# Patient Record
Sex: FEMALE | Race: WHITE | NOT HISPANIC OR LATINO | Employment: OTHER | URBAN - METROPOLITAN AREA
[De-identification: names, ages, dates, MRNs, and addresses within clinical notes are randomized per-mention and may not be internally consistent; named-entity substitution may affect disease eponyms.]

---

## 2017-01-25 DIAGNOSIS — Z12.31 ENCOUNTER FOR SCREENING MAMMOGRAM FOR MALIGNANT NEOPLASM OF BREAST: ICD-10-CM

## 2017-01-27 ENCOUNTER — HOSPITAL ENCOUNTER (OUTPATIENT)
Dept: RADIOLOGY | Age: 62
Discharge: HOME | End: 2017-01-27
Payer: COMMERCIAL

## 2017-01-27 DIAGNOSIS — Z12.31 ENCOUNTER FOR SCREENING MAMMOGRAM FOR MALIGNANT NEOPLASM OF BREAST: ICD-10-CM

## 2017-01-27 PROCEDURE — G0202 SCR MAMMO BI INCL CAD: HCPCS

## 2017-02-28 ENCOUNTER — ALLSCRIPTS OFFICE VISIT (OUTPATIENT)
Dept: OTHER | Facility: OTHER | Age: 62
End: 2017-02-28

## 2017-05-02 ENCOUNTER — GENERIC CONVERSION - ENCOUNTER (OUTPATIENT)
Dept: OTHER | Facility: OTHER | Age: 62
End: 2017-05-02

## 2017-06-20 ENCOUNTER — ALLSCRIPTS OFFICE VISIT (OUTPATIENT)
Dept: OTHER | Facility: OTHER | Age: 62
End: 2017-06-20

## 2017-08-11 ENCOUNTER — LAB CONVERSION - ENCOUNTER (OUTPATIENT)
Dept: OTHER | Facility: OTHER | Age: 62
End: 2017-08-11

## 2017-08-11 ENCOUNTER — GENERIC CONVERSION - ENCOUNTER (OUTPATIENT)
Dept: OTHER | Facility: OTHER | Age: 62
End: 2017-08-11

## 2017-08-11 LAB
A/G RATIO (HISTORICAL): 2.4 (CALC) (ref 1–2.5)
ALBUMIN SERPL BCP-MCNC: 4.4 G/DL (ref 3.6–5.1)
ALP SERPL-CCNC: 60 U/L (ref 33–130)
AST SERPL W P-5'-P-CCNC: 19 U/L (ref 10–35)
BILIRUB SERPL-MCNC: 2 MG/DL (ref 0.2–1.2)
BILIRUBIN DIRECT (HISTORICAL): 0.4 MG/DL
BUN SERPL-MCNC: 12 MG/DL (ref 7–25)
BUN/CREA RATIO (HISTORICAL): ABNORMAL (CALC) (ref 6–22)
CALCIUM SERPL-MCNC: 9.4 MG/DL (ref 8.6–10.4)
CHLORIDE SERPL-SCNC: 105 MMOL/L (ref 98–110)
CHOLEST SERPL-MCNC: 161 MG/DL (ref 125–200)
CHOLEST/HDLC SERPL: 2.1 (CALC)
CO2 SERPL-SCNC: 29 MMOL/L (ref 20–31)
CREAT SERPL-MCNC: 0.84 MG/DL (ref 0.5–0.99)
DEPRECATED RDW RBC AUTO: 12.9 % (ref 11–15)
EGFR AFRICAN AMERICAN (HISTORICAL): 87 ML/MIN/1.73M2
EGFR-AMERICAN CALC (HISTORICAL): 75 ML/MIN/1.73M2
GAMMA GLOBULIN (HISTORICAL): 1.8 G/DL (CALC) (ref 1.9–3.7)
GLUCOSE (HISTORICAL): 87 MG/DL (ref 65–99)
HCT VFR BLD AUTO: 38.3 % (ref 35–45)
HDLC SERPL-MCNC: 76 MG/DL
HGB BLD-MCNC: 12.8 G/DL (ref 11.7–15.5)
LDL CHOLESTEROL (HISTORICAL): 68 MG/DL (CALC)
MCH RBC QN AUTO: 30.5 PG (ref 27–33)
MCHC RBC AUTO-ENTMCNC: 33.4 G/DL (ref 32–36)
MCV RBC AUTO: 91.2 FL (ref 80–100)
NON-HDL-CHOL (CHOL-HDL) (HISTORICAL): 85 MG/DL (CALC)
PLATELET # BLD AUTO: 233 THOUSAND/UL (ref 140–400)
PMV BLD AUTO: 11.3 FL (ref 7.5–12.5)
POTASSIUM SERPL-SCNC: 4.1 MMOL/L (ref 3.5–5.3)
RBC # BLD AUTO: 4.2 MILLION/UL (ref 3.8–5.1)
SODIUM SERPL-SCNC: 143 MMOL/L (ref 135–146)
TOTAL PROTEIN (HISTORICAL): 6.2 G/DL (ref 6.1–8.1)
TRIGL SERPL-MCNC: 85 MG/DL
WBC # BLD AUTO: 4.8 THOUSAND/UL (ref 3.8–10.8)

## 2017-08-21 DIAGNOSIS — R68.83 CHILLS (WITHOUT FEVER): ICD-10-CM

## 2017-08-21 DIAGNOSIS — M85.80 OTHER SPECIFIED DISORDERS OF BONE DENSITY AND STRUCTURE, UNSPECIFIED SITE: ICD-10-CM

## 2017-08-21 DIAGNOSIS — R17 JAUNDICE: ICD-10-CM

## 2017-08-21 DIAGNOSIS — E78.5 HYPERLIPIDEMIA: ICD-10-CM

## 2017-08-22 ENCOUNTER — ALLSCRIPTS OFFICE VISIT (OUTPATIENT)
Dept: OTHER | Facility: OTHER | Age: 62
End: 2017-08-22

## 2017-09-23 ENCOUNTER — LAB CONVERSION - ENCOUNTER (OUTPATIENT)
Dept: OTHER | Facility: OTHER | Age: 62
End: 2017-09-23

## 2017-09-23 LAB
A/G RATIO (HISTORICAL): 2.3 (CALC) (ref 1–2.5)
ALBUMIN SERPL BCP-MCNC: 4.3 G/DL (ref 3.6–5.1)
ALP SERPL-CCNC: 75 U/L (ref 33–130)
ALT SERPL W P-5'-P-CCNC: 20 U/L (ref 6–29)
AST SERPL W P-5'-P-CCNC: 19 U/L (ref 10–35)
BASOPHILS # BLD AUTO: 0.5 %
BASOPHILS # BLD AUTO: 29 CELLS/UL (ref 0–200)
BILIRUB SERPL-MCNC: 1.3 MG/DL (ref 0.2–1.2)
BUN SERPL-MCNC: 14 MG/DL (ref 7–25)
BUN/CREA RATIO (HISTORICAL): ABNORMAL (CALC) (ref 6–22)
CALCIUM SERPL-MCNC: 9.5 MG/DL (ref 8.6–10.4)
CHLORIDE SERPL-SCNC: 106 MMOL/L (ref 98–110)
CO2 SERPL-SCNC: 30 MMOL/L (ref 20–31)
CREAT SERPL-MCNC: 0.85 MG/DL (ref 0.5–0.99)
DEPRECATED RDW RBC AUTO: 12.7 % (ref 11–15)
EGFR AFRICAN AMERICAN (HISTORICAL): 86 ML/MIN/1.73M2
EGFR-AMERICAN CALC (HISTORICAL): 74 ML/MIN/1.73M2
EOSINOPHIL # BLD AUTO: 0.7 %
EOSINOPHIL # BLD AUTO: 40 CELLS/UL (ref 15–500)
ERYTHROCYTE SEDIMENTATION RATE (HISTORICAL): 2 MM/H
GAMMA GLOBULIN (HISTORICAL): 1.9 G/DL (CALC) (ref 1.9–3.7)
GLUCOSE (HISTORICAL): 89 MG/DL (ref 65–99)
HCT VFR BLD AUTO: 37.5 % (ref 35–45)
HGB BLD-MCNC: 12.6 G/DL (ref 11.7–15.5)
LYMPHOCYTES # BLD AUTO: 1841 CELLS/UL (ref 850–3900)
LYMPHOCYTES # BLD AUTO: 32.3 %
MCH RBC QN AUTO: 30.4 PG (ref 27–33)
MCHC RBC AUTO-ENTMCNC: 33.6 G/DL (ref 32–36)
MCV RBC AUTO: 90.6 FL (ref 80–100)
METANEPHRINE FREE PLASMA (HISTORICAL): 52 PG/ML
MONOCYTES # BLD AUTO: 371 CELLS/UL (ref 200–950)
MONOCYTES (HISTORICAL): 6.5 %
NEUTROPHILS # BLD AUTO: 3420 CELLS/UL (ref 1500–7800)
NEUTROPHILS # BLD AUTO: 60 %
NORMETANEPHRINE FREE PLASMA (HISTORICAL): 145 PG/ML
PLATELET # BLD AUTO: 229 THOUSAND/UL (ref 140–400)
PMV BLD AUTO: 11.1 FL (ref 7.5–12.5)
POTASSIUM SERPL-SCNC: 4.3 MMOL/L (ref 3.5–5.3)
RBC # BLD AUTO: 4.14 MILLION/UL (ref 3.8–5.1)
SODIUM SERPL-SCNC: 142 MMOL/L (ref 135–146)
T4 FREE SERPL-MCNC: 1.2 NG/DL (ref 0.8–1.8)
TOTAL PROTEIN (HISTORICAL): 6.2 G/DL (ref 6.1–8.1)
TOTAL, FREE (MN+NMN) (HISTORICAL): 197 PG/ML
TSH SERPL DL<=0.05 MIU/L-ACNC: 0.36 MIU/L (ref 0.4–4.5)
WBC # BLD AUTO: 5.7 THOUSAND/UL (ref 3.8–10.8)

## 2017-09-25 ENCOUNTER — GENERIC CONVERSION - ENCOUNTER (OUTPATIENT)
Dept: OTHER | Facility: OTHER | Age: 62
End: 2017-09-25

## 2017-10-30 ENCOUNTER — HOSPITAL ENCOUNTER (INPATIENT)
Facility: HOSPITAL | Age: 62
LOS: 6 days | Discharge: HOME | DRG: 749 | End: 2017-11-05
Attending: EMERGENCY MEDICINE | Admitting: SURGERY
Payer: COMMERCIAL

## 2017-10-30 ENCOUNTER — APPOINTMENT (EMERGENCY)
Dept: CT IMAGING | Facility: HOSPITAL | Age: 62
DRG: 749 | End: 2017-10-30
Payer: COMMERCIAL

## 2017-10-30 ENCOUNTER — ANESTHESIA EVENT (INPATIENT)
Dept: PERIOP | Facility: HOSPITAL | Age: 62
DRG: 749 | End: 2017-10-30
Payer: COMMERCIAL

## 2017-10-30 ENCOUNTER — ANESTHESIA (INPATIENT)
Dept: PERIOP | Facility: HOSPITAL | Age: 62
DRG: 749 | End: 2017-10-30
Payer: COMMERCIAL

## 2017-10-30 DIAGNOSIS — K56.601 COMPLETE INTESTINAL OBSTRUCTION, UNSPECIFIED CAUSE (HCC): Primary | ICD-10-CM

## 2017-10-30 DIAGNOSIS — K56.609 SBO (SMALL BOWEL OBSTRUCTION) (HCC): ICD-10-CM

## 2017-10-30 LAB
ALBUMIN SERPL BCP-MCNC: 3.4 G/DL (ref 3.5–5)
ALP SERPL-CCNC: 83 U/L (ref 46–116)
ALT SERPL W P-5'-P-CCNC: 24 U/L (ref 12–78)
ANION GAP SERPL CALCULATED.3IONS-SCNC: 21 MMOL/L (ref 4–13)
AST SERPL W P-5'-P-CCNC: 18 U/L (ref 5–45)
ATRIAL RATE: 86 BPM
BASE EX.OXY STD BLDV CALC-SCNC: 75.8 % (ref 60–80)
BASE EXCESS BLDV CALC-SCNC: -8.6 MMOL/L
BASOPHILS # BLD AUTO: 0 THOUSANDS/ΜL (ref 0–0.1)
BASOPHILS NFR BLD AUTO: 0 % (ref 0–1)
BILIRUB SERPL-MCNC: 2.2 MG/DL (ref 0.2–1)
BUN SERPL-MCNC: 82 MG/DL (ref 5–25)
CALCIUM SERPL-MCNC: 9.2 MG/DL (ref 8.3–10.1)
CHLORIDE SERPL-SCNC: 94 MMOL/L (ref 100–108)
CO2 SERPL-SCNC: 18 MMOL/L (ref 21–32)
CREAT SERPL-MCNC: 4.56 MG/DL (ref 0.6–1.3)
EOSINOPHIL # BLD AUTO: 0.29 THOUSAND/ΜL (ref 0–0.61)
EOSINOPHIL NFR BLD AUTO: 3 % (ref 0–6)
ERYTHROCYTE [DISTWIDTH] IN BLOOD BY AUTOMATED COUNT: 13 % (ref 11.6–15.1)
GFR SERPL CREATININE-BSD FRML MDRD: 10 ML/MIN/1.73SQ M
GLUCOSE SERPL-MCNC: 135 MG/DL (ref 65–140)
HCO3 BLDV-SCNC: 16.2 MMOL/L (ref 24–30)
HCT VFR BLD AUTO: 43 % (ref 34.8–46.1)
HGB BLD-MCNC: 14.8 G/DL (ref 11.5–15.4)
HOLD SPECIMEN: NORMAL
LACTATE SERPL-SCNC: 1.7 MMOL/L (ref 0.5–2)
LIPASE SERPL-CCNC: 104 U/L (ref 73–393)
LYMPHOCYTES # BLD AUTO: 0.45 THOUSANDS/ΜL (ref 0.6–4.47)
LYMPHOCYTES NFR BLD AUTO: 5 % (ref 14–44)
MCH RBC QN AUTO: 29.8 PG (ref 26.8–34.3)
MCHC RBC AUTO-ENTMCNC: 34.4 G/DL (ref 31.4–37.4)
MCV RBC AUTO: 87 FL (ref 82–98)
MONOCYTES # BLD AUTO: 0.35 THOUSAND/ΜL (ref 0.17–1.22)
MONOCYTES NFR BLD AUTO: 4 % (ref 4–12)
NEUTROPHILS # BLD AUTO: 7.85 THOUSANDS/ΜL (ref 1.85–7.62)
NEUTS SEG NFR BLD AUTO: 88 % (ref 43–75)
O2 CT BLDV-SCNC: 16.5 ML/DL
P AXIS: 56 DEGREES
PCO2 BLDV: 32 MM HG (ref 42–50)
PH BLDV: 7.32 [PH] (ref 7.3–7.4)
PLATELET # BLD AUTO: 166 THOUSANDS/UL (ref 149–390)
PMV BLD AUTO: 12 FL (ref 8.9–12.7)
PO2 BLDV: 44.6 MM HG (ref 35–45)
POTASSIUM SERPL-SCNC: 3.3 MMOL/L (ref 3.5–5.3)
PR INTERVAL: 152 MS
PROT SERPL-MCNC: 7.7 G/DL (ref 6.4–8.2)
QRS AXIS: 18 DEGREES
QRSD INTERVAL: 86 MS
QT INTERVAL: 338 MS
QTC INTERVAL: 404 MS
RBC # BLD AUTO: 4.97 MILLION/UL (ref 3.81–5.12)
SODIUM SERPL-SCNC: 133 MMOL/L (ref 136–145)
T WAVE AXIS: 32 DEGREES
VENTRICULAR RATE: 86 BPM
WBC # BLD AUTO: 8.94 THOUSAND/UL (ref 4.31–10.16)

## 2017-10-30 PROCEDURE — 85025 COMPLETE CBC W/AUTO DIFF WBC: CPT | Performed by: EMERGENCY MEDICINE

## 2017-10-30 PROCEDURE — 0DTJ0ZZ RESECTION OF APPENDIX, OPEN APPROACH: ICD-10-PCS | Performed by: SURGERY

## 2017-10-30 PROCEDURE — 83690 ASSAY OF LIPASE: CPT | Performed by: EMERGENCY MEDICINE

## 2017-10-30 PROCEDURE — C9113 INJ PANTOPRAZOLE SODIUM, VIA: HCPCS | Performed by: SURGERY

## 2017-10-30 PROCEDURE — 36415 COLL VENOUS BLD VENIPUNCTURE: CPT | Performed by: EMERGENCY MEDICINE

## 2017-10-30 PROCEDURE — 83605 ASSAY OF LACTIC ACID: CPT | Performed by: EMERGENCY MEDICINE

## 2017-10-30 PROCEDURE — 96374 THER/PROPH/DIAG INJ IV PUSH: CPT

## 2017-10-30 PROCEDURE — 74176 CT ABD & PELVIS W/O CONTRAST: CPT

## 2017-10-30 PROCEDURE — 99285 EMERGENCY DEPT VISIT HI MDM: CPT

## 2017-10-30 PROCEDURE — 82805 BLOOD GASES W/O2 SATURATION: CPT | Performed by: EMERGENCY MEDICINE

## 2017-10-30 PROCEDURE — 80053 COMPREHEN METABOLIC PANEL: CPT | Performed by: EMERGENCY MEDICINE

## 2017-10-30 PROCEDURE — 88304 TISSUE EXAM BY PATHOLOGIST: CPT | Performed by: SURGERY

## 2017-10-30 PROCEDURE — 93005 ELECTROCARDIOGRAM TRACING: CPT

## 2017-10-30 PROCEDURE — 94762 N-INVAS EAR/PLS OXIMTRY CONT: CPT

## 2017-10-30 PROCEDURE — 96375 TX/PRO/DX INJ NEW DRUG ADDON: CPT

## 2017-10-30 PROCEDURE — 0W9J00Z DRAINAGE OF PELVIC CAVITY WITH DRAINAGE DEVICE, OPEN APPROACH: ICD-10-PCS | Performed by: SURGERY

## 2017-10-30 PROCEDURE — 96376 TX/PRO/DX INJ SAME DRUG ADON: CPT

## 2017-10-30 PROCEDURE — 94760 N-INVAS EAR/PLS OXIMETRY 1: CPT

## 2017-10-30 RX ORDER — ONDANSETRON 2 MG/ML
4 INJECTION INTRAMUSCULAR; INTRAVENOUS ONCE
Status: DISCONTINUED | OUTPATIENT
Start: 2017-10-30 | End: 2017-11-05 | Stop reason: HOSPADM

## 2017-10-30 RX ORDER — MAGNESIUM HYDROXIDE 1200 MG/15ML
LIQUID ORAL AS NEEDED
Status: DISCONTINUED | OUTPATIENT
Start: 2017-10-30 | End: 2017-10-30 | Stop reason: HOSPADM

## 2017-10-30 RX ORDER — SODIUM CHLORIDE 9 MG/ML
75 INJECTION, SOLUTION INTRAVENOUS CONTINUOUS
Status: DISCONTINUED | OUTPATIENT
Start: 2017-10-30 | End: 2017-11-02

## 2017-10-30 RX ORDER — ONDANSETRON 2 MG/ML
4 INJECTION INTRAMUSCULAR; INTRAVENOUS EVERY 4 HOURS PRN
Status: DISCONTINUED | OUTPATIENT
Start: 2017-10-30 | End: 2017-11-05 | Stop reason: HOSPADM

## 2017-10-30 RX ORDER — LIDOCAINE HYDROCHLORIDE 10 MG/ML
INJECTION, SOLUTION INFILTRATION; PERINEURAL AS NEEDED
Status: DISCONTINUED | OUTPATIENT
Start: 2017-10-30 | End: 2017-10-30 | Stop reason: SURG

## 2017-10-30 RX ORDER — CHOLECALCIFEROL (VITAMIN D3) 125 MCG
2000 CAPSULE ORAL DAILY
COMMUNITY

## 2017-10-30 RX ORDER — MIDAZOLAM HYDROCHLORIDE 1 MG/ML
INJECTION INTRAMUSCULAR; INTRAVENOUS
Status: DISPENSED
Start: 2017-10-30 | End: 2017-10-31

## 2017-10-30 RX ORDER — HEPARIN SODIUM 5000 [USP'U]/ML
5000 INJECTION, SOLUTION INTRAVENOUS; SUBCUTANEOUS EVERY 8 HOURS SCHEDULED
Status: DISCONTINUED | OUTPATIENT
Start: 2017-10-30 | End: 2017-11-05 | Stop reason: HOSPADM

## 2017-10-30 RX ORDER — ONDANSETRON 2 MG/ML
INJECTION INTRAMUSCULAR; INTRAVENOUS AS NEEDED
Status: DISCONTINUED | OUTPATIENT
Start: 2017-10-30 | End: 2017-10-30 | Stop reason: SURG

## 2017-10-30 RX ORDER — ATORVASTATIN CALCIUM 20 MG/1
20 TABLET, FILM COATED ORAL DAILY
COMMUNITY
End: 2018-02-27 | Stop reason: SDUPTHER

## 2017-10-30 RX ORDER — MIDAZOLAM HYDROCHLORIDE 1 MG/ML
INJECTION INTRAMUSCULAR; INTRAVENOUS AS NEEDED
Status: DISCONTINUED | OUTPATIENT
Start: 2017-10-30 | End: 2017-10-30 | Stop reason: SURG

## 2017-10-30 RX ORDER — FENTANYL CITRATE/PF 50 MCG/ML
25 SYRINGE (ML) INJECTION
Status: DISCONTINUED | OUTPATIENT
Start: 2017-10-30 | End: 2017-10-30 | Stop reason: HOSPADM

## 2017-10-30 RX ORDER — MORPHINE SULFATE 4 MG/ML
4 INJECTION, SOLUTION INTRAMUSCULAR; INTRAVENOUS ONCE
Status: COMPLETED | OUTPATIENT
Start: 2017-10-30 | End: 2017-10-30

## 2017-10-30 RX ORDER — CHLORAL HYDRATE 500 MG
4000 CAPSULE ORAL DAILY
COMMUNITY

## 2017-10-30 RX ORDER — SODIUM CHLORIDE, SODIUM LACTATE, POTASSIUM CHLORIDE, CALCIUM CHLORIDE 600; 310; 30; 20 MG/100ML; MG/100ML; MG/100ML; MG/100ML
INJECTION, SOLUTION INTRAVENOUS CONTINUOUS PRN
Status: DISCONTINUED | OUTPATIENT
Start: 2017-10-30 | End: 2017-10-30 | Stop reason: SURG

## 2017-10-30 RX ORDER — CEFAZOLIN SODIUM 1 G/3ML
INJECTION, POWDER, FOR SOLUTION INTRAMUSCULAR; INTRAVENOUS AS NEEDED
Status: DISCONTINUED | OUTPATIENT
Start: 2017-10-30 | End: 2017-10-30 | Stop reason: SURG

## 2017-10-30 RX ORDER — GLYCOPYRROLATE 0.2 MG/ML
INJECTION INTRAMUSCULAR; INTRAVENOUS AS NEEDED
Status: DISCONTINUED | OUTPATIENT
Start: 2017-10-30 | End: 2017-10-30 | Stop reason: SURG

## 2017-10-30 RX ORDER — SUCCINYLCHOLINE/SOD CL,ISO/PF 100 MG/5ML
SYRINGE (ML) INTRAVENOUS AS NEEDED
Status: DISCONTINUED | OUTPATIENT
Start: 2017-10-30 | End: 2017-10-30 | Stop reason: SURG

## 2017-10-30 RX ORDER — ONDANSETRON 2 MG/ML
4 INJECTION INTRAMUSCULAR; INTRAVENOUS ONCE AS NEEDED
Status: DISCONTINUED | OUTPATIENT
Start: 2017-10-30 | End: 2017-10-30 | Stop reason: HOSPADM

## 2017-10-30 RX ORDER — EPHEDRINE SULFATE 50 MG/ML
INJECTION, SOLUTION INTRAVENOUS AS NEEDED
Status: DISCONTINUED | OUTPATIENT
Start: 2017-10-30 | End: 2017-10-30 | Stop reason: SURG

## 2017-10-30 RX ORDER — PROPOFOL 10 MG/ML
INJECTION, EMULSION INTRAVENOUS AS NEEDED
Status: DISCONTINUED | OUTPATIENT
Start: 2017-10-30 | End: 2017-10-30 | Stop reason: SURG

## 2017-10-30 RX ORDER — PANTOPRAZOLE SODIUM 40 MG/1
40 INJECTION, POWDER, FOR SOLUTION INTRAVENOUS
Status: DISCONTINUED | OUTPATIENT
Start: 2017-10-30 | End: 2017-11-05 | Stop reason: HOSPADM

## 2017-10-30 RX ORDER — KETOROLAC TROMETHAMINE 30 MG/ML
INJECTION, SOLUTION INTRAMUSCULAR; INTRAVENOUS AS NEEDED
Status: DISCONTINUED | OUTPATIENT
Start: 2017-10-30 | End: 2017-10-30 | Stop reason: SURG

## 2017-10-30 RX ORDER — ASPIRIN 81 MG/1
81 TABLET, CHEWABLE ORAL DAILY
COMMUNITY

## 2017-10-30 RX ORDER — MORPHINE SULFATE 4 MG/ML
4 INJECTION, SOLUTION INTRAMUSCULAR; INTRAVENOUS ONCE
Status: DISCONTINUED | OUTPATIENT
Start: 2017-10-30 | End: 2017-10-30

## 2017-10-30 RX ADMIN — FENTANYL CITRATE 25 MCG: 50 INJECTION INTRAMUSCULAR; INTRAVENOUS at 15:30

## 2017-10-30 RX ADMIN — METRONIDAZOLE 500 MG: 500 INJECTION, SOLUTION INTRAVENOUS at 19:45

## 2017-10-30 RX ADMIN — FENTANYL CITRATE 25 MCG: 50 INJECTION INTRAMUSCULAR; INTRAVENOUS at 15:22

## 2017-10-30 RX ADMIN — FENTANYL CITRATE 25 MCG: 50 INJECTION INTRAMUSCULAR; INTRAVENOUS at 15:34

## 2017-10-30 RX ADMIN — SODIUM CHLORIDE 125 ML/HR: 0.9 INJECTION, SOLUTION INTRAVENOUS at 18:13

## 2017-10-30 RX ADMIN — GLYCOPYRROLATE 0.6 MG: 0.2 INJECTION, SOLUTION INTRAMUSCULAR; INTRAVENOUS at 14:45

## 2017-10-30 RX ADMIN — DEXAMETHASONE SODIUM PHOSPHATE 4 MG: 10 INJECTION INTRAMUSCULAR; INTRAVENOUS at 13:51

## 2017-10-30 RX ADMIN — MIDAZOLAM HYDROCHLORIDE 2 MG: 1 INJECTION, SOLUTION INTRAMUSCULAR; INTRAVENOUS at 13:42

## 2017-10-30 RX ADMIN — SODIUM CHLORIDE 1000 ML: 0.9 INJECTION, SOLUTION INTRAVENOUS at 10:02

## 2017-10-30 RX ADMIN — MORPHINE SULFATE 4 MG: 4 INJECTION INTRAVENOUS at 10:43

## 2017-10-30 RX ADMIN — METRONIDAZOLE 500 MG: 500 INJECTION, SOLUTION INTRAVENOUS at 12:26

## 2017-10-30 RX ADMIN — EPHEDRINE SULFATE 10 MG: 50 INJECTION, SOLUTION INTRAMUSCULAR; INTRAVENOUS; SUBCUTANEOUS at 13:54

## 2017-10-30 RX ADMIN — PANTOPRAZOLE SODIUM 40 MG: 40 INJECTION, POWDER, FOR SOLUTION INTRAVENOUS at 18:13

## 2017-10-30 RX ADMIN — PROPOFOL 150 MG: 10 INJECTION, EMULSION INTRAVENOUS at 13:47

## 2017-10-30 RX ADMIN — Medication 100 MG: at 13:47

## 2017-10-30 RX ADMIN — KETOROLAC TROMETHAMINE 30 MG: 30 INJECTION, SOLUTION INTRAMUSCULAR at 14:47

## 2017-10-30 RX ADMIN — MIDAZOLAM HYDROCHLORIDE 2 MG: 1 INJECTION, SOLUTION INTRAMUSCULAR; INTRAVENOUS at 12:55

## 2017-10-30 RX ADMIN — CEFAZOLIN SODIUM 2000 MG: 2 SOLUTION INTRAVENOUS at 11:47

## 2017-10-30 RX ADMIN — NEOSTIGMINE METHYLSULFATE 3 MG: 1 INJECTION, SOLUTION INTRAMUSCULAR; INTRAVENOUS; SUBCUTANEOUS at 14:45

## 2017-10-30 RX ADMIN — CEFAZOLIN SODIUM 1000 MG: 1 SOLUTION INTRAVENOUS at 18:14

## 2017-10-30 RX ADMIN — ONDANSETRON 4 MG: 2 INJECTION INTRAMUSCULAR; INTRAVENOUS at 13:51

## 2017-10-30 RX ADMIN — FENTANYL CITRATE 25 MCG: 50 INJECTION INTRAMUSCULAR; INTRAVENOUS at 15:17

## 2017-10-30 RX ADMIN — LIDOCAINE HYDROCHLORIDE 50 MG: 10 INJECTION, SOLUTION INFILTRATION; PERINEURAL at 13:47

## 2017-10-30 RX ADMIN — HYDROMORPHONE HYDROCHLORIDE 0.5 MG: 1 INJECTION, SOLUTION INTRAMUSCULAR; INTRAVENOUS; SUBCUTANEOUS at 14:49

## 2017-10-30 RX ADMIN — PROPOFOL 100 MG: 10 INJECTION, EMULSION INTRAVENOUS at 13:42

## 2017-10-30 RX ADMIN — CEFAZOLIN SODIUM 1000 MG: 1 INJECTION, POWDER, FOR SOLUTION INTRAMUSCULAR; INTRAVENOUS at 13:54

## 2017-10-30 RX ADMIN — SODIUM CHLORIDE 2000 ML: 0.9 INJECTION, SOLUTION INTRAVENOUS at 12:14

## 2017-10-30 RX ADMIN — HEPARIN SODIUM 5000 UNITS: 5000 INJECTION, SOLUTION INTRAVENOUS; SUBCUTANEOUS at 22:02

## 2017-10-30 RX ADMIN — SODIUM CHLORIDE, SODIUM LACTATE, POTASSIUM CHLORIDE, AND CALCIUM CHLORIDE: .6; .31; .03; .02 INJECTION, SOLUTION INTRAVENOUS at 13:42

## 2017-10-30 RX ADMIN — Medication: at 16:26

## 2017-10-30 RX ADMIN — MORPHINE SULFATE 4 MG: 4 INJECTION INTRAVENOUS at 10:12

## 2017-10-30 NOTE — PLAN OF CARE
METABOLIC, FLUID AND ELECTROLYTES - ADULT     Electrolytes maintained within normal limits Progressing     Fluid balance maintained Progressing        Nutrition/Hydration-ADULT     Nutrient/Hydration intake appropriate for improving, restoring or maintaining nutritional needs Progressing        PAIN - ADULT     Verbalizes/displays adequate comfort level or baseline comfort level Progressing        Potential for Falls     Patient will remain free of falls Progressing        Prexisting or High Potential for Compromised Skin Integrity     Skin integrity is maintained or improved Progressing        SKIN/TISSUE INTEGRITY - ADULT     Incision(s), wounds(s) or drain site(s) healing without S/S of infection Progressing

## 2017-10-30 NOTE — H&P
History and Physical -General Surgery  [facility] 65 [facility] MR[facility]: [id number]  Unit/Bed#: [facility] Encounter: [id number]        Reason for Consult / Principal Problem: Complete intestinal obstruction    HPI: Melanie Damon is a 65y o  year old female who presented to the ED with a 2 day history of BLQ abdominal pain  Yesterday the pain was 10/10  Associated with N/V/D  She denies fever/chills  SHe has not had any food since Friday  Cat scan findings reveal a high grade SBO most likely secondary to an internal hernia  Review of Systems   Constitutional: Positive for activity change and appetite change  Negative for chills and fever  Cardiovascular: Negative for chest pain  Gastrointestinal: Positive for abdominal distention, abdominal pain, diarrhea, nausea and vomiting  Negative for blood in stool  Skin: Negative for color change  Historical Information   Past Medical History:   Diagnosis Date    Diverticulosis     GERD (gastroesophageal reflux disease)     Hyperlipidemia      Past Surgical History:   Procedure Laterality Date    UTERINE FIBROID SURGERY       Social History   History   Alcohol Use No     History   Drug Use No     History   Smoking Status    Never Smoker   Smokeless Tobacco    Never Used     History reviewed  No pertinent family history      Meds/Allergies     Prescriptions Prior to Admission   Medication    aspirin 81 mg chewable tablet    atorvastatin (LIPITOR) 20 mg tablet    Cholecalciferol (VITAMIN D3) 2000 units TABS    Cyanocobalamin (VITAMIN B 12 PO)    Linaclotide 145 MCG CAPS    Multiple Vitamins-Calcium (ONE-A-DAY WOMENS FORMULA PO)    Omega-3 Fat Ac-Cholecalciferol (FLEX OMEGA BENEFITS/VIT D-3 PO)    omeprazole (PriLOSEC) 10 mg delayed release capsule     Current Facility-Administered Medications   Medication Dose Route Frequency    [MAR Hold] morphine (PF) 4 mg/mL injection 4 mg  4 mg Intravenous Once    [MAR Hold] ondansetron (ZOFRAN) injection 4 mg  4 mg Intravenous Once    [MAR Hold] sodium chloride 0 9 % bolus 1,000 mL  1,000 mL Intravenous Once    sodium chloride 0 9 % bolus 2,000 mL  2,000 mL Intravenous Once       No Known Allergies    Objective     Blood pressure 111/56, pulse 68, temperature (!) 97 °F (36 1 °C), temperature source Temporal, resp  rate 18, weight 67 kg (147 lb 12 8 oz), SpO2 98 %  Intake/Output Summary (Last 24 hours) at 10/30/17 1303  Last data filed at 10/30/17 1226   Gross per 24 hour   Intake             1050 ml   Output                0 ml   Net             1050 ml       PHYSICAL EXAM  General appearance: alert and no distress  Skin: Skin color, texture, turgor normal  No rashes or lesions  Head: Normocephalic, without obvious abnormality  Heart: regular rate and rhythm, S1, S2 normal, no murmur, click, rub or gallop  Lungs: clear to auscultation bilaterally  Abdomen: rounded and soft, tender throughout  no BS, no rebound or guarding     Neurological: normal without focal findings    Lab Results:   Admission on 10/30/2017   Component Date Value    WBC 10/30/2017 8 94     RBC 10/30/2017 4 97     Hemoglobin 10/30/2017 14 8     Hematocrit 10/30/2017 43 0     MCV 10/30/2017 87     MCH 10/30/2017 29 8     MCHC 10/30/2017 34 4     RDW 10/30/2017 13 0     MPV 10/30/2017 12 0     Platelets 02/36/3583 166     Neutrophils Relative 10/30/2017 88*    Lymphocytes Relative 10/30/2017 5*    Monocytes Relative 10/30/2017 4     Eosinophils Relative 10/30/2017 3     Basophils Relative 10/30/2017 0     Neutrophils Absolute 10/30/2017 7 85*    Lymphocytes Absolute 10/30/2017 0 45*    Monocytes Absolute 10/30/2017 0 35     Eosinophils Absolute 10/30/2017 0 29     Basophils Absolute 10/30/2017 0 00     Sodium 10/30/2017 133*    Potassium 10/30/2017 3 3*    Chloride 10/30/2017 94*    CO2 10/30/2017 18*    Anion Gap 10/30/2017 21*    BUN 10/30/2017 82*    Creatinine 10/30/2017 4 56*    Glucose 10/30/2017 135  Calcium 10/30/2017 9 2     AST 10/30/2017 18     ALT 10/30/2017 24     Alkaline Phosphatase 10/30/2017 83     Total Protein 10/30/2017 7 7     Albumin 10/30/2017 3 4*    Total Bilirubin 10/30/2017 2 20*    eGFR 10/30/2017 10     Lipase 10/30/2017 104     Extra Tube 10/30/2017 Hold for add-ons   pH, Rohit 10/30/2017 7  321     pCO2, Rohit 10/30/2017 32 0*    pO2, Rohit 10/30/2017 44 6     HCO3, Rohit 10/30/2017 16 2*    Base Excess, Rohit 10/30/2017 -8 6     O2 Content, Rohit 10/30/2017 16 5     O2 HGB, VENOUS 10/30/2017 75 8     LACTIC ACID 10/30/2017 1 7      Imaging Studies: I have personally reviewed pertinent reports  ASSESSMENT/PLAN:  Problem List     * (Principal)Complete intestinal obstruction       Assessment: 64 yo female with SBO secondary to an internal hernia     Plan:   NPO for OR today  IVF   IV abx  Consent obtained  NGT decompression   DHARA secondary to obstruction       IVF        Monitor BUN/CR        This patient will require > 2 night hospital stay  Counseling / Coordination of Care  Total time spent today  30 minutes  Greater than 50% of total time was spent with the patient and / or family counseling and / or coordination of care

## 2017-10-30 NOTE — ED PROVIDER NOTES
History  Chief Complaint   Patient presents with    Abdominal Pain     Pt  c/o abdominal pain since  Friday, vomiting and loose stools  Pt  told EMS she passed out on way to bathroom yesterday     65 yr  wf c/o lower abd pain with several epiwsdoes of non bloody vomiting and brown liquid non bloody stools fro 3 days-- no fevers--  No ill contacts- no travel- no other comps-         History provided by:  Patient   used: No        Prior to Admission Medications   Prescriptions Last Dose Informant Patient Reported? Taking? Cholecalciferol (VITAMIN D3) 2000 units TABS   Yes Yes   Sig: Take 2,000 Units by mouth daily   Cyanocobalamin (VITAMIN B 12 PO)   Yes Yes   Sig: Take 5,000 mcg by mouth daily   Linaclotide 145 MCG CAPS   Yes Yes   Sig: Take 145 mcg by mouth daily   Multiple Vitamins-Calcium (ONE-A-DAY WOMENS FORMULA PO)   Yes Yes   Sig: Take 1 tablet by mouth daily   Omega-3 Fat Ac-Cholecalciferol (FLEX OMEGA BENEFITS/VIT D-3 PO)   Yes Yes   Sig: Take 4 tablets by mouth daily   aspirin 81 mg chewable tablet   Yes Yes   Sig: Chew 81 mg daily   atorvastatin (LIPITOR) 20 mg tablet   Yes Yes   Sig: Take 20 mg by mouth daily   omeprazole (PriLOSEC) 10 mg delayed release capsule   Yes Yes   Sig: Take 10 mg by mouth daily      Facility-Administered Medications: None       Past Medical History:   Diagnosis Date    Diverticulosis     GERD (gastroesophageal reflux disease)     Hyperlipidemia        Past Surgical History:   Procedure Laterality Date    UTERINE FIBROID SURGERY         History reviewed  No pertinent family history  I have reviewed and agree with the history as documented  Social History   Substance Use Topics    Smoking status: Never Smoker    Smokeless tobacco: Never Used    Alcohol use No        Review of Systems   Constitutional: Positive for activity change, appetite change and fatigue  Negative for chills, diaphoresis, fever and unexpected weight change  HENT: Negative  Eyes: Negative  Respiratory: Negative  Cardiovascular: Negative  Gastrointestinal: Positive for abdominal distention, abdominal pain, diarrhea, nausea and vomiting  Negative for anal bleeding, blood in stool, constipation and rectal pain  Endocrine: Negative  Genitourinary: Negative  Musculoskeletal: Negative  Skin: Negative  Allergic/Immunologic: Negative  Neurological: Negative  Hematological: Negative  Psychiatric/Behavioral: Negative  Physical Exam  ED Triage Vitals   Temperature Pulse Respirations Blood Pressure SpO2   10/30/17 0933 10/30/17 0928 10/30/17 0928 10/30/17 0928 10/30/17 0928   98 1 °F (36 7 °C) 89 18 111/74 100 %      Temp Source Heart Rate Source Patient Position - Orthostatic VS BP Location FiO2 (%)   10/30/17 0933 10/30/17 0928 10/30/17 0928 10/30/17 0928 --   Oral Monitor Lying Right arm       Pain Score       10/30/17 1012       Worst Possible Pain           Orthostatic Vital Signs  Vitals:    10/30/17 0928 10/30/17 1000 10/30/17 1015 10/30/17 1030   BP: 111/74 108/65  109/65   Pulse: 89 80 80 78   Patient Position - Orthostatic VS: Lying   Lying       Physical Exam   Constitutional: She is oriented to person, place, and time  No distress  avss--  tachypneci- ill appearing-- pulse ox 94 % on ra- intepretation is normal- no intervention    HENT:   Head: Normocephalic and atraumatic  Eyes: Conjunctivae and EOM are normal  Pupils are equal, round, and reactive to light  Right eye exhibits no discharge  Left eye exhibits no discharge  No scleral icterus  Mm pink   Neck: Normal range of motion  Neck supple  No JVD present  No tracheal deviation present  No thyromegaly present  Cardiovascular: Normal rate, regular rhythm, normal heart sounds and intact distal pulses  Exam reveals no gallop and no friction rub  No murmur heard  Pulmonary/Chest: Effort normal and breath sounds normal  No stridor  No respiratory distress  She has no wheezes   She has no rales  She exhibits no tenderness  Abdominal: Soft  Bowel sounds are normal  She exhibits distension  She exhibits no mass  There is tenderness  There is no rebound and no guarding  No hernia  blq abd tendneress-- no cva tendenress- pos rlq rebound tendenress   Musculoskeletal: Normal range of motion  She exhibits no edema, tenderness or deformity  Lymphadenopathy:     She has no cervical adenopathy  Neurological: She is alert and oriented to person, place, and time  No cranial nerve deficit or sensory deficit  She exhibits normal muscle tone  Coordination normal    Skin: Skin is warm  Capillary refill takes less than 2 seconds  No rash noted  She is not diaphoretic  No erythema  No pallor  Psychiatric: She has a normal mood and affect  Her behavior is normal  Judgment and thought content normal    Nursing note and vitals reviewed        ED Medications  Medications   morphine (PF) 4 mg/mL injection 4 mg (not administered)   sodium chloride 0 9 % bolus 1,000 mL (0 mL Intravenous Stopped 10/30/17 1013)   morphine (PF) 4 mg/mL injection 4 mg (4 mg Intravenous Given 10/30/17 1012)       Diagnostic Studies  Results Reviewed     Procedure Component Value Units Date/Time    Blood gas, venous [[id number]]     Lab Status:  No result Specimen:  Blood     Lactic acid, plasma [[id number]]     Lab Status:  No result Specimen:  Blood     Comprehensive metabolic panel [[id number]]  (Abnormal) Collected:  10/30/17 0947    Lab Status:  Final result Specimen:  Blood from Arm, Right Updated:  10/30/17 1022     Sodium 133 (L) mmol/L      Potassium 3 3 (L) mmol/L      Chloride 94 (L) mmol/L      CO2 18 (L) mmol/L      Anion Gap 21 (H) mmol/L      BUN 82 (H) mg/dL      Creatinine 4 56 (H) mg/dL      Glucose 135 mg/dL      Calcium 9 2 mg/dL      AST 18 U/L      ALT 24 U/L      Alkaline Phosphatase 83 U/L      Total Protein 7 7 g/dL      Albumin 3 4 (L) g/dL      Total Bilirubin 2 20 (H) mg/dL      eGFR 10 ml/min/1 73sq m Narrative:         National Kidney Disease Education Program recommendations are as follows:  GFR calculation is accurate only with a steady state creatinine  Chronic Kidney disease less than 60 ml/min/1 73 sq  meters  Kidney failure less than 15 ml/min/1 73 sq  meters      Lipase [[id number]]  (Normal) Collected:  10/30/17 0947    Lab Status:  Final result Specimen:  Blood from Arm, Right Updated:  10/30/17 1022     Lipase 104 u/L     CBC and differential [[id number]]  (Abnormal) Collected:  10/30/17 0947    Lab Status:  Final result Specimen:  Blood from Arm, Right Updated:  10/30/17 0956     WBC 8 94 Thousand/uL      RBC 4 97 Million/uL      Hemoglobin 14 8 g/dL      Hematocrit 43 0 %      MCV 87 fL      MCH 29 8 pg      MCHC 34 4 g/dL      RDW 13 0 %      MPV 12 0 fL      Platelets 631 Thousands/uL      Neutrophils Relative 88 (H) %      Lymphocytes Relative 5 (L) %      Monocytes Relative 4 %      Eosinophils Relative 3 %      Basophils Relative 0 %      Neutrophils Absolute 7 85 (H) Thousands/µL      Lymphocytes Absolute 0 45 (L) Thousands/µL      Monocytes Absolute 0 35 Thousand/µL      Eosinophils Absolute 0 29 Thousand/µL      Basophils Absolute 0 00 Thousands/µL                  CT abdomen pelvis wo contrast    (Results Pending)              Procedures  Procedures       Phone Contacts  ED Phone Contact    ED Course  ED Course as of Oct 30 1708   Mon Oct 30, 2017   1030 ABD U/S BY ER MD;  NO FLUID SEEN AROUND HEART/ RUQ/LUQ/BEHIND BLADDER/ MOBIEL GS AND SLUDGE SEEN- NO R/L HYDRONEPHROSIS/ NO AAA- PRINTER NOT WORKING     1114 - ER MD NOTE- PT - RE-EVALUATED- FEELS IMPROVED WITH SYMPTOMS- TALKING ON PHONE IN AD    1128 CT OF ABD/PELVIS NON CONTRAST- ABNORMAL SEGMENT OF DISTAL SMALL BOWEL WITH PROX SBO- AND LOCALIZED FREE AIR AROUND ABNORMAL SEGMENT    1130 ER MD NOTE- CASE /D/W  SURGERICAL RESIDENT - TOLD TO CALL SURGERICAL PA- SURGERICAL PA CONTACTED    18 ER MD NOTE- CASE D/W NICOLAS PA- WILL D/W DR Jessie Alvarenga --  PT MADE AWARE OF THIS     1231 - CASE D/W SALIMA- SURG PA- ASKS FOR NG TUBE- Flora Chen                                [facility] Time    Disposition  Final diagnoses:   None     ED Disposition     None      Follow-up Information    None       Patient's Medications   Discharge Prescriptions    No medications on file     No discharge procedures on file      ED Provider  Electronically Signed by           Genia Meeks MD  10/30/17 0501

## 2017-10-30 NOTE — ED NOTES
Per Dr Angy Carrillo, no need to draw blood cultures prior to starting antibiotics       Shahrzad Dale RN  10/30/17 1294

## 2017-10-30 NOTE — ED NOTES
SPoke to [employer] in [place]  Explained that pt  Was sent to OR before Surgery spoke to Dr Shagufta Adam about putting NGT in place, and morphine, zofran order   Report was called to floor, spoke to [facility], RN  10/30/17 4984

## 2017-10-30 NOTE — ANESTHESIA PREPROCEDURE EVALUATION
Review of Systems/Medical History  Patient summary reviewed        Cardiovascular  Hyperlipidemia,    Pulmonary  Negative pulmonary ROS ,        GI/Hepatic    GERD ,        Negative  ROS        Endo/Other  Negative endo/other ROS      GYN  Negative gynecology ROS          Hematology  Negative hematology ROS      Musculoskeletal  Negative musculoskeletal ROS        Neurology  Negative neurology ROS      Psychology   Negative psychology ROS            Physical Exam    Airway    Mallampati score: II  TM Distance: >3 FB  Neck ROM: full     Dental       Cardiovascular  Cardiovascular exam normal    Pulmonary  Pulmonary exam normal     Other Findings        Anesthesia Plan  ASA Score- 2       Anesthesia Type- general with ASA Monitors  Additional Monitors:   Airway Plan: ETT  Comment: Risk for aspiration d/w patient  Agrees with plan  Induction- intravenous  Informed Consent- Anesthetic plan and risks discussed with patient

## 2017-10-30 NOTE — OP NOTE
OPERATIVE REPORT  PATIENT NAME: Oly Love    :  1955  MRN: [id number]  Pt Location: AN OR ROOM 01    SURGERY DATE: 10/30/2017    Surgeon(s) and Role:     * Leon Gaytan DO - Primary     * Ani Love MD - Assisting    Preop Diagnosis:  Complete intestinal obstruction, unspecified cause [K56 601]    Post-Op Diagnosis Codes:     Pelvic abscess,  Probable adhesions    Procedure(s) (LRB):  LAPAROTOMY EXPLORATORY, DRAINAGE PELVIC ABSCESS; APPENDECTOMY (N/A)    Specimen(s):  ID Type Source Tests Collected by Time Destination   1 :  Tissue Appendix TISSUE EXAM Quintero DO Lukas 10/30/2017 1415        Estimated Blood Loss:   Minimal    Drains:  NG/OG/Enteral Tube Nasogastric 18 Fr Left nares (Active)   Site Assessment Clean;Dry; Intact 10/30/2017  1:01 PM   Drainage Appearance Bile 10/30/2017  1:01 PM   Output (mL) 150 mL 10/30/2017  1:01 PM   Number of days: 0       Anesthesia Type:   General    Operative Indications:  Possible internal hernia on CT    Operative Findings:  She was noted to have a lot of purulent material down low in the pelvis  Distal small bowel was inflamed  The entire small bowel and large bowel were run with no specific sign of perforation  There only scanty adhesions in the pelvis  Appendix was somewhat denuded while running the bowel therefore an appendectomy was performed  No signs of Meckel's diverticulum   Uuterus and ovaries appeared normal      Review of Systems/Medical History  Patient summary reviewed      Cardiovascular  Hyperlipidemia,  Pulmonary  Negative pulmonary ROS ,    GI/Hepatic  GERD ,    Negative  ROS    Endo/Other  Negative endo/other ROS  GYN  Negative gynecology ROS       Hematology  Negative hematology ROS     Musculoskeletal  Negative musculoskeletal ROS    Neurology  Negative neurology ROS     Psychology   Negative psychology ROS           height 66 inches, weight 67 kg/ 147 lb, BMI 23   wound  Class 3   ASA 2 E  Complications: None    Procedure and Technique:   patient was brought to the operative suite and identified by visualization, conversation, by armband  She had received Ancef perioperatively  She just received Flagyl in the emergency room  Once under general anesthesia a Bush catheter was placed under sterile technique  Sequential compression pumps were placed  Abdomen was then prepped and draped in a sterile fashion  A time-out was performed and was assured that the prep was dry  A periumbilical midline incision was made  Underlying subcutaneous tissue was divided with hot cautery  Fascia was carefully opened up in the midline gaining access into the abdominal cavity  The abdomen was then explored  She had some obvious purulence down low in the pelvis  Small bowel was pulled out was quite angry in appearance  She had some fibrin and exudate in the distal ileum  This was run up to the ileocecal valve  In doing so the appendix  Was noted to be small  However the serosa was somewhat beat up  Given this I then performed an appendectomy  Mesoappendix was divided between hemostats and tied off with 2 0 Vicryl tie  Clamped the base of the appendix and looked the contents upward  Two 0 Vicryl tie was used to tie off the base of the appendix  Appendix was transected and handed off the field  Mucosa was cauterized  Two 0 silk was used in haZ stitch type fashion to dunk the stump  All the small bowel was run to the ligament of Treitz  There was no signs of a Meckel's diverticulum  Although did show signs of inflammation I could not find any specific area of perforation  Early scant adhesions in the pelvis which were freed up  With simple manipulation  There was some inflammation on the side of the sigmoid colon but no overt signs of a sigmoid perforation  There was no signs of ischemia  After copious irrigation with several L of saline    A Jonh drain was placed in each of the left as well as right lower quadrant areas  These were anchored to the kin with  2- 0 nylon  Omentum was placed back over the small bowel fascia was closed using 1  PDS in a running continuous fashion  Irrigation was carried out of the wound  Skin was closed with skin staples  Saline soaked sylvia was then placed between the staples  Wound was washed and dried  Sterile dressings were applied  She was awakened in the operating returned to the recovery area in stable condition having tolerated the procedure well     I was present for the entire procedure    Patient Disposition:  PACU     SIGNATURE: Toribio Curling, DO  DATE: October 30, 2017  TIME: 2:47 PM

## 2017-10-30 NOTE — ED NOTES
- marcial encinas clinical sup of intra-ab d infection - pt with no sris criteria--      Perez Cruz MD  10/30/17 2315

## 2017-10-30 NOTE — ANESTHESIA POSTPROCEDURE EVALUATION
Post-Op Assessment Note      CV Status:  Stable    Mental Status:  Alert and awake    Hydration Status:  Stable and euvolemic    PONV Controlled:  Controlled    Airway Patency:  Patent and adequate    Post Op Vitals Reviewed: Yes          Staff: CRNA           /66 (10/30/17 1500)    Temp     Pulse 90 (10/30/17 1500)   Resp 20 (10/30/17 1500)    SpO2 98 % (10/30/17 1500)

## 2017-10-30 NOTE — ED PROCEDURE NOTE
PROCEDURE  ECG 12 Lead Documentation  Date/Time: 10/30/2017 10:34 AM  Performed by: Vickie Reynoso  Authorized by: Rosa MARR     ECG 12 Lead Documentation  Date/Time: 10/30/2017 10:34 AM  Performed by: Vickie Reynoso  Authorized by: Rosa MARR     ECG 12 Lead Documentation  Date/Time: 10/30/2017 10:34 AM  Performed by: Vickie Reynoso  Authorized by: Rosa MARR     Indications / Diagnosis:  SYNCOPE  ECG reviewed by me, the ED Provider: yes    Patient location:  ED and bedside  Previous ECG:     Previous ECG:  Unavailable  Interpretation:     Interpretation: non-specific    Rate:     ECG rate:  86    ECG rate assessment: normal    Rhythm:     Rhythm: sinus rhythm    Ectopy:     Ectopy: none    QRS:     QRS axis:  Normal    QRS intervals:  Normal  Conduction:     Conduction: normal    ST segments:     ST segments:  Normal  T waves:     T waves: flattening      Flattening:  III, aVF, V1, V4, V5 and V6  Q waves:     Q waves:  V1 and III  Other findings:     Other findings: U wave    Comments:      NO ECG SIGNS OF ISCHEMIA/ INJURY / R HEART STRAIN - NO ECG SIGNS OF LONG QTC/ /WPW/BRUGADA SYNDROME/HCM/ EPSILON WAVES

## 2017-10-31 LAB
ALBUMIN SERPL BCP-MCNC: 2.3 G/DL (ref 3.5–5)
ALP SERPL-CCNC: 64 U/L (ref 46–116)
ALT SERPL W P-5'-P-CCNC: 21 U/L (ref 12–78)
ANION GAP SERPL CALCULATED.3IONS-SCNC: 13 MMOL/L (ref 4–13)
AST SERPL W P-5'-P-CCNC: 28 U/L (ref 5–45)
BASOPHILS # BLD MANUAL: 0 THOUSAND/UL (ref 0–0.1)
BASOPHILS NFR MAR MANUAL: 0 % (ref 0–1)
BILIRUB SERPL-MCNC: 0.7 MG/DL (ref 0.2–1)
BUN SERPL-MCNC: 68 MG/DL (ref 5–25)
CALCIUM SERPL-MCNC: 8.3 MG/DL (ref 8.3–10.1)
CHLORIDE SERPL-SCNC: 107 MMOL/L (ref 100–108)
CO2 SERPL-SCNC: 20 MMOL/L (ref 21–32)
CREAT SERPL-MCNC: 2.37 MG/DL (ref 0.6–1.3)
EOSINOPHIL # BLD MANUAL: 0 THOUSAND/UL (ref 0–0.4)
EOSINOPHIL NFR BLD MANUAL: 0 % (ref 0–6)
ERYTHROCYTE [DISTWIDTH] IN BLOOD BY AUTOMATED COUNT: 13.1 % (ref 11.6–15.1)
GFR SERPL CREATININE-BSD FRML MDRD: 21 ML/MIN/1.73SQ M
GLUCOSE SERPL-MCNC: 123 MG/DL (ref 65–140)
HCT VFR BLD AUTO: 36.1 % (ref 34.8–46.1)
HGB BLD-MCNC: 12.1 G/DL (ref 11.5–15.4)
LYMPHOCYTES # BLD AUTO: 0.41 THOUSAND/UL (ref 0.6–4.47)
LYMPHOCYTES # BLD AUTO: 6 % (ref 14–44)
MAGNESIUM SERPL-MCNC: 2.3 MG/DL (ref 1.6–2.6)
MCH RBC QN AUTO: 29.8 PG (ref 26.8–34.3)
MCHC RBC AUTO-ENTMCNC: 33.5 G/DL (ref 31.4–37.4)
MCV RBC AUTO: 89 FL (ref 82–98)
MONOCYTES # BLD AUTO: 0.07 THOUSAND/UL (ref 0–1.22)
MONOCYTES NFR BLD: 1 % (ref 4–12)
NEUTROPHILS # BLD MANUAL: 6.4 THOUSAND/UL (ref 1.85–7.62)
NEUTS BAND NFR BLD MANUAL: 12 % (ref 0–8)
NEUTS SEG NFR BLD AUTO: 81 % (ref 43–75)
PLATELET # BLD AUTO: 226 THOUSANDS/UL (ref 149–390)
PLATELET BLD QL SMEAR: ADEQUATE
PMV BLD AUTO: 11 FL (ref 8.9–12.7)
POTASSIUM SERPL-SCNC: 3.9 MMOL/L (ref 3.5–5.3)
PROT SERPL-MCNC: 5.8 G/DL (ref 6.4–8.2)
RBC # BLD AUTO: 4.06 MILLION/UL (ref 3.81–5.12)
SODIUM SERPL-SCNC: 140 MMOL/L (ref 136–145)
TOTAL CELLS COUNTED SPEC: 100
WBC # BLD AUTO: 6.88 THOUSAND/UL (ref 4.31–10.16)

## 2017-10-31 PROCEDURE — 80053 COMPREHEN METABOLIC PANEL: CPT | Performed by: PHYSICIAN ASSISTANT

## 2017-10-31 PROCEDURE — 85027 COMPLETE CBC AUTOMATED: CPT | Performed by: SURGERY

## 2017-10-31 PROCEDURE — 97166 OT EVAL MOD COMPLEX 45 MIN: CPT

## 2017-10-31 PROCEDURE — C9113 INJ PANTOPRAZOLE SODIUM, VIA: HCPCS | Performed by: SURGERY

## 2017-10-31 PROCEDURE — 85007 BL SMEAR W/DIFF WBC COUNT: CPT | Performed by: SURGERY

## 2017-10-31 PROCEDURE — G8988 SELF CARE GOAL STATUS: HCPCS

## 2017-10-31 PROCEDURE — G8987 SELF CARE CURRENT STATUS: HCPCS

## 2017-10-31 PROCEDURE — 83735 ASSAY OF MAGNESIUM: CPT | Performed by: SURGERY

## 2017-10-31 PROCEDURE — 94762 N-INVAS EAR/PLS OXIMTRY CONT: CPT

## 2017-10-31 PROCEDURE — 94760 N-INVAS EAR/PLS OXIMETRY 1: CPT

## 2017-10-31 RX ORDER — ACETAMINOPHEN 650 MG/1
325 SUPPOSITORY RECTAL EVERY 4 HOURS PRN
Status: DISCONTINUED | OUTPATIENT
Start: 2017-10-31 | End: 2017-11-02

## 2017-10-31 RX ADMIN — HEPARIN SODIUM 5000 UNITS: 5000 INJECTION, SOLUTION INTRAVENOUS; SUBCUTANEOUS at 05:50

## 2017-10-31 RX ADMIN — CEFAZOLIN SODIUM 1000 MG: 1 SOLUTION INTRAVENOUS at 03:25

## 2017-10-31 RX ADMIN — Medication: at 16:43

## 2017-10-31 RX ADMIN — SODIUM CHLORIDE 125 ML/HR: 0.9 INJECTION, SOLUTION INTRAVENOUS at 02:10

## 2017-10-31 RX ADMIN — METRONIDAZOLE 500 MG: 500 INJECTION, SOLUTION INTRAVENOUS at 04:19

## 2017-10-31 RX ADMIN — PANTOPRAZOLE SODIUM 40 MG: 40 INJECTION, POWDER, FOR SOLUTION INTRAVENOUS at 08:37

## 2017-10-31 RX ADMIN — HEPARIN SODIUM 5000 UNITS: 5000 INJECTION, SOLUTION INTRAVENOUS; SUBCUTANEOUS at 22:52

## 2017-10-31 RX ADMIN — CEFAZOLIN SODIUM 1000 MG: 1 SOLUTION INTRAVENOUS at 11:42

## 2017-10-31 RX ADMIN — METRONIDAZOLE 500 MG: 500 INJECTION, SOLUTION INTRAVENOUS at 12:19

## 2017-10-31 RX ADMIN — ACETAMINOPHEN 325 MG: 650 SUPPOSITORY RECTAL at 20:58

## 2017-10-31 RX ADMIN — METRONIDAZOLE 500 MG: 500 INJECTION, SOLUTION INTRAVENOUS at 20:57

## 2017-10-31 RX ADMIN — SODIUM CHLORIDE 125 ML/HR: 0.9 INJECTION, SOLUTION INTRAVENOUS at 18:25

## 2017-10-31 RX ADMIN — CEFAZOLIN SODIUM 1000 MG: 1 SOLUTION INTRAVENOUS at 19:53

## 2017-10-31 RX ADMIN — HEPARIN SODIUM 5000 UNITS: 5000 INJECTION, SOLUTION INTRAVENOUS; SUBCUTANEOUS at 13:09

## 2017-10-31 RX ADMIN — SODIUM CHLORIDE 125 ML/HR: 0.9 INJECTION, SOLUTION INTRAVENOUS at 10:27

## 2017-10-31 NOTE — PROGRESS NOTES
Progress Note - General Surgery   Radha Love 65 y o  female MRN: [id number]  Unit/Bed#: -[facility] Encounter: [id number]    Assessment:  64 yo F w/ SBO s/p ex lap on 10/30      -  (however 450 recorded in as PO) ALHAJI drains 60/65 each  - Cr 2 37    Plan:  COntinue NPO/NGT to low suction  May have ice chips for comfort- 1 cup per shift only, continue to record amounts  PRN pain control w/ PCA  OOB, ambulation  Monitor Cr- downtrending  Ancef/Flagyl antibiotics      Subjective/Objective   Chief Complaint: None    Subjective: No complaints, pain adequately controlled  NGT output 460 ml / 24hrs  No flatus  Many questions this AM     Objective:     Blood pressure 107/58, pulse 74, temperature 97 8 °F (36 6 °C), temperature source Axillary, resp  rate 16, height 5' 2 5" (1 588 m), weight 63 5 kg (140 lb), SpO2 98 %  ,Body mass index is 25 2 kg/m²  Intake/Output Summary (Last 24 hours) at 10/31/17 1139  Last data filed at 10/31/17 1027   Gross per 24 hour   Intake           3044 9 ml   Output             1190 ml   Net           1854 9 ml       Invasive Devices     Peripheral Intravenous Line            Peripheral IV 10/30/17 Left Forearm less than 1 day    Peripheral IV 10/31/17 Left Wrist less than 1 day          Drain            Closed/Suction Drain Left LLQ Bulb 19 Fr  less than 1 day    Closed/Suction Drain Right RLQ Bulb 19 Fr  less than 1 day    NG/OG/Enteral Tube Nasogastric 18 Fr Left nares less than 1 day    Urethral Catheter Latex 16 Fr  less than 1 day                Physical Exam:   Gen: A&O, NAD  Cardio: RRR  Lungs: CTAB  Abd: Soft, non distended   Dressing c/d/i  ALHAJI x 2 , serous fluid      Lab, Imaging and other studies:  CBC:   Lab Results   Component Value Date    WBC 6 88 10/31/2017    HGB 12 1 10/31/2017    HCT 36 1 10/31/2017    MCV 89 10/31/2017     10/31/2017    MCH 29 8 10/31/2017    MCHC 33 5 10/31/2017    RDW 13 1 10/31/2017    MPV 11 0 10/31/2017   , CMP:   Lab Results Component Value Date     10/31/2017    K 3 9 10/31/2017     10/31/2017    CO2 20 (L) 10/31/2017    ANIONGAP 13 10/31/2017    BUN 68 (H) 10/31/2017    CREATININE 2 37 (H) 10/31/2017    GLUCOSE 123 10/31/2017    CALCIUM 8 3 10/31/2017    AST 28 10/31/2017    ALT 21 10/31/2017    ALKPHOS 64 10/31/2017    PROT 5 8 (L) 10/31/2017    ALBUMIN 2 3 (L) 10/31/2017    BILITOT 0 70 10/31/2017    EGFR 21 10/31/2017     VTE Pharmacologic Prophylaxis: Heparin  VTE Mechanical Prophylaxis: sequential compression device

## 2017-10-31 NOTE — OCCUPATIONAL THERAPY NOTE
Occupational Therapy Evaluation      Lester Love    10/31/2017    Patient Active Problem List   Diagnosis    Complete intestinal obstruction       Past Medical History:   Diagnosis Date    Diverticulosis     GERD (gastroesophageal reflux disease)     Hyperlipidemia        Past Surgical History:   Procedure Laterality Date    KNEE CARTILAGE SURGERY Left     x3    LAPAROTOMY N/A 10/30/2017    Procedure: LAPAROTOMY EXPLORATORY, DRAINAGE PELVIC ABSCESS; APPENDECTOMY;  Surgeon: Ramon Opitz, DO;  Location: [facility];  Service: General    UTERINE FIBROID SURGERY          10/31/17 1329   Note Type   Note type Eval only   Home Living   Type of Home House   Home Layout Multi-level; Able to live on main level with bedroom/bathroom   Bathroom Shower/Tub Walk-in shower   Prior Function   Level of [place] Independent with ADLs and functional mobility   Lives With (Resides with brother )   [place] Help From Family   ADL Assistance Independent   IADLs Independent   Vocational Retired   Lifestyle   Autonomy Patient reports I with all ADL and IADL activities  + driving, + med mgt, + fiances    Reciprocal Relationships Patient is single, no children    Reports her 80 y/o brother lives with her and can provide assitance but does not feel comfortable with him proving ADL support    Service to Others Retired, likes to spend time with friends and enjoys caring for her home   Intrinsic Gratification likes to walk, enjoys doing housework    Psychosocial   Psychosocial (WDL) WDL   Subjective   Subjective "I think once this stuff is off of me I will be bettter"   ADL   Eating Assistance 7  Independent  (currently ice-chips only )   Grooming Assistance 6  [address] [facility]  [address] to Stand 5  Supervision   Stand to Sit 5  Supervision   Functional Mobility   Functional Mobility 4  Minimal assistance   Additional items Hand hold assistance   Balance   Static Sitting Good   Dynamic Sitting Good   Static Standing Fair +   Dynamic Standing Fair   Activity Tolerance   Activity Tolerance Patient limited by fatigue;Patient limited by pain   Medical Staff Made Aware OT spoke with Jessica Helton RN    RUE Assessment   RUE Assessment WFL   LUE Assessment   LUE Assessment WFL   Vision-Basic Assessment   Current Vision No visual deficits   Cognition   Overall Cognitive Status WFL   Orientation Level Oriented X4   Memory Within functional limits   Assessment   Limitation Decreased ADL status; Decreased endurance;Decreased self-care trans   Prognosis Good   Assessment Patient is a 66 y/o female presented to ED with abdominal pain, nausa and vomiting  Patient admitted with diagnosis of pelvic abcess, under went exp-lap, drainage of pelvic abcess and appendectomy 10/20/17  Patient now referred to OT for functional assessment of ADL/IADL skills for discharge recommendations  Patient is alert, oriented, pleasant and cooperative, reports fully I with ADLs and IADLs PTA  Patient resides with brother who is able to provide support for most ADL/IADL tasks however patient reports she would like to manage own personal hygine and dressing skills Indpendently post discharge as she does not feel comfortable with brother assisting with these activities  Patient currently presents with mild decline with activity tolerance, dyanmic balance and pain with moving/reaching LB therefore impacting occupational function in areas of LB dressing, LB bathing, ADL transfers  From OT standpoint anticipate patient will be able to return home with family support and do not feel patient will need additional therapy services on [place] [id number] or out-patient basis    Will continue to follow for 1-2 more treatment sessions to provide LB ADL tranining via adaptive and/or compensatory strategies and furhter assess safety with ADL transfers (shower) in order to improve safety and independence with ADL routine post discharge  Goals   Patient Goals "Go home"   STG Time Frame 3-5   Short Term Goal #1 1  Patient will complete LB bathing at Mod I level with/without AE; 2   Patietn will increase LB dressing with/without AE at Mod I level; 3  Patient will complete ADL transfers at Mod I level with G safety awarness with DME as needed; 4  Patient will demonstrate G carryover of EC techs for ADL and IADL activities    Plan   Treatment Interventions ADL retraining;UE strengthening/ROM; Cognitive reorientation;Patient/family training   Goal Expiration Date 11/05/17   OT Frequency 1-2x/wk  (1-2 more treatment sessions )   Recommendation   OT Discharge Recommendation Home with family support   OT - OK to Discharge Yes  (When medically cleared )   Barthel Index   Feeding 10   Bathing 0   Grooming Score 5   Dressing Score 5   Bladder Score 10   Bowels Score 10   Toilet Use Score 10   Transfers (Bed/Chair) Score 10   Mobility (Level Surface) Score 10   Stairs Score 0   Barthel Index Score 70   Stanley Lozoya, OT

## 2017-10-31 NOTE — PLAN OF CARE
Problem: OCCUPATIONAL THERAPY ADULT  Goal: Performs self-care activities at highest level of function for planned discharge setting  See evaluation for individualized goals  Treatment Interventions: ADL retraining, UE strengthening/ROM, Cognitive reorientation, Patient/family training          See flowsheet documentation for full assessment, interventions and recommendations  Limitation: Decreased ADL status, Decreased endurance, Decreased self-care trans  Prognosis: Good  Assessment: Patient is a 64 y/o female presented to ED with abdominal pain, nausa and vomiting  Patient admitted with diagnosis of pelvic abcess, under went exp-lap, drainage of pelvic abcess and appendectomy 10/20/17  Patient now referred to OT for functional assessment of ADL/IADL skills for discharge recommendations  Patient is alert, oriented, pleasant and cooperative, reports fully I with ADLs and IADLs PTA  Patient resides with brother who is able to provide support for most ADL/IADL tasks however patient reports she would like to manage own personal hygine and dressing skills Indpendently post discharge as she does not feel comfortable with brother assisting with these activities  Patient currently presents with mild decline with activity tolerance, dyanmic balance and pain with moving/reaching LB therefore impacting occupational function in areas of LB dressing, LB bathing, ADL transfers  From OT standpoint anticipate patient will be able to return home with family support and do not feel patient will need additional therapy services on [place] [phone] or out-patient basis  Will continue to follow for 1-2 more treatment sessions to provide LB ADL tranining via adaptive and/or compensatory strategies and furhter assess safety with ADL transfers (shower) in order to improve safety and independence with ADL routine post discharge         OT Discharge Recommendation: Home with family support  OT - OK to Discharge: Yes (When medically cleared )

## 2017-10-31 NOTE — CASE MANAGEMENT
Initial Clinical Review    Admission: Date/Time/Statement: 10/30/17 @ 1202     Orders Placed This Encounter   Procedures    Inpatient Admission (expected length of stay for this patient is greater than two midnights)     Standing Status:   Standing     Number of Occurrences:   1     Order Specific Question:   Admitting Physician     Answer:   Luis Deras [[id number]]     Order Specific Question:   Level of Care     Answer:   Med Surg [[id number]]     Order Specific Question:   Estimated length of stay     Answer:   More than 2 Midnights     Order Specific Question:   Certification     Answer:   I certify that inpatient services are medically necessary for this patient for a duration of greater than two midnights  See H&P and MD Progress Notes for additional information about the patient's course of treatment  ED: Date/Time/Mode of Arrival:   ED Arrival Information     Expected Arrival Acuity Means of Arrival Escorted By Service Admission Type    - 10/30/2017 09:23 Urgent Ambulance [facility] Surgery-General Urgent    Arrival Complaint    Abd pain          Chief Complaint:   Chief Complaint   Patient presents with    Abdominal Pain     Pt  c/o abdominal pain since  Friday, vomiting and loose stools  Pt  told EMS she passed out on way to bathroom yesterday       History of Illness: Patient with 2-3 days of increasing abdominal pain associated with nausea vomiting and diarrhea  She has had very poor p o  intake  Due to increased abdominal pain and abdominal distention she presented to the emergency room  Workup included a CT scan which revealed very dilated loops of bowel  CT scan was read as area concerning for internal hernia and possible even perforation with potential extraluminal air on the small bowel      ED Vital Signs:   ED Triage Vitals   Temperature Pulse Respirations Blood Pressure SpO2   10/30/17 0933 10/30/17 0928 10/30/17 0928 10/30/17 0928 10/30/17 0928   98 1 °F (36 7 °C) 89 18 111/74 100 % Temp Source Heart Rate Source Patient Position - Orthostatic VS BP Location FiO2 (%)   10/30/17 0933 10/30/17 0928 10/30/17 0928 10/30/17 0928 --   Oral Monitor Lying Right arm       Pain Score       10/30/17 1012       Worst Possible Pain        Wt Readings from Last 1 Encounters:   10/30/17 63 5 kg (140 lb)       Vital Signs (abnormal): temperature 97    Abnormal Labs/Diagnostic Test Results:   Blood gas venous 7 32/32/44 5/16 2/75 8  Na 133, K 3 3 cl 94  Co2-18  Anion gap 21  Bun 82  Creatinine 4 56  Albumin 3 4  Total bilirubin 2 20    Ct abdomen - High-grade distal small bowel obstruction  Transition from distended fluid-filled loops to collapsed loops in the right lower quadrant at the site of an angulated loop beyond which and approximately 15 cm segment of markedly thick-walled small bowel is noted   Bubbles of extraluminal gas adjacent   to this thick-walled segment are consistent with localized perforation   Findings suggest the possibility of bowel obstruction due to internal hernia and ischemic perforation of incarcerated loops within the hernia sac   Alternative etiologies of   segmental distal small bowel inflammation and localized perforation resulting in bowel obstruction cannot excluded    No abscess collection    ED Treatment:   Medication Administration from 10/30/2017 0923 to 10/30/2017 1242       Date/Time Order Dose Route Action Action by Comments     10/30/2017 1013 sodium chloride 0 9 % bolus 1,000 mL 0 mL Intravenous Stopped Gustavo Dominguez RN      10/30/2017 1002 sodium chloride 0 9 % bolus 1,000 mL 1,000 mL Intravenous New Bag Stefany Gonzales      10/30/2017 1012 morphine (PF) 4 mg/mL injection 4 mg 4 mg Intravenous Given Gustavo Dominguez RN      10/30/2017 1043 morphine (PF) 4 mg/mL injection 4 mg 4 mg Intravenous Given Gustavo Dominguez RN      10/30/2017 1226 ceFAZolin (ANCEF) IVPB (premix) 2,000 mg 0 mg Intravenous Stopped Gustavo Dominguez RN      10/30/2017 1147 ceFAZolin (ANCEF) IVPB (premix) 2,000 mg 2,000 mg Intravenous [address] Carmen Remicarmela, RN      10/30/2017 1226 metroNIDAZOLE (FLAGYL) IVPB (premix) 500 mg 500 mg Intravenous [address] Carmen Bryan, RN      10/30/2017 1214 sodium chloride 0 9 % bolus 2,000 mL 2,000 mL Intravenous [address] Shanepemavega Adams, RN      10/30/2017 1242 morphine (PF) 4 mg/mL injection 4 mg   Intravenous MAR Hold Automatic Transfer Provider      10/30/2017 1242 [facility]ansetron [facility]) injection 4 mg   Intravenous MAR Hold Automatic Transfer Provider      10/30/2017 1242 sodium chloride 0 9 % bolus 1,000 mL   Intravenous MAR Hold Automatic Transfer Provider           Past Medical/Surgical History: Active Ambulatory Problems     Diagnosis Date Noted    No Active Ambulatory Problems     Resolved Ambulatory Problems     Diagnosis Date Noted    No Resolved Ambulatory Problems     Past Medical History:   Diagnosis Date    Diverticulosis     GERD (gastroesophageal reflux disease)     Hyperlipidemia        Admitting Diagnosis: SBO (small bowel obstruction) [K56 609]  Abdominal pain [R10 9]  Complete intestinal obstruction, unspecified cause [K56 601]    Age/Sex: 65 y o  female    Assessment/Plan: Small bowel obstruction likely secondary to internal hernia with possible perforation by CT scan  Acute renal failure likely from dehydration     Plan:  Risks and benefits for exploratory laparotomy possible small-bowel resection were discussed with the patient she agrees to proceed      Procedure 10/30- LAPAROTOMY EXPLORATORY, DRAINAGE PELVIC ABSCESS; APPENDECTOMY (N/A)    Admission Orders:  10/30/2017  1202 INPATIENT   Scheduled Meds:   cefazolin 1,000 mg Intravenous Q8H   heparin (porcine) 5,000 Units Subcutaneous Q8H [address]   metroNIDAZOLE 500 mg Intravenous Q8H   ondansetron 4 mg Intravenous Once   pantoprazole 40 mg Intravenous Q24H [address]   sodium chloride 1,000 mL Intravenous Once     Continuous Infusions:   HYDROmorphone     sodium chloride 125 mL/hr Last Rate: 125 mL/hr (10/31/17 1027)     PRN Meds: not used:  influenza vaccine    naloxone    ondansetron    Phenol    OTHER ORDERS: NGT to medium suction  scds  Continuous pulse oximetry  NPO  PT/OT    [address] [place] [address] in the [facility] by Darryn Rodriguez for 2017  Network Utilization Review Department  Phone: [phone]; Fax [phone]  ATTENTION: The Network Utilization Review Department is now centralized for our 7 Facilities  Make a note that we have a new phone and fax numbers for our Department  Please call with any questions or concerns to [phone] and carefully follow the prompts so that you are directed to the right person  All voicemails are confidential  Fax any determinations, approvals, denials, and requests for initial or continue stay review clinical to [phone]  Due to HIGH CALL volume, it would be easier if you could please send faxed requests to expedite your requests and in part, help us provide discharge notifications faster

## 2017-10-31 NOTE — NUTRITION
10/31/17 1423   Assessment   Timepoint Initial  (poor po)   Labs   List Completed Labs (BUN 68, creat 2 37, alb 2 3; meds: fentanyl, LR, morphine, zofran, IVF)   Adequacy of Intake   Nutrition Modality NPO  (only one cup ice chips per shift)   Estimated Calorie Intake 0-25%   Estimated Protein Intake  0-25%   Estimated Fluid Intake 0-25%   Estimated calorie intake compared to estimated need Not meeting needs d/t NPO status s/p sx  Pt reports some abdominal pain, belching  Has not passed any flatus yet  Will continue to monitor diet advancement and po intakes  Nutrition Prognosis   Potential Fair   Nutrition Concerns (s/p ex lap w/drainage of pelvic abscess, appendectomy r/t complete intestinal obstruction)   Comorbid Concerns (diverticulosis, GERD, HLD)   Nutrition Precautions (diet advancement/po intakes)   Nutrition Considerations (Education not indicated at present)   PES Statement   Problem Intake   Oral or Nutritional Support Intake (2) Inadequate oral intake NI-2 1   Related to Inability for PO   As evidenced by: NPO Status   Patient Nutrition Goals   Goal weight maintained;transition to PO diet;tolerate PO diet   Goal Status initiated   Timeframe to complete goal by next f/u   Recommendations/Interventions   Summary Pt admitted w/complete intestinal obstruction, s/p ex lap w/drainage of pelvic abscess and appendectomy  Currently w/NGT in place, -460ml x24hrs  Pt reports abdominal pain and bloating along with some belching, has not passed flatus yet    Per gen sx continue w/NPO/NGT low suction, pt allowed 1 cup ice chips per shift  Will monitor ability to advance diet and po intakes      Interventions Diet: to Advance   Nutrition Recommendations Initiate diet  (as medically able/appropriate advance to clear liquids)   Nutrition Complexity Risk   Nutrition complexity level Moderate risk   Nutrition review: 11/03/17  (diet advaced? po intakes?)   Follow up date 11/07/17

## 2017-11-01 LAB
ANION GAP SERPL CALCULATED.3IONS-SCNC: 14 MMOL/L (ref 4–13)
BUN SERPL-MCNC: 49 MG/DL (ref 5–25)
CALCIUM SERPL-MCNC: 8.3 MG/DL (ref 8.3–10.1)
CHLORIDE SERPL-SCNC: 112 MMOL/L (ref 100–108)
CO2 SERPL-SCNC: 17 MMOL/L (ref 21–32)
CREAT SERPL-MCNC: 1.25 MG/DL (ref 0.6–1.3)
ERYTHROCYTE [DISTWIDTH] IN BLOOD BY AUTOMATED COUNT: 13.4 % (ref 11.6–15.1)
GFR SERPL CREATININE-BSD FRML MDRD: 47 ML/MIN/1.73SQ M
GLUCOSE SERPL-MCNC: 103 MG/DL (ref 65–140)
HCT VFR BLD AUTO: 31.6 % (ref 34.8–46.1)
HGB BLD-MCNC: 10.2 G/DL (ref 11.5–15.4)
MCH RBC QN AUTO: 29.2 PG (ref 26.8–34.3)
MCHC RBC AUTO-ENTMCNC: 32.3 G/DL (ref 31.4–37.4)
MCV RBC AUTO: 91 FL (ref 82–98)
PLATELET # BLD AUTO: 207 THOUSANDS/UL (ref 149–390)
PMV BLD AUTO: 10.9 FL (ref 8.9–12.7)
POTASSIUM SERPL-SCNC: 3.6 MMOL/L (ref 3.5–5.3)
RBC # BLD AUTO: 3.49 MILLION/UL (ref 3.81–5.12)
SODIUM SERPL-SCNC: 143 MMOL/L (ref 136–145)
WBC # BLD AUTO: 6.7 THOUSAND/UL (ref 4.31–10.16)

## 2017-11-01 PROCEDURE — G8979 MOBILITY GOAL STATUS: HCPCS

## 2017-11-01 PROCEDURE — 85027 COMPLETE CBC AUTOMATED: CPT | Performed by: SURGERY

## 2017-11-01 PROCEDURE — G8978 MOBILITY CURRENT STATUS: HCPCS

## 2017-11-01 PROCEDURE — 97535 SELF CARE MNGMENT TRAINING: CPT

## 2017-11-01 PROCEDURE — 80048 BASIC METABOLIC PNL TOTAL CA: CPT | Performed by: SURGERY

## 2017-11-01 PROCEDURE — 97530 THERAPEUTIC ACTIVITIES: CPT

## 2017-11-01 PROCEDURE — C9113 INJ PANTOPRAZOLE SODIUM, VIA: HCPCS | Performed by: SURGERY

## 2017-11-01 PROCEDURE — 97163 PT EVAL HIGH COMPLEX 45 MIN: CPT

## 2017-11-01 RX ADMIN — CEFAZOLIN SODIUM 1000 MG: 1 SOLUTION INTRAVENOUS at 19:00

## 2017-11-01 RX ADMIN — PANTOPRAZOLE SODIUM 40 MG: 40 INJECTION, POWDER, FOR SOLUTION INTRAVENOUS at 08:53

## 2017-11-01 RX ADMIN — HEPARIN SODIUM 5000 UNITS: 5000 INJECTION, SOLUTION INTRAVENOUS; SUBCUTANEOUS at 05:56

## 2017-11-01 RX ADMIN — SODIUM CHLORIDE 125 ML/HR: 0.9 INJECTION, SOLUTION INTRAVENOUS at 19:02

## 2017-11-01 RX ADMIN — HEPARIN SODIUM 5000 UNITS: 5000 INJECTION, SOLUTION INTRAVENOUS; SUBCUTANEOUS at 13:48

## 2017-11-01 RX ADMIN — CEFAZOLIN SODIUM 1000 MG: 1 SOLUTION INTRAVENOUS at 02:38

## 2017-11-01 RX ADMIN — METRONIDAZOLE 500 MG: 500 INJECTION, SOLUTION INTRAVENOUS at 11:59

## 2017-11-01 RX ADMIN — SODIUM CHLORIDE 125 ML/HR: 0.9 INJECTION, SOLUTION INTRAVENOUS at 10:46

## 2017-11-01 RX ADMIN — Medication: at 16:56

## 2017-11-01 RX ADMIN — METRONIDAZOLE 500 MG: 500 INJECTION, SOLUTION INTRAVENOUS at 21:12

## 2017-11-01 RX ADMIN — METRONIDAZOLE 500 MG: 500 INJECTION, SOLUTION INTRAVENOUS at 03:58

## 2017-11-01 RX ADMIN — SODIUM CHLORIDE 125 ML/HR: 0.9 INJECTION, SOLUTION INTRAVENOUS at 02:36

## 2017-11-01 RX ADMIN — CEFAZOLIN SODIUM 1000 MG: 1 SOLUTION INTRAVENOUS at 10:49

## 2017-11-01 RX ADMIN — HEPARIN SODIUM 5000 UNITS: 5000 INJECTION, SOLUTION INTRAVENOUS; SUBCUTANEOUS at 21:13

## 2017-11-01 NOTE — PLAN OF CARE
Problem: PHYSICAL THERAPY ADULT  Goal: Performs mobility at highest level of function for planned discharge setting  See evaluation for individualized goals  Treatment/Interventions: Functional transfer training, LE strengthening/ROM, Elevations, Therapeutic exercise, Endurance training, Patient/family training, Equipment eval/education, Bed mobility, Gait training  Equipment Recommended: Andrea Hemphill (pending progress)       See flowsheet documentation for full assessment, interventions and recommendations  Prognosis: Good  Problem List: Decreased strength, Decreased endurance, Impaired balance, Decreased mobility, Decreased safety awareness  Assessment: Pt is a 65 y o  female seen for PT evaluation s/p [facility] 10[facility]30/2017 w/ Complete intestinal obstruction  Order placed for PT  Comorbidities affecting pt's physical performance listed above  Personal factors affecting pt at time of IE include: environment, inability to perform IADLs and inability to perform ADLs  Prior to admission, pt was independent w/ all functional mobility w/ out AD, lives in multi-level home and lives with her brother  Upon evaluation: Pt requires mod I for sit to stand and SBA for ambulation with RW  Decreased gait speed and increased support required from RW due to "discomfort"  (Please find full objective findings from PT assessment regarding body systems outlined above)  Impairments and limitations also listed above, especially due to  weakness, impaired balance, decreased endurance, gait deviations, decreased activity tolerance, decreased safety awareness and fall risk  The following objective measures performed on IE also reveal limitations: Barthel Index 70/100  Pt's clinical presentation is currently unstable/unpredictable seen in pt's presentation of multiple lines, PCA pump, and abnormal lab values    Pt to benefit from continued skilled PT tx while in hospital and upon DC to address deficits as defined above and maximize level of functional mobility  From PT/mobility standpoint, recommendation at time of d/c would be home with family support pending progress in order to maximize pt's functional independence and consistency w/ mobility in order to facilitate return to PLOF  Recommend stair negotiation, initiation of HEP, and dynamic balance activities  Recommendation: Home with family support     PT - OK to Discharge: Yes (when medically appropriate)    See flowsheet documentation for full assessment

## 2017-11-01 NOTE — PLAN OF CARE
METABOLIC, FLUID AND ELECTROLYTES - ADULT     Electrolytes maintained within normal limits Progressing     Fluid balance maintained Progressing        PAIN - ADULT     Verbalizes/displays adequate comfort level or baseline comfort level Progressing        Potential for Falls     Patient will remain free of falls Progressing        Prexisting or High Potential for Compromised Skin Integrity     Skin integrity is maintained or improved Progressing        SKIN/TISSUE INTEGRITY - ADULT     Incision(s), wounds(s) or drain site(s) healing without S/S of infection Progressing

## 2017-11-01 NOTE — PLAN OF CARE
Problem: OCCUPATIONAL THERAPY ADULT  Goal: Performs self-care activities at highest level of function for planned discharge setting  See evaluation for individualized goals  Treatment Interventions: ADL retraining, UE strengthening/ROM, Cognitive reorientation, Patient/family training          See flowsheet documentation for full assessment, interventions and recommendations  Outcome: Progressing  Limitation: Decreased ADL status, Decreased endurance, Decreased self-care trans  Prognosis: Good  Assessment: Patient seen for OT session focusing on ADL activitites in stance at sink, LB dressing skills, transfers and functional mobility  Patient tolerated treatment session well, OT spoke with nursing to encourage patient ambulation in hallways with A for management of IV line in order to improve endurnace  Patient with minimal difficulty with LB dressing, however do not feel she will require adaptive equipment as she is mainly limited due to IV lines, NG tube and ALHAJI tubes  Patient c/o minimal pan reaching to lower body, will focus next session on using step stool or adaptive techs' to decrease pain with reaching LB if neeed      Recommendation: PT consult (recommend PT consult for recommendations for assitive device)  OT Discharge Recommendation: Home with family support  OT - OK to Discharge: Yes (When medically stable )

## 2017-11-01 NOTE — PROGRESS NOTES
Progress Note - General Surgery   Verito Love 65 y o  female MRN: [id number]  Unit/Bed#: -01 Encounter: [id number]      Subjective/Objective     Subjective:pt feeling better, pain controlled  Objective: ml                  JP1-70 ml,  JP2-150ml      /65   Pulse 62   Temp 97 6 °F (36 4 °C) (Oral)   Resp 16   Ht 5' 2 5" (1 588 m)   Wt 63 5 kg (140 lb)   SpO2 96%   BMI 25 20 kg/m²       Intake/Output Summary (Last 24 hours) at 11/01/17 0836  Last data filed at 11/01/17 0559   Gross per 24 hour   Intake          2512 43 ml   Output             2165 ml   Net           347 43 ml       Invasive Devices     Peripheral Intravenous Line            Peripheral IV 10/30/17 Left Forearm 1 day    Peripheral IV 10/31/17 Left Wrist 1 day          Drain            Closed/Suction Drain Left LLQ Bulb 19 Fr  1 day    Closed/Suction Drain Right RLQ Bulb 19 Fr  1 day    NG/OG/Enteral Tube Nasogastric 18 Fr Left nares 1 day                  Physical Exam    General Appearance:    Alert, cooperative, no distress, appears stated age   Head:    Normocephalic, without obvious abnormality, atraumatic   Lungs:     Clear to auscultation bilaterally, respirations unlabored   Heart:    Regular rate and rhythm, S1 and S2 normal, no murmur, rub    or gallop   Abdomen:     Soft, appropriate tenderness, bowel sounds active all four quadrants, no masses, no organomegaly, non distended, incisions clean, dry, and intact  yas drains in place   Extremities:   Extremities normal, atraumatic, no cyanosis or edema   Neurologic:   CNII-XII intact   Normal strength and sensation               Lab, Imaging and other studies:  CBC:   Lab Results   Component Value Date    WBC 6 70 11/01/2017    HGB 10 2 (L) 11/01/2017    HCT 31 6 (L) 11/01/2017    MCV 91 11/01/2017     11/01/2017    MCH 29 2 11/01/2017    MCHC 32 3 11/01/2017    RDW 13 4 11/01/2017    MPV 10 9 11/01/2017   , CMP:   Lab Results   Component Value Date     11/01/2017    K 3 6 11/01/2017     (H) 11/01/2017    CO2 17 (L) 11/01/2017    ANIONGAP 14 (H) 11/01/2017    BUN 49 (H) 11/01/2017    CREATININE 1 25 11/01/2017    GLUCOSE 103 11/01/2017    CALCIUM 8 3 11/01/2017    EGFR 47 11/01/2017        VTE Mechanical Prophylaxis: sequential compression device    Assessment/Plan  66 yo female with SBO s/p ex lap POD2  Continue NPO/NGT  Pt encouraged to ambulate  Monitor creatinine  Prn pain control  May have ice chips for comfort      This text is generated with voice recognition software  There may be translation, syntax,  or grammatical errors  If you have any questions, please contact the dictating provider

## 2017-11-01 NOTE — OCCUPATIONAL THERAPY NOTE
Occupational Therapy Treatment Note      Lester Love    11/1/2017    Patient Active Problem List   Diagnosis    Complete intestinal obstruction       Past Medical History:   Diagnosis Date    Diverticulosis     GERD (gastroesophageal reflux disease)     Hyperlipidemia        Past Surgical History:   Procedure Laterality Date    KNEE CARTILAGE SURGERY Left     x3    LAPAROTOMY N/A 10/30/2017    Procedure: LAPAROTOMY EXPLORATORY, DRAINAGE PELVIC ABSCESS; APPENDECTOMY;  Surgeon: Vinicio Solis DO;  Location: [facility];  Service: General    UTERINE FIBROID SURGERY          11/01/17 1140   Restrictions/Precautions   Other Precautions Multiple lines  (NGT)   ADL   Where Assessed Standing at sink   Grooming Assistance 6  Modified Independent   Grooming Deficit Wash/dry hands; Teeth care   LB Dressing Assistance 4  Minimal Assistance   LB Dressing Deficit Thread LLE into pants; Thread RLE into pants; Don/doff L sock; Don/doff R sock   Toileting Assistance  6  Modified independent   Functional Standing Tolerance   Time 5   Activity grooming at sink    Transfers   Sit to Stand 6  Modified independent   Additional items Assist x 1   Stand to Sit 6  Modified independent   Additional items Assist x 1   Toilet transfer 6  Modified independent   Additional items Assist x 1   Functional Mobility   Functional Mobility 6  Modified independent   Additional Comments using RW for ambulation longer distances on unit    Additional items Rolling walker  (Mod I level walking short distances in room with IV pole )   Cognition   Overall Cognitive Status WFL   Activity Tolerance   Activity Tolerance Patient tolerated treatment well   Medical Staff Made Aware OT spoke with INGRID Garcia    Assessment   Assessment Patient seen for OT session focusing on ADL activitites in stance at sink, LB dressing skills, transfers and functional mobility    Patient tolerated treatment session well, OT spoke with nursing to encourage patient ambulation in hallways with A for management of IV line in order to improve endurnace  Patient with minimal difficulty with LB dressing, however do not feel she will require adaptive equipment as she is mainly limited due to IV lines, NG tube and ALHAJI tubes  Patient c/o minimal pan reaching to lower body, will focus next session on using step stool or adaptive techs' to decrease pain with reaching LB if neeed  Plan   Treatment Interventions ADL retraining;Equipment evaluation/education; Endurance training   Recommendation   Recommendation PT consult  (recommend PT consult for recommendations for assitive device)   OT Discharge Recommendation Home with family support   OT - OK to Discharge Yes  (When medically stable )   Kaylen Rodriguez, OT

## 2017-11-01 NOTE — PHYSICAL THERAPY NOTE
PHYSICAL THERAPY EVALUATION  NAME: Lester Love  AGE:   65 y o  MRN:  [id number]  ADMIT DX: SBO (small bowel obstruction) [K56 609]  Abdominal pain [R10 9]  Complete intestinal obstruction, unspecified cause [K56 601]    PMH:   Past Medical History:   Diagnosis Date    Diverticulosis     GERD (gastroesophageal reflux disease)     Hyperlipidemia      LENGTH OF STAY: 2     11/01/17 1217   Pain Assessment   Pain Assessment No/denies pain   Pain Score No Pain   Hospital Pain Intervention(s) Ambulation/increased activity   Response to Interventions tolerated   Home Living   Type of [address] [place] [address] Two level; Able to live on main level with bedroom/bathroom   Additional Comments Ambulates independently without AD at baseline     Prior Function   Level of [place] Independent with ADLs and functional mobility   Lives With (Brother)   Receives Help From Family   ADL Assistance Independent   IADLs Independent   Falls in the last 6 months (1 syncopal fall recently)   Vocational Retired   Comments brother available to assist as needed   Restrictions/Precautions   Wells Yecenia Bearing Precautions Per Order No   Other Precautions Multiple lines;Telemetry  (NG tube)   General   Family/Caregiver Present No   Cognition   Overall Cognitive Status WFL   Arousal/Participation Cooperative   Orientation Level Oriented X4   Memory Within functional limits   Following Commands Follows all commands and directions without difficulty   RLE Assessment   RLE Assessment X   Strength RLE   RLE Overall Strength 4+/5  (grossly)   LLE Assessment   LLE Assessment X   Strength LLE   LLE Overall Strength 4+/5  (grossly)   Bed Mobility   Supine to Sit Unable to assess  (OOB in chair pre/post evaluation )   Transfers   Sit to Stand 6  Modified independent   Additional items Increased time required   Stand to Sit 6  Modified independent   Additional items Increased time required   Stand pivot 5  Supervision   Additional items Increased time required;Verbal cues   Ambulation/Elevation   Gait pattern Forward Flexion;Decreased foot clearance; Short stride; Excessively slow   Gait Assistance 5  Supervision   Additional items Verbal cues   Assistive Device Rolling walker   Distance 300` x1   Balance   Static Sitting Good   Dynamic Sitting Good   Static Standing Fair +   Dynamic Standing Fair   Ambulatory Fair -   Endurance Deficit   Endurance Deficit Yes   Endurance Deficit Description limited ambulation distance, reports fatigue   Activity Tolerance   Activity Tolerance Patient limited by fatigue   Nurse Made Aware Per RN, pt appropriate to evaluate  (updated on status)   Assessment   Prognosis Good   Problem List Decreased strength;Decreased endurance; Impaired balance;Decreased mobility; Decreased safety awareness   Goals   Patient Goals "to get this tube out of my nose"   [facility] Expiration Date 11/10/17   Short Term Goal #1 Pt will be able to: (1) perform bed mobility with mod I (2) ambulate 300` with mod I and least restrictive AD (3) negotiate full flight of stairs with SBA and hand rail    Treatment Day 0   Plan   Treatment/Interventions Functional transfer training;LE strengthening/ROM; Elevations; Therapeutic exercise; Endurance training;Patient/family training;Equipment eval/education; Bed mobility;Gait training   PT Frequency 5x/wk   Recommendation   Recommendation Home with family support   Equipment Recommended [place]  (pending progress)   PT - OK to Discharge Yes  (when medically appropriate)   Barthel Index   Feeding 10   Bathing 0   Grooming Score 5   Dressing Score 5   Bladder Score 10   Bowels Score 10   Toilet Use Score 10   Transfers (Bed/Chair) Score 10   Mobility (Level Surface) Score 10   Stairs Score 0   Barthel Index Score 70       Assessment: Pt is a 65 y o  female seen for PT evaluation s/p admit to [facility] on 10/30/2017 w/ Complete intestinal obstruction  Order placed for PT   Comorbidities affecting pt's physical performance listed above  Personal factors affecting pt at time of IE include: environment, inability to perform IADLs and inability to perform ADLs  Prior to admission, pt was independent w/ all functional mobility w/ out AD, lives in multi-level home and lives with her brother  Upon evaluation: Pt requires mod I for sit to stand and SBA for ambulation with RW  Decreased gait speed and increased support required from RW due to "discomfort"  (Please find full objective findings from PT assessment regarding body systems outlined above)  Impairments and limitations also listed above, especially due to  weakness, impaired balance, decreased endurance, gait deviations, decreased activity tolerance, decreased safety awareness and fall risk  The following objective measures performed on IE also reveal limitations: Barthel Index 70/100  Pt's clinical presentation is currently unstable/unpredictable seen in pt's presentation of multiple lines, PCA pump, and abnormal lab values  Pt to benefit from continued skilled PT tx while in hospital and upon DC to address deficits as defined above and maximize level of functional mobility  From PT/mobility standpoint, recommendation at time of d/c would be home with family support pending progress in order to maximize pt's functional independence and consistency w/ mobility in order to facilitate return to PLOF  Recommend stair negotiation, initiation of HEP, and dynamic balance activities        Lodema Counter, PT,DPT

## 2017-11-02 PROCEDURE — G8989 SELF CARE D/C STATUS: HCPCS

## 2017-11-02 PROCEDURE — C9113 INJ PANTOPRAZOLE SODIUM, VIA: HCPCS | Performed by: SURGERY

## 2017-11-02 PROCEDURE — 97535 SELF CARE MNGMENT TRAINING: CPT

## 2017-11-02 RX ORDER — OXYCODONE HYDROCHLORIDE 10 MG/1
10 TABLET ORAL EVERY 4 HOURS PRN
Status: DISCONTINUED | OUTPATIENT
Start: 2017-11-02 | End: 2017-11-05 | Stop reason: HOSPADM

## 2017-11-02 RX ORDER — DEXTROSE, SODIUM CHLORIDE, AND POTASSIUM CHLORIDE 5; .45; .15 G/100ML; G/100ML; G/100ML
75 INJECTION INTRAVENOUS CONTINUOUS
Status: DISCONTINUED | OUTPATIENT
Start: 2017-11-02 | End: 2017-11-05 | Stop reason: HOSPADM

## 2017-11-02 RX ORDER — HYDROCODONE BITARTRATE AND ACETAMINOPHEN 5; 325 MG/1; MG/1
1 TABLET ORAL EVERY 4 HOURS PRN
Status: DISCONTINUED | OUTPATIENT
Start: 2017-11-02 | End: 2017-11-05 | Stop reason: HOSPADM

## 2017-11-02 RX ORDER — ACETAMINOPHEN 325 MG/1
650 TABLET ORAL EVERY 6 HOURS PRN
Status: DISCONTINUED | OUTPATIENT
Start: 2017-11-02 | End: 2017-11-05 | Stop reason: HOSPADM

## 2017-11-02 RX ADMIN — METRONIDAZOLE 500 MG: 500 INJECTION, SOLUTION INTRAVENOUS at 19:47

## 2017-11-02 RX ADMIN — CEFAZOLIN SODIUM 1000 MG: 1 SOLUTION INTRAVENOUS at 02:52

## 2017-11-02 RX ADMIN — HEPARIN SODIUM 5000 UNITS: 5000 INJECTION, SOLUTION INTRAVENOUS; SUBCUTANEOUS at 05:25

## 2017-11-02 RX ADMIN — HEPARIN SODIUM 5000 UNITS: 5000 INJECTION, SOLUTION INTRAVENOUS; SUBCUTANEOUS at 22:11

## 2017-11-02 RX ADMIN — CEFAZOLIN SODIUM 1000 MG: 1 SOLUTION INTRAVENOUS at 18:42

## 2017-11-02 RX ADMIN — CEFAZOLIN SODIUM 1000 MG: 1 SOLUTION INTRAVENOUS at 10:06

## 2017-11-02 RX ADMIN — METRONIDAZOLE 500 MG: 500 INJECTION, SOLUTION INTRAVENOUS at 11:15

## 2017-11-02 RX ADMIN — METRONIDAZOLE 500 MG: 500 INJECTION, SOLUTION INTRAVENOUS at 05:19

## 2017-11-02 RX ADMIN — PANTOPRAZOLE SODIUM 40 MG: 40 INJECTION, POWDER, FOR SOLUTION INTRAVENOUS at 08:04

## 2017-11-02 RX ADMIN — SODIUM CHLORIDE 125 ML/HR: 0.9 INJECTION, SOLUTION INTRAVENOUS at 02:57

## 2017-11-02 RX ADMIN — HEPARIN SODIUM 5000 UNITS: 5000 INJECTION, SOLUTION INTRAVENOUS; SUBCUTANEOUS at 15:05

## 2017-11-02 RX ADMIN — HYDROCODONE BITARTRATE AND ACETAMINOPHEN 1 TABLET: 5; 325 TABLET ORAL at 18:42

## 2017-11-02 RX ADMIN — DEXTROSE, SODIUM CHLORIDE, AND POTASSIUM CHLORIDE 75 ML/HR: 5; .45; .15 INJECTION INTRAVENOUS at 09:48

## 2017-11-02 NOTE — OCCUPATIONAL THERAPY NOTE
Occupational Therapy Progress Note      Patient Name: Harriett Zuniga    PTTGF'X Date: 11/2/2017    Problem List:   Patient Active Problem List   Diagnosis    Complete intestinal obstruction          11/02/17 1103   Restrictions/Precautions   Weight Bearing Precautions Per Order No   Other Precautions Multiple lines;Pain   General   Response to Previous Treatment Patient with no complaints from previous session   Pain Assessment   Pain Assessment 0-10   Pain Score 6   Pain Type Surgical pain   Pain Location Abdomen   Pain Orientation Bilateral   Effect of Pain on Daily Activities limits mobility and activity tolerance   Patient's Stated Pain Goal No pain   Hospital Pain Intervention(s) Repositioned; Ambulation/increased activity; Emotional support   Response to Interventions tolerated   ADL   LB Dressing Assistance 5  Supervision/Setup   LB Dressing Deficit Setup; Increased time to complete   LB Dressing Comments to don / doff sock  Pt reports that she does not wear socks at baseline  Pt educated on use of stool and tongs as needed if not able to reach when threading LE into pants and underwear   Pt demonstrated understanding of tech using reacher and foot stool    Toileting Assistance  6  Modified independent   Toileting Deficit Increased time to complete   Bed Mobility   Supine to Sit Unable to assess   Sit to Supine Unable to assess   Additional Comments Pt seated OOB in chair upon therapist arrival and post session w/ call bell in reach    Transfers   Sit to Stand 6  Modified independent   Additional items Increased time required   Stand to Sit 6  Modified independent   Additional items Increased time required   Toilet transfer 6  Modified independent   Additional items Increased time required   Functional Mobility   Functional Mobility 5  Supervision   Additional Comments to / from bathroom   Additional items (IV pole)   Toilet Transfers   Toilet Transfer From Other (Comment)  (chair)   Toilet Transfer Type To and from   Toilet Transfer to Standard toilet   Toilet Transfer Technique Ambulating   Toilet Transfers Modified independence   Toilet Transfers Comments increased time  pt managed IV pole   Cognition   Overall Cognitive Status WFL   Arousal/Participation Alert; Cooperative   Attention Within functional limits   Orientation Level Oriented X4   Memory Within functional limits   Following Commands Follows all commands and directions without difficulty   Activity Tolerance   Activity Tolerance Patient tolerated treatment well   Medical Staff Made Aware per Jose QUINTERO to see   Assessment   Assessment Patient seen for OT session focusing on pt education on compensatory strategies for LBD and toileting  Pt educated on use on foot stool and AE (tongs) to in creased I w/ LBD  Pt demnonstrated understanding of tech w/ S after set- up  Pt complete toileting w/ mod I including hygiene and clothing mgmt  From an OT perspective, pt can return home to [facility] when medically stable for discharge from acute care  No additional acute care OT needs at this time  Recommend continued participation in ADL's w/ nursing staff while in acute care  D/C OT   Plan   Treatment Interventions ADL retraining; Compensatory technique education;Equipment evaluation/education;Patient/family training   Goal Expiration Date 11/05/17   Treatment Day 2   OT Frequency 1-2x/wk   Recommendation   OT Discharge Recommendation Home with family support   OT - OK to Discharge Yes  (when medically stable)   Barthel Index   Feeding 10   Bathing 5   Grooming Score 5   Dressing Score 10   Bladder Score 10   Bowels Score 10   Toilet Use Score 10   Transfers (Bed/Chair) Score 15   Mobility (Level Surface) Score 10   Stairs Score 0   Barthel Index Score 85   No additional acute care OT needs at this time   D/C OT  Zoraida Sandhu OT

## 2017-11-02 NOTE — PLAN OF CARE
Problem: DISCHARGE PLANNING - CARE MANAGEMENT  Goal: Discharge to post-acute care or home with appropriate resources  INTERVENTIONS:  - Conduct assessment to determine patient/family and health care team treatment goals, and need for post-acute services based on payer coverage, community resources, and patient preferences, and barriers to discharge  - Address psychosocial, clinical, and financial barriers to discharge as identified in assessment in conjunction with the patient/family and health care team  - Arrange appropriate level of post-acute services according to patients   needs and preference and payer coverage in collaboration with the physician and health care team  - Communicate with and update the patient/family, physician, and health care team regarding progress on the discharge plan  - Arrange appropriate transportation to post-acute venues  Outcome: Adequate for Discharge  LOS 3 days, pt is not a bundle or readmission  Pt is retired, lives in [place] with her brother in a 2 story house with 1 step to enter  There is a bathroom on the first floor  Pt is alert was independent with ADLs to include driving a car  Pt has crutches from past admission  Pt is not open with any VNA prior to this admission  Pt [facility] on [address] and she could afford her meds  Pt does not have a hx of MH or D&A  Pt has a POA/AD and asked pt to bring in copy  Pt's friend with transport when she is discharge   is her friend Alison Saenz at [phone]  Pt will relay information about her admission to family  Rec consult for VNA  Pt has 2 drains  Pt stated she was given instruction about drain and does not feel she will need VNA  Discharge plan is home

## 2017-11-02 NOTE — PLAN OF CARE
Problem: OCCUPATIONAL THERAPY ADULT  Goal: Performs self-care activities at highest level of function for planned discharge setting  See evaluation for individualized goals  Treatment Interventions: ADL retraining, UE strengthening/ROM, Cognitive reorientation, Patient/family training          See flowsheet documentation for full assessment, interventions and recommendations  Outcome: Progressing  Limitation: Decreased ADL status, Decreased endurance, Decreased self-care trans  Prognosis: Good  Assessment: Patient seen for OT session focusing on pt education on compensatory strategies for LBD and toileting  Pt educated on use on foot stool and AE (tongs) to in creased I w/ LBD  Pt demnonstrated understanding of tech w/ S after set- up  Pt complete toileting w/ mod I including hygiene and clothing mgmt  From an OT perspective, pt can return [facility] to [facility] when medically stable for discharge from acute care  No additional acute care OT needs at this time  Recommend continued participation in ADL's w/ nursing staff while in acute care   D/C OT  Recommendation: PT consult (recommend PT consult for recommendations for assitive device)  OT Discharge Recommendation: Home with family support  OT - OK to Discharge: Yes (when medically stable)

## 2017-11-02 NOTE — SOCIAL WORK
LOS 3 days, pt is not a bundle or readmission  Pt is retired, lives in [place] her brother in a 2 story house with 1 step to enter  There is a bathroom on the first floor  Pt is alert was independent with ADLs to include driving a car  Pt has crutches from past admission  Pt is not open with any VNA prior to this admission  [facility] [address] and she could afford her meds  Pt does not have a hx of MH or D&A  Pt has a POA/AD and asked pt to bring in copy  Pt's friend with transport when she is discharge   is her friend Nahed Bryant at [phone]  Pt will relay information about her admission to family  Rec consult for VNA  Pt has 2 drains  Pt stated she was given instruction about drain and does not feel she will need VNA  Discharge plan is home

## 2017-11-02 NOTE — PROGRESS NOTES
Progress Note - General Surgery   [facility]: [id number]  Unit/Bed#: -[place] Encounter: [id number]    Assessment:  62F w/SBO s/p ex lap w/drainage pelvic abscess and appendectomy 10/31   - ALHAJI R 60, L 105  -     Plan:  - ancef/flagyl  - NPO, continue NGT to suction  - ALHAJI x2 to suction  - convert PCA to PO/IV pain meds today  - PT/OT, OOB  - SQH/SCDs      Subjective/Objective   Subjective: passed very minimal flatus overnight, pain controlled with PCA  Denies fever/chills, is ambulating and using IS    Objective:    Blood pressure 124/63, pulse 61, temperature 97 9 °F (36 6 °C), temperature source Oral, resp  rate 18, height 5' 2 5" (1 588 m), weight 63 5 kg (140 lb), SpO2 96 %  ,Body mass index is 25 2 kg/m²  I/O last 24 hours: In: 5289 1 [P O :840;  I V :4319 1; NG/GT:130]  Out: 3485 [Urine:2325; Emesis/NG output:900; Drains:260]    Invasive Devices     Peripheral Intravenous Line            Peripheral IV 10/30/17 Left Forearm 2 days    Peripheral IV 10/31/17 Left Wrist 2 days          Drain            Closed/Suction Drain Left LLQ Bulb 19 Fr  2 days    Closed/Suction Drain Right RLQ Bulb 19 Fr  2 days    NG/OG/Enteral Tube Nasogastric 18 Fr Left nares 2 days                Physical Exam:   NAD  RRR  Norm resp effort  Abd soft, ND, appropriately tender  ALHAJI x2 with serosang drainage  Midline incision cdi    Lab, Imaging and other studies:  Lab Results   Component Value Date    WBC 6 70 11/01/2017    HGB 10 2 (L) 11/01/2017    HCT 31 6 (L) 11/01/2017    MCV 91 11/01/2017     11/01/2017      Lab Results   Component Value Date    GLUCOSE 103 11/01/2017    CALCIUM 8 3 11/01/2017     11/01/2017    K 3 6 11/01/2017    CO2 17 (L) 11/01/2017     (H) 11/01/2017    BUN 49 (H) 11/01/2017    CREATININE 1 25 11/01/2017       VTE Pharmacologic Prophylaxis: Heparin  VTE Mechanical Prophylaxis: sequential compression device

## 2017-11-03 PROCEDURE — C9113 INJ PANTOPRAZOLE SODIUM, VIA: HCPCS | Performed by: SURGERY

## 2017-11-03 PROCEDURE — 97116 GAIT TRAINING THERAPY: CPT

## 2017-11-03 PROCEDURE — 97110 THERAPEUTIC EXERCISES: CPT

## 2017-11-03 RX ADMIN — METRONIDAZOLE 500 MG: 500 INJECTION, SOLUTION INTRAVENOUS at 05:43

## 2017-11-03 RX ADMIN — PANTOPRAZOLE SODIUM 40 MG: 40 INJECTION, POWDER, FOR SOLUTION INTRAVENOUS at 08:00

## 2017-11-03 RX ADMIN — ACETAMINOPHEN 650 MG: 325 TABLET ORAL at 02:49

## 2017-11-03 RX ADMIN — METRONIDAZOLE 500 MG: 500 INJECTION, SOLUTION INTRAVENOUS at 20:52

## 2017-11-03 RX ADMIN — METRONIDAZOLE 500 MG: 500 INJECTION, SOLUTION INTRAVENOUS at 12:03

## 2017-11-03 RX ADMIN — HEPARIN SODIUM 5000 UNITS: 5000 INJECTION, SOLUTION INTRAVENOUS; SUBCUTANEOUS at 21:00

## 2017-11-03 RX ADMIN — CEFAZOLIN SODIUM 1000 MG: 1 SOLUTION INTRAVENOUS at 02:22

## 2017-11-03 RX ADMIN — CEFAZOLIN SODIUM 1000 MG: 1 SOLUTION INTRAVENOUS at 10:44

## 2017-11-03 RX ADMIN — HEPARIN SODIUM 5000 UNITS: 5000 INJECTION, SOLUTION INTRAVENOUS; SUBCUTANEOUS at 05:44

## 2017-11-03 RX ADMIN — DEXTROSE, SODIUM CHLORIDE, AND POTASSIUM CHLORIDE 75 ML/HR: 5; .45; .15 INJECTION INTRAVENOUS at 00:45

## 2017-11-03 RX ADMIN — CEFAZOLIN SODIUM 1000 MG: 1 SOLUTION INTRAVENOUS at 20:10

## 2017-11-03 RX ADMIN — DEXTROSE, SODIUM CHLORIDE, AND POTASSIUM CHLORIDE 75 ML/HR: 5; .45; .15 INJECTION INTRAVENOUS at 18:01

## 2017-11-03 RX ADMIN — HEPARIN SODIUM 5000 UNITS: 5000 INJECTION, SOLUTION INTRAVENOUS; SUBCUTANEOUS at 13:44

## 2017-11-03 NOTE — PHYSICAL THERAPY NOTE
PHYSICAL THERAPY NOTE          Patient Name: Cash Conde  [id number] Date: 11/3/2017     11/03/17 1449   Pain Assessment   Pain Assessment No/denies pain   Pain Score No Pain   Hospital Pain Intervention(s) Ambulation/increased activity;Repositioned; Emotional support;Elevated; Rest   Restrictions/Precautions   Other Precautions Multiple lines; Fall Risk   General   Chart Reviewed Yes   Response to Previous Treatment Patient with no complaints from previous session  Family/Caregiver Present No   Cognition   Overall Cognitive Status WFL   Arousal/Participation Alert; Cooperative   Attention Within functional limits   Orientation Level Oriented X4   Following Commands Follows all commands and directions without difficulty   Subjective   Subjective I walked 3 times around floor today and to do 1 more time today  I used roller walker and other times pushed iv pole only  no pain  no dizziness  Bed Mobility   Rolling L 6  Modified independent   Additional items Assist x 1;Bedrails; Increased time required;Verbal cues   Supine to Sit 6  Modified independent   Additional items HOB elevated;Assist x 1;Verbal cues; Increased time required  (log rolling educaiton)   Sit to Supine 5  Supervision   Additional items Assist x 1;HOB elevated; Increased time required;Verbal cues   Transfers   Sit to Stand 6  Modified independent   Additional items Assist x 1;Bedrails;Armrests; Verbal cues  (3 trials)   Stand to Sit 5  Supervision   Additional items Assist x 1; Armrests; Bedrails;Verbal cues   Toilet transfer 7  Independent   Additional items Assist x 1;Verbal cues;Armrests   Ambulation/Elevation   Gait pattern Decreased foot clearance; Forward Flexion; Short stride; Excessively slow   Gait Assistance 5  Supervision   Additional items Assist x 1;Verbal cues; Tactile cues   Assistive Device Rolling walker;Straight cane  (cleared by rik caceres for me to treat she added cane trial )   Distance 160 feet  for stair training stand rest  con't 50 feet , dpt setup trial of cane  110 feet c S /tactile A   Stair Management Assistance 4  Minimal assist   Additional items Assist x 1; Tactile cues; Verbal cues   Stair Management Technique One rail R;Foreward;Nonreciprocal   Number of Stairs 8  (did 4 steps x 2 c A of folded roller walker + r rail)   Balance   Static Sitting Good   Dynamic Sitting Good   Static Standing Fair +   Ambulatory Fair -  (best balance c roller walker but no lob c cane+brief c iv)   Endurance Deficit   Endurance Deficit Yes   Activity Tolerance   Activity Tolerance Patient limited by fatigue   Nurse Made Aware spoke c nurse nicki guevara for clearance of treatment then she recommended and + cane for trial   Exercises   Quad Sets 15 reps; Supine;AROM; Bilateral   Heelslides Supine;20 reps;AROM; Bilateral   Glute Sets Supine;15 reps;AROM; Bilateral   Hip Abduction Supine;15 reps;AROM; Bilateral;AAROM   Hip Adduction Supine;15 reps;AAROM;AROM; Bilateral   Knee AROM Short Arc Quad Supine;15 reps;AROM; Bilateral   Ankle Pumps Supine;15 reps;AROM; Bilateral   Assessment   Prognosis Good   Problem List Decreased strength;Decreased endurance; Impaired balance;Decreased mobility; Decreased skin integrity; Decreased safety awareness  (has 2 drains pinned to gown)   Assessment pt progressed c roller walker at times impulsive c mobility c iv line  no pain no dizziness  performed stairs c rail and folded roller walker minimal A 1  con't and nicki jolly added cane trial to progress goals  pt required minimal A S 1  fatigued and returned to room  she performed bed mobility MI/ S of 1 log rolling c bed flat  trasfners MI/S 1  no lob  performed le therapeutic exercise and hep issued reveiwed  reclined  will benefit trial of cane on levels  home for d/c   Goals   Patient Goals do 1 more walk today   return sitting    STG Expiration Date 11/10/17   Treatment Day 1  (darleen jolly part of session)   Plan   Treatment/Interventions Functional transfer training;LE strengthening/ROM; Elevations; Therapeutic exercise; Endurance training;Patient/family training;Equipment eval/education; Bed mobility;Gait training; Compensatory technique education;Spoke to nursing  (nicki jolly x 2)   PT Frequency 5x/wk   Recommendation   Recommendation Home with family support   Equipment Recommended (con't trial of cane   new 1 fitted and setup)   PT - OK to Discharge Yes

## 2017-11-03 NOTE — PLAN OF CARE
Problem: PHYSICAL THERAPY ADULT  Goal: Performs mobility at highest level of function for planned discharge setting  See evaluation for individualized goals  Treatment/Interventions: Functional transfer training, LE strengthening/ROM, Elevations, Therapeutic exercise, Endurance training, Patient/family training, Equipment eval/education, Bed mobility, Gait training  Equipment Recommended: Jarvis Panda (pending progress)       See flowsheet documentation for full assessment, interventions and recommendations  Outcome: Adequate for Discharge  Prognosis: Good  Problem List: Decreased strength, Decreased endurance, Impaired balance, Decreased mobility, Decreased skin integrity, Decreased safety awareness (has 2 drains pinned to gown)  Assessment: pt progressed c roller walker at times impulsive c mobility c iv line  no pain no dizziness  performed stairs c rail and folded roller walker minimal A 1  con't and dpt rik added cane trial to progress goals  pt required minimal A S 1  fatigued and returned to room  she performed bed mobility MI/ S of 1 log rolling c bed flat  trasfners MI/S 1  no lob  performed le therapeutic exercise and hep issued reveiwed  reclined  will benefit trial of cane on levels  home for d/c        Recommendation: Home with family support     PT - OK to Discharge: Yes    See flowsheet documentation for full assessment

## 2017-11-03 NOTE — PROGRESS NOTES
Progress Note -Surgery PA  Anjelica Love 65 y o  female MRN: [id number]  Unit/Bed#: -01 Encounter: [id number]      Subjective/Objective     Subjective:  Patient seen she is still having some pain  Denies nausea or vomiting  Tolerating clears with toast and crackers  States she did have the very small bowel movement this a m  and is passing gas  Objective:     /69   Pulse 66   Temp 98 °F (36 7 °C) (Oral)   Resp 16   Ht 5' 2 5" (1 588 m)   Wt 63 5 kg (140 lb)   SpO2 96%   BMI 25 20 kg/m²       Intake/Output Summary (Last 24 hours) at 11/03/17 0934  Last data filed at 11/03/17 5868   Gross per 24 hour   Intake             1321 ml   Output             1610 ml   Net             -289 ml       Invasive Devices     Peripheral Intravenous Line            Peripheral IV 10/31/17 Left Wrist 3 days          Drain            Closed/Suction Drain Left LLQ Bulb 19 Fr  3 days    Closed/Suction Drain Right RLQ Bulb 19 Fr  3 days                Physical Exam:  General appearance: alert, appears stated age and cooperative  Lungs: clear to auscultation bilaterally  Heart: regular rate and rhythm, S1, S2 normal, no murmur, click, rub or gallop  Abdomen:  Soft, nondistended  Incision clean dry intact   ALHAJI drain times 2 serosanguineous  Extremities: extremities normal, atraumatic, no cyanosis or edema      Current Facility-Administered Medications:     acetaminophen (TYLENOL) tablet 650 mg, 650 mg, Oral, Q6H PRN, Fabiana Smith PA-C, 650 mg at 11/03/17 0249    ceFAZolin (ANCEF) IVPB (premix) 1,000 mg, 1,000 mg, Intravenous, Q8H, Lisa Doty MD, Last Rate: 100 mL/hr at 11/03/17 0222, 1,000 mg at 11/03/17 0222    dextrose 5 % and sodium chloride 0 45 % with KCl 20 mEq/L infusion, 75 mL/hr, Intravenous, Continuous, Abdoul Ye DO, Last Rate: 75 mL/hr at 11/03/17 0045, 75 mL/hr at 11/03/17 0045    heparin (porcine) subcutaneous injection 5,000 Units, 5,000 Units, Subcutaneous, Q8H Abdoul Deleon DO Ephraim, 5,000 Units at 11/03/17 0544    HYDROcodone-acetaminophen (NORCO) 5-325 mg per tablet 1 tablet, 1 tablet, Oral, Q4H PRN, Prem Bledsoe PA-C, 1 tablet at 11/02/17 1842    HYDROmorphone (DILAUDID) 1 mg/mL injection 0 5 mg, 0 5 mg, Intravenous, Q1H PRN, Eliza Smith PA-C    influenza inactivated quadrivalent vaccine (FLULAVAL) IM injection 0 5 mL, 0 5 mL, Intramuscular, Prior to discharge, Abdoul Ye DO    metroNIDAZOLE (FLAGYL) IVPB (premix) 500 mg, 500 mg, Intravenous, Q8H, Lily Pimentel MD, Last Rate: 200 mL/hr at 11/03/17 0543, 500 mg at 11/03/17 0543    naloxone (NARCAN) 0 04 mg/mL syringe 0 04 mg, 0 04 mg, Intravenous, Q1MIN PRN, Lily Pimentel MD    ondansetron [facility]) injection 4 mg, 4 mg, Intravenous, Once, Debra Hemphill MD    ondansetron [facility]) injection 4 mg, 4 mg, Intravenous, Q4H PRN, Lily Pimentel MD    oxyCODONE (ROXICODONE) immediate release tablet 10 mg, 10 mg, Oral, Q4H PRN, Eliza Smith PA-C    pantoprazole (PROTONIX) injection 40 mg, 40 mg, Intravenous, Q24H [address], Lily Pimentel MD, 40 mg at 11/03/17 0800    phenol (CHLORASEPTIC) 1 4 % mucosal liquid 1 spray, 1 spray, Mouth/Throat, Q2H PRN, Lily Pimentel MD    sodium chloride 0 9 % bolus 1,000 mL, 1,000 mL, Intravenous, Once, Debra Hemphill MD              Lab, Imaging and other studies:I have personally reviewed pertinent lab results  , CBC: No results found for: WBC, HGB, HCT, MCV, PLT, ADJUSTEDWBC, MCH, MCHC, RDW, MPV, NRBC, CMP: No results found for: NA, K, CL, CO2, ANIONGAP, BUN, CREATININE, GLUCOSE, CALCIUM, AST, ALT, ALKPHOS, PROT, ALBUMIN, BILITOT, EGFR  Labs in chart were reviewed    Lab Results   Component Value Date    WBC 6 70 11/01/2017    WBC 6 73 09/17/2015    HGB 10 2 (L) 11/01/2017    HGB 14 0 09/17/2015    HCT 31 6 (L) 11/01/2017    HCT 41 6 09/17/2015     11/01/2017     09/17/2015     Lab Results   Component Value Date     11/01/2017     09/17/2015    K 3 6 11/01/2017    K 4 2 09/17/2015     (H) 11/01/2017     09/17/2015    CO2 17 (L) 11/01/2017    CO2 30 4 09/17/2015    BUN 49 (H) 11/01/2017    BUN 13 09/17/2015    CREATININE 1 25 11/01/2017    CREATININE 0 87 09/17/2015    GLUCOSE 103 11/01/2017    GLUCOSE 92 09/17/2015       VTE Pharmacologic Prophylaxis: Heparin  VTE Mechanical Prophylaxis: sequential compression device    Assessment:    27-year-old female status post ex lap with drainage of pelvic abscess and appendectomy    Plan:    -continue Ancef/Flagyl  -will increase diet to fulls toast crackers  -continue drain care  -encouraged out of bed/ambulation  -DVT prophylaxis    Luis Enrique Olsen PA-C

## 2017-11-04 LAB — HGB BLD-MCNC: 10.4 G/DL (ref 11.5–15.4)

## 2017-11-04 PROCEDURE — C9113 INJ PANTOPRAZOLE SODIUM, VIA: HCPCS | Performed by: SURGERY

## 2017-11-04 PROCEDURE — 85018 HEMOGLOBIN: CPT | Performed by: SURGERY

## 2017-11-04 RX ADMIN — CEFAZOLIN SODIUM 1000 MG: 1 SOLUTION INTRAVENOUS at 02:17

## 2017-11-04 RX ADMIN — ACETAMINOPHEN 650 MG: 325 TABLET ORAL at 03:09

## 2017-11-04 RX ADMIN — HEPARIN SODIUM 5000 UNITS: 5000 INJECTION, SOLUTION INTRAVENOUS; SUBCUTANEOUS at 06:32

## 2017-11-04 RX ADMIN — ACETAMINOPHEN 650 MG: 325 TABLET ORAL at 23:31

## 2017-11-04 RX ADMIN — HEPARIN SODIUM 5000 UNITS: 5000 INJECTION, SOLUTION INTRAVENOUS; SUBCUTANEOUS at 14:47

## 2017-11-04 RX ADMIN — METRONIDAZOLE 500 MG: 500 INJECTION, SOLUTION INTRAVENOUS at 03:04

## 2017-11-04 RX ADMIN — HEPARIN SODIUM 5000 UNITS: 5000 INJECTION, SOLUTION INTRAVENOUS; SUBCUTANEOUS at 23:31

## 2017-11-04 RX ADMIN — METRONIDAZOLE 500 MG: 500 INJECTION, SOLUTION INTRAVENOUS at 11:22

## 2017-11-04 RX ADMIN — CEFAZOLIN SODIUM 1000 MG: 1 SOLUTION INTRAVENOUS at 10:41

## 2017-11-04 RX ADMIN — DEXTROSE, SODIUM CHLORIDE, AND POTASSIUM CHLORIDE 75 ML/HR: 5; .45; .15 INJECTION INTRAVENOUS at 09:26

## 2017-11-04 RX ADMIN — METRONIDAZOLE 500 MG: 500 INJECTION, SOLUTION INTRAVENOUS at 19:58

## 2017-11-04 RX ADMIN — CEFAZOLIN SODIUM 1000 MG: 1 SOLUTION INTRAVENOUS at 18:13

## 2017-11-04 RX ADMIN — PANTOPRAZOLE SODIUM 40 MG: 40 INJECTION, POWDER, FOR SOLUTION INTRAVENOUS at 08:32

## 2017-11-04 NOTE — PLAN OF CARE
Problem: Potential for Falls  Goal: Patient will remain free of falls  INTERVENTIONS:  - Assess patient frequently for physical needs  -  Identify cognitive and physical deficits and behaviors that affect risk of falls  -  [place] fall precautions as indicated by assessment   - Educate patient/family on patient safety including physical limitations  - Instruct patient to call for assistance with activity based on assessment  - Modify environment to reduce risk of injury  - Consider OT/PT consult to assist with strengthening/mobility   Outcome: Progressing      Problem: Prexisting or High Potential for Compromised Skin Integrity  Goal: Skin integrity is maintained or improved  INTERVENTIONS:  - Identify patients at risk for skin breakdown  - Assess and monitor skin integrity  - Assess and monitor nutrition and hydration status  - Monitor labs (i e  albumin)  - Assess for incontinence   - Turn and reposition patient  - Assist with mobility/ambulation  - Relieve pressure over bony prominences  - Avoid friction and shearing  - Provide appropriate hygiene as needed including keeping skin clean and dry  - Evaluate need for skin moisturizer/barrier cream  - Collaborate with interdisciplinary team (i e  Nutrition, Rehabilitation, etc )   - Patient/family teaching   Outcome: Progressing      Problem: Nutrition/Hydration-ADULT  Goal: Nutrient/Hydration intake appropriate for improving, restoring or maintaining nutritional needs  Monitor and assess patient's nutrition/hydration status for malnutrition (ex- brittle hair, bruises, dry skin, pale skin and conjunctiva, muscle wasting, smooth red tongue, and disorientation)  Collaborate with interdisciplinary team and initiate plan and interventions as ordered  Monitor patient's weight and dietary intake as ordered or per policy  Utilize nutrition screening tool and intervene per policy   Determine patient's food preferences and provide high-protein, high-caloric foods as appropriate       INTERVENTIONS:  - Monitor oral intake, urinary output, labs, and treatment plans  - Assess nutrition and hydration status and recommend course of action  - Evaluate amount of meals eaten  - Assist patient with eating if necessary   - Allow adequate time for meals  - Recommend/ encourage appropriate diets, oral nutritional supplements, and vitamin/mineral supplements  - Order, calculate, and assess calorie counts as needed  - Recommend, monitor, and adjust tube feedings and TPN/PPN based on assessed needs  - Assess need for intravenous fluids  - Provide specific nutrition/hydration education as appropriate  - Include patient/family/caregiver in decisions related to nutrition   Outcome: Progressing      Problem: METABOLIC, FLUID AND ELECTROLYTES - ADULT  Goal: Electrolytes maintained within normal limits  INTERVENTIONS:  - Monitor labs and assess patient for signs and symptoms of electrolyte imbalances  - Administer electrolyte replacement as ordered  - Monitor response to electrolyte replacements, including repeat lab results as appropriate  - Instruct patient on fluid and nutrition as appropriate   Outcome: Progressing    Goal: Fluid balance maintained  INTERVENTIONS:  - Monitor labs and assess for signs and symptoms of volume excess or deficit  - Monitor I/O and WT  - Instruct patient on fluid and nutrition as appropriate   Outcome: Progressing      Problem: SKIN/TISSUE INTEGRITY - ADULT  Goal: Incision(s), wounds(s) or drain site(s) healing without S/S of infection  INTERVENTIONS  - Assess and document risk factors for skin impairment   - Assess and document dressing, incision, wound bed, drain sites and surrounding tissue  - Initiate Nutrition services consult and/or wound management as needed   Outcome: Progressing      Problem: PAIN - ADULT  Goal: Verbalizes/displays adequate comfort level or baseline comfort level  Interventions:  - Encourage patient to monitor pain and request assistance  - Assess pain using appropriate pain scale  - Administer analgesics based on type and severity of pain and evaluate response  - Implement non-pharmacological measures as appropriate and evaluate response  - Consider cultural and social influences on pain and pain management  - Notify physician/advanced practitioner if interventions unsuccessful or patient reports new pain   Outcome: Progressing      Problem: DISCHARGE PLANNING - CARE MANAGEMENT  Goal: Discharge to post-acute care or home with appropriate resources  INTERVENTIONS:  - Conduct assessment to determine patient/family and health care team treatment goals, and need for post-acute services based on payer coverage, community resources, and patient preferences, and barriers to discharge  - Address psychosocial, clinical, and financial barriers to discharge as identified in assessment in conjunction with the patient/family and health care team  - Arrange appropriate level of post-acute services according to patients   needs and preference and payer coverage in collaboration with the physician and health care team  - Communicate with and update the patient/family, physician, and health care team regarding progress on the discharge plan  - Arrange appropriate transportation to post-acute venues   Outcome: Progressing

## 2017-11-04 NOTE — PLAN OF CARE
DISCHARGE PLANNING - CARE MANAGEMENT     Discharge to post-acute care or home with appropriate resources Progressing        METABOLIC, FLUID AND ELECTROLYTES - ADULT     Electrolytes maintained within normal limits Progressing     Fluid balance maintained Progressing        Nutrition/Hydration-ADULT     Nutrient/Hydration intake appropriate for improving, restoring or maintaining nutritional needs Progressing        PAIN - ADULT     Verbalizes/displays adequate comfort level or baseline comfort level Progressing        Potential for Falls     Patient will remain free of falls Progressing        Prexisting or High Potential for Compromised Skin Integrity     Skin integrity is maintained or improved Progressing        SKIN/TISSUE INTEGRITY - ADULT     Incision(s), wounds(s) or drain site(s) healing without S/S of infection Progressing

## 2017-11-04 NOTE — PROGRESS NOTES
Progress Note -Surgery PA  Kareem Love 65 y o  female MRN: [id number]  Unit/Bed#: -01 Encounter: [id number]      Subjective/Objective     Subjective:  Patient feels well this a m   States she had another small bowel movement this morning  Is passing gas  Tolerating diet  ALHAJI drains x2 serous sanguinous output  Objective:     /73   Pulse 69   Temp 98 °F (36 7 °C) (Oral)   Resp 18   Ht 5' 2 5" (1 588 m)   Wt 63 5 kg (140 lb)   SpO2 96%   BMI 25 20 kg/m²       Intake/Output Summary (Last 24 hours) at 11/04/17 9308  Last data filed at 11/04/17 9777   Gross per 24 hour   Intake             1350 ml   Output             2530 ml   Net            -1180 ml       Invasive Devices     Peripheral Intravenous Line            Peripheral IV 11/03/17 Right Forearm less than 1 day          Drain            Closed/Suction Drain Left LLQ Bulb 19 Fr  4 days    Closed/Suction Drain Right RLQ Bulb 19 Fr  4 days                Physical Exam:  General appearance: alert, appears stated age and cooperative  Lungs: clear to auscultation bilaterally  Heart: regular rate and rhythm, S1, S2 normal, no murmur, click, rub or gallop  Abdomen:  Soft, nontender, nondistended  Bowel sounds positive  ALHAJI drain times to serosanguineous    Incision clean/dry/intact      Current Facility-Administered Medications:     acetaminophen (TYLENOL) tablet 650 mg, 650 mg, Oral, Q6H PRN, Arthur Jaramillo PA-C, 650 mg at 11/04/17 0309    ceFAZolin (ANCEF) IVPB (premix) 1,000 mg, 1,000 mg, Intravenous, Q8H, Jose Mendez MD, Last Rate: 100 mL/hr at 11/04/17 0217, 1,000 mg at 11/04/17 0217    dextrose 5 % and sodium chloride 0 45 % with KCl 20 mEq/L infusion, 75 mL/hr, Intravenous, Continuous, Abdoul Ye DO, Last Rate: 75 mL/hr at 11/03/17 1801, 75 mL/hr at 11/03/17 1801    heparin (porcine) subcutaneous injection 5,000 Units, 5,000 Units, Subcutaneous, Q8H [facility], Abdoul Ye DO, 5,000 Units at 11/04/17 8202   HYDROcodone-acetaminophen (NORCO) 5-325 mg per tablet 1 tablet, 1 tablet, Oral, Q4H PRN, Tamera Houser PA-C, 1 tablet at 11/02/17 1842    HYDROmorphone (DILAUDID) 1 mg/mL injection 0 5 mg, 0 5 mg, Intravenous, Q1H PRN, Eliza Smith PA-C    influenza inactivated quadrivalent vaccine (FLULAVAL) IM injection 0 5 mL, 0 5 mL, Intramuscular, Prior to discharge, Abdoul Ye,     metroNIDAZOLE (FLAGYL) IVPB (premix) 500 mg, 500 mg, Intravenous, Q8H, Evy Simpson MD, Last Rate: 200 mL/hr at 11/04/17 0304, 500 mg at 11/04/17 0304    naloxone (NARCAN) 0 04 mg/mL syringe 0 04 mg, 0 04 mg, Intravenous, Q1MIN PRN, Evy Simpson MD    ondansetron [facility]) injection 4 mg, 4 mg, Intravenous, Once, Moises Mark MD    ondansetron [facility]) injection 4 mg, 4 mg, Intravenous, Q4H PRN, Evy Simpson MD    oxyCODONE (ROXICODONE) immediate release tablet 10 mg, 10 mg, Oral, Q4H PRN, Eliza Smith PA-C    pantoprazole (PROTONIX) injection 40 mg, 40 mg, Intravenous, Q24H [address], Evy Simpson MD, 40 mg at 11/04/17 0832    phenol (CHLORASEPTIC) 1 4 % mucosal liquid 1 spray, 1 spray, Mouth/Throat, Q2H PRN, Evy Simpson MD    sodium chloride 0 9 % bolus 1,000 mL, 1,000 mL, Intravenous, Once, Moises Mark MD              Lab, Imaging and other studies:  I have personally reviewed pertinent lab results  , CBC:   Lab Results   Component Value Date    HGB 10 4 (L) 11/04/2017   , CMP: No results found for: NA, K, CL, CO2, ANIONGAP, BUN, CREATININE, GLUCOSE, CALCIUM, AST, ALT, ALKPHOS, PROT, ALBUMIN, BILITOT, EGFR  Labs in chart were reviewed    Lab Results   Component Value Date    WBC 6 70 11/01/2017    WBC 6 73 09/17/2015    HGB 10 4 (L) 11/04/2017    HGB 14 0 09/17/2015    HCT 31 6 (L) 11/01/2017    HCT 41 6 09/17/2015     11/01/2017     09/17/2015     Lab Results   Component Value Date     11/01/2017     09/17/2015    K 3 6 11/01/2017    K 4 2 09/17/2015     (H) 11/01/2017     09/17/2015    CO2 17 (L) 11/01/2017    CO2 30 4 09/17/2015    BUN 49 (H) 11/01/2017    BUN 13 09/17/2015    CREATININE 1 25 11/01/2017    CREATININE 0 87 09/17/2015    GLUCOSE 103 11/01/2017    GLUCOSE 92 09/17/2015       VTE Pharmacologic Prophylaxis: Heparin  VTE Mechanical Prophylaxis: sequential compression device    Assessment:  29-year-old female status post ex lap with drainage of pelvic abscess and appendectomy      Plan:  -will advance diet to surgical soft today  -continue drain care  -encouraged out of bed/ambulation  -Ancef/Flagyl  -DVT prophylaxis  -tentative discharge today or tomorrow    Maria De Jesus Titus PA-C

## 2017-11-05 VITALS
BODY MASS INDEX: 24.8 KG/M2 | HEIGHT: 63 IN | OXYGEN SATURATION: 93 % | SYSTOLIC BLOOD PRESSURE: 110 MMHG | TEMPERATURE: 98.3 F | RESPIRATION RATE: 18 BRPM | WEIGHT: 140 LBS | HEART RATE: 71 BPM | DIASTOLIC BLOOD PRESSURE: 57 MMHG

## 2017-11-05 LAB
ANION GAP SERPL CALCULATED.3IONS-SCNC: 6 MMOL/L (ref 4–13)
BUN SERPL-MCNC: 4 MG/DL (ref 5–25)
CALCIUM SERPL-MCNC: 8.1 MG/DL (ref 8.3–10.1)
CHLORIDE SERPL-SCNC: 107 MMOL/L (ref 100–108)
CO2 SERPL-SCNC: 31 MMOL/L (ref 21–32)
CREAT SERPL-MCNC: 0.68 MG/DL (ref 0.6–1.3)
ERYTHROCYTE [DISTWIDTH] IN BLOOD BY AUTOMATED COUNT: 13.4 % (ref 11.6–15.1)
GFR SERPL CREATININE-BSD FRML MDRD: 95 ML/MIN/1.73SQ M
GLUCOSE SERPL-MCNC: 129 MG/DL (ref 65–140)
HCT VFR BLD AUTO: 33.1 % (ref 34.8–46.1)
HGB BLD-MCNC: 10.6 G/DL (ref 11.5–15.4)
MCH RBC QN AUTO: 29 PG (ref 26.8–34.3)
MCHC RBC AUTO-ENTMCNC: 32 G/DL (ref 31.4–37.4)
MCV RBC AUTO: 90 FL (ref 82–98)
PLATELET # BLD AUTO: 292 THOUSANDS/UL (ref 149–390)
PMV BLD AUTO: 10.5 FL (ref 8.9–12.7)
POTASSIUM SERPL-SCNC: 3.1 MMOL/L (ref 3.5–5.3)
RBC # BLD AUTO: 3.66 MILLION/UL (ref 3.81–5.12)
SODIUM SERPL-SCNC: 144 MMOL/L (ref 136–145)
WBC # BLD AUTO: 8.09 THOUSAND/UL (ref 4.31–10.16)

## 2017-11-05 PROCEDURE — C9113 INJ PANTOPRAZOLE SODIUM, VIA: HCPCS | Performed by: SURGERY

## 2017-11-05 PROCEDURE — 85027 COMPLETE CBC AUTOMATED: CPT | Performed by: PHYSICIAN ASSISTANT

## 2017-11-05 PROCEDURE — 80048 BASIC METABOLIC PNL TOTAL CA: CPT | Performed by: PHYSICIAN ASSISTANT

## 2017-11-05 RX ORDER — METRONIDAZOLE 250 MG/1
250 TABLET ORAL EVERY 8 HOURS SCHEDULED
Qty: 15 TABLET | Refills: 0 | Status: SHIPPED | OUTPATIENT
Start: 2017-11-05 | End: 2017-11-10

## 2017-11-05 RX ORDER — HYDROCODONE BITARTRATE AND ACETAMINOPHEN 5; 325 MG/1; MG/1
1-2 TABLET ORAL EVERY 4 HOURS PRN
Qty: 30 TABLET | Refills: 0 | Status: SHIPPED | OUTPATIENT
Start: 2017-11-05 | End: 2017-11-15

## 2017-11-05 RX ORDER — AMOXICILLIN AND CLAVULANATE POTASSIUM 875; 125 MG/1; MG/1
1 TABLET, FILM COATED ORAL EVERY 12 HOURS SCHEDULED
Qty: 10 TABLET | Refills: 0 | Status: SHIPPED | OUTPATIENT
Start: 2017-11-05 | End: 2017-11-10

## 2017-11-05 RX ADMIN — METRONIDAZOLE 500 MG: 500 INJECTION, SOLUTION INTRAVENOUS at 05:17

## 2017-11-05 RX ADMIN — PANTOPRAZOLE SODIUM 40 MG: 40 INJECTION, POWDER, FOR SOLUTION INTRAVENOUS at 08:58

## 2017-11-05 RX ADMIN — CEFAZOLIN SODIUM 1000 MG: 1 SOLUTION INTRAVENOUS at 11:09

## 2017-11-05 RX ADMIN — HEPARIN SODIUM 5000 UNITS: 5000 INJECTION, SOLUTION INTRAVENOUS; SUBCUTANEOUS at 05:17

## 2017-11-05 RX ADMIN — CEFAZOLIN SODIUM 1000 MG: 1 SOLUTION INTRAVENOUS at 03:00

## 2017-11-05 RX ADMIN — DEXTROSE, SODIUM CHLORIDE, AND POTASSIUM CHLORIDE 75 ML/HR: 5; .45; .15 INJECTION INTRAVENOUS at 01:06

## 2017-11-05 NOTE — DISCHARGE INSTRUCTIONS
Soft diet for two weeks- includes soups, anything in a   Avoid pork, steak, salads for two weeks  Allowed to shower  Allow water to run over incision  ALHAJI drain sites will continue to drain  Keep bandage over areas and change as needed  Do not drive while taking pain medication  Antibiotics to continue for 5 days  Follow up in office with Dr Jarad Newberry in 2 weeks

## 2017-11-05 NOTE — PROGRESS NOTES
Progress Note - General Surgery   Anjelica Love 65 y o  female MRN: [id number]  Unit/Bed#: -01 Encounter: [id number]        Subjective/Objective     Subjective:  Doing quite well  No nausea or vomiting  Tolerating soft diet  Blood pressure 110/57, pulse 71, temperature 98 3 °F (36 8 °C), temperature source Oral, resp  rate 18, height 5' 2 5" (1 588 m), weight 63 5 kg (140 lb), SpO2 93 %  ,Body mass index is 25 2 kg/m²  Intake/Output Summary (Last 24 hours) at 11/05/17 0845  Last data filed at 11/05/17 0820   Gross per 24 hour   Intake           1752 5 ml   Output             3287 ml   Net          -1534 5 ml           Physical Exam:   Wound clean and dry  ALHAJI scant serosanguineous  Abdomen is soft      Assessment:  Postoperative day 6 status post exploratory laparotomy  Operative findings were intra-abdominal abscess  The etiology is unclear  She did have nausea vomiting and diarrhea prior to her presentation  Clinically doing much better  Plan:  Drains can be removed  Okay for discharge today    Would continue another 5 days of Augmentin and Flagyl  Soft diet  Follow-up in the office in 2 weeks time    Shay White DO  11/5/2017  8:45 AM

## 2017-11-06 ENCOUNTER — GENERIC CONVERSION - ENCOUNTER (OUTPATIENT)
Dept: OTHER | Facility: OTHER | Age: 62
End: 2017-11-06

## 2017-11-06 NOTE — CASE MANAGEMENT
Continued Stay Review    Date: 11/4/2017   Subjective:  Patient feels well this a m   States she had another small bowel movement this morning  Is passing gas  Tolerating diet    ALHAJI drains x2 serous sanguinous output    Vital Signs: /57   Pulse 71   Temp 98 3 °F (36 8 °C) (Oral)   Resp 18   Ht 5' 2 5" (1 588 m)   Wt 63 5 kg (140 lb)   SpO2 93%   BMI 25 20 kg/m²     Medications:   Scheduled Meds: acetaminophen (TYLENOL) tablet 650 mg, 650 mg, Oral, Q6H PRN, Kaitlynn Smith PA-C, 650 mg at 11/04/17 0309    ceFAZolin (ANCEF) IVPB (premix) 1,000 mg, 1,000 mg, Intravenous, Q8H, Suyapa Gaytan MD, Last Rate: 100 mL/hr at 11/04/17 0217, 1,000 mg at 11/04/17 0217    dextrose 5 % and sodium chloride 0 45 % with KCl 20 mEq/L infusion, 75 mL/hr, Intravenous, Continuous, Abdoul Ye DO, Last Rate: 75 mL/hr at 11/03/17 1801, 75 mL/hr at 11/03/17 1801    heparin (porcine) subcutaneous injection 5,000 Units, 5,000 Units, Subcutaneous, Q8H [address], Abdoul Ye DO, 5,000 Units at 11/04/17 6651    HYDROcodone-acetaminophen (NORCO) 5-325 mg per tablet 1 tablet, 1 tablet, Oral, Q4H PRN, Jessica Dolan PA-C, 1 tablet at 11/02/17 1842    HYDROmorphone (DILAUDID) 1 mg/mL injection 0 5 mg, 0 5 mg, Intravenous, Q1H PRN, Eliza Smith PA-C    influenza inactivated quadrivalent vaccine (FLULAVAL) IM injection 0 5 mL, 0 5 mL, Intramuscular, Prior to discharge, Abdoul Ye DO    metroNIDAZOLE (FLAGYL) IVPB (premix) 500 mg, 500 mg, Intravenous, Q8H, Suyapa Gaytan MD, Last Rate: 200 mL/hr at 11/04/17 0304, 500 mg at 11/04/17 0304    naloxone (NARCAN) 0 04 mg/mL syringe 0 04 mg, 0 04 mg, Intravenous, Q1MIN PRN, Suyapa Gaytan MD    ondansetron [facility]) injection 4 mg, 4 mg, Intravenous, Once, Rohan Latif MD    ondansetron [facility]) injection 4 mg, 4 mg, Intravenous, Q4H PRN, Suyapa Gaytan MD    oxyCODONE (ROXICODONE) immediate release tablet 10 mg, 10 mg, Oral, Q4H PRN, Jessica Dolan, CORDELIA    pantoprazole (PROTONIX) injection 40 mg, 40 mg, Intravenous, Q24H [address], Lisandra Cabral MD, 40 mg at 11/04/17 0832    phenol (CHLORASEPTIC) 1 4 % mucosal liquid 1 spray, 1 spray, Mouth/Throat, Q2H PRN, Lisandra Cabral MD    sodium chloride 0 9 % bolus 1,000 mL, 1,000 mL, Intravenous, Once, Alexi Jo MD    PRN medications:  tylenol - used x 2  Abnormal Labs/Diagnostic Results: hgb 10 4  Age/Sex: 65 y o  female     Assessment/Plan: 59-year-old female status post ex lap with drainage of pelvic abscess and appendectomy     Plan:  -will advance diet to surgical soft today  -continue drain care  -encouraged out of bed/ambulation  -Ancef/Flagyl  -DVT prophylaxis      Discharge Plan: home

## 2017-11-09 NOTE — DISCHARGE SUMMARY
Discharge Summary - Davide Love 65 y o  female MRN: [id number]    Unit/Bed#: -01 Encounter: [id number]    Admission Date: 10/30/2017   Discharge Date: 11/5/17    Admitting Diagnosis:   SBO (small bowel obstruction) [K56 609]  Abdominal pain [R10 9]  Complete intestinal obstruction, unspecified cause [K56 601]    Discharge Diagnoses: Principal Problem:    Complete intestinal obstruction      Consultations: None    Procedures Performed: LAPAROTOMY EXPLORATORY, DRAINAGE PELVIC ABSCESS; APPENDECTOMY      Hospital Course: Mariana Alford is a 65 y o  female admitted for pelvic abscess, probable adhesions  Patient was taken to the OR on 10/30/17 for ex lap, pelvic abscess drainage, appendectomy  Pt tolerated procedure well  NG tube was placed during procedure  PCA was also started for pain control  Patient began having flatus on 11/2/17  NG tube output also decreased  Plan trial of NG-tube was done on 11/2/17 and NG tube was removed  Patient had small bowel movement on 11/3/17  Patient's diet was increased as tolerated  Patient was determined to be in good condition on 11/5/17 was discharged home  ALHAJI drains were removed before discharge  Patient was instructed to follow up with Dr Denzel Madsen in 2 weeks  Patient was also discharged on p  o  Augmentin and Flagyl for an additional 5 days  Condition at Discharge: good     Discharge instructions/Information to patient and family:   See after visit summary for information provided to patient and family  Provisions for Follow-Up Care:  See after visit summary for information related to follow-up care and any pertinent home health orders  Disposition: See After Visit Summary for discharge disposition information  Planned Readmission: No    Discharge Statement   I spent 30 minutes discharging the patient  This time was spent on the day of discharge  I had direct contact with the patient on the day of discharge   Additional documentation is required if more than 30 minutes were spent on discharge  Discharge Medications:  See after visit summary for reconciled discharge medications provided to patient and family        Gwendel Rinne, PA-C

## 2018-01-12 VITALS — HEART RATE: 59 BPM | WEIGHT: 149.13 LBS | SYSTOLIC BLOOD PRESSURE: 147 MMHG | DIASTOLIC BLOOD PRESSURE: 81 MMHG

## 2018-01-12 NOTE — PROGRESS NOTES
Assessment    1  Well adult exam (V70 0) (Z00 00)   2  Anxiety (300 00) (F41 9)   3  GERD without esophagitis (530 81) (K21 9)   4  Hyperlipidemia (272 4) (E78 5)   5  Irritable bowel syndrome (564 1) (K58 9)   6  Migraines (346 90) (G43 909)   7  Total bilirubin, elevated (277 4) (R17)   8  Chills (780 64) (R68 83)   9  Osteopenia (733 90) (M85 80)   10  Need for Zostavax administration (V04 89) (Z23)    Plan  Chills, Total bilirubin, elevated    · (1) CBC/PLT/DIFF; Status:Active; Requested for:68Cjk5940;    · (1) COMPREHENSIVE METABOLIC PANEL; Status:Active; Requested for:94Bkq6854;    · (1) METANEPHRINE, PLASMA; Status:Active; Requested for:15Qfy2911;    · (1) SED RATE; Status:Active; Requested for:37Pvw7060;    · (1) TSH WITH FT4 REFLEX; Status:Active; Requested for:08Yyw8103; Health Maintenance    · *VB - PHQ-9 Tool; Status:Complete - Retrospective By Protocol Authorization;   Done:  22Aug2017 08:00AM  Need for Zostavax administration    · Zostavax 63946 UNT/0 65ML Subcutaneous Solution Reconstituted (Zostavax  63234 UNT/0 65ML Subcutaneous Solution Reconstituted); 0 65 ml x 1  Osteopenia    · * DXA BONE DENSITY SPINE HIP AND PELVIS; Status:Active; Requested  for:22Aug2017; Well adult exam    · Call [phone] if: You find a new or different kind of lump in your breast ;  Status:Complete;   Done: 99XSD5109   · Call [phone] if: You have any bleeding from the vagina ; Status:Complete;    Done: 13Vtm7297   · Call [phone] if: You have any warning signs of skin cancer ; Status:Complete;    Done: 01MCO6319   · Call 911 if:  You experience a new kind of chest pain (angina) or pressure ;  Status:Complete;   Done: 45LXW0425   · Always use a seat belt and shoulder strap when riding or driving a motor vehicle ;  Status:Complete;   Done: 28Bku2010   · Begin a limited exercise program ; Status:Complete;   Done: 53Jcj1700   · Some eating tips that can help you lose weight ; Status:Complete;   Done: 40Rfd1142   · We recommend that you bring your body mass index down to 26 ; Status:Complete;    Done: 75Cia4380   · We recommend that you follow the "Mediterranean diet "; Status:Complete;   Done:  85Mdk6877   · We recommend that you follow these steps to lower your risk of osteoporosis  ;  Status:Complete;   Done: 68PDZ4840    Discussion/Summary  health maintenance visit Currently, she eats a healthy diet and has an adequate exercise regimen  cervical cancer screening is managed by GYN Breast cancer screening: mammogram is current and breast cancer screening is managed by GYN  Colorectal cancer screening: the next colonoscopy is due now  Osteoporosis screening: the next bone mineral density test is due now  Preventative: UTD with mammogram + GYN  Due for repeat colonoscopy which she plans on scheduling soon with her gastroenterologist  UTD with immunizations  Written Rx given for shingles vaccine  To schedule routine eye exam      Episodic chills/sweats, nausea, abdominal pain: No readily apparent cause of this  May be a component of her IBS  Previous nephrolithiasis, but most recent imaging showed no sign of recurrence and she lacks  symptoms  Slip given for repeat/additional labs including bilirubin  Advise scheduling appointment with her gastroenterologist  Also due for yearly GYN exam, which she was also advised to schedule  Call/ER for new/worsening symptoms  Hyperlipidemia: Well controlled on statin  Migraines: No recent worsening  Takes triptan PRN  GERD: Remains well controlled on low dose Prilosec  Discussed potential long term AE's of PPI, minimizing use  Osteopenia: Due for repeat DEXA  Continue regular weight bearing exercise, adequate calcium + D  Anxiety: Well controlled at present  Has PRN benzo at home, but hasn't needed to use recently  RTO 6 months (sooner if above symptoms or ongoing or abnormalities with additional blood work)        Chief Complaint  Patient physical      History of Present Illness  HM, Adult Female: The patient is being seen for a health maintenance evaluation  The last health maintenance visit was 5 year(s) ago  Social History: Household members include Brother  She is unmarried  Work status: working part-time  The patient has never smoked cigarettes  She reports occasional alcohol use  She has never used illicit drugs  General Health: The patient's health since the last visit is described as good  She has regular dental visits  She denies vision problems  She denies hearing loss  Immunizations status: up to date  Lifestyle:  She consumes a diverse and healthy diet  She does not have any weight concerns  She exercises regularly  She does not use tobacco  She consumes alcohol  She reports occasional alcohol use  Alcohol concern: The patient has no concerns about alcohol abuse  She denies drug use  Reproductive health: the patient is postmenopausal   she is not sexually active  Screening: cancer screening reviewed and updated  metabolic screening reviewed and updated  risk screening reviewed and updated  HPI:   Here for routine well exam      Over the past 3-4 months she has been experiencing episodic sweats/chills, accompanied by nausea, generalized abdominal cramps  Lasts couple of days, or less, then resolves  No known food, environmental, emotional triggers  Not sure if it is partly her IBS, although symptoms not usually relieved by defecation  Never had issue with postmenopausal hot flashes before  No associated vomiting C/D, melena, hematochezia, dysuria, frequency, hematuria, spotting, rashes, tick bites, fatigue, weight loss, palpitations, cough, rashes, tick bites, increased headaches, joint pains  Heartburn minimal on daily Prilosec  No recent travel  No abdominal pain at present  Due for repeat colonoscopy in January  Sees gastroenterologist      Tolerating meds without AE's   Recent (8/11/17) blood work results reviewed with patient  Normal, including favorable lipids (, LDL 68, TG 85, HDL 76)  Continued mildly elevated T-bili (=2 0; chronic), up just a bit from previous  Denies jaundice, RUQ abdominal pain, liver/GB issues  Fracture left ring finger 2 months ago after accidentally slamming on car door  Better now  No residual issues  No further splint  Fairly healthy diet  Does yoga regularly  1 cup of coffee per day  Drinks plenty of water  Occasional (couple times a year) cocktail  No other alcohol  No smoking  Continues to live with her brother  Things going fairly well at home  Gets out of the house when she can  Colonoscopy 2012  Repeat 5 years  DEXA 7/2015  Mild osteopenia  Mammogram 1/2017  Sees Dr Irineo Tejeda  Upcoming eye appointment  Review of Systems    Constitutional: as noted in HPI and not feeling tired  Eyes: no eyesight problems  ENT: no sore throat and no hearing loss  Cardiovascular: no chest pain, no palpitations and no lower extremity edema  Respiratory: no shortness of breath and no cough  Gastrointestinal: as noted in HPI  Genitourinary: no dysuria and no incontinence  Musculoskeletal: no arthralgias  Neurological: as noted in HPI and no dizziness  Psychiatric: as noted in HPI and no depression  Endocrine: no muscle weakness  Hematologic/Lymphatic: no swollen glands  Over the past 2 weeks, how often have you been bothered by the following problems? 1 ) Little interest or pleasure in doing things? Not at all    2 ) Feeling down, depressed or hopeless? Not at all  Active Problems    1  Allergic rhinitis (477 9) (J30 9)   2  Anxiety (300 00) (F41 9)   3  Closed nondisplaced fracture of distal phalanx of right ring finger, initial encounter   (816 02) (U96 608K)   4  Digital mucous cyst of finger (727 43) (M67 449)   5  Diverticulosis (562 10) (K57 90)   6  Encounter for gynecological examination with Papanicolaou smear of cervix (N22 31)   (Z01 419)   7  Encounter for routine laboratory testing (V72 62) (Z00 00)   8  Encounter for screening mammogram for breast cancer (V76 12) (Z12 31)   9  Family history of cerebrovascular accident (V17 1) (Z82 3)   10  Family history of Gastric Cancer (V16 0)   11  GERD without esophagitis (530 81) (K21 9)   12  Health care maintenance (V70 0) (Z00 00)   13  Family history of Heart Disease (V17 49)   14  History of chest pain (V13 89) (Z87 898)   15  History of hyperthyroidism (V12 29) (Z86 39)   16  Human bite of finger (883 0) (W76 759N,S91  3XXA)   17  Hyperlipidemia (272 4) (E78 5)   18  Influenza vaccine needed (V04 81) (Z23)   19  Internal Hemorrhoids (455 0)   20  Irritable bowel syndrome (564 1) (K58 9)   21  Migraines (346 90) (G43 909)   22  Nonspecific abnormal results of function study of thyroid (794 5) (R94 6)   23  Osteoarthrosis of knee (715 36) (M17 10)   24  History of Renal cyst (753 10) (N28 1)   25  Screening for depression (V79 0) (Z13 89)   26  Total bilirubin, elevated (277 4) (R17)    Past Medical History    · History of Acute low back pain (724 2) (M54 5)   · History of Acute upper respiratory infection (465 9) (J06 9)   · Allergic rhinitis (477 9) (J30 9)   · Anxiety (300 00) (F41 9)   · Chills (780 64) (R68 83)   · History of Cough (786 2) (R05)   · Digital mucous cyst of finger (727 43) (M67 449)   · Diverticulosis (562 10) (K57 90)   · Encounter for routine laboratory testing (V72 62) (Z00 00)   · History of chest pain (V13 89) (C91 764)   · History of hypokalemia (V12 29) (Z86 39)   · History of renal calculi (V13 01) (Z87 442)   · History of upper respiratory infection (V12 09) (Z87 09)   · History of uterine leiomyoma (V13 29) (Z86 018)   · Human bite of finger (883 0) (Y71 588S,N27  3XXA)   · History of Hydronephrosis, right (591) (N13 30)   · Hyperlipidemia (272 4) (E78 5)   · Influenza vaccine needed (V04 81) (Z23)   · Internal Hemorrhoids (455 0)   · Irritable bowel syndrome (564 1) (K58 9)   · Migraines (346 90) (G43 909)   · Need for Zostavax administration (V04 89) (Z23)   · History of Nephrolithiasis (V13 01)   · Nonspecific abnormal results of function study of thyroid (794 5) (R94 6)   · Osteoarthrosis of knee (715 36) (M17 10)   · Osteopenia (733 90) (M85 80)   · History of Renal cyst (753 10) (N28 1)   · History of Screening for osteoporosis (V82 81) (Z13 820)   · Total bilirubin, elevated (277 4) (R17)   · History of Vaginitis and vulvovaginitis (616 10) (N76 0)   · History of Visit for routine gyn exam (V72 31) (Z01 419)   · Well adult exam (V70 0) (Z00 00)    Surgical History    · History of Biopsy Thyroid Using Percutaneous Core Needle   · History of Hand Surgery   · History of Knee Surgery   · History of Surgery Of Salivary Glands And Ducts   · History of Tonsillectomy   · History of Tubal Ligation   · History of Uterine Fibroid Embolization   · History of Uterine Myomectomy    Family History  Mother    · Family history of Cancer   · Family history of Gastric Cancer (V16 0)  Father    · Family history of cerebrovascular accident (V17 1) (Z82 3)   · Family history of Heart Disease (V17 49)  Sister    · Family history of Diabetes Mellitus (V18 0)   · Family history of arthritis (V17 7) (Z82 61)   · Family history of Renal Disease  Brother    · Family history of Hypertension (V17 49)    Social History    · Being A Social Drinker   · Cultural background   · NON-   · Daily caffeinated cola consumption   · Drinks coffee   · Denied: History of Drug Use   · Exercise Habits   · Former smoker (V15 82) (J87 130)   · 1/01/2014   · Occasional alcohol use   · Occupation: Retired   · Primary spoken language English   · Racial background   · WHITE   · Single    Current Meds   1  Aspirin 81 MG TABS; Take 1 tablet daily; Therapy: 35Eod5837 to (Last Rx:27Nur5275) Ordered   2  Atorvastatin Calcium 20 MG Oral Tablet; take 1 tablet by mouth every day;    Therapy: 78XAY7947 to (Evaluate:41Bav6042) Requested for: 45Ihk2568; Last   Rx:88Tme6052 Ordered   3  Fish Oil CAPS; Therapy: ([place] Eagles) to Recorded   4  Multiple Vitamin TABS; Therapy: (Georgina Eagles) to Recorded   5  Omeprazole 10 MG Oral Capsule Delayed Release; Therapy: ([phone]) to Recorded   6  Vitamin B-12 5000 MCG Oral Tablet Disintegrating; Therapy: ([phone]) to Recorded   7  Vitamin D TABS; Therapy: (Recorded:04Feb2016) to Recorded   8  Vitamin D3 CAPS; Therapy: (Recorded:29Sep2016) to Recorded    Allergies    1  No Known Drug Allergies    Vitals   Recorded: 76Mzp3437 07:52AM   Temperature 96 5 F, Tympanic   Heart Rate 62   Respiration 16   Systolic 111   Diastolic 70   Height 5 ft 4 in   Weight 147 lb 5 oz   BMI Calculated 25 29   BSA Calculated 1 72   O2 Saturation 98     Physical Exam    Constitutional   General appearance: No acute distress, well appearing and well nourished  Appears comfortable  Head and Face   Head and face: Normal     Eyes   Conjunctiva and lids: No swelling, erythema or discharge  Pupils and irises: Equal, round, reactive to light  Ophthalmoscopic examination: Normal fundi and optic discs  Ears, Nose, Mouth, and Throat   External inspection of ears and nose: Normal     Otoscopic examination: Tympanic membranes translucent with normal light reflex  Canals patent without erythema  Nasal mucosa, septum, and turbinates: Normal without edema or erythema  Lips, teeth, and gums: Normal, good dentition  Oropharynx: Normal with no erythema, edema, exudate or lesions  Neck   Neck: Supple, symmetric, trachea midline, no masses  Thyroid: Normal, no thyromegaly  Pulmonary   Respiratory effort: No increased work of breathing or signs of respiratory distress  Auscultation of lungs: Clear to auscultation  Cardiovascular   Auscultation of heart: Normal rate and rhythm, normal S1 and S2, no murmurs  Carotid pulses: 2+ bilaterally      Pedal pulses: 2+ bilaterally  Examination of extremities for edema and/or varicosities: Normal     Abdomen   Abdomen: Non-tender, no masses  Bowel sounds were normal  The abdomen was soft and nontender  The abdomen was not firm and not rigid  No rebound tenderness  No guarding  There was a negative Kramer's sign  no masses palpated  Liver and spleen: No hepatomegaly or splenomegaly  Lymphatic   Palpation of lymph nodes in neck: No lymphadenopathy  Musculoskeletal   Gait and station: Normal     Neurologic   Reflexes: 2+ and symmetric  Psychiatric   Orientation to person, place, and time: Normal     Mood and affect: Normal        Results/Data  PHQ-2 Adult Depression Screening 52Ngr9286 09:00AM Malinda Elliott     Test Name Result Flag Reference   PHQ-2 Adult Depression Score 0     Over the last two weeks, how often have you been bothered by any of the following problems? Little interest or pleasure in doing things: Not at all - 0  Feeling down, depressed, or hopeless: Not at all - 0   PHQ-2 Adult Depression Screening Negative       *VB - PHQ-9 Tool 22Dzy8140 08:00AM Malinda Elliott     Test Name Result Flag Reference   PHQ-9 Adult Depression Score -     PHQ-9 Adult Depression Screening Negative         Health Management  Health Maintenance   COLONOSCOPY; every 3 years; Last 68GWL3074; Next Due: 18DTH8350; Overdue    Future Appointments    Date/Time Provider Specialty Site   02/27/2018 08:00 AM AKASH Jimenez [facility]   04/26/2018 08:45 AM Daphnie Bee MD Urology [address]     Signatures   Electronically signed by : Papa Antonio [facility];  Aug 22 2017  8:59AM EST                       (Author)    Electronically signed by : Armida Kim DO; Aug 22 2017 10:09AM EST                       (Author)

## 2018-01-13 VITALS
SYSTOLIC BLOOD PRESSURE: 124 MMHG | OXYGEN SATURATION: 98 % | HEART RATE: 90 BPM | TEMPERATURE: 98.7 F | DIASTOLIC BLOOD PRESSURE: 72 MMHG | WEIGHT: 151.13 LBS | BODY MASS INDEX: 26.6 KG/M2

## 2018-01-14 VITALS
RESPIRATION RATE: 16 BRPM | TEMPERATURE: 96.5 F | WEIGHT: 147.31 LBS | BODY MASS INDEX: 25.15 KG/M2 | DIASTOLIC BLOOD PRESSURE: 70 MMHG | OXYGEN SATURATION: 98 % | HEIGHT: 64 IN | SYSTOLIC BLOOD PRESSURE: 130 MMHG | HEART RATE: 62 BPM

## 2018-01-15 NOTE — RESULT NOTES
Verified Results  (1) COMPREHENSIVE METABOLIC PANEL 04MMU7124 73:95ST Lizeth Livingston    Order Number: [id number]     Test Name Result Flag Reference   GLUCOSE,RANDM 94 mg/dL     If the patient is fasting, the ADA then defines impaired fasting glucose as > 100 mg/dL and diabetes as > or equal to 123 mg/dL  SODIUM 143 mmol/L  136-145   POTASSIUM 4 0 mmol/L  3 5-5 3   CHLORIDE 105 mmol/L  100-108   CARBON DIOXIDE 30 mmol/L  21-32   ANION GAP (CALC) 8 mmol/L  4-13   BLOOD UREA NITROGEN 9 mg/dL  5-25   CREATININE 0 83 mg/dL  0 60-1 30   Standardized to IDMS reference method   CALCIUM 8 8 mg/dL  8 3-10 1   BILI, TOTAL 1 60 mg/dL H 0 20-1 00   ALK PHOSPHATAS 71 U/L     ALT (SGPT) 24 U/L  12-78   AST(SGOT) 19 U/L  5-45   ALBUMIN 3 9 g/dL  3 5-5 0   TOTAL PROTEIN 6 8 g/dL  6 4-8 2   eGFR Non-African American      >60 0 ml/min/1 73sq m   - Patient Instructions: This is a fasting blood test  Water, black tea or black coffee only after 9:00pm the night before test Drink 2 glasses of water the morning of test   National Kidney Disease Education Program recommendations are as follows:  GFR calculation is accurate only with a steady state creatinine  Chronic Kidney disease less than 60 ml/min/1 73 sq  meters  Kidney failure less than 15 ml/min/1 73 sq  meters  (1) LIPID PANEL FASTING W DIRECT LDL REFLEX 33MHW5207 11:36AM Lizeth Livingston    Order Number: [id number]     Test Name Result Flag Reference   CHOLESTEROL 161 mg/dL     LDL CHOLESTEROL CALCULATED 47 mg/dL  0-100   - Patient Instructions: This is a fasting blood test  Water, black tea or black coffee only after 9:00pm the night before test   Drink 2 glasses of water the morning of test     - Patient Instructions:  This is a fasting blood test  Water, black tea or black coffee only after 9:00pm the night before test Drink 2 glasses of water the morning of test   Triglyceride:         Normal              <150 mg/dl       Borderline High    150-199 mg/dl       High               200-499 mg/dl       Very High          >499 mg/dl  Cholesterol:         Desirable        <200 mg/dl      Borderline High  200-239 mg/dl      High             >239 mg/dl  HDL Cholesterol:        High    >59 mg/dL      Low     <41 mg/dL  LDL Cholesterol:        Optimal          <100 mg/dl        Near Optimal     100-129 mg/dl        Above Optimal          Borderline High   130-159 mg/dl          High              160-189 mg/dl          Very High        >189 mg/dl  LDL CALCULATED:    This screening LDL is a calculated result  It does not have the accuracy of the Direct Measured LDL in the monitoring of patients with hyperlipidemia and/or statin therapy  Direct Measure LDL (NBF174) must be ordered separately in these patients  TRIGLYCERIDES 85 mg/dL  <=150   Specimen collection should occur prior to N-Acetylcysteine or Metamizole administration due to the potential for falsely depressed results  HDL,DIRECT 97 mg/dL H 40-60   Specimen collection should occur prior to Metamizole administration due to the potential for falsely depressed results  (1) BILIRUBIN, DIRECT [id number] 11:36AM Meka Watson    Order Number: [id number]     Test Name Result Flag Reference   BILI, DIRECT 0 26 mg/dL H 0 00-0 20   - Patient Instructions: This is a fasting blood test  Water, black tea or black coffee only after 9:00pm the night before test Drink 2 glasses of water the morning of test        Discussion/Summary   Normal blood work including very good cholesterol numbers  Only abnormality is continued mildly elevated bilirubin level, but this has been stable over time and is not a concern at present  Will continue to monitor  Can discuss further at next office visit  Let me know if you have any questions in the interim    Keep up the good work!   --Stephanie Mast

## 2018-01-16 NOTE — RESULT NOTES
Verified Results  (1) CBC/PLT/DIFF [id number] 11:05AM [facility] Order Number: [id number]     Test Name Result Flag Reference   WBC COUNT 5 22 Thousand/uL  4 31-10 16   RBC COUNT 4 37 Million/uL  3 81-5 12   HEMOGLOBIN 13 1 g/dL  11 5-15 4   HEMATOCRIT 38 9 %  34 8-46  1   MCV 89 fL  82-98   MCH 30 0 pg  26 8-34 3   MCHC 33 7 g/dL  31 4-37 4   RDW 13 4 %  11 6-15 1   MPV 10 9 fL  8 9-12 7   PLATELET COUNT 493 Thousands/uL  149-390   NEUTROPHILS RELATIVE PERCENT 51 %  43-75   LYMPHOCYTES RELATIVE PERCENT 41 %  14-44   MONOCYTES RELATIVE PERCENT 7 %  4-12   EOSINOPHILS RELATIVE PERCENT 1 %  0-6   BASOPHILS RELATIVE PERCENT 0 %  0-1   NEUTROPHILS ABSOLUTE COUNT 2 65 Thousands/?L  1 85-7 62   LYMPHOCYTES ABSOLUTE COUNT 2 14 Thousands/?L  0 60-4 47   MONOCYTES ABSOLUTE COUNT 0 36 Thousand/?L  0 17-1 22   EOSINOPHILS ABSOLUTE COUNT 0 05 Thousand/?L  0 00-0 61   BASOPHILS ABSOLUTE COUNT 0 02 Thousands/?L  0 00-0 10     (1) COMPREHENSIVE METABOLIC PANEL [id number] 75:84ZK [facility] Order Number: [id number]     Test Name Result Flag Reference   GLUCOSE,RANDM 96 mg/dL     If the patient is fasting, the ADA then defines impaired fasting glucose as > 100 mg/dL and diabetes as > or equal to 123 mg/dL     SODIUM 146 mmol/L H 136-145   POTASSIUM 4 0 mmol/L  3 5-5 3   CHLORIDE 109 mmol/L H 100-108   CARBON DIOXIDE 30 mmol/L  21-32   ANION GAP (CALC) 7 mmol/L  4-13   BLOOD UREA NITROGEN 11 mg/dL  5-25   CREATININE 0 78 mg/dL  0 60-1 30   Standardized to IDMS reference method   CALCIUM 8 9 mg/dL  8 3-10 1   BILI, TOTAL 1 40 mg/dL H 0 20-1 00   ALK PHOSPHATAS 65 U/L     ALT (SGPT) 29 U/L  12-78   AST(SGOT) 20 U/L  5-45   ALBUMIN 3 9 g/dL  3 5-5 0   TOTAL PROTEIN 6 4 g/dL  6 4-8 2   eGFR Non-African American      >60 0 ml/min/1 73sq m   [employer] Disease Education Program recommendations are as follows:  GFR calculation is accurate only with a steady state creatinine  Chronic Kidney disease less than 60 ml/min/1 73 sq  meters  Kidney failure less than 15 ml/min/1 73 sq  meters  (1) LIPID PANEL FASTING W DIRECT LDL REFLEX [id number] 11:05AM [facility] Order Number: [id number]     Test Name Result Flag Reference   CHOLESTEROL 147 mg/dL     LDL CHOLESTEROL CALCULATED 48 mg/dL  0-100   Triglyceride:         Normal              <150 mg/dl       Borderline High    150-199 mg/dl       High               200-499 mg/dl       Very High          >499 mg/dl  Cholesterol:         Desirable        <200 mg/dl      Borderline High  200-239 mg/dl      High             >239 mg/dl  HDL Cholesterol:        High    >59 mg/dL      Low     <41 mg/dL  LDL Cholesterol:        Optimal          <100 mg/dl        Near Optimal     100-129 mg/dl        Above Optimal          Borderline High   130-159 mg/dl          High              160-189 mg/dl          Very High        >189 mg/dl  LDL CALCULATED:    This screening LDL is a calculated result  It does not have the accuracy of the Direct Measured LDL in the monitoring of patients with hyperlipidemia and/or statin therapy  Direct Measure LDL (NQV874) must be ordered separately in these patients  TRIGLYCERIDES 84 mg/dL  <=150   Specimen collection should occur prior to N-Acetylcysteine or Metamizole administration due to the potential for falsely depressed results  HDL,DIRECT 82 mg/dL H 40-60   Specimen collection should occur prior to Metamizole administration due to the potential for falsely depressed results  (1) TSH WITH FT4 REFLEX [id number] 11:05AM [facility]     Test Name Result Flag Reference   TSH 1 027 uIU/mL  0 358-3 740   Patients undergoing fluorescein dye angiography may retain small amounts of fluorescein in the body for 48-72 hours post procedure  Samples containing fluorescein can produce falsely depressed TSH values  If the patient had this procedure,a specimen should be resubmitted post fluorescein clearance            The recommended reference ranges for TSH during pregnancy are as follows:  First trimester 0 1 to 2 5 uIU/mL  Second trimester  0 2 to 3 0 uIU/mL  Third trimester 0 3 to 3 0 uIU/m     (1) BILIRUBIN, DIRECT [id number] 11:05AM [facility]     Test Name Result Flag Reference   BILI, DIRECT 0 26 mg/dL H 0 00-0 20       Discussion/Summary      Normal blood work results including very good cholesterol numbers  Continued mildly elevated bilirubin level, but this has been stable over time and is not a concern  Can discuss results further at next office visit  Call if any questions in the interim  --[facility]

## 2018-01-16 NOTE — RESULT NOTES
Discussion/Summary      SUMMARY OF RESULTS: Essentially normal, including bilirubin level  No signs of inflammation/infection  No cause of chills, GI issues noted  Advise f/u with gastroenterologist for ongoing symptoms  Let me know if you have any questions--Blu      Verified Results  (1) COMPREHENSIVE METABOLIC PANEL [id number] 61:59ZG Lizeth Livingston     Test Name Result Flag Reference   GLUCOSE 89 mg/dL  65-99   Fasting reference interval   UREA NITROGEN (BUN) 14 mg/dL  7-25   CREATININE 0 85 mg/dL  0 50-0 99   For patients >52years of age, the reference limit  for Creatinine is approximately 13% higher for people  identified as -American  eGFR NON-AFR  AMERICAN 74 mL/min/1 73m2  > OR = 60   eGFR AFRICAN AMERICAN 86 mL/min/1 73m2  > OR = 60   BUN/CREATININE RATIO   0-77   NOT APPLICABLE (calc)   SODIUM 142 mmol/L  135-146   POTASSIUM 4 3 mmol/L  3 5-5 3   CHLORIDE 106 mmol/L     CARBON DIOXIDE 30 mmol/L  20-31   CALCIUM 9 5 mg/dL  8 6-10 4   PROTEIN, TOTAL 6 2 g/dL  6 1-8 1   ALBUMIN 4 3 g/dL  3 6-5 1   GLOBULIN 1 9 g/dL (calc)  1 9-3 7   ALBUMIN/GLOBULIN RATIO 2 3 (calc)  1 0-2 5   BILIRUBIN, TOTAL 1 3 mg/dL H 0 2-1 2   ALKALINE PHOSPHATASE 75 U/L     AST 19 U/L  10-35   ALT 20 U/L  6-29     (Q) METANEPHRINES, FRACT, FREE, LC/MS/MS, PLASMA [id number] 12:16PM Lizeth Livingston     Test Name Result Flag Reference   METANEPHRINE, FREE 52 pg/mL  <=57   This test was developed and its analytical performance  characteristics have been determined by [address] [place] [address]  It has  not been cleared or approved by the  [place]  Food and Drug  Administration  This assay has been validated pursuant  to the CLIA regulations and is used for clinical  purposes  NORMETANEPHRINE, FREE 145 pg/mL  <=148   This test was developed and its analytical performance  characteristics have been determined by [address] [place] [address]   It has  not been cleared or approved by the [place]  Food and Drug  Administration  This assay has been validated pursuant  to the CLIA regulations and is used for clinical  purposes  TOTAL, FREE (MN+NMN) 197 pg/mL  <=205   For additional information, please refer to  [email or web address]  (This link is being provided for informational/educatio  informational/educational purposes only )     Elevations >4-fold upper reference range: strongly  suggestive of a pheochromocytoma(1)  Elevations >1- 4-fold upper reference range:  significant but not diagnostic, may be due to  medications or stress  Suggest running 24 hr urine  fractionated metanephrines and/or serum Chromagranin A  for confirmation  Reference:     (1)Dell ELKINS et al, Plasma Chromogranin A  or Urine Fractionated Metanephrines Follow-Up Testing  Improves the Diagnostic Accuracy of Plasma Fractionated  Metanephrines for Pheochromocytoma  The Journal of  Clinical Endocrinology # Metabolism 93(1), 91-95, 2008  This test was developed and its analytical performance  characteristics have been determined by [address] [place] [address]  It has  not been cleared or approved by the [place]  Food and Drug  Administration  This assay has been validated pursuant  to the CLIA regulations and is used for clinical  purposes       (Q) [facility] [id number] 12:16PM [facility]     Test Name Result Flag Reference   SED RATE BY MODIFIEDMARIANNA 2 mm/h  < OR = 30     (1) CBC/PLT/DIFF [id number] 12:16PM [facility]     Test Name Result Flag Reference   WHITE BLOOD CELL COUNT 5 7 Thousand/uL  3 8-10 8   RED BLOOD CELL COUNT 4 14 Million/uL  3 80-5 10   HEMOGLOBIN 12 6 g/dL  11 7-15 5   HEMATOCRIT 37 5 %  35 0-45 0   MCV 90 6 fL  80 0-100 0   MCH 30 4 pg  27 0-33 0   MCHC 33 6 g/dL  32 0-36 0   RDW 12 7 %  11 0-15 0   PLATELET COUNT 845 Thousand/uL  140-400   ABSOLUTE NEUTROPHILS 3420 cells/uL  4497-2947   ABSOLUTE LYMPHOCYTES 1841 cells/uL  850-3900   ABSOLUTE MONOCYTES 371 cells/uL  200-950   ABSOLUTE EOSINOPHILS 40 cells/uL     ABSOLUTE BASOPHILS 29 cells/uL  0-200   NEUTROPHILS 60 %     LYMPHOCYTES 32 3 %     MONOCYTES 6 5 %     EOSINOPHILS 0 7 %     BASOPHILS 0 5 %     MPV 11 1 fL  7 5-12 5     (Q) TSH, 3RD GENERATION W/REFLEX TO FT4 [id number] 12:16PM [facility]   REPORT COMMENT:  FASTING:YES     Test Name Result Flag Reference   TSH W/REFLEX TO FT4 0 36 mIU/L L 0 40-4 50   T4, FREE 1 2 ng/dL  0 8-1 8

## 2018-01-16 NOTE — RESULT NOTES
Discussion/Summary      Normal blood work results including very good cholesterol numbers  Only issue is continued elevated bilirubin level, up just a bit from previous  Assuming you are not having symptoms (abdominal pain, jaundice, etc), this is not a big concern, but we will continue to monitor it  Should make your gastroenterologist aware  Can discuss further at next follow-up office visit  Call if you have any questions in the interim--Blu      Verified Results  (Q) CBC (H/H, RBC, INDICES, WBC, PLT) [id number] 10:50AM Melinda Mcmahon   REPORT COMMENT:  FASTING:YES     Test Name Result Flag Reference   WHITE BLOOD CELL COUNT 4 8 Thousand/uL  3 8-10 8   RED BLOOD CELL COUNT 4 20 Million/uL  3 80-5 10   HEMOGLOBIN 12 8 g/dL  11 7-15 5   HEMATOCRIT 38 3 %  35 0-45 0   MCV 91 2 fL  80 0-100 0   MCH 30 5 pg  27 0-33 0   MCHC 33 4 g/dL  32 0-36 0   RDW 12 9 %  11 0-15 0   PLATELET COUNT 985 Thousand/uL  140-400   MPV 11 3 fL  7 5-12 5     (Q) COMPREHENSIVE METABOLIC PANEL W/O ALT [id number] 10:50AM Melinda Mcmahon     Test Name Result Flag Reference   GLUCOSE 87 mg/dL  65-99   Fasting reference interval   UREA NITROGEN (BUN) 12 mg/dL  7-25   CREATININE 0 84 mg/dL  0 50-0 99   For patients >52years of age, the reference limit  for Creatinine is approximately 13% higher for people  identified as -American  eGFR NON-AFR   AMERICAN 75 mL/min/1 73m2  > OR = 60   eGFR AFRICAN AMERICAN 87 mL/min/1 73m2  > OR = 60   BUN/CREATININE RATIO   5-27   NOT APPLICABLE (calc)   SODIUM 143 mmol/L  135-146   POTASSIUM 4 1 mmol/L  3 5-5 3   CHLORIDE 105 mmol/L     CARBON DIOXIDE 29 mmol/L  20-31   CALCIUM 9 4 mg/dL  8 6-10 4   PROTEIN, TOTAL 6 2 g/dL  6 1-8 1   ALBUMIN 4 4 g/dL  3 6-5 1   GLOBULIN 1 8 g/dL (calc) L 1 9-3 7   ALBUMIN/GLOBULIN RATIO 2 4 (calc)  1 0-2 5   BILIRUBIN, TOTAL 2 0 mg/dL H 0 2-1 2   ALKALINE PHOSPHATASE 60 U/L     AST 19 U/L  10-35     (Q) LIPID PANEL WITH REFLEX TO DIRECT LDL [id number] 10:50AM [facility]     Test Name Result Flag Reference   CHOLESTEROL, TOTAL 161 mg/dL  125-200   HDL CHOLESTEROL 76 mg/dL  > OR = 46   TRIGLICERIDES 85 mg/dL  <746   LDL-CHOLESTEROL 68 mg/dL (calc)  <130   Desirable range <100 mg/dL for patients with CHD or  diabetes and <70 mg/dL for diabetic patients with  known heart disease  CHOL/HDLC RATIO 2 1 (calc)  < OR = 5 0   NON HDL CHOLESTEROL 85 mg/dL (calc)     Target for non-HDL cholesterol is 30 mg/dL higher than   LDL cholesterol target       (1) BILIRUBIN, DIRECT [id number] 10:50AM [facility]     Test Name Result Flag Reference   BILIRUBIN, DIRECT 0 4 mg/dL H < OR = 0 2

## 2018-01-18 ENCOUNTER — GENERIC CONVERSION - ENCOUNTER (OUTPATIENT)
Dept: OTHER | Facility: OTHER | Age: 63
End: 2018-01-18

## 2018-01-18 ENCOUNTER — ALLSCRIPTS OFFICE VISIT (OUTPATIENT)
Dept: OTHER | Facility: OTHER | Age: 63
End: 2018-01-18

## 2018-01-18 DIAGNOSIS — Z12.31 ENCOUNTER FOR SCREENING MAMMOGRAM FOR MALIGNANT NEOPLASM OF BREAST: ICD-10-CM

## 2018-01-19 NOTE — PROGRESS NOTES
Assessment   1  Encounter for gynecological examination with Papanicolaou smear of cervix (V72 31)     (Z01 419)    Plan   Encounter for gynecological examination with Papanicolaou smear of cervix    · (B) PAP WITH HPV PLUS; Status: In Progress - Specimen/Data Collected,Retrospective    Authorization;   Done: [id number]   Perform:[employer]; TQW:[id number]; Last Updated Emma Marie; 1/18/2018 2:15:42 PM;Ordered;For:Encounter for gynecological examination with Papanicolaou smear of cervix; Ordered By:Edwin Das;  Encounter for screening mammogram for breast cancer    · * MAMMO SCREENING BILATERAL W CAD; Status:Hold For - Scheduling; Requested    for:18Jan2018; Perform:[facility]; TTH:[id number];FRQDWEU;MVP:[id number] for screening mammogram for breast cancer; Ordered By:Edwin Das;    Discussion/Summary   health maintenance visit Currently, she eats a healthy diet  the risks and benefits of cervical cancer screening were discussed cervical cancer screening is current Pap test was done today Breast cancer screening: the risks and benefits of breast cancer screening were discussed and mammogram is current  Colorectal cancer screening: the risks and benefits of colorectal cancer screening were discussed and colorectal cancer screening is current  Normal APE the patient's operative report and hospital course that she brought with her today from the pelvic abscess  Everything within the report appears that the uterus ovary and tubes were all completely normal in appearance  It is felt that she may have had a small pinhole rupture from the diverticulitis that caused a pelvic abscess  She does have follow-up scheduled GI we also discussed possibly followed up with colorectal surgeon to discuss further options as well we also encouraged her to consider following up with a nutritionist to look at a variety of options of dietary changes that may help prevent this in the future      The patient has the current Goals: Routine exam  The patent has the current Barriers: None  Patient is able to Self-Care  The treatment plan was reviewed with the patient/guardian  The patient/guardian understands and agrees with the treatment plan       Self Referrals: No      Chief Complaint   Annual Gyn Exam      History of Present Illness   HPI: Here for annual gyn exam - overall well - did have bad episode of pelvic abscess in late december with exploratory laparotomy and IV antibiotics felt to be due to bowel issues - during surgery pelvic anatomy was normal - no vaginal bleeding or discharge - bowels now are normal and has f/u with her GI - bladder normal - has mammogram scheduled and had recent bloodwork during hospital stay -    GYN HM, Adult Female  Christine Mortondo: The patient is being seen for a health maintenance and gynecology evaluation  The last health maintenance visit was 02/04/2016  General Health: The patient's health since the last visit is described as good  She has regular dental visits  -- She denies vision problems  -- She denies hearing loss  Immunizations status: up to date  Lifestyle:  She consumes a diverse and healthy diet  -- She does not have any weight concerns  -- She exercises regularly  -- She does not use tobacco -- She denies alcohol use  -- She denies drug use  Reproductive health: the patient is postmenopausal--   she reports no menstrual problems  Menstrual history:  age at menarche was 13  LMP: the patient is unsure of the date of her LMP  Screening: cancer screening reviewed and current  metabolic screening reviewed and current  risk screening reviewed and current  Review of Systems        Constitutional: recent weight loss, but-- no fever,-- not feeling poorly,-- no recent weight gain,-- no chills-- and-- not feeling tired  Cardiovascular: no complaints of slow or fast heart rate, no chest pain, no palpitations, no leg claudication or lower extremity edema  Respiratory: no complaints of shortness of breath, no wheezing, no dyspnea on exertion, no orthopnea or PND  Breasts: no complaints of breast pain, breast lump or nipple discharge  Gastrointestinal: no complaints of abdominal pain, no constipation, no nausea or diarrhea, no vomiting, no bloody stools  Genitourinary: no complaints of dysuria, no incontinence, no pelvic pain, no dysmenorrhea, no vaginal discharge or abnormal vaginal bleeding  Musculoskeletal: no complaints of arthralgia, no myalgia, no joint swelling or stiffness, no limb pain or swelling  Neurological: migraines, but-- no complaints of headache, no confusion, no numbness or tingling, no dizziness or fainting  ROS reviewed  OB History   Pregnancy History (Brief):      Prior pregnancies: : 0  Para: 0 (living)       Active Problems   1  Allergic rhinitis (477 9) (J30 9)   2  Anxiety (300 00) (F41 9)   3  Chills (780 64) (R68 83)   4  Closed nondisplaced fracture of distal phalanx of right ring finger, initial encounter     (816 02) (L13 431Y)   5  Digital mucous cyst of finger (727 43) (M67 449)   6  Diverticulosis (562 10) (K57 90)   7  Encounter for gynecological examination with Papanicolaou smear of cervix (V72 31)     (Z01 419)   8  Encounter for routine laboratory testing (V72 62) (Z00 00)   9  Encounter for screening mammogram for breast cancer (V76 12) (Z12 31)   10  Family history of cerebrovascular accident (V17 1) (Z82 3)   11  Family history of Gastric Cancer (V16 0)   12  GERD without esophagitis (530 81) (K21 9)   13  Health care maintenance (V70 0) (Z00 00)   14  Family history of Heart Disease (V17 49)   15  History of chest pain (V13 89) (Z87 898)   16  History of hyperthyroidism (V12 29) (Z86 39)   17  Human bite of finger (883 0) (I07 811N,Q56  3XXA)   18  Hyperlipidemia (272 4) (E78 5)   19  Influenza vaccine needed (V04 81) (Z23)   20  Internal Hemorrhoids (455 0)   21   Irritable bowel syndrome (564 1) (K58 9)   22  Migraines (346 90) (G43 909)   23  Need for Zostavax administration (V04 89) (Z23)   24  Nonspecific abnormal results of function study of thyroid (794 5) (R94 6)   25  Osteoarthrosis of knee (715 36) (M17 10)   26  Osteopenia (733 90) (M85 80)   27  History of Renal cyst (753 10) (N28 1)   28  Screening for depression (V79 0) (Z13 89)   29  Total bilirubin, elevated (277 4) (R17)   30  Well adult exam (V70 0) (Z00 00)    Past Medical History    · History of Acute low back pain (724 2) (M54 5)   · History of Acute upper respiratory infection (465 9) (J06 9)   · Allergic rhinitis (477 9) (J30 9)   · Anxiety (300 00) (F41 9)   · Chills (780 64) (R68 83)   · History of Cough (786 2) (R05)   · Digital mucous cyst of finger (727 43) (M67 449)   · Diverticulosis (562 10) (K57 90)   · Encounter for routine laboratory testing (V72 62) (Z00 00)   · History of chest pain (V13 89) (K31 119)   · History of hypokalemia (V12 29) (Z86 39)   · History of renal calculi (V13 01) (Z87 442)   · History of upper respiratory infection (V12 09) (Z87 09)   · History of uterine leiomyoma (V13 29) (Z86 018)   · Human bite of finger (883 0) (U74 393I,O47  3XXA)   · History of Hydronephrosis, right (591) (N13 30)   · Hyperlipidemia (272 4) (E78 5)   · Influenza vaccine needed (V04 81) (Z23)   · Internal Hemorrhoids (455 0)   · Irritable bowel syndrome (564 1) (K58 9)   · Migraines (346 90) (G43 909)   · Need for Zostavax administration (V04 89) (Z23)   · History of Nephrolithiasis (V13 01)   · Nonspecific abnormal results of function study of thyroid (794 5) (R94 6)   · Osteoarthrosis of knee (715 36) (M17 10)   · Osteopenia (733 90) (M85 80)   · History of Renal cyst (753 10) (N28 1)   · History of Screening for osteoporosis (V82 81) (Z13 820)   · Total bilirubin, elevated (277 4) (R17)   · History of Vaginitis and vulvovaginitis (616 10) (N76 0)   · History of Visit for routine gyn exam (V72 31) (Z01 419)   · Well adult exam (V70 0) (Z00 00)     The active problems and past medical history were reviewed and updated today  Surgical History    · History of Biopsy Thyroid Using Percutaneous Core Needle   · History of Hand Surgery   · History of Knee Surgery   · History of Surgery Of Salivary Glands And Ducts   · History of Tonsillectomy   · History of Tubal Ligation   · History of Uterine Fibroid Embolization   · History of Uterine Myomectomy     The surgical history was reviewed and updated today  Family History   Mother    · Family history of Cancer   · Family history of Gastric Cancer (V16 0)  Father    · Family history of cerebrovascular accident (V17 1) (Z82 3)   · Family history of Heart Disease (V17 49)  Sister    · Family history of Diabetes Mellitus (V18 0)   · Family history of arthritis (V17 7) (Z82 61)   · Family history of Renal Disease  Brother    · Family history of Hypertension (V17 49)     The family history was reviewed and updated today  Social History    · Being A Social Drinker   · Cultural background   · NON-   · Daily caffeinated cola consumption   · Drinks coffee   · Denied: History of Drug Use   · Exercise Habits   · Former smoker (V15 82) (Y43 082)   · 1/01/2014   · Occasional alcohol use   · Occupation: Retired   · Primary spoken language English   · Racial background   · [address]   · Retired   · Single  The social history was reviewed and updated today  Current Meds    1  Aspirin 81 MG TABS; Take 1 tablet daily; Therapy: 98Zfu0673 to (Last Rx:67Ocg2700) Ordered   2  Atorvastatin Calcium 20 MG Oral Tablet; take 1 tablet by mouth every day; [facility] 42ZGG6986 [facility]  Requested for: 11DFC0115; Last     Rx:15Fwp6111 Ordered   3  Fish Oil CAPS; Therapy: (Darshan Castanon) to Recorded   4  Multiple Vitamin TABS; Therapy: (Darshan Castanon) to Recorded   5  Omeprazole 10 MG Oral Capsule Delayed Release;      Therapy: (Recorded:42Yec7535) to Recorded   6  Vitamin B-12 5000 MCG Oral Tablet Disintegrating; Therapy: (Linda Book) to Recorded   7  Vitamin D TABS; Therapy: (Recorded:12Jig8769) to Recorded   8  Vitamin D3 CAPS; Therapy: (Recorded:91Eba6547) to Recorded    Allergies   1  No Known Drug Allergies    Vitals    Recorded: 75QIR9005 30:53DE   Systolic 173, LUE, Sitting   Diastolic 62, LUE, Sitting   Height 5 ft 4 in   Weight 142 lb    BMI Calculated 24 37   BSA Calculated 1 69     Physical Exam        Constitutional      General appearance: No acute distress, well appearing and well nourished  -- well nourished white female  Neck      Neck: Normal, supple, trachea midline, no masses  Thyroid: Normal, no thyromegaly  Pulmonary      Respiratory effort: No increased work of breathing or signs of respiratory distress  Auscultation of lungs: Clear to auscultation  Cardiovascular      Auscultation of heart: Normal rate and rhythm, normal S1 and S2, no murmurs  Peripheral vascular exam: Normal pulses Throughout  Genitourinary      External genitalia: Normal and no lesions appreciated  Vagina: Normal, no lesions or dryness appreciated  -- atrophic  Urethra: Normal        Urethral meatus: Normal        Bladder: Normal, soft, non-tender and no prolapse or masses appreciated  Cervix: Normal, no palpable masses  -- nullip  Uterus: Normal, non-tender, not enlarged, and no palpable masses  Adnexa/parametria: Normal, non-tender and no fullness or masses appreciated  Anus, perineum, and rectum: Normal sphincter tone, no masses, and no prolapse  Chest      Breasts: Normal and no dimpling or skin changes noted  Abdomen      Abdomen: Normal, non-tender, and no organomegaly noted  Liver and spleen: No hepatomegaly or splenomegaly  Examination for hernias: No hernias appreciated  Stool sample for occult blood: Negative         Lymphatic Palpation of lymph nodes in neck, axillae, groin and/or other locations: No lymphadenopathy or masses noted  Skin      Skin and subcutaneous tissue: Normal skin turgor and no rashes         Palpation of skin and subcutaneous tissue: Normal        Psychiatric      Orientation to person, place, and time: Normal        Mood and affect: Normal        Future Appointments      Date/Time Provider Specialty Site   02/27/2018 08:00 AM Rosanne Betancur, [address] [facility]     Signatures    Electronically signed by : SRINIVASAN Pearson ; Jan 18 2018  2:31PM EST                       (Author)

## 2018-01-22 VITALS
WEIGHT: 142 LBS | HEIGHT: 64 IN | SYSTOLIC BLOOD PRESSURE: 118 MMHG | BODY MASS INDEX: 24.24 KG/M2 | DIASTOLIC BLOOD PRESSURE: 62 MMHG

## 2018-01-22 LAB
HPV 18 (HISTORICAL): NOT DETECTED
HPV HIGH RISK 16/18 (HISTORICAL): NOT DETECTED
HPV HR 12 DNA CVX QL NAA+PROBE: NOT DETECTED
HPV16 (HISTORICAL): NOT DETECTED
HPV16 DNA SPEC QL NAA+PROBE: NOT DETECTED
HPV18 DNA SPEC QL NAA+PROBE: NOT DETECTED
PAP (HISTORICAL): NORMAL
THIN PREP CVX: NORMAL

## 2018-01-23 NOTE — MISCELLANEOUS
History of Present Illness  TCM Communication [facility]: The patient is being contacted for surgeon and PCP  The date of admission: 10/30/17, date of discharge: 11/6/2017  Diagnosis: complete intestinal obstruction, unspecified cause  She was discharged to home  Medications were not reviewed today  Counseling was provided to the patient  Communication performed and completed by Li Sin      Active Problems    1  Allergic rhinitis (477 9) (J30 9)   2  Anxiety (300 00) (F41 9)   3  Chills (780 64) (R68 83)   4  Closed nondisplaced fracture of distal phalanx of right ring finger, initial encounter   (816 02) (R20 992C)   5  Digital mucous cyst of finger (727 43) (M67 449)   6  Diverticulosis (562 10) (K57 90)   7  Encounter for gynecological examination with Papanicolaou smear of cervix (V72 31)   (Z01 419)   8  Encounter for routine laboratory testing (V72 62) (Z00 00)   9  Encounter for screening mammogram for breast cancer (V76 12) (Z12 31)   10  Family history of cerebrovascular accident (V17 1) (Z82 3)   11  Family history of Gastric Cancer (V16 0)   12  GERD without esophagitis (530 81) (K21 9)   13  Health care maintenance (V70 0) (Z00 00)   14  Family history of Heart Disease (V17 49)   15  History of chest pain (V13 89) (Z87 898)   16  History of hyperthyroidism (V12 29) (Z86 39)   17  Human bite of finger (883 0) (F57 487I,R34  3XXA)   18  Hyperlipidemia (272 4) (E78 5)   19  Influenza vaccine needed (V04 81) (Z23)   20  Internal Hemorrhoids (455 0)   21  Irritable bowel syndrome (564 1) (K58 9)   22  Migraines (346 90) (G43 909)   23  Need for Zostavax administration (V04 89) (Z23)   24  Nonspecific abnormal results of function study of thyroid (794 5) (R94 6)   25  Osteoarthrosis of knee (715 36) (M17 10)   26  Osteopenia (733 90) (M85 80)   27  History of Renal cyst (753 10) (N28 1)   28  Screening for depression (V79 0) (Z13 89)   29  Total bilirubin, elevated (277 4) (R17)   30   Well adult exam (V70 0) (Z00 00)    Past Medical History    1  History of Acute low back pain (724 2) (M54 5)   2  History of Acute upper respiratory infection (465 9) (J06 9)   3  Allergic rhinitis (477 9) (J30 9)   4  Anxiety (300 00) (F41 9)   5  Chills (780 64) (R68 83)   6  History of Cough (786 2) (R05)   7  Digital mucous cyst of finger (727 43) (M67 449)   8  Diverticulosis (562 10) (K57 90)   9  Encounter for routine laboratory testing (V72 62) (Z00 00)   10  History of chest pain (V13 89) (Z87 898)   11  History of hypokalemia (V12 29) (Z86 39)   12  History of renal calculi (V13 01) (Z87 442)   13  History of upper respiratory infection (V12 09) (Z87 09)   14  History of uterine leiomyoma (V13 29) (Z86 018)   15  Human bite of finger (883 0) (F55 074R,A44  3XXA)   16  History of Hydronephrosis, right (591) (N13 30)   17  Hyperlipidemia (272 4) (E78 5)   18  Influenza vaccine needed (V04 81) (Z23)   19  Internal Hemorrhoids (455 0)   20  Irritable bowel syndrome (564 1) (K58 9)   21  Migraines (346 90) (G43 909)   22  Need for Zostavax administration (V04 89) (Z23)   23  History of Nephrolithiasis (V13 01)   24  Nonspecific abnormal results of function study of thyroid (794 5) (R94 6)   25  Osteoarthrosis of knee (715 36) (M17 10)   26  Osteopenia (733 90) (M85 80)   27  History of Renal cyst (753 10) (N28 1)   28  History of Screening for osteoporosis (V82 81) (Z13 820)   29  Total bilirubin, elevated (277 4) (R17)   30  History of Vaginitis and vulvovaginitis (616 10) (N76 0)   31  History of Visit for routine gyn exam (V72 31) (Z01 419)   32  Well adult exam (V70 0) (Z00 00)    Surgical History    1  History of Biopsy Thyroid Using Percutaneous Core Needle   2  History of Hand Surgery   3  History of Knee Surgery   4  History of Surgery Of Salivary Glands And Ducts   5  History of Tonsillectomy   6  History of Tubal Ligation   7  History of Uterine Fibroid Embolization   8   History of Uterine Myomectomy    Family History  Mother    1  Family history of Cancer   2  Family history of Gastric Cancer (V16 0)  Father    3  Family history of cerebrovascular accident (V17 1) (Z82 3)   4  Family history of Heart Disease (V17 49)  Sister    5  Family history of Diabetes Mellitus (V18 0)   6  Family history of arthritis (V17 7) (Z82 61)   7  Family history of Renal Disease  Brother    8  Family history of Hypertension (V17 49)    Social History    · Being A Social Drinker   · Cultural background   · Daily caffeinated cola consumption   · Drinks coffee   · Denied: History of Drug Use   · Exercise Habits   · Former smoker (V15 82) (I53 367)   · Occasional alcohol use   · Occupation: Retired   · Primary spoken language English   · Racial background   · Single    Current Meds   1  Aspirin 81 MG TABS; Take 1 tablet daily; Therapy: 03Txl1039 to (Last Rx:80Dzg8220) Ordered   2  Atorvastatin Calcium 20 MG Oral Tablet; take 1 tablet by mouth every day; Therapy: 90EKM3870 to (MVWZHBVX:46LTM6839)  Requested for: 20JDX8256; Last   Rx:91Ffu5606 Ordered   3  Fish Oil CAPS; Therapy: (Jevon Rene) to Recorded   4  Multiple Vitamin TABS; Therapy: ([place] Rene) to Recorded   5  Omeprazole 10 MG Oral Capsule Delayed Release; Therapy: (Onalee Stamp) to Recorded   6  Vitamin B-12 5000 MCG Oral Tablet Disintegrating; Therapy: (Onalee Stamp) to Recorded   7  Vitamin D TABS; Therapy: (Recorded:80Wfx2362) to Recorded   8  Vitamin D3 CAPS; Therapy: (Onalee Stamp) to Recorded   9  Zostavax 69773 UNT/0 65ML Subcutaneous Solution Reconstituted; 0 65 ml x 1; Therapy: 86Chm2269 to (Last Rx:93Oja2468) Ordered    Allergies    1  No Known Drug Allergies    Health Management  Health Maintenance   COLONOSCOPY; every 3 years; Last 45QQK5383; Next Due: 34XQV4906; Overdue    Message   Recorded as Task   Date: 11/05/2017 04:32 PM, Created By: System   Task Name: Hospital LONG   Assigned To:  Ashly Cui   Regarding Patient: Cindy Christopher, Status: Active   Comment:    System - 05 Nov 2017 4:32 PM     Patient discharged from hospital   Patient Name: Lily Lir  Patient YOB: 1955  Discharge Date: 11/5/2017  [facility] 06 Nov 2017 5:06 PM     TASK EDITED  lm for patient to schedule appointment     Future Appointments    Date/Time Provider Specialty Site   02/27/2018 08:00 AM Art Mayorga, [address] [place] [address]   01/18/2018 01:30 PM SRINIVASAN Vinson   Obstetrics/Gynecology [address] OBGYN     Signatures   Electronically signed by : Abdi Garrison DO; Jan 8 2018  8:38AM EST                       (Author)

## 2018-01-31 ENCOUNTER — TRANSCRIBE ORDERS (OUTPATIENT)
Dept: ADMINISTRATIVE | Facility: HOSPITAL | Age: 63
End: 2018-01-31

## 2018-01-31 DIAGNOSIS — R93.5 ABNORMAL ABDOMINAL ULTRASOUND: Primary | ICD-10-CM

## 2018-02-02 ENCOUNTER — HOSPITAL ENCOUNTER (OUTPATIENT)
Dept: RADIOLOGY | Age: 63
Discharge: HOME | End: 2018-02-02
Payer: COMMERCIAL

## 2018-02-02 DIAGNOSIS — Z12.31 ENCOUNTER FOR SCREENING MAMMOGRAM FOR MALIGNANT NEOPLASM OF BREAST: ICD-10-CM

## 2018-02-02 PROCEDURE — 77067 SCR MAMMO BI INCL CAD: CPT

## 2018-02-27 ENCOUNTER — OFFICE VISIT (OUTPATIENT)
Dept: FAMILY MEDICINE CLINIC | Facility: OTHER | Age: 63
End: 2018-02-27
Payer: COMMERCIAL

## 2018-02-27 VITALS
HEIGHT: 63 IN | SYSTOLIC BLOOD PRESSURE: 124 MMHG | TEMPERATURE: 97.3 F | OXYGEN SATURATION: 96 % | HEART RATE: 62 BPM | WEIGHT: 144.31 LBS | BODY MASS INDEX: 25.57 KG/M2 | DIASTOLIC BLOOD PRESSURE: 78 MMHG

## 2018-02-27 DIAGNOSIS — G43.719 INTRACTABLE CHRONIC MIGRAINE WITHOUT AURA AND WITHOUT STATUS MIGRAINOSUS: ICD-10-CM

## 2018-02-27 DIAGNOSIS — K21.9 GERD WITHOUT ESOPHAGITIS: ICD-10-CM

## 2018-02-27 DIAGNOSIS — F41.9 ANXIETY: ICD-10-CM

## 2018-02-27 DIAGNOSIS — M85.80 OSTEOPENIA, UNSPECIFIED LOCATION: ICD-10-CM

## 2018-02-27 DIAGNOSIS — N73.9 PELVIC ABSCESS IN FEMALE: ICD-10-CM

## 2018-02-27 DIAGNOSIS — K58.9 IRRITABLE BOWEL SYNDROME, UNSPECIFIED TYPE: ICD-10-CM

## 2018-02-27 DIAGNOSIS — E78.2 MIXED HYPERLIPIDEMIA: Primary | ICD-10-CM

## 2018-02-27 PROBLEM — K56.601 COMPLETE INTESTINAL OBSTRUCTION (HCC): Status: RESOLVED | Noted: 2017-10-30 | Resolved: 2018-02-27

## 2018-02-27 PROCEDURE — 99214 OFFICE O/P EST MOD 30 MIN: CPT | Performed by: NURSE PRACTITIONER

## 2018-02-27 RX ORDER — ATORVASTATIN CALCIUM 20 MG/1
20 TABLET, FILM COATED ORAL DAILY
Qty: 90 TABLET | Refills: 3 | Status: SHIPPED | OUTPATIENT
Start: 2018-02-27 | End: 2019-08-12 | Stop reason: SDUPTHER

## 2018-02-27 NOTE — PROGRESS NOTES
Assessment/Plan:       Diagnoses and all orders for this visit:    IBS + hx pelvic abscess  --Symptoms largely resolved  Has upcoming colonoscopy scheduled, along with CT scan, labs, and f/u with colorectal     --Adequate dietary fiber  Mixed hyperlipidemia  -     atorvastatin (LIPITOR) 20 mg tablet; Take 1 tablet (20 mg total) by mouth daily  -     CBC and differential; Future  -     Comprehensive metabolic panel; Future  -     HEMOGLOBIN A1C W/ EAG ESTIMATION; Future  -     Lipid Panel with Direct LDL reflex; Future    GERD without esophagitis  --Minimal symptoms on PPI  Previously discussed potential long-term AE's, possible trial of tapering to PRN use only  Intractable chronic migraine without aura and without status migrainosus  --Infrequent  Takes triptan prn  Osteopenia, unspecified location  --Continue daily calcium + D, weight bearing exercise  Due for repeat DEXA-->previous order reprinted  Anxiety  --Has not been an issue lately  Takes benzo prn  Other orders  -     aspirin 81 MG tablet; Take 1 tablet by mouth daily  -     Linaclotide (LINZESS) 145 MCG CAPS;       UTD with mammogram, eye exam   Declines flu shot  RTO 6 months  Subjective:      Patient ID: Tony Willoughby is a 65 y o  female  Here for routine follow-up  No acute complaints or issues  Hospitalized with pelvic abscess back in October  Laparoscopic surgery  Doing fine since then  No further fevers/chills/sweats, abdominal pain, N/V  Bowel movements with normal consistency  No C/D, melena, hematochezia  No urinary issues  No spotting or discharge  Scheduled for colonoscopy 3/20 with [facility]   CT scan scheduled 3/1  No recent heartburn  Remains on PPI  Occasional migraines  No recent worsening  Imitrex helpful when she needs it  No recent anxiety  Hasn't had to take benzo  Saw GYN last month    Had mammogram             The following portions of the patient's history were reviewed and updated as appropriate: allergies, current medications, past family history, past medical history, past social history, past surgical history and problem list     Review of Systems   Constitutional: Negative for chills and fever  HENT: Negative for sore throat  Respiratory: Negative for cough and shortness of breath  Cardiovascular: Negative for chest pain and palpitations  Gastrointestinal: Negative for abdominal pain, constipation, diarrhea, nausea, rectal pain and vomiting  Genitourinary: Negative for difficulty urinating  Musculoskeletal: Negative for arthralgias  Neurological:        Per HPI   Psychiatric/Behavioral:        Per HPI         Objective:      /90 (BP Location: Left arm, Patient Position: Sitting, Cuff Size: Adult)   Pulse 62   Temp (!) 97 3 °F (36 3 °C) (Tympanic)   Ht 5' 3" (1 6 m)   Wt 65 5 kg (144 lb 5 oz)   SpO2 96%   BMI 25 56 kg/m²          Physical Exam   Constitutional: She is oriented to person, place, and time  She appears well-developed and well-nourished  HENT:   Head: Normocephalic  Right Ear: External ear normal    Left Ear: External ear normal    Nose: Nose normal    Mouth/Throat: Oropharynx is clear and moist    Eyes: Conjunctivae are normal  Pupils are equal, round, and reactive to light  Neck: Normal range of motion  Neck supple  Cardiovascular: Normal rate, regular rhythm and normal heart sounds  Pulmonary/Chest: Effort normal and breath sounds normal    Abdominal: Soft  Bowel sounds are normal  There is no tenderness  Benign appearing, non-tender, 6 inch vertical midline abdominal scar  Neurological: She is alert and oriented to person, place, and time  She has normal reflexes  Skin: Skin is warm and dry  Psychiatric: She has a normal mood and affect

## 2018-02-28 LAB
ALBUMIN SERPL-MCNC: 4.3 G/DL (ref 3.6–5.1)
ALBUMIN/GLOB SERPL: 2.5 (CALC) (ref 1–2.5)
ALP SERPL-CCNC: 71 U/L (ref 33–130)
ALT SERPL-CCNC: 18 U/L (ref 6–29)
AST SERPL-CCNC: 18 U/L (ref 10–35)
BASOPHILS # BLD AUTO: 30 CELLS/UL (ref 0–200)
BASOPHILS NFR BLD AUTO: 0.5 %
BILIRUB SERPL-MCNC: 1.1 MG/DL (ref 0.2–1.2)
BUN SERPL-MCNC: 12 MG/DL (ref 7–25)
BUN/CREAT SERPL: ABNORMAL (CALC) (ref 6–22)
CALCIUM SERPL-MCNC: 9.1 MG/DL (ref 8.6–10.4)
CHLORIDE SERPL-SCNC: 107 MMOL/L (ref 98–110)
CHOLEST SERPL-MCNC: 137 MG/DL
CHOLEST/HDLC SERPL: 2 (CALC)
CO2 SERPL-SCNC: 29 MMOL/L (ref 20–31)
CREAT SERPL-MCNC: 0.85 MG/DL (ref 0.5–0.99)
EOSINOPHIL # BLD AUTO: 83 CELLS/UL (ref 15–500)
EOSINOPHIL NFR BLD AUTO: 1.4 %
ERYTHROCYTE [DISTWIDTH] IN BLOOD BY AUTOMATED COUNT: 12.7 % (ref 11–15)
EST. AVERAGE GLUCOSE BLD GHB EST-MCNC: 100 (CALC)
EST. AVERAGE GLUCOSE BLD GHB EST-SCNC: 5.5 (CALC)
GLOBULIN SER CALC-MCNC: 1.7 G/DL (CALC) (ref 1.9–3.7)
GLUCOSE SERPL-MCNC: 95 MG/DL (ref 65–99)
HBA1C MFR BLD: 5.1 % OF TOTAL HGB
HCT VFR BLD AUTO: 36.6 % (ref 35–45)
HDLC SERPL-MCNC: 68 MG/DL
HGB BLD-MCNC: 12.2 G/DL (ref 11.7–15.5)
LDLC SERPL CALC-MCNC: 52 MG/DL (CALC)
LYMPHOCYTES # BLD AUTO: 2519 CELLS/UL (ref 850–3900)
LYMPHOCYTES NFR BLD AUTO: 42.7 %
MCH RBC QN AUTO: 29.9 PG (ref 27–33)
MCHC RBC AUTO-ENTMCNC: 33.3 G/DL (ref 32–36)
MCV RBC AUTO: 89.7 FL (ref 80–100)
MONOCYTES # BLD AUTO: 437 CELLS/UL (ref 200–950)
MONOCYTES NFR BLD AUTO: 7.4 %
NEUTROPHILS # BLD AUTO: 2832 CELLS/UL (ref 1500–7800)
NEUTROPHILS NFR BLD AUTO: 48 %
NONHDLC SERPL-MCNC: 69 MG/DL (CALC)
PLATELET # BLD AUTO: 226 THOUSAND/UL (ref 140–400)
PMV BLD REES-ECKER: 11.4 FL (ref 7.5–12.5)
POTASSIUM SERPL-SCNC: 4.2 MMOL/L (ref 3.5–5.3)
PROT SERPL-MCNC: 6 G/DL (ref 6.1–8.1)
RBC # BLD AUTO: 4.08 MILLION/UL (ref 3.8–5.1)
SL AMB EGFR AFRICAN AMERICAN: 86 ML/MIN/1.73M2
SL AMB EGFR NON AFRICAN AMERICAN: 74 ML/MIN/1.73M2
SODIUM SERPL-SCNC: 142 MMOL/L (ref 135–146)
TRIGL SERPL-MCNC: 89 MG/DL
WBC # BLD AUTO: 5.9 THOUSAND/UL (ref 3.8–10.8)

## 2018-03-01 ENCOUNTER — HOSPITAL ENCOUNTER (OUTPATIENT)
Dept: CT IMAGING | Facility: HOSPITAL | Age: 63
End: 2018-03-01
Attending: COLON & RECTAL SURGERY
Payer: COMMERCIAL

## 2018-03-01 ENCOUNTER — HOSPITAL ENCOUNTER (OUTPATIENT)
Dept: CT IMAGING | Facility: HOSPITAL | Age: 63
Discharge: HOME | End: 2018-03-01
Attending: COLON & RECTAL SURGERY
Payer: COMMERCIAL

## 2018-03-01 DIAGNOSIS — R93.5 ABNORMAL ABDOMINAL ULTRASOUND: ICD-10-CM

## 2018-03-01 PROCEDURE — 74177 CT ABD & PELVIS W/CONTRAST: CPT

## 2018-03-01 RX ADMIN — IOHEXOL 100 ML: 350 INJECTION, SOLUTION INTRAVENOUS at 07:42

## 2018-03-28 ENCOUNTER — HOSPITAL ENCOUNTER (OUTPATIENT)
Dept: RADIOLOGY | Facility: HOSPITAL | Age: 63
Discharge: HOME | End: 2018-03-28
Attending: UROLOGY
Payer: COMMERCIAL

## 2018-03-28 DIAGNOSIS — Z87.442 PERSONAL HISTORY OF URINARY CALCULI: ICD-10-CM

## 2018-03-28 PROCEDURE — 74018 RADEX ABDOMEN 1 VIEW: CPT

## 2018-04-04 DIAGNOSIS — Z87.442 PERSONAL HISTORY OF URINARY CALCULI: ICD-10-CM

## 2018-04-26 ENCOUNTER — CONSULT (OUTPATIENT)
Dept: UROLOGY | Facility: AMBULATORY SURGERY CENTER | Age: 63
End: 2018-04-26
Payer: COMMERCIAL

## 2018-04-26 VITALS
HEIGHT: 63 IN | WEIGHT: 145 LBS | DIASTOLIC BLOOD PRESSURE: 76 MMHG | HEART RATE: 68 BPM | BODY MASS INDEX: 25.69 KG/M2 | SYSTOLIC BLOOD PRESSURE: 132 MMHG

## 2018-04-26 DIAGNOSIS — N20.0 NEPHROLITHIASIS: Primary | ICD-10-CM

## 2018-04-26 LAB
SL AMB  POCT GLUCOSE, UA: NORMAL
SL AMB LEUKOCYTE ESTERASE,UA: NORMAL
SL AMB POCT BILIRUBIN,UA: NORMAL
SL AMB POCT BLOOD,UA: NORMAL
SL AMB POCT CLARITY,UA: CLEAR
SL AMB POCT COLOR,UA: YELLOW
SL AMB POCT KETONES,UA: NORMAL
SL AMB POCT NITRITE,UA: NORMAL
SL AMB POCT PH,UA: 5
SL AMB POCT SPECIFIC GRAVITY,UA: 1.02
SL AMB POCT URINE PROTEIN: NORMAL
SL AMB POCT UROBILINOGEN: NORMAL

## 2018-04-26 PROCEDURE — 99213 OFFICE O/P EST LOW 20 MIN: CPT | Performed by: UROLOGY

## 2018-04-26 PROCEDURE — 81002 URINALYSIS NONAUTO W/O SCOPE: CPT | Performed by: UROLOGY

## 2018-04-26 RX ORDER — DAPSONE 75 MG/G
GEL TOPICAL DAILY
Refills: 3 | COMMUNITY
Start: 2018-02-26

## 2018-04-26 NOTE — PROGRESS NOTES
4/26/2018    Lester Love  1955  [id number]        Assessment  History of nephrolithiasis without recurrence      Discussion  Provided the patient with reassurance that a recent CT scan as well as her KUB shows no evidence of stones  She has not had an issue with stones for many years  I recommend that her follow-up be on an as-needed basis at this time  History of Present Illness  59 y o  female with a history of nephrolithiasis dating back many years  She denies any flank pain or hematuria  She underwent surgery for pelvic abscess likely secondary to micro perforation who of diverticular disease in the fall of 2017  CT scan at that time showed no evidence of nephrolithiasis  A recent KUB shows no stones  She is otherwise asymptomatic  AUA Symptom Score      Review of Systems  Review of Systems   Constitutional: Negative  HENT: Negative  Eyes: Negative  Respiratory: Negative  Cardiovascular: Negative  Gastrointestinal: Negative  Endocrine: Negative  Genitourinary: Negative  Musculoskeletal: Negative  Skin: Negative  Allergic/Immunologic: Negative  Neurological: Negative  Hematological: Negative  Psychiatric/Behavioral: Negative  All other systems reviewed and are negative          Past Medical History  Past Medical History:   Diagnosis Date    Allergic rhinitis     last assessed: 9/29/2016    Anxiety     last assessed: 8/22/2017    Diverticulosis     last assessed: 10/7/2013    GERD (gastroesophageal reflux disease)     Hydronephrosis, right     last assessed: 5/22/2013    Hyperlipidemia     last assessed 8/22/2017    Hypokalemia     last assessed: 3/24/2015    IBS (irritable bowel syndrome)     last assessed: 8/22/2017    Internal hemorrhoids     last assessed: 10/7/2013    Migraines     last assessed: 8/22/2017    Nephrolithiasis     Nonspecific abnormal results of function study of thyroid     last assessed: 10/8/2013   Linda Dougherty Osteoarthrosis of knee     last assessed: 2/28/2017    Osteopenia     last assessed: 8/22/2017    Renal calculi     last assessed: 4/29/2016    Renal cyst     last assessed: 7/15/2015    Total bilirubin, elevated     last assessed: 8/22/2017    Uterine leiomyoma        Past Social History  Past Surgical History:   Procedure Laterality Date    HAND SURGERY      KNEE CARTILAGE SURGERY Left     x3    KNEE SURGERY      LAPAROTOMY N/A 10/30/2017    Procedure: LAPAROTOMY EXPLORATORY, DRAINAGE PELVIC ABSCESS; APPENDECTOMY;  Surgeon: Robyn Sung DO;  [facility];  Service: General    MYOMECTOMY      SALIVARY GLAND SURGERY      and ducts    THYROID SURGERY      biopsy thyroid using percutaneous core needle    TONSILLECTOMY      TUBAL LIGATION      UTERINE FIBROID SURGERY      embolization       Past Family History  Family History   Problem Relation Age of Onset    Cancer Mother     Stomach cancer Mother     Other Father      CVA    Heart disease Father     Diabetes Sister     Arthritis Sister     Kidney disease Sister     Hypertension Brother        Past Social history  Social History     Social History    Marital status: Single     Spouse name: N/A    Number of children: N/A    Years of education: N/A     Occupational History    retired      Social History Main Topics    Smoking status: Former Smoker     Packs/day: 1 00     Years: 6 00     Quit date: 1/1/2012    Smokeless tobacco: Never Used    Alcohol use Yes      Comment: occasional drink socially    Drug use: No    Sexual activity: Not on file     Other Topics Concern    Not on file     Social History Narrative    Daily caffeinated cola consumption    Drinks coffee    Exercise habits           Current Medications  Current Outpatient Prescriptions   Medication Sig Dispense Refill    ACZONE 7 5 % GEL APPLY ONCE DAILY  3    aspirin 81 mg chewable tablet Chew 81 mg daily      atorvastatin (LIPITOR) 20 mg tablet Take 1 tablet (20 mg total) by mouth daily 90 tablet 3    Cholecalciferol (VITAMIN D3) 2000 units TABS Take 2,000 Units by mouth daily      Cyanocobalamin (VITAMIN B 12 PO) Take 5,000 mcg by mouth daily      Multiple Vitamins-Calcium (ONE-A-DAY WOMENS FORMULA PO) Take 1 tablet by mouth daily      Omega-3 Fatty Acids (FISH OIL) 1,000 mg Take 4,000 mg by mouth daily      omeprazole (PriLOSEC) 10 mg delayed release capsule Take 10 mg by mouth daily       No current facility-administered medications for this visit  Allergies  No Known Allergies    Past Medical History, Social History, Family History, medications and allergies were reviewed  Vitals  Vitals:    04/26/18 0853   BP: 132/76   Pulse: 68   Weight: 65 8 kg (145 lb)   Height: 5' 3 25" (1 607 m)       Physical Exam  Physical Exam  On examination she is in no acute distress  Her abdomen is soft nontender nondistended   examination reveals no CVA tenderness  Skin is warm  Extremities without edema    Neurologic is grossly intact and nonfocal   Gait normal   Affect normal      Results  No results found for: PSA  Lab Results   Component Value Date    GLUCOSE 129 11/05/2017    CALCIUM 9 1 02/27/2018     11/05/2017    K 3 1 (L) 11/05/2017    CO2 31 11/05/2017     11/05/2017    BUN 12 02/27/2018    CREATININE 0 85 02/27/2018     Lab Results   Component Value Date    WBC 8 09 11/05/2017    HGB 12 2 02/27/2018    HCT 36 6 02/27/2018    MCV 89 7 02/27/2018     02/27/2018         Office Urine Dip  No results found for this or any previous visit (from the past 1 hour(s)) ]

## 2018-08-27 ENCOUNTER — OFFICE VISIT (OUTPATIENT)
Dept: FAMILY MEDICINE CLINIC | Facility: OTHER | Age: 63
End: 2018-08-27
Payer: COMMERCIAL

## 2018-08-27 VITALS
OXYGEN SATURATION: 99 % | TEMPERATURE: 97.6 F | HEIGHT: 63 IN | DIASTOLIC BLOOD PRESSURE: 82 MMHG | HEART RATE: 61 BPM | SYSTOLIC BLOOD PRESSURE: 118 MMHG | WEIGHT: 149.25 LBS | BODY MASS INDEX: 26.45 KG/M2

## 2018-08-27 DIAGNOSIS — N73.9 PELVIC ABSCESS IN FEMALE: ICD-10-CM

## 2018-08-27 DIAGNOSIS — K57.30 DIVERTICULOSIS OF LARGE INTESTINE WITHOUT HEMORRHAGE: ICD-10-CM

## 2018-08-27 DIAGNOSIS — G43.719 INTRACTABLE CHRONIC MIGRAINE WITHOUT AURA AND WITHOUT STATUS MIGRAINOSUS: ICD-10-CM

## 2018-08-27 DIAGNOSIS — K58.9 IRRITABLE BOWEL SYNDROME, UNSPECIFIED TYPE: Primary | ICD-10-CM

## 2018-08-27 DIAGNOSIS — K21.9 GERD WITHOUT ESOPHAGITIS: ICD-10-CM

## 2018-08-27 DIAGNOSIS — Z13.820 ENCOUNTER FOR SCREENING FOR OSTEOPOROSIS: ICD-10-CM

## 2018-08-27 DIAGNOSIS — F41.9 ANXIETY: ICD-10-CM

## 2018-08-27 DIAGNOSIS — E78.2 MIXED HYPERLIPIDEMIA: ICD-10-CM

## 2018-08-27 DIAGNOSIS — R10.31 RIGHT LOWER QUADRANT ABDOMINAL PAIN: ICD-10-CM

## 2018-08-27 DIAGNOSIS — M85.80 OSTEOPENIA, UNSPECIFIED LOCATION: ICD-10-CM

## 2018-08-27 PROCEDURE — 99214 OFFICE O/P EST MOD 30 MIN: CPT | Performed by: NURSE PRACTITIONER

## 2018-08-27 PROCEDURE — 3008F BODY MASS INDEX DOCD: CPT | Performed by: NURSE PRACTITIONER

## 2018-08-27 NOTE — PROGRESS NOTES
Assessment/Plan:         Problem List Items Addressed This Visit     Anxiety  --Well controlled on prn benzo      GERD without esophagitis  --Controlled with PPI + diet  Previously discussed tapering  May be due for repeat EGD soon (previous 2013)  Hyperlipidemia  --Remains on statin  Previously given slip for repeat lipids which she was reminded to get done (lab slip reprinted)      Intractable chronic migraine without aura and without status migrainosus  --Infrequent  PRN triptan helpful  Osteopenia + screening for osteoporosis    Relevant Orders    DXA bone density spine hip and pelvis      Other Visit Diagnoses     Right lower quadrant abdominal pain (mild x 2 days) + Diverticulosis of large intestine+ IBS (irritable bowel syndrome)   --Continue regular exercise program, including yoga, higher fiber diet  --Advise clear liquids today  If symptoms no better by tomorrow, she was instructed to get CT done  --Advised ER with any worsening  Relevant Orders    CT abdomen pelvis w wo contrast          UTD with mammogram      RTO 6 months    Subjective:      Patient ID: Mariana Alford is a 59 y o  female  Here for routine follow-up  Tolerating meds without AE's  Had colonoscopy + CT 3/2018  Showed severe diverticulosis  No attacks for over 6 months  Was told she may need colon resection, however  Somewhat anxious about this  Plans on scheduling f/u with [facility] to discuss further  Trying to get plenty of fiber (fruits and vegetables) in her diet  Notes some dull right sided abdominal discomfort x 2 days  Mid/lower aspect  Rates 4/10 at present  No variation with food, position, defecation/urination  Last BM was 2 days ago--normal consistency  Denies any recent change in pattern including C/D, melena/hematochezia  Remains on daily Miralax  No fever, N/V  No urinary changes  Hx appendectomy  Has gallbladder  No recent dietary changes       Anxiety well controlled  Infrequent benzo use  No depression  Continues to do yoga and walks regularly (friend's [facility] and [employer])  No recent migraines  Lost slip for repeat labs and DEXA (given previously)    Mammogram/GYN 2/2018  The following portions of the patient's history were reviewed and updated as appropriate: allergies, current medications, past family history, past medical history, past social history, past surgical history and problem list     Review of Systems   Constitutional: Negative for chills and fever  HENT: Negative for sore throat  Respiratory: Negative for cough and shortness of breath  Cardiovascular: Negative for chest pain and palpitations  Gastrointestinal: Positive for abdominal pain  Negative for blood in stool, constipation, diarrhea, nausea and vomiting  Genitourinary: Negative for dysuria  Musculoskeletal: Negative for arthralgias  Neurological: Negative for dizziness and headaches  Psychiatric/Behavioral: Negative for agitation and behavioral problems  Objective:      /82 (BP Location: Right arm, Patient Position: Sitting, Cuff Size: Adult)   Pulse 61   Temp 97 6 °F (36 4 °C) (Tympanic)   Ht 5' 3" (1 6 m)   Wt 67 7 kg (149 lb 4 oz)   SpO2 99%   BMI 26 44 kg/m²          Physical Exam   Constitutional: She is oriented to person, place, and time  She appears well-developed and well-nourished  HENT:   Head: Normocephalic  Right Ear: External ear normal    Left Ear: External ear normal    Nose: Nose normal    Mouth/Throat: Oropharynx is clear and moist    Eyes: Conjunctivae are normal  Pupils are equal, round, and reactive to light  Neck: Normal range of motion  Neck supple  No thyromegaly present  Cardiovascular: Normal rate, regular rhythm and normal heart sounds  Pulmonary/Chest: Effort normal and breath sounds normal    Abdominal: Soft   Bowel sounds are normal    Abdominal soft, nondistended, with right lower/mid right sided tenderness  No masses  No guarding or rebound  Normal BS  Negative Kramer  Neurological: She is alert and oriented to person, place, and time  She has normal reflexes  Skin: Skin is warm and dry  Psychiatric: She has a normal mood and affect

## 2018-08-28 LAB
ALBUMIN SERPL-MCNC: 4 G/DL (ref 3.6–5.1)
ALBUMIN/GLOB SERPL: 1.8 (CALC) (ref 1–2.5)
ALP SERPL-CCNC: 74 U/L (ref 33–130)
ALT SERPL-CCNC: 17 U/L (ref 6–29)
AST SERPL-CCNC: 16 U/L (ref 10–35)
BASOPHILS # BLD AUTO: 38 CELLS/UL (ref 0–200)
BASOPHILS NFR BLD AUTO: 0.7 %
BILIRUB SERPL-MCNC: 1.5 MG/DL (ref 0.2–1.2)
BUN SERPL-MCNC: 13 MG/DL (ref 7–25)
BUN/CREAT SERPL: ABNORMAL (CALC) (ref 6–22)
CALCIUM SERPL-MCNC: 9.2 MG/DL (ref 8.6–10.4)
CHLORIDE SERPL-SCNC: 107 MMOL/L (ref 98–110)
CHOLEST SERPL-MCNC: 146 MG/DL
CHOLEST/HDLC SERPL: 2.2 (CALC)
CO2 SERPL-SCNC: 30 MMOL/L (ref 20–32)
CREAT SERPL-MCNC: 0.87 MG/DL (ref 0.5–0.99)
EOSINOPHIL # BLD AUTO: 81 CELLS/UL (ref 15–500)
EOSINOPHIL NFR BLD AUTO: 1.5 %
ERYTHROCYTE [DISTWIDTH] IN BLOOD BY AUTOMATED COUNT: 12.4 % (ref 11–15)
EST. AVERAGE GLUCOSE BLD GHB EST-MCNC: 108 (CALC)
EST. AVERAGE GLUCOSE BLD GHB EST-SCNC: 6 (CALC)
GLOBULIN SER CALC-MCNC: 2.2 G/DL (CALC) (ref 1.9–3.7)
GLUCOSE SERPL-MCNC: 97 MG/DL (ref 65–99)
HBA1C MFR BLD: 5.4 % OF TOTAL HGB
HCT VFR BLD AUTO: 38.2 % (ref 35–45)
HDLC SERPL-MCNC: 67 MG/DL
HGB BLD-MCNC: 12.8 G/DL (ref 11.7–15.5)
LDLC SERPL CALC-MCNC: 59 MG/DL (CALC)
LYMPHOCYTES # BLD AUTO: 2462 CELLS/UL (ref 850–3900)
LYMPHOCYTES NFR BLD AUTO: 45.6 %
MCH RBC QN AUTO: 30.4 PG (ref 27–33)
MCHC RBC AUTO-ENTMCNC: 33.5 G/DL (ref 32–36)
MCV RBC AUTO: 90.7 FL (ref 80–100)
MONOCYTES # BLD AUTO: 497 CELLS/UL (ref 200–950)
MONOCYTES NFR BLD AUTO: 9.2 %
NEUTROPHILS # BLD AUTO: 2322 CELLS/UL (ref 1500–7800)
NEUTROPHILS NFR BLD AUTO: 43 %
NONHDLC SERPL-MCNC: 79 MG/DL (CALC)
PLATELET # BLD AUTO: 245 THOUSAND/UL (ref 140–400)
PMV BLD REES-ECKER: 11 FL (ref 7.5–12.5)
POTASSIUM SERPL-SCNC: 4.4 MMOL/L (ref 3.5–5.3)
PROT SERPL-MCNC: 6.2 G/DL (ref 6.1–8.1)
RBC # BLD AUTO: 4.21 MILLION/UL (ref 3.8–5.1)
SL AMB EGFR AFRICAN AMERICAN: 83 ML/MIN/1.73M2
SL AMB EGFR NON AFRICAN AMERICAN: 71 ML/MIN/1.73M2
SODIUM SERPL-SCNC: 144 MMOL/L (ref 135–146)
TRIGL SERPL-MCNC: 112 MG/DL
WBC # BLD AUTO: 5.4 THOUSAND/UL (ref 3.8–10.8)

## 2018-09-06 ENCOUNTER — HOSPITAL ENCOUNTER (OUTPATIENT)
Dept: CT IMAGING | Facility: HOSPITAL | Age: 63
Discharge: HOME | End: 2018-09-06
Attending: NURSE PRACTITIONER
Payer: COMMERCIAL

## 2018-09-06 DIAGNOSIS — K57.30 DIVERTICULOSIS OF LARGE INTESTINE WITHOUT HEMORRHAGE: ICD-10-CM

## 2018-09-06 DIAGNOSIS — R10.31 RIGHT LOWER QUADRANT ABDOMINAL PAIN: ICD-10-CM

## 2018-09-06 PROCEDURE — 74178 CT ABD&PLV WO CNTR FLWD CNTR: CPT

## 2018-09-06 RX ADMIN — IOHEXOL 100 ML: 350 INJECTION, SOLUTION INTRAVENOUS at 07:03

## 2018-09-07 PROBLEM — R17 SERUM TOTAL BILIRUBIN ELEVATED: Status: ACTIVE | Noted: 2018-09-07

## 2018-11-14 ENCOUNTER — DOCUMENTATION (OUTPATIENT)
Dept: ENDOCRINOLOGY | Facility: CLINIC | Age: 63
End: 2018-11-14

## 2018-11-14 ENCOUNTER — OFFICE VISIT (OUTPATIENT)
Dept: ENDOCRINOLOGY | Facility: CLINIC | Age: 63
End: 2018-11-14
Payer: COMMERCIAL

## 2018-11-14 VITALS
BODY MASS INDEX: 27.46 KG/M2 | HEART RATE: 58 BPM | SYSTOLIC BLOOD PRESSURE: 124 MMHG | WEIGHT: 155 LBS | DIASTOLIC BLOOD PRESSURE: 80 MMHG | HEIGHT: 63 IN

## 2018-11-14 DIAGNOSIS — M85.80 OSTEOPENIA, UNSPECIFIED LOCATION: Primary | ICD-10-CM

## 2018-11-14 DIAGNOSIS — E04.2 MULTIPLE THYROID NODULES: ICD-10-CM

## 2018-11-14 DIAGNOSIS — R79.89 LOW TSH LEVEL: ICD-10-CM

## 2018-11-14 DIAGNOSIS — Z78.0 POST-MENOPAUSAL: ICD-10-CM

## 2018-11-14 PROCEDURE — 99204 OFFICE O/P NEW MOD 45 MIN: CPT | Performed by: INTERNAL MEDICINE

## 2018-11-14 NOTE — ASSESSMENT & PLAN NOTE
Will request a thyroid ultrasound-depending on the size and characteristics of the thyroid nodule will determine if she needs any further workup

## 2018-11-14 NOTE — PROGRESS NOTES
Patient called asking for P A  To be done for Dexa scan 54168  I did call [phone] and patient does not need a P  A

## 2018-11-14 NOTE — LETTER
November 14, 2018     [facility] [id number]    Patient: Lorena Love   YOB: 1955   Date of Visit: 11/14/2018       Dear Dr Charly Saul:    Thank you for referring Dilcia Zuleta to me for evaluation  Below are my notes for this consultation  If you have questions, please do not hesitate to call me  I look forward to following your patient along with you  Sincerely,        Vera Nichols MD        CC: No Recipients  Vera Nichols MD  11/14/2018 10:32 AM  Sign at close encounter   Lorena Love 59 y o  female MRN: [id number]    Encounter: [id number]      Assessment/Plan     Problem List Items Addressed This Visit     Osteopenia - Primary     No history of fragility fractures-continue vitamin-D supplementations  She is due for repeat DEXA scan-will give her a script         Relevant Orders    Vitamin D 25 hydroxy Lab Collect    DXA bone density spine hip and pelvis    Low TSH level     Repeat thyroid function tests         Relevant Orders    TSH, 3rd generation Lab Collect    T4, free Lab Collect    T3 Lab Collect    DXA bone density spine hip and pelvis    Multiple thyroid nodules     Will request a thyroid ultrasound-depending on the size and characteristics of the thyroid nodule will determine if she needs any further workup         Relevant Orders    US thyroid    T3 Lab Collect      Other Visit Diagnoses     Post-menopausal        Relevant Orders    DXA bone density spine hip and pelvis        CC:   Thyroid dysfunction    History of Present Illness      HPI:  21-year-old woman referred here for evaluation of thyroid dysfunction  She states that she has History of hyperthyroidism and thyroid nodules - diagnosed many years back  Underwent FNAB many years back -which came back as benign as per patient  No FH of thyroid cancer , no history of RT to neck    Currently denies any obstructive symptoms including difficulty swallowing, breathing or pressure sensations  Occasional palpitations  For the past year  Weight gain - 10 lbs in the past year  Occasional cosntiaption   No fatigue , no sleep issues   Heat intolernace - chronic       Review of Systems   Constitutional: Negative for fatigue and unexpected weight change  Respiratory: Negative for cough and shortness of breath  Cardiovascular: Positive for palpitations  Negative for leg swelling  Gastrointestinal: Negative for diarrhea, nausea and vomiting  Endocrine: Positive for heat intolerance  Negative for cold intolerance  Musculoskeletal: Negative for gait problem  Skin: Negative for color change, pallor, rash and wound  Psychiatric/Behavioral: Negative for sleep disturbance  All other systems reviewed and are negative        Historical Information   Past Medical History:   Diagnosis Date    Allergic rhinitis     last assessed: 9/29/2016    Anxiety     last assessed: 8/22/2017    Diverticulosis     last assessed: 10/7/2013    GERD (gastroesophageal reflux disease)     Hydronephrosis, right     last assessed: 5/22/2013    Hyperlipidemia     last assessed 8/22/2017    Hypokalemia     last assessed: 3/24/2015    IBS (irritable bowel syndrome)     last assessed: 8/22/2017    Internal hemorrhoids     last assessed: 10/7/2013    Migraines     last assessed: 8/22/2017    Nephrolithiasis     Nonspecific abnormal results of function study of thyroid     last assessed: 10/8/2013    Osteoarthrosis of knee     last assessed: 2/28/2017    Osteopenia     last assessed: 8/22/2017    Renal calculi     last assessed: 4/29/2016    Renal cyst     last assessed: 7/15/2015    Total bilirubin, elevated     last assessed: 8/22/2017    Uterine leiomyoma      Past Surgical History:   Procedure Laterality Date    HAND SURGERY      KNEE CARTILAGE SURGERY Left     x3    KNEE SURGERY      LAPAROTOMY N/A 10/30/2017    Procedure: LAPAROTOMY EXPLORATORY, DRAINAGE PELVIC ABSCESS; APPENDECTOMY;  Surgeon: Laura Patel DO;  Location: [facility];  Service: General    MYOMECTOMY      SALIVARY GLAND SURGERY      and ducts    THYROID SURGERY      biopsy thyroid using percutaneous core needle    TONSILLECTOMY      TUBAL LIGATION      UTERINE FIBROID SURGERY      embolization     Social History   History   Alcohol Use    Yes     Comment: occasional drink socially     History   Drug Use No     History   Smoking Status    Former Smoker    Packs/day: 1 00    Years: 6 00    Quit date: 1/1/2012   Smokeless Tobacco    Never Used     Family History:   Family History   Problem Relation Age of Onset    Cancer Mother     Stomach cancer Mother     Other Father         CVA    Heart disease Father     Diabetes Sister     Arthritis Sister     Kidney disease Sister     Hypertension Brother        Meds/Allergies   Current Outpatient Prescriptions   Medication Sig Dispense Refill    ACZONE 7 5 % GEL APPLY ONCE DAILY  3    aspirin 81 mg chewable tablet Chew 81 mg daily      atorvastatin (LIPITOR) 20 mg tablet Take 1 tablet (20 mg total) by mouth daily 90 tablet 3    Cholecalciferol (VITAMIN D3) 2000 units TABS Take 2,000 Units by mouth daily      Cyanocobalamin (VITAMIN B 12 PO) Take 5,000 mcg by mouth daily      Multiple Vitamins-Calcium (ONE-A-DAY WOMENS FORMULA PO) Take 1 tablet by mouth daily      Omega-3 Fatty Acids (FISH OIL) 1,000 mg Take 4,000 mg by mouth daily      omeprazole (PriLOSEC) 10 mg delayed release capsule Take 10 mg by mouth daily       No current facility-administered medications for this visit  No Known Allergies    Objective   Vitals: Blood pressure 124/80, pulse 58, height 5' 3" (1 6 m), weight 70 3 kg (155 lb)  Physical Exam   Constitutional: She is oriented to person, place, and time  She appears well-developed and well-nourished  No distress  HENT:   Head: Normocephalic and atraumatic  Mouth/Throat: No oropharyngeal exudate     Eyes: Conjunctivae and EOM are normal  No scleral icterus  Neck: Normal range of motion  Neck supple  Thyromegaly present  Cardiovascular: Normal rate, regular rhythm and normal heart sounds  No murmur heard  Pulmonary/Chest: Effort normal and breath sounds normal  No respiratory distress  She has no wheezes  She has no rales  Abdominal: Soft  Bowel sounds are normal  She exhibits no distension  There is no tenderness  There is no rebound  Musculoskeletal: Normal range of motion  She exhibits no edema or deformity  Lymphadenopathy:     She has no cervical adenopathy  Neurological: She is alert and oriented to person, place, and time  Skin: Skin is warm and dry  No rash noted  No erythema  No pallor  Psychiatric: She has a normal mood and affect  Her behavior is normal  Judgment and thought content normal        The history was obtained from the review of the chart, patient  Lab Results:     September 2017-TSH 0 36    Imaging Studies:     DEXA scan 2015-osteopenia with a T-score of -1 7 left femoral neck     I have personally reviewed pertinent reports  Portions of the record may have been created with voice recognition software  Occasional wrong word or "sound a like" substitutions may have occurred due to the inherent limitations of voice recognition software  Read the chart carefully and recognize, using context, where substitutions have occurred

## 2018-11-14 NOTE — ASSESSMENT & PLAN NOTE
No history of fragility fractures-continue vitamin-D supplementations    She is due for repeat DEXA scan-will give her a script

## 2018-11-15 ENCOUNTER — TELEPHONE (OUTPATIENT)
Dept: ENDOCRINOLOGY | Facility: CLINIC | Age: 63
End: 2018-11-15

## 2018-11-15 LAB
25(OH)D3 SERPL-MCNC: 46 NG/ML (ref 30–100)
T3 SERPL-MCNC: 122 NG/DL (ref 76–181)
T4 FREE SERPL-MCNC: 1.2 NG/DL (ref 0.8–1.8)
TSH SERPL-ACNC: 0.4 MIU/L (ref 0.4–4.5)

## 2018-11-15 NOTE — TELEPHONE ENCOUNTER
----- Message from Wilian Dang MD sent at 11/15/2018  1:15 PM EST -----  Please call the patient regarding labs - thyroid function tests normal

## 2018-11-16 ENCOUNTER — HOSPITAL ENCOUNTER (OUTPATIENT)
Dept: ULTRASOUND IMAGING | Facility: HOSPITAL | Age: 63
Discharge: HOME | End: 2018-11-16
Attending: INTERNAL MEDICINE
Payer: COMMERCIAL

## 2018-11-16 DIAGNOSIS — E04.2 MULTIPLE THYROID NODULES: ICD-10-CM

## 2018-11-16 PROCEDURE — 76536 US EXAM OF HEAD AND NECK: CPT

## 2018-11-19 ENCOUNTER — TELEPHONE (OUTPATIENT)
Dept: ENDOCRINOLOGY | Facility: CLINIC | Age: 63
End: 2018-11-19

## 2018-11-19 NOTE — TELEPHONE ENCOUNTER
----- Message from Alton Theodore MD sent at 11/19/2018  1:28 PM EST -----  Please call the patient regarding ultrasound-thyroid nodules-do not meet criteria for biopsy or further imaging

## 2018-11-19 NOTE — PROGRESS NOTES
Please call the patient regarding ultrasound-thyroid nodules-do not meet criteria for biopsy or further imaging

## 2018-12-12 ENCOUNTER — HOSPITAL ENCOUNTER (OUTPATIENT)
Dept: RADIOLOGY | Age: 63
Discharge: HOME | End: 2018-12-12
Payer: COMMERCIAL

## 2018-12-12 DIAGNOSIS — M85.80 OSTEOPENIA, UNSPECIFIED LOCATION: ICD-10-CM

## 2018-12-12 DIAGNOSIS — R79.89 LOW TSH LEVEL: ICD-10-CM

## 2018-12-12 DIAGNOSIS — Z78.0 POST-MENOPAUSAL: ICD-10-CM

## 2018-12-12 PROCEDURE — 77080 DXA BONE DENSITY AXIAL: CPT

## 2018-12-13 ENCOUNTER — TELEPHONE (OUTPATIENT)
Dept: ENDOCRINOLOGY | Facility: CLINIC | Age: 63
End: 2018-12-13

## 2018-12-13 NOTE — TELEPHONE ENCOUNTER
----- Message from Traci Torrez MD sent at 12/13/2018  4:09 PM EST -----  Please call the patient regarding dexa- shows osteopenia , continue calcium and vitamin D

## 2018-12-31 ENCOUNTER — OFFICE VISIT (OUTPATIENT)
Dept: FAMILY MEDICINE CLINIC | Facility: OTHER | Age: 63
End: 2018-12-31
Payer: COMMERCIAL

## 2018-12-31 VITALS
HEART RATE: 67 BPM | SYSTOLIC BLOOD PRESSURE: 116 MMHG | OXYGEN SATURATION: 97 % | TEMPERATURE: 97.8 F | BODY MASS INDEX: 26.55 KG/M2 | DIASTOLIC BLOOD PRESSURE: 78 MMHG | WEIGHT: 155.5 LBS | HEIGHT: 64 IN

## 2018-12-31 DIAGNOSIS — J06.9 VIRAL UPPER RESPIRATORY TRACT INFECTION: Primary | ICD-10-CM

## 2018-12-31 PROCEDURE — 1036F TOBACCO NON-USER: CPT | Performed by: NURSE PRACTITIONER

## 2018-12-31 PROCEDURE — 99214 OFFICE O/P EST MOD 30 MIN: CPT | Performed by: NURSE PRACTITIONER

## 2018-12-31 PROCEDURE — 3008F BODY MASS INDEX DOCD: CPT | Performed by: NURSE PRACTITIONER

## 2018-12-31 RX ORDER — PROMETHAZINE HYDROCHLORIDE AND CODEINE PHOSPHATE 6.25; 1 MG/5ML; MG/5ML
5 SYRUP ORAL EVERY 4 HOURS PRN
Qty: 180 ML | Refills: 0 | Status: SHIPPED | OUTPATIENT
Start: 2018-12-31 | End: 2019-01-28 | Stop reason: ALTCHOICE

## 2018-12-31 NOTE — PATIENT INSTRUCTIONS
Upper Respiratory Infection, Ambulatory Care   GENERAL INFORMATION:   An upper respiratory infection  is also called a common cold  It can affect your nose, throat, ears, and sinuses  Common symptoms include the following:   · Runny or stuffy nose    · Sneezing and coughing    · Sore throat or hoarseness    · Red, watery, and sore eyes    · Tiredness or restlessness    · Chills and fever    · Headache, body aches, or sore muscles  Seek immediate care for the following symptoms:   · Headaches or a stiff neck    · Bright lights hurt your eyes    · Chest pain or trouble breathing  Treatment for an upper respiratory infection  may include any of the following:  · Decongestants  help decrease nasal congestion and improve your breathing  Do not use decongestant sprays for more than a few days  · Cough suppressants  help decrease coughing  Ask your healthcare provider which type of cough medicine is best for you  Some cough medicines need a doctor's order  · NSAIDs  help decrease swelling and pain or fever  This medicine is available with or without a doctor's order  NSAIDs can cause stomach bleeding or kidney problems in certain people  If you take blood thinner medicine, always ask your healthcare provider if NSAIDs are safe for you  Always read the medicine label and follow directions  Care for an upper respiratory infection:   · Rest  until your fever is gone or you feel better  · Drink liquids as directed to prevent dehydration  You may need to drink 8 to 10 cups of liquid each day  Good liquids to drink include water, ginger ale, tea, or fruit juices  · Gargle  with warm salt water to help your sore throat feel better  Mix ¼ teaspoon salt with 1 cup warm water  You may also suck on hard candy or throat lozenges  · Saline nasal drops  help loosen your nasal congestion  They can be bought without a doctor's order      · Take a warm bath or shower  to help decrease body aches and help you breathe easier  · Use a cool-mist humidifier  to increase air moisture and make it easier for you to breathe  Prevent the spread of germs:   · Avoid others for the first 2 to 3 days of your cold  Germs are easily spread during this time  · Do not share food, drinks,  towels, or personal items with others  · Wash your hands often  Use soap and water  Wash your hands after you use the bathroom, change a child's diapers, or sneeze  Wash your hands before you prepare or eat food  Cover your mouth and nose with a tissue when you sneeze or cough  Follow up with your healthcare provider as directed:  Write down your questions so you remember to ask them during your visits  CARE AGREEMENT:   You have the right to help plan your care  Learn about your health condition and how it may be treated  Discuss treatment options with your caregivers to decide what care you want to receive  You always have the right to refuse treatment  The above information is an  only  It is not intended as medical advice for individual conditions or treatments  Talk to your doctor, nurse or pharmacist before following any medical regimen to see if it is safe and effective for you  © 2014 [address] is for End User's use only and may not be sold, redistributed or otherwise used for commercial purposes  All illustrations and images included in CareNotes® are the copyrighted property of [employer]  or Darryn Rodriguez

## 2018-12-31 NOTE — PROGRESS NOTES
Assessment/Plan:         Problem List Items Addressed This Visit     None      Visit Diagnoses     Viral upper respiratory tract infection    -  Primary  --Advise rest, fluids, OTC expectorant, saline nasal spray, OTC decongestant, Tylenol, cool mist humidifier    --Call for no improvement/worsening over the next 5-7 days  Relevant Medications    promethazine-codeine (PHENERGAN WITH CODEINE) 6 25-10 mg/5 mL syrup            Subjective:      Patient ID: Massiel Duran is a 59 y o  female  Here with complaints of mildly productive cough, nasal congestion, clear rhinorrhea, mild sore throat, headache x 2 days  Initial low grade fever, chills  No body aches  Appetite decreased but no N/V/D, abdominal pain  Taking Tylenol Cold  No known sick contacts  Did not get flu shot, but does not feel like the flu  The following portions of the patient's history were reviewed and updated as appropriate: current medications, past family history, past medical history, past social history, past surgical history and problem list     Review of Systems   HENT: Positive for rhinorrhea  Respiratory: Positive for cough  Negative for shortness of breath and wheezing  Gastrointestinal: Negative for abdominal pain, diarrhea, nausea and vomiting  Neurological: Positive for headaches  Objective:      /78 (BP Location: Left arm, Patient Position: Sitting, Cuff Size: Adult)   Pulse 67   Temp 97 8 °F (36 6 °C) (Tympanic)   Ht 5' 4" (1 626 m)   Wt 70 5 kg (155 lb 8 oz)   SpO2 97%   BMI 26 69 kg/m²          Physical Exam   Constitutional: She is oriented to person, place, and time  She appears well-developed and well-nourished  HENT:   Head: Normocephalic  Right Ear: External ear normal    Left Ear: External ear normal    Mouth/Throat: Oropharynx is clear and moist    Turbinates swollen, erythematous  No sinus tenderness  Eyes: Pupils are equal, round, and reactive to light  Conjunctivae are normal    Neck: Normal range of motion  Neck supple  Cardiovascular: Normal rate, regular rhythm and normal heart sounds  Pulmonary/Chest: Effort normal and breath sounds normal    Breathing easy  RR=18  No cough noted  Abdominal: Soft  Bowel sounds are normal  There is no tenderness  Lymphadenopathy:     She has no cervical adenopathy  Neurological: She is alert and oriented to person, place, and time  She has normal reflexes  Skin: Skin is warm and dry  Psychiatric: She has a normal mood and affect

## 2019-01-03 ENCOUNTER — OFFICE VISIT (OUTPATIENT)
Dept: ENDOCRINOLOGY | Facility: CLINIC | Age: 64
End: 2019-01-03
Payer: COMMERCIAL

## 2019-01-03 VITALS
BODY MASS INDEX: 27.61 KG/M2 | SYSTOLIC BLOOD PRESSURE: 104 MMHG | HEIGHT: 63 IN | DIASTOLIC BLOOD PRESSURE: 68 MMHG | WEIGHT: 155.8 LBS | HEART RATE: 83 BPM

## 2019-01-03 DIAGNOSIS — M85.80 OSTEOPENIA, UNSPECIFIED LOCATION: Primary | ICD-10-CM

## 2019-01-03 DIAGNOSIS — E04.2 MULTIPLE THYROID NODULES: ICD-10-CM

## 2019-01-03 DIAGNOSIS — R79.89 LOW TSH LEVEL: ICD-10-CM

## 2019-01-03 PROCEDURE — 99214 OFFICE O/P EST MOD 30 MIN: CPT | Performed by: NURSE PRACTITIONER

## 2019-01-03 NOTE — PROGRESS NOTES
Established Patient Progress Note       Chief Complaint   Patient presents with    Follow-up     osteopenia        History of Present Illness:     Chanelle Elena is a 59 y o  female with a history of osteopenia, thyroid nodules, and low TSH  For the osteopenia her recent dexa scan showed low BMD in lumbar spine, otherwise was stable  She is currently taking vitamin d 2,000 units daily and calcium through diet (yougurt, milk, cheese)  She does weight bearing exercise (walks) daily  She denies recent falls or fractures  For the thyroid nodules recent US showed multiple spongiform nodules that do not meet criteria for biopsy or follow up  She denies neck pain, dysphonia, dysphagia, or dyspnea  For the low TSH she has a history of hyperthyroidism  Her recent thyroid function test was normal  She denies symptoms of hypo or hyperthyroidism         Patient Active Problem List   Diagnosis    Abnormal MRI of head    Allergic rhinitis    Anxiety    Diverticulosis    GERD without esophagitis    Hyperlipidemia    Internal hemorrhoids    Intractable chronic migraine without aura and without status migrainosus    Osteopenia    IBS (irritable bowel syndrome)    Diverticulosis of large intestine    Serum total bilirubin elevated    Low TSH level    Multiple thyroid nodules      Past Medical History:   Diagnosis Date    Allergic rhinitis     last assessed: 9/29/2016    Anxiety     last assessed: 8/22/2017    Diverticulosis     last assessed: 10/7/2013    GERD (gastroesophageal reflux disease)     Hydronephrosis, right     last assessed: 5/22/2013    Hyperlipidemia     last assessed 8/22/2017    Hypokalemia     last assessed: 3/24/2015    IBS (irritable bowel syndrome)     last assessed: 8/22/2017    Internal hemorrhoids     last assessed: 10/7/2013    Migraines     last assessed: 8/22/2017    Nephrolithiasis     Nonspecific abnormal results of function study of thyroid     last assessed: 10/8/2013   [employer] Osteoarthrosis of knee     last assessed: 2/28/2017    Osteopenia     last assessed: 8/22/2017    Renal calculi     last assessed: 4/29/2016    Renal cyst     last assessed: 7/15/2015    Total bilirubin, elevated     last assessed: 8/22/2017    Uterine leiomyoma       Past Surgical History:   Procedure Laterality Date    HAND SURGERY      KNEE CARTILAGE SURGERY Left     x3    KNEE SURGERY      LAPAROTOMY N/A 10/30/2017    Procedure: LAPAROTOMY EXPLORATORY, DRAINAGE PELVIC ABSCESS; APPENDECTOMY;  Surgeon: Eddie Mckeon DO;  [facility];  Service: General    MYOMECTOMY      SALIVARY GLAND SURGERY      and ducts    THYROID SURGERY      biopsy thyroid using percutaneous core needle    TONSILLECTOMY      TUBAL LIGATION      UTERINE FIBROID SURGERY      embolization      Family History   Problem Relation Age of Onset    Cancer Mother     Stomach cancer Mother     Other Father         CVA    Heart disease Father     Diabetes Sister     Arthritis Sister     Kidney disease Sister     Hypertension Brother      Social History   Substance Use Topics    Smoking status: Former Smoker     Packs/day: 1 00     Years: 6 00     Quit date: 1/1/2012    Smokeless tobacco: Never Used    Alcohol use Yes      Comment: occasional drink socially     No Known Allergies    Current Outpatient Prescriptions:     ACZONE 7 5 % GEL, APPLY ONCE DAILY, Disp: , Rfl: 3    aspirin 81 mg chewable tablet, Chew 81 mg daily, Disp: , Rfl:     atorvastatin (LIPITOR) 20 mg tablet, Take 1 tablet (20 mg total) by mouth daily, Disp: 90 tablet, Rfl: 3    Cholecalciferol (VITAMIN D3) 2000 units TABS, Take 2,000 Units by mouth daily, Disp: , Rfl:     Cyanocobalamin (VITAMIN B 12 PO), Take 5,000 mcg by mouth daily, Disp: , Rfl:     Multiple Vitamins-Calcium (ONE-A-DAY WOMENS FORMULA PO), Take 1 tablet by mouth daily, Disp: , Rfl:     Omega-3 Fatty Acids (FISH OIL) 1,000 mg, Take 4,000 mg by mouth daily, Disp: , Rfl:    omeprazole (PriLOSEC) 10 mg delayed release capsule, Take 10 mg by mouth daily, Disp: , Rfl:     promethazine-codeine (PHENERGAN WITH CODEINE) 6 25-10 mg/5 mL syrup, Take 5 mL by mouth every 4 (four) hours as needed for cough, Disp: 180 mL, Rfl: 0    Review of Systems   Constitutional: Negative for chills and fever  HENT: Negative for sore throat and trouble swallowing  Eyes: Negative for visual disturbance  Respiratory: Negative for shortness of breath  Cardiovascular: Negative for chest pain and palpitations  Gastrointestinal: Negative for abdominal pain, constipation and diarrhea  Endocrine: Negative for cold intolerance, heat intolerance, polydipsia, polyphagia and polyuria  Genitourinary: Negative for frequency  Musculoskeletal: Negative for arthralgias and myalgias  Skin: Negative for rash  Neurological: Negative for dizziness and tremors  Hematological: Negative for adenopathy  Psychiatric/Behavioral: Negative for sleep disturbance  All other systems reviewed and are negative  Physical Exam:  Body mass index is 27 6 kg/m²  /68   Pulse 83   Ht 5' 3" (1 6 m)   Wt 70 7 kg (155 lb 12 8 oz)   BMI 27 60 kg/m²    Wt Readings from Last 3 Encounters:   01/03/19 70 7 kg (155 lb 12 8 oz)   12/31/18 70 5 kg (155 lb 8 oz)   11/14/18 70 3 kg (155 lb)       Physical Exam   Constitutional: She is oriented to person, place, and time  She appears well-developed and well-nourished  No distress  HENT:   Head: Normocephalic and atraumatic  Eyes: Pupils are equal, round, and reactive to light  Conjunctivae are normal    Neck: Normal range of motion  Neck supple  No thyromegaly present  Cardiovascular: Normal rate, regular rhythm and normal heart sounds  Pulmonary/Chest: Effort normal and breath sounds normal  No respiratory distress  She has no wheezes  She has no rales  Abdominal: Soft  Bowel sounds are normal  She exhibits no distension  There is no tenderness  Musculoskeletal: Normal range of motion  She exhibits no edema  Neurological: She is alert and oriented to person, place, and time  Skin: Skin is warm and dry  Psychiatric: She has a normal mood and affect  Vitals reviewed  Labs:     Component      Latest Ref Rng & Units 11/14/2018   T3, Total      76 - 181 ng/dL 122   Free T4      0 8 - 1 8 ng/dL 1 2   TSH, POC      0 40 - 4 50 mIU/L 0 40   EXTERNAL VITAMIN D,25      30 - 100 ng/mL 46     CENTRAL  DXA SCAN     CLINICAL HISTORY:   59year old post-menopausal  female risk factors include previous smoking  Melanoma  Hyperthyroidism  Gastroesophageal reflux with Prilosec use      TECHNIQUE: Bone densitometry was performed using a Horizon A bone densitometer  Regions of interest appear properly placed  There are no obvious fractures or other confounding variables which could limit the study  Degenerative changes of the lumbar   spine and hip  This will falsely elevate the bone mineral densities in these regions       COMPARISON: Several prior studies, most recent July 23, 2015     RESULTS:   LUMBAR SPINE:  L1-L4:  BMD 1 0 58 gm/cm2  T-score 0 1  Z-score 1 7     LEFT TOTAL HIP:  BMD 0 828 gm/cm2  T-score -0 9  Z-score 0 2     LEFT FEMORAL NECK:  BMD 0 628 gm/cm2  T-score -2 0  Z-score -0 6           IMPRESSION:  1  Based on the [facility] classification, the T-score of -2 0 in the left hip is consistent with low bone mineral density  2  When compared to the prior examination, there has been a 5  % DECREASE in the total bone mineral density of the lumbar spine  There has been no significant change in the total bone mineral density of the left hip  3   Any secondary causes of low bone mineral density should be excluded prior to treatment, if clinically indicated    4   A daily intake of at least 1200 mg calcium and 800 to 1000 IU of Vitamin D, as well as weight bearing and muscle strengthening exercise, fall prevention and avoidance of tobacco and excessive alcohol intake as basic preventive measures are suggested  5   Repeat DXA  in 18 - 24 months, on the same machine, as clinically indicated            The 10 year risk of hip fracture is 1%, with the 10 year risk of major osteoporotic fracture being 10%, as calculated by the WHO fracture risk assessment tool (FRAX)  The current NOF guidelines recommend treating patients with FRAX 10 year risk score of   >3% for hip fracture and >20% for major osteoporotic fracture       THYROID ULTRASOUND     INDICATION:    E04 2: Nontoxic multinodular goiter      COMPARISON:  None     TECHNIQUE:   Ultrasound of the thyroid was performed with a high frequency linear transducer in transverse and sagittal planes including volumetric imaging sweeps as well as traditional still imaging technique      FINDINGS:  Normal homogeneous smooth echotexture      Right lobe:  5 0 x 1 3 x 1 6 cm  Left lobe:  4 6 x 1 2 x 1 5 cm  Isthmus:  0 3 cm      There are no dominant nodules that meet current ACR criteria for biopsy or follow-up  There are multiple spongiform nodules bilaterally, largest measuring 1 6 x 0 9 x 1 3 cm in the right mid to lower pole, and 1 2 x 0 6 x 1 0 cm in the posterior left   lower pole      IMPRESSION:     Multiple spongiform nodules  No nodule meets current ACR criteria for requiring biopsy or followup ultrasounds  Impression & Plan:    Problem List Items Addressed This Visit     Osteopenia - Primary     Continue vitamin d supplementation and dietary calcium  Educated on falls prevention and importance of weight bearing exercise  Low TSH level     TSH and free T4 normal, will continue to monitor  Patient has history of hyperthyroidism  Relevant Orders    T4, free    TSH, 3rd generation    T3 Lab Collect    Multiple thyroid nodules     Stable, thyroid nodules do not meet criteria for biopsy or follow up imaging                   Orders Placed This Encounter   Procedures    T4, free Standing Status:   Future     Standing Expiration Date:   1/3/2020    TSH, 3rd generation     This is a patient instruction: This test is non-fasting  Please drink two glasses of water morning of bloodwork  Standing Status:   Future     Standing Expiration Date:   1/3/2020    T3 Lab Collect     Standing Status:   Future     Standing Expiration Date:   1/3/2020         Discussed with the patient and all questioned fully answered  She will call me if any problems arise  Follow-up appointment in 6 months       Counseled patient on diagnostic results, prognosis, risk and benefit of treatment options, instruction for management, importance of treatment compliance, Risk  factor reduction and impressions      Corinna Salvador [address]

## 2019-01-04 ENCOUNTER — OFFICE VISIT (OUTPATIENT)
Dept: FAMILY MEDICINE CLINIC | Facility: OTHER | Age: 64
End: 2019-01-04
Payer: COMMERCIAL

## 2019-01-04 VITALS
SYSTOLIC BLOOD PRESSURE: 110 MMHG | DIASTOLIC BLOOD PRESSURE: 78 MMHG | WEIGHT: 153.56 LBS | HEIGHT: 64 IN | HEART RATE: 74 BPM | OXYGEN SATURATION: 96 % | BODY MASS INDEX: 26.21 KG/M2 | TEMPERATURE: 96.6 F

## 2019-01-04 DIAGNOSIS — R17 SERUM TOTAL BILIRUBIN ELEVATED: ICD-10-CM

## 2019-01-04 DIAGNOSIS — Z11.59 NEED FOR HEPATITIS C SCREENING TEST: ICD-10-CM

## 2019-01-04 DIAGNOSIS — J32.0 MAXILLARY SINUSITIS, UNSPECIFIED CHRONICITY: ICD-10-CM

## 2019-01-04 DIAGNOSIS — Z13.1 SCREENING FOR DIABETES MELLITUS: ICD-10-CM

## 2019-01-04 DIAGNOSIS — E78.2 MIXED HYPERLIPIDEMIA: ICD-10-CM

## 2019-01-04 DIAGNOSIS — J06.9 UPPER RESPIRATORY TRACT INFECTION, UNSPECIFIED TYPE: Primary | ICD-10-CM

## 2019-01-04 PROCEDURE — 99214 OFFICE O/P EST MOD 30 MIN: CPT | Performed by: NURSE PRACTITIONER

## 2019-01-04 RX ORDER — AZITHROMYCIN 250 MG/1
TABLET, FILM COATED ORAL
Qty: 6 TABLET | Refills: 0 | Status: SHIPPED | OUTPATIENT
Start: 2019-01-04 | End: 2019-01-09

## 2019-01-04 RX ORDER — ATORVASTATIN CALCIUM 20 MG/1
TABLET, FILM COATED ORAL
Qty: 90 TABLET | Refills: 3 | OUTPATIENT
Start: 2019-01-04

## 2019-01-04 NOTE — PATIENT INSTRUCTIONS
Sinusitis   WHAT YOU NEED TO KNOW:   What is sinusitis? Sinusitis is inflammation or infection of your sinuses  It is most often caused by a virus  Acute sinusitis may last up to 12 weeks  Chronic sinusitis lasts longer than 12 weeks  Recurrent sinusitis means you have 4 or more times in 1 year  What increases my risk for sinusitis? · Medical conditions, such as an upper respiratory infection, allergies, asthma, or cystic fibrosis    · Dental infections or procedures, such as gum infections, tooth decay, or a root canal    · Smoking    · Abnormal sinus structure, such as nasal growths, swollen tonsils, or a deviated septum    · A weak immune system, from diseases such as diabetes or HIV  What are the signs and symptoms of sinusitis? · Fever    · Pain, pressure, redness, or swelling around the forehead, cheeks, or eyes    · Thick yellow or green discharge from your nose    · Tenderness when you touch your face over your sinuses    · Dry cough that happens mostly at night or when you lie down    · Headache and face pain that is worse when you lean forward    · Tooth pain, or pain when you chew  How is sinusitis diagnosed? Your healthcare provider will examine you and ask about your symptoms  He or she will check inside your nose using a nasal speculum  This is a small tool used to open your nostrils  A sample of the mucus from your nose may show what germ is causing your infection  How is sinusitis treated? Your symptoms may go away on their own  Your healthcare provider may recommend watchful waiting for up to 10 days before starting antibiotics  You may  need any of the following:  · Acetaminophen  decreases pain and fever  It is available without a doctor's order  Ask how much to take and how often to take it  Follow directions   Read the labels of all other medicines you are using to see if they also contain acetaminophen, or ask your doctor or pharmacist  Acetaminophen can cause liver damage if not taken correctly  Do not use more than 4 grams (4,000 milligrams) total of acetaminophen in one day  · NSAIDs , such as ibuprofen, help decrease swelling, pain, and fever  This medicine is available with or without a doctor's order  NSAIDs can cause stomach bleeding or kidney problems in certain people  If you take blood thinner medicine, always ask your healthcare provider if NSAIDs are safe for you  Always read the medicine label and follow directions  · Nasal steroid sprays  may help decrease inflammation in your nose and sinuses  · Decongestants  help reduce swelling and drain mucus in the nose and sinuses  They may help you breathe easier  · Antihistamines  help dry mucus in the nose and relieve sneezing  · Antibiotics  help treat or prevent a bacterial infection  How can I manage my symptoms? · Rinse your sinuses  Use a sinus rinse device to rinse your nasal passages with a saline (salt water) solution or distilled water  Do not use tap water  This will help thin the mucus in your nose and rinse away pollen and dirt  It will also help reduce swelling so you can breathe normally  Ask your healthcare provider how often to do this  · Breathe in steam   Heat a bowl of water until you see steam  Lean over the bowl and make a tent over your head with a large towel  Breathe deeply for about 20 minutes  Be careful not to get too close to the steam or burn yourself  Do this 3 times a day  You can also breathe deeply when you take a hot shower  · Sleep with your head elevated  Place an extra pillow under your head before you go to sleep to help your sinuses drain  · Drink liquids as directed  Ask your healthcare provider how much liquid to drink each day and which liquids are best for you  Liquids will thin the mucus in your nose and help it drain  Avoid drinks that contain alcohol or caffeine  · Do not smoke, and avoid secondhand smoke    Nicotine and other chemicals in cigarettes and cigars can make your symptoms worse  Ask your healthcare provider for information if you currently smoke and need help to quit  E-cigarettes or smokeless tobacco still contain nicotine  Talk to your healthcare provider before you use these products  How can I help prevent the spread of germs that cause sinusitis? Wash your hands often with soap and water  Wash your hands after you use the bathroom, change a child's diaper, or sneeze  Wash your hands before you prepare or eat food  When should I seek immediate care? · Your eye and eyelid are red, swollen, and painful  · You cannot open your eye  · You have vision changes, such as double vision  · Your eyeball bulges out or you cannot move your eye  · You are more sleepy than normal, or you notice changes in your ability to think, move, or talk  · You have a stiff neck, a fever, or a bad headache  · You have swelling of your forehead or scalp  When should I contact my healthcare provider? · Your symptoms do not improve after 3 days  · Your symptoms do not go away after 10 days  · You have nausea and are vomiting  · Your nose is bleeding  · You have questions or concerns about your condition or care  CARE AGREEMENT:   You have the right to help plan your care  Learn about your health condition and how it may be treated  Discuss treatment options with your caregivers to decide what care you want to receive  You always have the right to refuse treatment  The above information is an  only  It is not intended as medical advice for individual conditions or treatments  Talk to your doctor, nurse or pharmacist before following any medical regimen to see if it is safe and effective for you  © 2017 [employer] Information is for End User's use only and may not be sold, redistributed or otherwise used for commercial purposes   All illustrations and images included in CareNotes® are the copyrighted property of [employer]  or Darryn Rodriguez

## 2019-01-04 NOTE — PROGRESS NOTES
Assessment/Plan:         Problem List Items Addressed This Visit     Hyperlipidemia    Relevant Orders    CBC and differential    Lipid Panel with Direct LDL reflex    Serum total bilirubin elevated    Relevant Orders    Comprehensive metabolic panel      Other Visit Diagnoses     Upper respiratory tract infection + early maxillary sinusitis    -  Primary  --Emphasized typical 7-10 day time-frame of viral URI  For now, advise OTC decongestant + expectorant, saline nasal spray/Neti pot, OTC nasal steroid, warm compresses, steam  --With weekend coming up, Rx for antibiotic sent to pharmacy to be filled and started if symptoms not improved/worsened with these measures  Relevant Medications    azithromycin (ZITHROMAX) 250 mg tablet    Screening for diabetes mellitus        Relevant Orders    Hemoglobin A1C    Urinalysis with reflex to microscopic    Need for hepatitis C screening test        Relevant Orders    Hepatitis C antibody            Subjective:      Patient ID: Nito Dubon is a 59 y o  female  Seen here 12/31 for URI symptoms starting 2 days prior  See previous note for details  Ongoing nasal/sinus congestion, yellow rhinorrhea, mildly productive cough that is no better/worse  No fever, sore throat  Left ear crackles at times, but no pain  Appetite remains decreased  No wheezing, dyspnea  Rx cough suppressant helping  Also taking OTC cold medication but it doesn't seem to be doing much  No nasal spray  Requesting lab slip for her yearly blood work  The following portions of the patient's history were reviewed and updated as appropriate: current medications, past family history, past medical history, past social history, past surgical history and problem list     Review of Systems   Constitutional: Negative for fever  HENT: Positive for rhinorrhea and sinus pressure  Negative for ear pain and sore throat  Respiratory: Positive for cough  Negative for wheezing  Gastrointestinal: Negative for abdominal pain  Musculoskeletal: Negative for myalgias  Neurological: Positive for headaches  Objective:      /78 (BP Location: Left arm, Patient Position: Sitting, Cuff Size: Large)   Pulse 74   Temp (!) 96 6 °F (35 9 °C) (Tympanic)   Ht 5' 4" (1 626 m)   Wt 69 7 kg (153 lb 9 oz)   SpO2 96%   BMI 26 36 kg/m²          Physical Exam   Constitutional: She is oriented to person, place, and time  She appears well-developed and well-nourished  HENT:   Head: Normocephalic  Right Ear: External ear normal    Left Ear: External ear normal    Mouth/Throat: Oropharynx is clear and moist    Turbinates erythematous, swollen  Mild maxillary sinus tenderness without overlying skin changes  Eyes: Pupils are equal, round, and reactive to light  Conjunctivae are normal    Neck: Normal range of motion  Neck supple  Cardiovascular: Normal rate, regular rhythm and normal heart sounds  Pulmonary/Chest: Effort normal and breath sounds normal    Breathing easy  RR=16  No cough noted  Abdominal: Soft  Bowel sounds are normal  There is no tenderness  Lymphadenopathy:     She has no cervical adenopathy  Neurological: She is alert and oriented to person, place, and time  She has normal reflexes  Skin: Skin is warm and dry  Psychiatric: She has a normal mood and affect

## 2019-01-04 NOTE — ASSESSMENT & PLAN NOTE
Continue vitamin d supplementation and dietary calcium  Educated on falls prevention and importance of weight bearing exercise

## 2019-01-28 ENCOUNTER — ANNUAL EXAM (OUTPATIENT)
Dept: OBGYN CLINIC | Facility: CLINIC | Age: 64
End: 2019-01-28
Payer: COMMERCIAL

## 2019-01-28 VITALS
BODY MASS INDEX: 27.46 KG/M2 | DIASTOLIC BLOOD PRESSURE: 80 MMHG | SYSTOLIC BLOOD PRESSURE: 116 MMHG | WEIGHT: 155 LBS | HEIGHT: 63 IN

## 2019-01-28 DIAGNOSIS — Z12.4 PAP SMEAR FOR CERVICAL CANCER SCREENING: ICD-10-CM

## 2019-01-28 DIAGNOSIS — Z12.39 SCREENING FOR MALIGNANT NEOPLASM OF BREAST: Primary | ICD-10-CM

## 2019-01-28 PROBLEM — K58.9 IBS (IRRITABLE BOWEL SYNDROME): Status: RESOLVED | Noted: 2018-02-27 | Resolved: 2019-01-28

## 2019-01-28 PROCEDURE — 99396 PREV VISIT EST AGE 40-64: CPT | Performed by: OBSTETRICS & GYNECOLOGY

## 2019-01-28 NOTE — PATIENT INSTRUCTIONS

## 2019-01-28 NOTE — PROGRESS NOTES
Assessment/Plan:    Normal annual gynecological exam  Pap smear is done due to new partner and request for STI testing as well  Has mammogram scheduled and order is in the computer  Continue to follow up with colorectal surgery  Return to office in 1 year       Problem List Items Addressed This Visit     Screening for malignant neoplasm of breast - Primary      Other Visit Diagnoses     Pap smear for cervical cancer screening                Subjective:      Patient ID: Barbara Goins is a 59 y o  female  Here for annual gyn exam - overall well - no vaginal bleeding or discharge - in new relationship with some discomfort with IC and postcoital spotting - bright red blood - has not tried any lubrication and does not have bleeding after - bowels and bladder normal - up to date with colonoscopy, dexascan, blood work and due for mammogram next week - is working with colorectal due to diverticular disease         The following portions of the patient's history were reviewed and updated as appropriate:   She  has a past medical history of Allergic rhinitis; Anxiety; Disease of thyroid gland; Diverticulosis; GERD (gastroesophageal reflux disease); Hydronephrosis, right; Hyperlipidemia; Hypokalemia; IBS (irritable bowel syndrome); Internal hemorrhoids; Migraines; Nephrolithiasis; Nonspecific abnormal results of function study of thyroid; Osteoarthrosis of knee; Osteopenia; Renal calculi; Renal cyst; Total bilirubin, elevated; and Uterine leiomyoma    She   Patient Active Problem List    Diagnosis Date Noted    Screening for malignant neoplasm of breast 01/28/2019    Low TSH level 11/14/2018    Multiple thyroid nodules 11/14/2018    Serum total bilirubin elevated 09/07/2018    Diverticulosis of large intestine 08/27/2018    Osteopenia 02/27/2018    Intractable chronic migraine without aura and without status migrainosus 08/10/2016    Osteoarthrosis of knee 03/28/2016    Allergic rhinitis 06/12/2014    Irritable bowel syndrome 06/12/2014    Hyperlipidemia 10/08/2013    Nonspecific abnormal results of function study of thyroid 10/08/2013    GERD without esophagitis 10/07/2013    Internal hemorrhoids 10/07/2013     She  has a past surgical history that includes Uterine fibroid surgery; Knee cartilage surgery (Left); LAPAROTOMY (N/A, 10/30/2017); Thyroid surgery; Hand surgery; Knee surgery; Salivary gland surgery; Tonsillectomy; Tubal ligation; and Myomectomy  Her family history includes Arthritis in her sister; Cancer in her mother; Diabetes in her sister; Heart disease in her father; Hypertension in her brother; Kidney disease in her sister; Other in her father; Stomach cancer in her mother  She  reports that she quit smoking about 7 years ago  She has a 6 00 pack-year smoking history  She has never used smokeless tobacco  She reports that she drinks alcohol  She reports that she does not use drugs  Current Outpatient Prescriptions   Medication Sig Dispense Refill    aspirin 81 mg chewable tablet Chew 81 mg daily      atorvastatin (LIPITOR) 20 mg tablet Take 1 tablet (20 mg total) by mouth daily 90 tablet 3    Cholecalciferol (VITAMIN D3) 2000 units TABS Take 2,000 Units by mouth daily      Cyanocobalamin (VITAMIN B 12 PO) Take 5,000 mcg by mouth daily      Multiple Vitamins-Calcium (ONE-A-DAY WOMENS FORMULA PO) Take 1 tablet by mouth daily      Omega-3 Fatty Acids (FISH OIL) 1,000 mg Take 4,000 mg by mouth daily      omeprazole (PriLOSEC) 10 mg delayed release capsule Take 10 mg by mouth daily      ACZONE 7 5 % GEL APPLY ONCE DAILY  3     No current facility-administered medications for this visit        Current Outpatient Prescriptions on File Prior to Visit   Medication Sig    aspirin 81 mg chewable tablet Chew 81 mg daily    atorvastatin (LIPITOR) 20 mg tablet Take 1 tablet (20 mg total) by mouth daily    Cholecalciferol (VITAMIN D3) 2000 units TABS Take 2,000 Units by mouth daily    Cyanocobalamin (VITAMIN B 12 PO) Take 5,000 mcg by mouth daily    Multiple Vitamins-Calcium (ONE-A-DAY WOMENS FORMULA PO) Take 1 tablet by mouth daily    Omega-3 Fatty Acids (FISH OIL) 1,000 mg Take 4,000 mg by mouth daily    omeprazole (PriLOSEC) 10 mg delayed release capsule Take 10 mg by mouth daily    ACZONE 7 5 % GEL APPLY ONCE DAILY    [DISCONTINUED] promethazine-codeine (PHENERGAN WITH CODEINE) 6 25-10 mg/5 mL syrup Take 5 mL by mouth every 4 (four) hours as needed for cough (Patient not taking: Reported on 1/28/2019 )     No current facility-administered medications on file prior to visit  She has No Known Allergies       Review of Systems   Constitutional: Negative for fatigue, fever and unexpected weight change  HENT: Negative for dental problem, mouth sores, nosebleeds, rhinorrhea, sinus pain, sinus pressure and sore throat  Eyes: Negative for pain, discharge and visual disturbance  Respiratory: Negative for cough, chest tightness, shortness of breath and wheezing  Cardiovascular: Negative for chest pain, palpitations and leg swelling  Gastrointestinal: Negative for blood in stool, constipation, diarrhea, nausea and vomiting  Endocrine: Negative for polydipsia  Genitourinary: Negative for difficulty urinating, dyspareunia, dysuria, menstrual problem, pelvic pain, urgency, vaginal discharge and vaginal pain  Musculoskeletal: Negative for arthralgias, back pain and joint swelling  Allergic/Immunologic: Negative for environmental allergies  Neurological: Positive for headaches  Negative for seizures and light-headedness  Hematological: Does not bruise/bleed easily  Psychiatric/Behavioral: Negative for sleep disturbance  The patient is not nervous/anxious  All other systems reviewed and are negative  Objective: There were no vitals taken for this visit  Physical Exam   Constitutional: She is oriented to person, place, and time   She appears well-developed and well-nourished  No distress  Older white female    HENT:   Head: Normocephalic and atraumatic  Neck: Normal range of motion  Neck supple  No thyromegaly present  Cardiovascular: Normal rate and regular rhythm  Pulmonary/Chest: Effort normal and breath sounds normal  No respiratory distress  She has no wheezes  She has no rales  She exhibits no tenderness  Right breast exhibits no inverted nipple, no mass, no nipple discharge, no skin change and no tenderness  Left breast exhibits no inverted nipple, no mass, no nipple discharge, no skin change and no tenderness  Breasts are symmetrical    Abdominal: Soft  She exhibits no distension and no mass  There is no tenderness  There is no rebound and no guarding  Genitourinary:   Genitourinary Comments: Normal female, no vulvar or vaginal lesions, vaginal walls are atrophic, cervix is grossly normal appearance and Pap smear is taken, uterus is small anterior with no adnexal masses noted on palpation and nontender,  Rectal exam reveals normal sphincter tone no masses and stool is guaiac negative   Neurological: She is alert and oriented to person, place, and time  Psychiatric: She has a normal mood and affect  Her behavior is normal    Vitals reviewed

## 2019-02-01 LAB
DEPRECATED C TRACH RRNA XXX QL PRB: NOT DETECTED
HPV HR 12 DNA CVX QL NAA+PROBE: NOT DETECTED
HPV16 DNA SPEC QL NAA+PROBE: NOT DETECTED
HPV18 DNA SPEC QL NAA+PROBE: NOT DETECTED
N GONORRHOEA DNA UR QL NAA+PROBE: NOT DETECTED
T VAGINALIS RRNA SPEC QL NAA+PROBE: NOT DETECTED
THIN PREP CVX: NORMAL

## 2019-02-05 ENCOUNTER — TRANSCRIBE ORDERS (OUTPATIENT)
Dept: ADMINISTRATIVE | Age: 64
End: 2019-02-05

## 2019-02-05 ENCOUNTER — HOSPITAL ENCOUNTER (OUTPATIENT)
Dept: RADIOLOGY | Age: 64
Discharge: HOME | End: 2019-02-05
Payer: COMMERCIAL

## 2019-02-05 VITALS — HEIGHT: 63 IN | BODY MASS INDEX: 26.58 KG/M2 | WEIGHT: 150 LBS

## 2019-02-05 DIAGNOSIS — Z12.31 ENCOUNTER FOR SCREENING MAMMOGRAM FOR MALIGNANT NEOPLASM OF BREAST: ICD-10-CM

## 2019-02-05 PROCEDURE — 77067 SCR MAMMO BI INCL CAD: CPT

## 2019-03-08 LAB
ALBUMIN SERPL-MCNC: 4.4 G/DL (ref 3.6–5.1)
ALBUMIN/GLOB SERPL: 2.3 (CALC) (ref 1–2.5)
ALP SERPL-CCNC: 70 U/L (ref 33–130)
ALT SERPL-CCNC: 17 U/L (ref 6–29)
APPEARANCE UR: CLEAR
AST SERPL-CCNC: 17 U/L (ref 10–35)
BACTERIA UR QL AUTO: ABNORMAL /HPF
BASOPHILS # BLD AUTO: 19 CELLS/UL (ref 0–200)
BASOPHILS NFR BLD AUTO: 0.4 %
BILIRUB SERPL-MCNC: 1.6 MG/DL (ref 0.2–1.2)
BILIRUB UR QL STRIP: NEGATIVE
BUN SERPL-MCNC: 12 MG/DL (ref 7–25)
BUN/CREAT SERPL: ABNORMAL (CALC) (ref 6–22)
CALCIUM SERPL-MCNC: 9.7 MG/DL (ref 8.6–10.4)
CHLORIDE SERPL-SCNC: 105 MMOL/L (ref 98–110)
CHOLEST SERPL-MCNC: 172 MG/DL
CHOLEST/HDLC SERPL: 2.5 (CALC)
CO2 SERPL-SCNC: 29 MMOL/L (ref 20–32)
COLOR UR: YELLOW
CREAT SERPL-MCNC: 0.78 MG/DL (ref 0.5–0.99)
EOSINOPHIL # BLD AUTO: 42 CELLS/UL (ref 15–500)
EOSINOPHIL NFR BLD AUTO: 0.9 %
ERYTHROCYTE [DISTWIDTH] IN BLOOD BY AUTOMATED COUNT: 12.1 % (ref 11–15)
GLOBULIN SER CALC-MCNC: 1.9 G/DL (CALC) (ref 1.9–3.7)
GLUCOSE SERPL-MCNC: 95 MG/DL (ref 65–99)
GLUCOSE UR QL STRIP: NEGATIVE
HBA1C MFR BLD: 5.4 % OF TOTAL HGB
HCT VFR BLD AUTO: 40.9 % (ref 35–45)
HCV AB S/CO SERPL IA: 0.01
HCV AB SERPL QL IA: NORMAL
HDLC SERPL-MCNC: 68 MG/DL
HGB BLD-MCNC: 14 G/DL (ref 11.7–15.5)
HGB UR QL STRIP: NEGATIVE
HYALINE CASTS #/AREA URNS LPF: ABNORMAL /LPF
KETONES UR QL STRIP: NEGATIVE
LDLC SERPL CALC-MCNC: 80 MG/DL (CALC)
LEUKOCYTE ESTERASE UR QL STRIP: ABNORMAL
LYMPHOCYTES # BLD AUTO: 1687 CELLS/UL (ref 850–3900)
LYMPHOCYTES NFR BLD AUTO: 35.9 %
MCH RBC QN AUTO: 30.7 PG (ref 27–33)
MCHC RBC AUTO-ENTMCNC: 34.2 G/DL (ref 32–36)
MCV RBC AUTO: 89.7 FL (ref 80–100)
MONOCYTES # BLD AUTO: 320 CELLS/UL (ref 200–950)
MONOCYTES NFR BLD AUTO: 6.8 %
NEUTROPHILS # BLD AUTO: 2632 CELLS/UL (ref 1500–7800)
NEUTROPHILS NFR BLD AUTO: 56 %
NITRITE UR QL STRIP: NEGATIVE
NONHDLC SERPL-MCNC: 104 MG/DL (CALC)
PH UR STRIP: 6 [PH] (ref 5–8)
PLATELET # BLD AUTO: 241 THOUSAND/UL (ref 140–400)
PMV BLD REES-ECKER: 11.9 FL (ref 7.5–12.5)
POTASSIUM SERPL-SCNC: 4.6 MMOL/L (ref 3.5–5.3)
PROT SERPL-MCNC: 6.3 G/DL (ref 6.1–8.1)
PROT UR QL STRIP: NEGATIVE
RBC # BLD AUTO: 4.56 MILLION/UL (ref 3.8–5.1)
RBC #/AREA URNS HPF: ABNORMAL /HPF
SL AMB EGFR AFRICAN AMERICAN: 94 ML/MIN/1.73M2
SL AMB EGFR NON AFRICAN AMERICAN: 81 ML/MIN/1.73M2
SODIUM SERPL-SCNC: 143 MMOL/L (ref 135–146)
SP GR UR STRIP: 1.01 (ref 1–1.03)
SQUAMOUS #/AREA URNS HPF: ABNORMAL /HPF
TRIGL SERPL-MCNC: 147 MG/DL
WBC # BLD AUTO: 4.7 THOUSAND/UL (ref 3.8–10.8)
WBC #/AREA URNS HPF: ABNORMAL /HPF

## 2019-03-11 ENCOUNTER — OFFICE VISIT (OUTPATIENT)
Dept: FAMILY MEDICINE CLINIC | Facility: OTHER | Age: 64
End: 2019-03-11
Payer: COMMERCIAL

## 2019-03-11 VITALS
OXYGEN SATURATION: 99 % | HEART RATE: 66 BPM | HEIGHT: 63 IN | BODY MASS INDEX: 27.82 KG/M2 | TEMPERATURE: 96.8 F | DIASTOLIC BLOOD PRESSURE: 68 MMHG | SYSTOLIC BLOOD PRESSURE: 102 MMHG | WEIGHT: 157 LBS

## 2019-03-11 DIAGNOSIS — M54.2 NECK PAIN ON RIGHT SIDE: ICD-10-CM

## 2019-03-11 DIAGNOSIS — R30.0 DYSURIA: ICD-10-CM

## 2019-03-11 DIAGNOSIS — N21.8 CALCULUS OF OTHER LOWER URINARY TRACT LOCATION: ICD-10-CM

## 2019-03-11 DIAGNOSIS — M85.80 OSTEOPENIA, UNSPECIFIED LOCATION: ICD-10-CM

## 2019-03-11 DIAGNOSIS — Z00.00 WELL ADULT EXAM: Primary | ICD-10-CM

## 2019-03-11 DIAGNOSIS — R17 SERUM TOTAL BILIRUBIN ELEVATED: ICD-10-CM

## 2019-03-11 DIAGNOSIS — R79.89 LOW TSH LEVEL: ICD-10-CM

## 2019-03-11 DIAGNOSIS — E04.2 MULTIPLE THYROID NODULES: ICD-10-CM

## 2019-03-11 DIAGNOSIS — E78.2 MIXED HYPERLIPIDEMIA: ICD-10-CM

## 2019-03-11 DIAGNOSIS — G43.909 MIGRAINE WITHOUT STATUS MIGRAINOSUS, NOT INTRACTABLE, UNSPECIFIED MIGRAINE TYPE: ICD-10-CM

## 2019-03-11 DIAGNOSIS — K21.9 GERD WITHOUT ESOPHAGITIS: ICD-10-CM

## 2019-03-11 DIAGNOSIS — K58.9 IRRITABLE BOWEL SYNDROME, UNSPECIFIED TYPE: ICD-10-CM

## 2019-03-11 PROCEDURE — 99396 PREV VISIT EST AGE 40-64: CPT | Performed by: NURSE PRACTITIONER

## 2019-03-11 RX ORDER — IVERMECTIN 10 MG/G
CREAM TOPICAL DAILY
COMMUNITY
Start: 2019-02-15

## 2019-03-11 RX ORDER — DOXYCYCLINE 100 MG/1
100 CAPSULE ORAL AS NEEDED
Refills: 3 | COMMUNITY
Start: 2019-02-15 | End: 2020-03-10

## 2019-03-11 NOTE — PROGRESS NOTES
Assessment/Plan:         Problem List Items Addressed This Visit     GERD without esophagitis  + IBS  --Symptoms controlled with diet + Linzess PRN + Prilosec  --Followed by colorectal + GI  --Reminded of potential long-term AE's of PPI's  Consider tapering to PRN use only  --UTD with colonoscopy, but may be due for repeat EGD for which she will contact her gastroenterologist        Multiple thyroid nodules + borderline low TSH  --Stable, followed by endocrine       Migraine  --No recent worsening  Takes triptan PRN      Osteopenia  --Continue regular walking, other weight bearing exercise, along with daily calcium + D  --UTD with DEXA       Hyperlipidemia  --Favorable profile on statin    Relevant Orders    Lipid Panel with Direct LDL reflex    Serum total bilirubin elevated  --Mild, stable, asymptomatic  Monitor  Sees GI  Relevant Orders    Comprehensive metabolic panel    Neck pain on right side  --Suspect strain of sternocleidomastoid, but cannot r/o carotidynia  No palpable cord or bruit noted on exam    --If recurrent-->warm compresses, ultrasound as precaution  --Call/ER for severe/persistent pain    Relevant Orders    VAS carotid complete study    Dysuria + hx urolithiasis  --Repeat UA + culture  KUB and ultrasound if no UTI and ongoing symptoms  --Call/ER for acute worsening/red flags  Relevant Orders    Urinalysis with reflex to microscopic    Urine culture    XR abdomen 1 view kub    US bladder      Other Visit Diagnoses     Well adult exam    -  Primary  --UTD with labs, colonoscopy, mammogram/GYN  --To schedule routine eye exam    --Declines flu shot, Shingrix at this time  UTD with Tdap  RTO 6 months         Subjective:      Patient ID: Rico Ruiz is a 59 y o  female  Here for routine well exam      Has a couple issues that she wanted to discuss:  --Gets intermittent "pinching sensation" on  the right side of her neck x 2-3 weeks    No significant variation with neck turning, but wonders if she may have strained muscle while shoveling  Concerned about her carotid  No swelling, redness  No pain at present  No home treatments tried  --Intermittent dysuria, hesitancy, sensation of incomplete void, urine odor over the past few weeks  No incontinence, hematuria  No acute bladder pain  No constipation  Recent (3/7) urinalysis normal  Has history of urolithiasis, but never in bladder  No fever/chills, N/V  No spotting, discharge  Has seen urology (Dr Juarez Ochoa) in the past)  --Gets "debra horses" more frequently over the past few months  Bilateral calf cramps at night  Localized tender area left lateral/posterior calf at times  No erythema, swelling  No symptoms at present  Feels well otherwise  No recent increase in migraines  No recent IBS flares  GERD fairly well controlled on daily Prilosec  No dysphagia, odynophagia, N/V   EGD 2013  Sees gastroenterologist in [facility]  Also sees [facility] with colonoscopy 3/2018  Recent (3/7/19) labs reviewed with patient  Decent lipids (, HDL 68, LDL 80, ), normal A1C, stable mildly elevated T-bili (1 6)  Low normal TSH, normal T4, stable thyroid nodules 11/2018 per endocrine  Healthy diet with plenty of fruits and vegetables  Small portions  Adequate fiber, calcium  Weight up a few pounds which she attributes to not walking as much  Usually takes out friend's [place]  Wants to increase her exercise to get her weight back down  Still does yoga  Had injection in her left knee recently  Helpful  No depression, anxiety  Things going OK with her brother Joanne Garvin, whom she lives with  GYN--Dr Arsen Morris  UTD  Mammogram 2018  DEXA 12/2018  Continued osteopenia  Rosacea well controlled with creams  Sees dermatologist       Eye exam 2 years ago  Due for repeat  Glasses UTD  No difficulty at night  Declines flu shot  Tdap 2015                   The following portions of the patient's history were reviewed and updated as appropriate: current medications, past family history, past medical history, past social history, past surgical history and problem list     Review of Systems   Constitutional: Negative for fever  HENT: Negative for hearing loss and sore throat  Eyes: Negative for visual disturbance  Respiratory: Negative for cough, shortness of breath and wheezing  Cardiovascular: Negative for chest pain and palpitations  Gastrointestinal: Negative for blood in stool, constipation, diarrhea, nausea and vomiting  Per HPI   Genitourinary:        Per HPI   Musculoskeletal:        Per HPI   Skin: Negative  Negative for rash  Neurological: Negative for dizziness and headaches  Psychiatric/Behavioral: Negative  Negative for dysphoric mood  Objective:      /68 (BP Location: Left arm, Patient Position: Sitting, Cuff Size: Adult)   Pulse 66   Temp (!) 96 8 °F (36 °C) (Tympanic)   Ht 5' 3" (1 6 m)   Wt 71 2 kg (157 lb)   SpO2 99%   BMI 27 81 kg/m²          Physical Exam   Constitutional: She is oriented to person, place, and time  She appears well-developed and well-nourished  HENT:   Head: Normocephalic  Right Ear: External ear normal    Left Ear: External ear normal    Nose: Nose normal    Mouth/Throat: Oropharynx is clear and moist    Eyes: Pupils are equal, round, and reactive to light  Conjunctivae are normal    Neck: Normal range of motion  Neck supple  Cardiovascular: Normal rate, regular rhythm and normal heart sounds  Pulmonary/Chest: Effort normal and breath sounds normal    Abdominal: Soft  Bowel sounds are normal  There is no tenderness  Musculoskeletal: Normal range of motion  She exhibits no edema, tenderness or deformity  Right side of neck with normal appearance, non-tender, including with resisted ROM  No palpable cord  No carotid bruit  Calves non-tender with normal appearance   No redness, swelling, increased warmth  Lymphadenopathy:     She has no cervical adenopathy  Neurological: She is alert and oriented to person, place, and time  She has normal reflexes  Skin: Skin is warm and dry  Psychiatric: She has a normal mood and affect

## 2019-03-11 NOTE — PATIENT INSTRUCTIONS
Wellness Visit for Adults   WHAT YOU NEED TO KNOW:   What is a wellness visit? A wellness visit is when you see your healthcare provider to get screened for health problems  You can also get advice on how to stay healthy  Write down your questions so you remember to ask them  Ask your healthcare provider how often you should have a wellness visit  What happens at a wellness visit? Your healthcare provider will ask about your health, and your family history of health problems  This includes high blood pressure, heart disease, and cancer  He or she will ask if you have symptoms that concern you, if you smoke, and about your mood  You may also be asked about your intake of medicines, supplements, food, and alcohol  Any of the following may be done:  · Your weight  will be checked  Your height may also be checked so your body mass index (BMI) can be calculated  Your BMI shows if you are at a healthy weight  · Your blood pressure  and heart rate will be checked  Your temperature may also be checked  · Blood and urine tests  may be done  Blood tests may be done to check your cholesterol levels  Abnormal cholesterol levels increase your risk for heart disease and stroke  You may also need a blood or urine test to check for diabetes if you are at increased risk  Urine tests may be done to look for signs of an infection or kidney disease  · A physical exam  includes checking your heartbeat and lungs with a stethoscope  Your healthcare provider may also check your skin to look for sun damage  · Screening tests  may be recommended  A screening test is done to check for diseases that may not cause symptoms  The screening tests you may need depend on your age, gender, family history, and lifestyle habits  For example, colorectal screening may be recommended if you are 48years old or older  What screening tests do I need if I am a woman? · A Pap smear  is used to screen for cervical cancer   Pap smears are usually done every 3 to 5 years depending on your age  You may need them more often if you have had abnormal Pap smear test results in the past  Ask your healthcare provider how often you should have a Pap smear  · A mammogram  is an x-ray of your breasts to screen for breast cancer  Experts recommend mammograms every 2 years starting at age 48 years  You may need a mammogram at age 52 years or younger if you have an increased risk for breast cancer  Talk to your healthcare provider about when you should start having mammograms and how often you need them  What vaccines might I need? · Get an influenza vaccine  every year  The influenza vaccine protects you from the flu  Several types of viruses cause the flu  The viruses change over time, so new vaccines are made each year  · Get a tetanus-diphtheria (Td) booster vaccine  every 10 years  This vaccine protects you against tetanus and diphtheria  Tetanus is a severe infection that may cause painful muscle spasms and lockjaw  Diphtheria is a severe bacterial infection that causes a thick covering in the back of your mouth and throat  · Get a human papillomavirus (HPV) vaccine  if you are female and aged 23 to 32 or male 23 to 24 and never received it  This vaccine protects you from HPV infection  HPV is the most common infection spread by sexual contact  HPV may also cause vaginal, penile, and anal cancers  · Get a pneumococcal vaccine  if you are aged 72 years or older  The pneumococcal vaccine is an injection given to protect you from pneumococcal disease  Pneumococcal disease is an infection caused by pneumococcal bacteria  The infection may cause pneumonia, meningitis, or an ear infection  · Get a shingles vaccine  if you are aged 61 or older, even if you have had shingles before  The shingles vaccine is an injection to protect you from the varicella-zoster virus  This is the same virus that causes chickenpox   Shingles is a painful rash that develops in people who had chickenpox or have been exposed to the virus  How can I eat healthy? My Plate is a model for planning healthy meals  It shows the types and amounts of foods that should go on your plate  Fruits and vegetables make up about half of your plate, and grains and protein make up the other half  A serving of dairy is included on the side of your plate  The amount of calories and serving sizes you need depends on your age, gender, weight, and height  Examples of healthy foods are listed below:  · Eat a variety of vegetables  such as dark green, red, and orange vegetables  You can also include canned vegetables low in sodium (salt) and frozen vegetables without added butter or sauces  · Eat a variety of fresh fruits , canned fruit in 100% juice, frozen fruit, and dried fruit  · Include whole grains  At least half of the grains you eat should be whole grains  Examples include whole-wheat bread, wheat pasta, brown rice, and whole-grain cereals such as oatmeal     · Eat a variety of protein foods such as seafood (fish and shellfish), lean meat, and poultry without skin (turkey and chicken)  Examples of lean meats include pork leg, shoulder, or tenderloin, and beef round, sirloin, tenderloin, and extra lean ground beef  Other protein foods include eggs and egg substitutes, beans, peas, soy products, nuts, and seeds  · Choose low-fat dairy products such as skim or 1% milk or low-fat yogurt, cheese, and cottage cheese  · Limit unhealthy fats  such as butter, hard margarine, and shortening  How much exercise do I need? Exercise at least 30 minutes per day on most days of the week  Some examples of exercise include walking, biking, dancing, and swimming  You can also fit in more physical activity by taking the stairs instead of the elevator or parking farther away from stores  Include muscle strengthening activities 2 days each week  Regular exercise provides many health benefits  It helps you manage your weight, and decreases your risk for type 2 diabetes, heart disease, stroke, and high blood pressure  Exercise can also help improve your mood  Ask your healthcare provider about the best exercise plan for you  What are some general health and safety guidelines I should follow? · Do not smoke  Nicotine and other chemicals in cigarettes and cigars can cause lung damage  Ask your healthcare provider for information if you currently smoke and need help to quit  E-cigarettes or smokeless tobacco still contain nicotine  Talk to your healthcare provider before you use these products  · Limit alcohol  A drink of alcohol is 12 ounces of beer, 5 ounces of wine, or 1½ ounces of liquor  · Lose weight, if needed  Being overweight increases your risk of certain health conditions  These include heart disease, high blood pressure, type 2 diabetes, and certain types of cancer  · Protect your skin  Do not sunbathe or use tanning beds  Use sunscreen with a SPF 15 or higher  Apply sunscreen at least 15 minutes before you go outside  Reapply sunscreen every 2 hours  Wear protective clothing, hats, and sunglasses when you are outside  · Drive safely  Always wear your seatbelt  Make sure everyone in your car wears a seatbelt  A seatbelt can save your life if you are in an accident  Do not use your cell phone when you are driving  This could distract you and cause an accident  Pull over if you need to make a call or send a text message  · Practice safe sex  Use latex condoms if are sexually active and have more than one partner  Your healthcare provider may recommend screening tests for sexually transmitted infections (STIs)  · Wear helmets, lifejackets, and protective gear  Always wear a helmet when you ride a bike or motorcycle, go skiing, or play sports that could cause a head injury  Wear protective equipment when you play sports   Wear a lifejacket when you are on a boat or doing water sports  CARE AGREEMENT:   You have the right to help plan your care  Learn about your health condition and how it may be treated  Discuss treatment options with your caregivers to decide what care you want to receive  You always have the right to refuse treatment  The above information is an  only  It is not intended as medical advice for individual conditions or treatments  Talk to your doctor, nurse or pharmacist before following any medical regimen to see if it is safe and effective for you  © 2017 [employer] Information is for End User's use only and may not be sold, redistributed or otherwise used for commercial purposes  All illustrations and images included in CareNotes® are the copyrighted property of A D [employer] M [employer]  or Darryn Rodriguez

## 2019-03-13 LAB
APPEARANCE UR: CLEAR
BILIRUB UR QL STRIP: NEGATIVE
COLOR UR: NORMAL
GLUCOSE UR QL STRIP: NEGATIVE
HGB UR QL STRIP: NEGATIVE
KETONES UR QL STRIP: NEGATIVE
LEUKOCYTE ESTERASE UR QL STRIP: NEGATIVE
NITRITE UR QL STRIP: NEGATIVE
PH UR STRIP: 5.5 [PH] (ref 5–8)
PROT UR QL STRIP: NEGATIVE
SP GR UR STRIP: 1.03 (ref 1–1.03)

## 2019-03-14 DIAGNOSIS — N30.90 CYSTITIS: Primary | ICD-10-CM

## 2019-03-14 RX ORDER — NITROFURANTOIN 25; 75 MG/1; MG/1
100 CAPSULE ORAL 2 TIMES DAILY
Qty: 10 CAPSULE | Refills: 0 | Status: SHIPPED | OUTPATIENT
Start: 2019-03-14 | End: 2019-03-19

## 2019-03-21 ENCOUNTER — HOSPITAL ENCOUNTER (OUTPATIENT)
Dept: NON INVASIVE DIAGNOSTICS | Facility: CLINIC | Age: 64
Discharge: HOME | End: 2019-03-21
Payer: COMMERCIAL

## 2019-03-21 ENCOUNTER — APPOINTMENT (OUTPATIENT)
Dept: NON INVASIVE DIAGNOSTICS | Facility: CLINIC | Age: 64
End: 2019-03-21
Payer: COMMERCIAL

## 2019-03-21 ENCOUNTER — HOSPITAL ENCOUNTER (OUTPATIENT)
Dept: RADIOLOGY | Facility: HOSPITAL | Age: 64
Discharge: HOME | End: 2019-03-21
Attending: NURSE PRACTITIONER
Payer: COMMERCIAL

## 2019-03-21 ENCOUNTER — APPOINTMENT (OUTPATIENT)
Dept: ULTRASOUND IMAGING | Facility: HOSPITAL | Age: 64
End: 2019-03-21
Attending: NURSE PRACTITIONER
Payer: COMMERCIAL

## 2019-03-21 ENCOUNTER — HOSPITAL ENCOUNTER (OUTPATIENT)
Dept: ULTRASOUND IMAGING | Facility: HOSPITAL | Age: 64
Discharge: HOME | End: 2019-03-21
Attending: NURSE PRACTITIONER
Payer: COMMERCIAL

## 2019-03-21 DIAGNOSIS — N21.8 CALCULUS OF OTHER LOWER URINARY TRACT LOCATION: ICD-10-CM

## 2019-03-21 DIAGNOSIS — M54.2 NECK PAIN ON RIGHT SIDE: ICD-10-CM

## 2019-03-21 DIAGNOSIS — R30.0 DYSURIA: ICD-10-CM

## 2019-03-21 PROCEDURE — 93880 EXTRACRANIAL BILAT STUDY: CPT

## 2019-03-21 PROCEDURE — 76857 US EXAM PELVIC LIMITED: CPT

## 2019-03-21 PROCEDURE — 93880 EXTRACRANIAL BILAT STUDY: CPT | Performed by: SURGERY

## 2019-03-21 PROCEDURE — 74018 RADEX ABDOMEN 1 VIEW: CPT

## 2019-03-22 ENCOUNTER — TELEPHONE (OUTPATIENT)
Dept: FAMILY MEDICINE CLINIC | Facility: OTHER | Age: 64
End: 2019-03-22

## 2019-03-22 DIAGNOSIS — R30.0 DYSURIA: Primary | ICD-10-CM

## 2019-03-22 DIAGNOSIS — N30.00 ACUTE CYSTITIS WITHOUT HEMATURIA: ICD-10-CM

## 2019-03-22 RX ORDER — CIPROFLOXACIN 500 MG/1
500 TABLET, FILM COATED ORAL EVERY 12 HOURS SCHEDULED
Qty: 14 TABLET | Refills: 0 | Status: SHIPPED | OUTPATIENT
Start: 2019-03-22 | End: 2019-03-29

## 2019-03-22 NOTE — TELEPHONE ENCOUNTER
Repeat urinalysis and urine culture ordered  Slips should print out  I am assuming that she completed the antibiotic (Alonzo Poet) that I put her on a week ago  This should have taken care of the bladder infection as culture results from 3/11 showed susceptibility to this particular antibiotic  Will call her as soon as repeat culture results come through  If her symptoms are bothersome enough and she wants to go on a different antibiotic while waiting for results, let me know  Should drink plenty of fluids also       Thanks

## 2019-03-22 NOTE — TELEPHONE ENCOUNTER
Pt stated she would like to be put on another antibiotic and would like results of US and x-ray once they are finalized

## 2019-03-22 NOTE — TELEPHONE ENCOUNTER
Patient called and she had all her bladder test done yesterday she is still having discomfort and was told at her last appointment if there was any discomfort to call us and get an order for UA   Pt would like the order and you can leave the message on her machine when its ready  if she don't answer   Thank you

## 2019-03-29 ENCOUNTER — TELEPHONE (OUTPATIENT)
Dept: FAMILY MEDICINE CLINIC | Facility: OTHER | Age: 64
End: 2019-03-29

## 2019-03-29 NOTE — TELEPHONE ENCOUNTER
Left detailed message for pt    ----- Message from Chaparrita Carvajal, [address] sent at 3/29/2019  8:25 AM EDT -----  Can we let Shaw Berry know that x-ray of kidneys and bladder came back normal   She should follow-up with the urologist for ongoing urinary issues   Thanks
no

## 2019-06-29 LAB
T3 SERPL-MCNC: 88 NG/DL (ref 76–181)
T4 FREE SERPL-MCNC: 1.2 NG/DL (ref 0.8–1.8)
TSH SERPL-ACNC: 0.68 MIU/L (ref 0.4–4.5)

## 2019-07-09 NOTE — PROGRESS NOTES
Established Patient Progress Note       Chief Complaint   Patient presents with    Follow-up        History of Present Illness:     Renata Artis is a 62 y o  female with a history of osteopenia, thyroid nodules, and low TSH  For the osteopenia she is currently taking vitamin d 2,000 units daily and calcium through diet (yougurt, milk, cheese), in addition to multivitamin  She does weight bearing exercise (walks) daily  She denies recent falls or fractures  For the thyroid nodules US showed multiple spongiform nodules that do not meet criteria for biopsy or follow up  She denies neck pain, dysphonia, dysphagia, or dyspnea  For the low TSH she has a history of hyperthyroidism  Her recent thyroid function test was normal  She denies symptoms of hypo or hyperthyroidism           Patient Active Problem List   Diagnosis    Allergic rhinitis    GERD without esophagitis    Hyperlipidemia    Internal hemorrhoids    Migraine    Osteopenia    Diverticulosis of large intestine    Serum total bilirubin elevated    Low TSH level    Multiple thyroid nodules    Irritable bowel syndrome    Nonspecific abnormal results of function study of thyroid    Osteoarthrosis of knee    Screening for malignant neoplasm of breast    Neck pain on right side    Dysuria      Past Medical History:   Diagnosis Date    Allergic rhinitis     last assessed: 9/29/2016    Anxiety     last assessed: 8/22/2017    Disease of thyroid gland     Diverticulosis     last assessed: 10/7/2013    GERD (gastroesophageal reflux disease)     Hydronephrosis, right     last assessed: 5/22/2013    Hyperlipidemia     last assessed 8/22/2017    Hypokalemia     last assessed: 3/24/2015    IBS (irritable bowel syndrome)     last assessed: 8/22/2017    Internal hemorrhoids     last assessed: 10/7/2013    Migraines     last assessed: 8/22/2017    Nephrolithiasis     Nonspecific abnormal results of function study of thyroid     last assessed: 10/8/2013    Osteoarthrosis of knee     last assessed: 2017    Osteopenia     last assessed: 2017    Renal calculi     last assessed: 2016    Renal cyst     last assessed: 7/15/2015    Total bilirubin, elevated     last assessed: 2017    Uterine leiomyoma       Past Surgical History:   Procedure Laterality Date    BREAST BIOPSY Right     benign    core bx    BREAST EXCISIONAL BIOPSY Left     benign    HAND SURGERY      KNEE CARTILAGE SURGERY Left     x3    KNEE SURGERY      LAPAROTOMY N/A 10/30/2017    Procedure: LAPAROTOMY EXPLORATORY, DRAINAGE PELVIC ABSCESS; APPENDECTOMY;  Surgeon: Eliezer Harrell DO;  [facility];  Service: General    MYOMECTOMY      SALIVARY GLAND SURGERY      and ducts    THYROID SURGERY      biopsy thyroid using percutaneous core needle    TONSILLECTOMY      TUBAL LIGATION      UTERINE FIBROID SURGERY      embolization      Family History   Problem Relation Age of Onset    Cancer Mother     Stomach cancer Mother     Other Father         CVA    Heart disease Father     Diabetes Sister     Arthritis Sister     Kidney disease Sister     Hypertension Brother     Breast cancer Cousin      Social History     Tobacco Use    Smoking status: Former Smoker     Packs/day: 1      Years: 6 00     Pack years: 6 00     Last attempt to quit: 2012     Years since quittin 5    Smokeless tobacco: Never Used   Substance Use Topics    Alcohol use: Yes     Comment: occasional drink socially     No Known Allergies    Current Outpatient Medications:     ACZONE 7 5 % GEL, APPLY ONCE DAILY, Disp: , Rfl: 3    aspirin 81 mg chewable tablet, Chew 81 mg daily, Disp: , Rfl:     atorvastatin (LIPITOR) 20 mg tablet, Take 1 tablet (20 mg total) by mouth daily, Disp: 90 tablet, Rfl: 3    Cholecalciferol (VITAMIN D3) 2000 units TABS, Take 2,000 Units by mouth daily, Disp: , Rfl:     Cyanocobalamin (VITAMIN B 12 PO), Take 5,000 mcg by mouth daily, Disp: , Rfl:     Multiple Vitamins-Calcium (ONE-A-DAY WOMENS FORMULA PO), Take 1 tablet by mouth daily, Disp: , Rfl:     Omega-3 Fatty Acids (FISH OIL) 1,000 mg, Take 4,000 mg by mouth daily, Disp: , Rfl:     omeprazole (PriLOSEC) 10 mg delayed release capsule, Take 10 mg by mouth daily, Disp: , Rfl:     SOOLANTRA 1 % CREA, , Disp: , Rfl:     doxycycline monohydrate (MONODOX) 100 mg capsule, Take 100 mg by mouth as needed , Disp: , Rfl: 3    Review of Systems   Constitutional: Negative for chills and fever  HENT: Negative for sore throat and trouble swallowing  Eyes: Negative for visual disturbance  Respiratory: Negative for shortness of breath  Cardiovascular: Negative for chest pain and palpitations  Gastrointestinal: Negative for abdominal pain, constipation and diarrhea  Endocrine: Negative for cold intolerance, heat intolerance, polydipsia, polyphagia and polyuria  Genitourinary: Negative for frequency  Musculoskeletal: Negative for arthralgias and myalgias  Skin: Negative for rash  Neurological: Negative for dizziness and tremors  Hematological: Negative for adenopathy  Psychiatric/Behavioral: Negative for sleep disturbance  All other systems reviewed and are negative  Physical Exam:  Body mass index is 28 17 kg/m²  /82   Pulse (!) 52   Ht 5' 3" (1 6 m)   Wt 72 1 kg (159 lb)   BMI 28 17 kg/m²    Wt Readings from Last 3 Encounters:   07/10/19 72 1 kg (159 lb)   03/11/19 71 2 kg (157 lb)   02/05/19 68 kg (150 lb)       Physical Exam   Constitutional: She is oriented to person, place, and time  She appears well-developed and well-nourished  No distress  HENT:   Head: Normocephalic and atraumatic  Eyes: Pupils are equal, round, and reactive to light  Conjunctivae are normal    Neck: Normal range of motion  Neck supple  No thyromegaly present  Cardiovascular: Normal rate, regular rhythm and normal heart sounds     Pulmonary/Chest: Effort normal and breath sounds normal  No respiratory distress  She has no wheezes  She has no rales  Abdominal: Soft  Bowel sounds are normal  She exhibits no distension  There is no tenderness  Musculoskeletal: Normal range of motion  She exhibits no edema  Neurological: She is alert and oriented to person, place, and time  Skin: Skin is warm and dry  Psychiatric: She has a normal mood and affect  Vitals reviewed  Labs:       Component      Latest Ref Rng & Units 6/28/2019   T3, Total      76 - 181 ng/dL 88   Free T4      0 8 - 1 8 ng/dL 1 2   TSH, POC      0 40 - 4 50 mIU/L 0 68     Lab Results   Component Value Date     09/20/2017    SODIUM 143 03/07/2019    K 4 6 03/07/2019     03/07/2019    CO2 29 03/07/2019    ANIONGAP 10 09/17/2015    AGAP 6 11/05/2017    BUN 12 03/07/2019    CREATININE 0 78 03/07/2019    GLUC 95 03/07/2019    CALCIUM 9 7 03/07/2019    AST 17 03/07/2019    ALT 17 03/07/2019    ALKPHOS 70 03/07/2019    PROT 6 2 09/20/2017    TP 6 3 03/07/2019    BILITOT 1 3 (H) 09/20/2017    TBILI 1 6 (H) 03/07/2019    EGFR 95 11/05/2017     THYROID ULTRASOUND     INDICATION:    E04 2: Nontoxic multinodular goiter      COMPARISON:  None     TECHNIQUE:   Ultrasound of the thyroid was performed with a high frequency linear transducer in transverse and sagittal planes including volumetric imaging sweeps as well as traditional still imaging technique      FINDINGS:  Normal homogeneous smooth echotexture      Right lobe:  5 0 x 1 3 x 1 6 cm  Left lobe:  4 6 x 1 2 x 1 5 cm  Isthmus:  0 3 cm      There are no dominant nodules that meet current ACR criteria for biopsy or follow-up  There are multiple spongiform nodules bilaterally, largest measuring 1 6 x 0 9 x 1 3 cm in the right mid to lower pole, and 1 2 x 0 6 x 1 0 cm in the posterior left   lower pole      IMPRESSION:     Multiple spongiform nodules  No nodule meets current ACR criteria for requiring biopsy or followup ultrasounds       Impression & Plan:    Problem List Items Addressed This Visit        Endocrine    Multiple thyroid nodules     Stable spongiform nodules  Do not meet criteria for biopsy or f/u  Relevant Orders    TSH, 3rd generation    T4, free       Musculoskeletal and Integument    Osteopenia - Primary     Continue dietary calcium and vitamin d supplementation  Next DEXA due December 2020  Educated on falls prevention and importance of weight bearing exercise  Relevant Orders    Vitamin D 25 hydroxy    Comprehensive metabolic panel       Other    Low TSH level     TSH and free T4 normal, will continue to monitor  Patient has history of hyperthyroidism         Relevant Orders    TSH, 3rd generation    T4, free          Orders Placed This Encounter   Procedures    TSH, 3rd generation     This is a patient instruction: This test is non-fasting  Please drink two glasses of water morning of bloodwork  Standing Status:   Future     Standing Expiration Date:   7/10/2020    Vitamin D 25 hydroxy     Standing Status:   Future     Standing Expiration Date:   7/10/2020    T4, free     Standing Status:   Future     Standing Expiration Date:   7/10/2020    Comprehensive metabolic panel     This is a patient instruction: Patient fasting for 8 hours or longer recommended  Standing Status:   Future     Standing Expiration Date:   7/10/2020       Discussed with the patient and all questioned fully answered  She will call me if any problems arise  Follow-up appointment in 6 months       Counseled patient on diagnostic results, prognosis, risk and benefit of treatment options, instruction for management, importance of treatment compliance, Risk  factor reduction and impressions      Corinna Salvador [address]

## 2019-07-10 ENCOUNTER — OFFICE VISIT (OUTPATIENT)
Dept: ENDOCRINOLOGY | Facility: CLINIC | Age: 64
End: 2019-07-10
Payer: COMMERCIAL

## 2019-07-10 VITALS
DIASTOLIC BLOOD PRESSURE: 82 MMHG | HEIGHT: 63 IN | BODY MASS INDEX: 28.17 KG/M2 | SYSTOLIC BLOOD PRESSURE: 122 MMHG | HEART RATE: 52 BPM | WEIGHT: 159 LBS

## 2019-07-10 DIAGNOSIS — R79.89 LOW TSH LEVEL: ICD-10-CM

## 2019-07-10 DIAGNOSIS — E04.2 MULTIPLE THYROID NODULES: ICD-10-CM

## 2019-07-10 DIAGNOSIS — M85.80 OSTEOPENIA, UNSPECIFIED LOCATION: Primary | ICD-10-CM

## 2019-07-10 PROCEDURE — 99214 OFFICE O/P EST MOD 30 MIN: CPT | Performed by: NURSE PRACTITIONER

## 2019-08-12 DIAGNOSIS — E78.2 MIXED HYPERLIPIDEMIA: ICD-10-CM

## 2019-08-12 RX ORDER — ATORVASTATIN CALCIUM 20 MG/1
TABLET, FILM COATED ORAL
Qty: 90 TABLET | Refills: 3 | Status: SHIPPED | OUTPATIENT
Start: 2019-08-12 | End: 2020-07-30 | Stop reason: SDUPTHER

## 2019-08-21 ENCOUNTER — OFFICE VISIT (OUTPATIENT)
Dept: FAMILY MEDICINE CLINIC | Facility: OTHER | Age: 64
End: 2019-08-21
Payer: COMMERCIAL

## 2019-08-21 VITALS
DIASTOLIC BLOOD PRESSURE: 64 MMHG | WEIGHT: 157 LBS | HEART RATE: 74 BPM | SYSTOLIC BLOOD PRESSURE: 100 MMHG | OXYGEN SATURATION: 98 % | TEMPERATURE: 98.5 F | HEIGHT: 63 IN | BODY MASS INDEX: 27.82 KG/M2

## 2019-08-21 DIAGNOSIS — K62.5 RECTAL BLEEDING: ICD-10-CM

## 2019-08-21 DIAGNOSIS — R10.84 GENERALIZED ABDOMINAL PAIN: ICD-10-CM

## 2019-08-21 DIAGNOSIS — R30.0 DYSURIA: Primary | ICD-10-CM

## 2019-08-21 DIAGNOSIS — R35.0 URINARY FREQUENCY: ICD-10-CM

## 2019-08-21 PROBLEM — R10.32 LEFT LOWER QUADRANT PAIN: Status: ACTIVE | Noted: 2019-08-21

## 2019-08-21 LAB
SL AMB  POCT GLUCOSE, UA: ABNORMAL
SL AMB LEUKOCYTE ESTERASE,UA: ABNORMAL
SL AMB POCT BILIRUBIN,UA: ABNORMAL
SL AMB POCT BLOOD,UA: ABNORMAL
SL AMB POCT CLARITY,UA: CLEAR
SL AMB POCT COLOR,UA: ABNORMAL
SL AMB POCT KETONES,UA: ABNORMAL
SL AMB POCT NITRITE,UA: ABNORMAL
SL AMB POCT PH,UA: 6
SL AMB POCT SPECIFIC GRAVITY,UA: 1.03
SL AMB POCT URINE PROTEIN: ABNORMAL
SL AMB POCT UROBILINOGEN: ABNORMAL

## 2019-08-21 PROCEDURE — 1036F TOBACCO NON-USER: CPT | Performed by: NURSE PRACTITIONER

## 2019-08-21 PROCEDURE — 81003 URINALYSIS AUTO W/O SCOPE: CPT | Performed by: NURSE PRACTITIONER

## 2019-08-21 PROCEDURE — 99214 OFFICE O/P EST MOD 30 MIN: CPT | Performed by: NURSE PRACTITIONER

## 2019-08-21 PROCEDURE — 87086 URINE CULTURE/COLONY COUNT: CPT | Performed by: NURSE PRACTITIONER

## 2019-08-21 PROCEDURE — 87186 SC STD MICRODIL/AGAR DIL: CPT | Performed by: NURSE PRACTITIONER

## 2019-08-21 PROCEDURE — 3008F BODY MASS INDEX DOCD: CPT | Performed by: NURSE PRACTITIONER

## 2019-08-21 PROCEDURE — 87077 CULTURE AEROBIC IDENTIFY: CPT | Performed by: NURSE PRACTITIONER

## 2019-08-21 RX ORDER — LEVOFLOXACIN 500 MG/1
500 TABLET, FILM COATED ORAL EVERY 24 HOURS
Qty: 7 TABLET | Refills: 0 | Status: SHIPPED | OUTPATIENT
Start: 2019-08-21 | End: 2019-08-28

## 2019-08-21 NOTE — PROGRESS NOTES
Assessment/Plan:         Problem List Items Addressed This Visit     Lower abdominal pain   Urinary frequency/urgency  Rectal bleeding  --Afebrile, Non-toxic, fairly comfortable appearance  --Largely unremarkable  urine dip  Send urine for culture while covering for possible UTI  --Hx severe diverticulosis raises possibility of early diverticulitis  Clear liquids, antibiotic for now  Advise STAT labs + CT scan tomorrow if symptoms no better  --ER for worsening/red flags  --Advise scheduling follow-up with both [facility] and [facility]    Relevant Medications    levofloxacin (LEVAQUIN) 500 mg tablet QD x 7 days     Other Relevant Orders    POCT urine dip auto non-scope (Completed)    Urine culture    Urinalysis with reflex to microscopic    CBC and differential    Comprehensive metabolic panel    Sedimentation rate, automated    CT abdomen pelvis w contrast            Subjective:      Patient ID: Gabriel Villela is a 62 y o  female  Last minute add-on for complaints of "UTI"    Upon questioning, she notes sharp LLQ abdominal pain noted yesterday, followed by single small/moderate amount of BRBPR  Today, pain is more bilateral lower abdominal, with associated urinary frequency, urgency x 2 days, no dysuria  Initial odor, but not since  No fever/chills, N/V  Appetite OK  Right lower back pain yesterday, but no back pain today  No chest pain or dyspnea  No further bowel movements since yesterday  Previous bowel movements were normal color and consistency  Denies diarrhea, constipation  No correlation of symptoms with eating, urination, defecation  No spotting or discharge  PMH notable for IBS, nephrolithiasis, diverticulosis/diverticulitis, internal hemorrhoids  PSH notable for laparotomy, fibroidectomy, myomectomy    Normal KUB and bladder ultrasound 3/21/19 except for mild (5 mm) bladder wall thickening  Colonsocopy 3/2018 notable for severe diverticulosis       Sees both [facility] (Dr Gilberto Ralph) and [facility] (Dr Kaiser Michelle)  Has not called their offices since symptoms started          The following portions of the patient's history were reviewed and updated as appropriate: current medications, past family history, past medical history, past social history, past surgical history and problem list     Review of Systems   Constitutional: Negative for fever  Respiratory: Negative for shortness of breath  Cardiovascular: Negative for chest pain  Genitourinary: Positive for frequency  Negative for dysuria and hematuria  Musculoskeletal: Negative for myalgias  Neurological: Negative for dizziness  Objective:      /64 (BP Location: Left arm, Patient Position: Sitting, Cuff Size: Adult)   Pulse 74   Temp 98 5 °F (36 9 °C) (Tympanic)   Ht 5' 3" (1 6 m)   Wt 71 2 kg (157 lb)   SpO2 98%   BMI 27 81 kg/m²          Physical Exam   Constitutional: She is oriented to person, place, and time  She appears well-developed  No distress  HENT:   Head: Normocephalic  Eyes: No scleral icterus  Cardiovascular: Normal rate and normal heart sounds  Pulmonary/Chest: Effort normal and breath sounds normal    Abdominal: Soft  Bowel sounds are normal  She exhibits no distension and no mass  There is tenderness  There is no rebound and no guarding  No hernia  Abdomen soft, non-distended with bilateral lower abdominal tenderness, L > R side  No palpable mass other than 3 cm diffuse, readily reducible abdominal wall bulge noted just to right of midline scar with Valsalva only  No guarding or rebound  No rigidity  Normal BS  No CVA tenderness  Neurological: She is alert and oriented to person, place, and time  Skin: She is not diaphoretic  Psychiatric: She has a normal mood and affect

## 2019-08-22 LAB
BILIRUB UR QL STRIP: ABNORMAL
CLARITY UR: ABNORMAL
COLOR UR: ABNORMAL
GLUCOSE UR STRIP-MCNC: NEGATIVE MG/DL
HGB UR QL STRIP.AUTO: NEGATIVE
KETONES UR STRIP-MCNC: NEGATIVE MG/DL
LEUKOCYTE ESTERASE UR QL STRIP: NEGATIVE
NITRITE UR QL STRIP: NEGATIVE
PH UR STRIP.AUTO: 6 [PH]
PROT UR STRIP-MCNC: NEGATIVE MG/DL
SP GR UR STRIP.AUTO: 1.02 (ref 1–1.03)
UROBILINOGEN UR QL STRIP.AUTO: 0.2 E.U./DL

## 2019-08-23 LAB
ALBUMIN SERPL-MCNC: 4.1 G/DL (ref 3.6–5.1)
ALBUMIN/GLOB SERPL: 2 (CALC) (ref 1–2.5)
ALP SERPL-CCNC: 85 U/L (ref 33–130)
ALT SERPL-CCNC: 14 U/L (ref 6–29)
AST SERPL-CCNC: 15 U/L (ref 10–35)
BASOPHILS # BLD AUTO: 38 CELLS/UL (ref 0–200)
BASOPHILS NFR BLD AUTO: 0.6 %
BILIRUB SERPL-MCNC: 1.4 MG/DL (ref 0.2–1.2)
BUN SERPL-MCNC: 11 MG/DL (ref 7–25)
BUN/CREAT SERPL: 11 (CALC) (ref 6–22)
CALCIUM SERPL-MCNC: 9.4 MG/DL (ref 8.6–10.4)
CHLORIDE SERPL-SCNC: 105 MMOL/L (ref 98–110)
CO2 SERPL-SCNC: 28 MMOL/L (ref 20–32)
CREAT SERPL-MCNC: 1.03 MG/DL (ref 0.5–0.99)
EOSINOPHIL # BLD AUTO: 51 CELLS/UL (ref 15–500)
EOSINOPHIL NFR BLD AUTO: 0.8 %
ERYTHROCYTE [DISTWIDTH] IN BLOOD BY AUTOMATED COUNT: 12.6 % (ref 11–15)
ERYTHROCYTE [SEDIMENTATION RATE] IN BLOOD BY WESTERGREN METHOD: 6 MM/H
GLOBULIN SER CALC-MCNC: 2.1 G/DL (CALC) (ref 1.9–3.7)
GLUCOSE SERPL-MCNC: 104 MG/DL (ref 65–139)
HCT VFR BLD AUTO: 39.4 % (ref 35–45)
HGB BLD-MCNC: 13.1 G/DL (ref 11.7–15.5)
LYMPHOCYTES # BLD AUTO: 1485 CELLS/UL (ref 850–3900)
LYMPHOCYTES NFR BLD AUTO: 23.2 %
MCH RBC QN AUTO: 30.1 PG (ref 27–33)
MCHC RBC AUTO-ENTMCNC: 33.2 G/DL (ref 32–36)
MCV RBC AUTO: 90.6 FL (ref 80–100)
MONOCYTES # BLD AUTO: 557 CELLS/UL (ref 200–950)
MONOCYTES NFR BLD AUTO: 8.7 %
NEUTROPHILS # BLD AUTO: 4269 CELLS/UL (ref 1500–7800)
NEUTROPHILS NFR BLD AUTO: 66.7 %
PLATELET # BLD AUTO: 253 THOUSAND/UL (ref 140–400)
PMV BLD REES-ECKER: 11.5 FL (ref 7.5–12.5)
POTASSIUM SERPL-SCNC: 4.2 MMOL/L (ref 3.5–5.3)
PROT SERPL-MCNC: 6.2 G/DL (ref 6.1–8.1)
RBC # BLD AUTO: 4.35 MILLION/UL (ref 3.8–5.1)
SL AMB EGFR AFRICAN AMERICAN: 67 ML/MIN/1.73M2
SL AMB EGFR NON AFRICAN AMERICAN: 58 ML/MIN/1.73M2
SODIUM SERPL-SCNC: 141 MMOL/L (ref 135–146)
WBC # BLD AUTO: 6.4 THOUSAND/UL (ref 3.8–10.8)

## 2019-08-24 LAB — BACTERIA UR CULT: ABNORMAL

## 2019-08-27 ENCOUNTER — TELEPHONE (OUTPATIENT)
Dept: FAMILY MEDICINE CLINIC | Facility: OTHER | Age: 64
End: 2019-08-27

## 2019-08-27 NOTE — TELEPHONE ENCOUNTER
Patient called back and was notified of the below message she goes for her CT 8/28/19 and she is on her last day of med for the UTI

## 2019-08-27 NOTE — TELEPHONE ENCOUNTER
Left message for patient to return call    ----- Message from [id number] sent at 8/26/2019  9:15 PM EDT -----  Can we let Sammy Cordero know that her blood work results came back fine, but her urine culture did come back positive for a bacterial infection which is susceptible to the antibiotic that we gave her, so hopefully she is starting to feel better  Still awaiting CT results  Will call when they come through   Thanks

## 2019-08-28 ENCOUNTER — HOSPITAL ENCOUNTER (OUTPATIENT)
Dept: RADIOLOGY | Age: 64
Discharge: HOME | End: 2019-08-28
Payer: COMMERCIAL

## 2019-08-28 DIAGNOSIS — R30.0 DYSURIA: ICD-10-CM

## 2019-08-28 PROCEDURE — 74177 CT ABD & PELVIS W/CONTRAST: CPT

## 2019-08-28 RX ADMIN — IOHEXOL 100 ML: 350 INJECTION, SOLUTION INTRAVENOUS at 08:39

## 2019-09-16 ENCOUNTER — OFFICE VISIT (OUTPATIENT)
Dept: FAMILY MEDICINE CLINIC | Facility: OTHER | Age: 64
End: 2019-09-16
Payer: COMMERCIAL

## 2019-09-16 ENCOUNTER — TELEPHONE (OUTPATIENT)
Dept: FAMILY MEDICINE CLINIC | Facility: OTHER | Age: 64
End: 2019-09-16

## 2019-09-16 VITALS
HEART RATE: 64 BPM | HEIGHT: 63 IN | OXYGEN SATURATION: 97 % | BODY MASS INDEX: 28.07 KG/M2 | WEIGHT: 158.4 LBS | TEMPERATURE: 98.7 F | SYSTOLIC BLOOD PRESSURE: 110 MMHG | DIASTOLIC BLOOD PRESSURE: 70 MMHG

## 2019-09-16 DIAGNOSIS — E78.2 MIXED HYPERLIPIDEMIA: ICD-10-CM

## 2019-09-16 DIAGNOSIS — K43.9 VENTRAL HERNIA WITHOUT OBSTRUCTION OR GANGRENE: ICD-10-CM

## 2019-09-16 DIAGNOSIS — J30.9 ALLERGIC RHINITIS, UNSPECIFIED SEASONALITY, UNSPECIFIED TRIGGER: ICD-10-CM

## 2019-09-16 DIAGNOSIS — K21.9 GERD WITHOUT ESOPHAGITIS: Primary | ICD-10-CM

## 2019-09-16 PROCEDURE — 99214 OFFICE O/P EST MOD 30 MIN: CPT | Performed by: FAMILY MEDICINE

## 2019-09-16 NOTE — TELEPHONE ENCOUNTER
Patient thought she was getting an order to check her cholesterol but didn't see anything in the system  Please advise  Thank you

## 2019-09-16 NOTE — TELEPHONE ENCOUNTER
I reprinted labs ordered 3/2019 by Casey Moya  Please reprint this Rx for her if she did not leave with it  Thanks!   Nichol Dimas, DO

## 2019-09-20 ENCOUNTER — OFFICE VISIT (OUTPATIENT)
Dept: FAMILY MEDICINE CLINIC | Facility: OTHER | Age: 64
End: 2019-09-20
Payer: COMMERCIAL

## 2019-09-20 VITALS
HEIGHT: 63 IN | TEMPERATURE: 98.7 F | BODY MASS INDEX: 27.46 KG/M2 | OXYGEN SATURATION: 98 % | DIASTOLIC BLOOD PRESSURE: 62 MMHG | SYSTOLIC BLOOD PRESSURE: 100 MMHG | HEART RATE: 64 BPM | WEIGHT: 155 LBS

## 2019-09-20 DIAGNOSIS — J06.9 UPPER RESPIRATORY TRACT INFECTION, UNSPECIFIED TYPE: Primary | ICD-10-CM

## 2019-09-20 DIAGNOSIS — J20.9 ACUTE BRONCHITIS, UNSPECIFIED ORGANISM: ICD-10-CM

## 2019-09-20 PROCEDURE — 3008F BODY MASS INDEX DOCD: CPT | Performed by: NURSE PRACTITIONER

## 2019-09-20 PROCEDURE — 99214 OFFICE O/P EST MOD 30 MIN: CPT | Performed by: NURSE PRACTITIONER

## 2019-09-20 RX ORDER — AZITHROMYCIN 250 MG/1
TABLET, FILM COATED ORAL
Qty: 6 TABLET | Refills: 0 | Status: SHIPPED | OUTPATIENT
Start: 2019-09-20 | End: 2019-09-25

## 2019-09-20 RX ORDER — PROMETHAZINE HYDROCHLORIDE AND CODEINE PHOSPHATE 6.25; 1 MG/5ML; MG/5ML
5 SYRUP ORAL EVERY 4 HOURS PRN
Qty: 180 ML | Refills: 0 | Status: SHIPPED | OUTPATIENT
Start: 2019-09-20 | End: 2019-11-27

## 2019-09-20 NOTE — PATIENT INSTRUCTIONS
Upper Respiratory Infection, Ambulatory Care   GENERAL INFORMATION:   An upper respiratory infection  is also called a common cold  It can affect your nose, throat, ears, and sinuses  Common symptoms include the following:   · Runny or stuffy nose    · Sneezing and coughing    · Sore throat or hoarseness    · Red, watery, and sore eyes    · Tiredness or restlessness    · Chills and fever    · Headache, body aches, or sore muscles  Seek immediate care for the following symptoms:   · Headaches or a stiff neck    · Bright lights hurt your eyes    · Chest pain or trouble breathing  Treatment for an upper respiratory infection  may include any of the following:  · Decongestants  help decrease nasal congestion and improve your breathing  Do not use decongestant sprays for more than a few days  · Cough suppressants  help decrease coughing  Ask your healthcare provider which type of cough medicine is best for you  Some cough medicines need a doctor's order  · NSAIDs  help decrease swelling and pain or fever  This medicine is available with or without a doctor's order  NSAIDs can cause stomach bleeding or kidney problems in certain people  If you take blood thinner medicine, always ask your healthcare provider if NSAIDs are safe for you  Always read the medicine label and follow directions  Care for an upper respiratory infection:   · Rest  until your fever is gone or you feel better  · Drink liquids as directed to prevent dehydration  You may need to drink 8 to 10 cups of liquid each day  Good liquids to drink include water, ginger ale, tea, or fruit juices  · Gargle  with warm salt water to help your sore throat feel better  Mix ¼ teaspoon salt with 1 cup warm water  You may also suck on hard candy or throat lozenges  · Saline nasal drops  help loosen your nasal congestion  They can be bought without a doctor's order      · Take a warm bath or shower  to help decrease body aches and help you breathe easier  · Use a cool-mist humidifier  to increase air moisture and make it easier for you to breathe  Prevent the spread of germs:   · Avoid others for the first 2 to 3 days of your cold  Germs are easily spread during this time  · Do not share food, drinks,  towels, or personal items with others  · Wash your hands often  Use soap and water  Wash your hands after you use the bathroom, change a child's diapers, or sneeze  Wash your hands before you prepare or eat food  Cover your mouth and nose with a tissue when you sneeze or cough  Follow up with your healthcare provider as directed:  Write down your questions so you remember to ask them during your visits  CARE AGREEMENT:   You have the right to help plan your care  Learn about your health condition and how it may be treated  Discuss treatment options with your caregivers to decide what care you want to receive  You always have the right to refuse treatment  The above information is an  only  It is not intended as medical advice for individual conditions or treatments  Talk to your doctor, nurse or pharmacist before following any medical regimen to see if it is safe and effective for you  © 2014 [address] is for End User's use only and may not be sold, redistributed or otherwise used for commercial purposes  All illustrations and images included in CareNotes® are the copyrighted property of [employer]  or Darryn Rodriguez

## 2019-09-20 NOTE — PROGRESS NOTES
Assessment/Plan:         Problem List Items Addressed This Visit     None      Visit Diagnoses     Upper respiratory tract infection  Acute bronchitis  --Advise rest, fluids, OTC expectorant, cool mist humidifier, OTC antihistamine, Rx cough supressant  --Offered inhaler, short course of oral prednisone which she is declining  --Written Rx for antibiotic given to be filled and started only if no improvement/worsening symptoms over the next 3-5 days  --Declining flu shot at this time  Relevant Medications    azithromycin (ZITHROMAX) 250 mg tablet    promethazine-codeine (PHENERGAN WITH CODEINE) 6 25-10 mg/5 mL syrup; cautioned regarding potential for drowsiness            Subjective:      Patient ID: Laurence Powell is a 62 y o  female  Here with complaints of minimally productive cough x 1 week  Associated wheezing, burning sensation in chest at times  No dyspnea  Mild nasal congestion, clear rhinorrhea, postnasal drip, sore throat  No fever/chills  Normal appetite  No N/V, abdominal pain  No headaches, body aches  Seen here 9/16 by Dr Hung Donnelly  Claritin and Flonase recommended which she is taking  Not sure if it is helping  No OTC meds taken  Denies history of seasonal allergies  No known sick contacts  The following portions of the patient's history were reviewed and updated as appropriate: current medications, past family history, past medical history, past social history, past surgical history and problem list     Review of Systems   Constitutional: Negative for fever  HENT: Positive for postnasal drip, rhinorrhea and sore throat  Negative for sinus pressure and sinus pain  Eyes: Negative for discharge  Respiratory: Positive for cough  Negative for shortness of breath  Gastrointestinal: Negative for abdominal pain, nausea and vomiting  Musculoskeletal: Negative for myalgias  Neurological: Negative for headaches           Objective:      /62 (BP Location: Left arm, Patient Position: Sitting, Cuff Size: Adult)   Pulse 64   Temp 98 7 °F (37 1 °C) (Tympanic)   Ht 5' 3" (1 6 m)   Wt 70 3 kg (155 lb)   SpO2 98%   BMI 27 46 kg/m²          Physical Exam   Constitutional: She is oriented to person, place, and time  She appears well-developed and well-nourished  HENT:   Head: Normocephalic  Right Ear: External ear normal    Left Ear: External ear normal    Mouth/Throat: Oropharynx is clear and moist  No oropharyngeal exudate  Turbinates swollen, erythematous  No sinus tenderness  Eyes: Pupils are equal, round, and reactive to light  Conjunctivae are normal    Neck: Normal range of motion  Neck supple  Cardiovascular: Normal rate, regular rhythm and normal heart sounds  Pulmonary/Chest: Effort normal and breath sounds normal  No stridor  No respiratory distress  She has no wheezes  She has no rales  She exhibits no tenderness  Breathing non-labored  RR=16  Occasional cough noted  Abdominal: Soft  Bowel sounds are normal  There is no tenderness  Lymphadenopathy:     She has no cervical adenopathy  Neurological: She is alert and oriented to person, place, and time  She has normal reflexes  Skin: Skin is warm and dry  Psychiatric: She has a normal mood and affect

## 2019-09-21 LAB
CHOLEST SERPL-MCNC: 175 MG/DL
CHOLEST/HDLC SERPL: 2.7 (CALC)
HDLC SERPL-MCNC: 65 MG/DL
LDLC SERPL CALC-MCNC: 83 MG/DL (CALC)
NONHDLC SERPL-MCNC: 110 MG/DL (CALC)
TRIGL SERPL-MCNC: 161 MG/DL

## 2019-09-29 PROBLEM — K43.9 VENTRAL HERNIA WITHOUT OBSTRUCTION OR GANGRENE: Status: ACTIVE | Noted: 2019-09-29

## 2019-09-30 ENCOUNTER — TELEPHONE (OUTPATIENT)
Dept: FAMILY MEDICINE CLINIC | Facility: OTHER | Age: 64
End: 2019-09-30

## 2019-09-30 NOTE — TELEPHONE ENCOUNTER
Called patient twice and kept getting hung up on       ----- Message from [id number] sent at 9/29/2019  7:10 PM EDT -----  Can we let David Joseph know that her cholesterol numbers came back fine overall  Mildly elevated triglycerides only  Should continue same dose of cholesterol medication, and continue to watch diet and weight   Thanks

## 2019-11-24 ENCOUNTER — OFFICE VISIT (OUTPATIENT)
Dept: URGENT CARE | Facility: CLINIC | Age: 64
End: 2019-11-24
Payer: COMMERCIAL

## 2019-11-24 VITALS
WEIGHT: 158.6 LBS | SYSTOLIC BLOOD PRESSURE: 134 MMHG | BODY MASS INDEX: 28.1 KG/M2 | OXYGEN SATURATION: 97 % | HEIGHT: 63 IN | TEMPERATURE: 98.4 F | DIASTOLIC BLOOD PRESSURE: 78 MMHG | RESPIRATION RATE: 15 BRPM | HEART RATE: 69 BPM

## 2019-11-24 DIAGNOSIS — L24.9 IRRITANT CONTACT DERMATITIS, UNSPECIFIED TRIGGER: Primary | ICD-10-CM

## 2019-11-24 PROCEDURE — G0382 LEV 3 HOSP TYPE B ED VISIT: HCPCS | Performed by: PHYSICIAN ASSISTANT

## 2019-11-24 NOTE — PATIENT INSTRUCTIONS
Contact Dermatitis   AMBULATORY CARE:   Contact dermatitis  is a rash  It develops when you touch something that irritates your skin or causes an allergic reaction  Common signs and symptoms include the following:   · Red, swollen, painful rash           · Skin that itches, stings, or burns    · Dry, scaly, or crusty skin patches    · Bumps or blisters    · Fluid draining from blisters  Call 911 for any of the following:   · You have sudden trouble breathing  · Your throat swells and you have trouble eating  · Your face is swollen  Contact your healthcare provider if:   · You have a fever  · Your blisters are draining pus  · Your rash spreads or does not get better, even after treatment  · You have questions or concerns about your condition or care  Treatment for contact dermatitis  involves removing any irritants or allergens that cause your rash  You may also need medicines to decrease itching and swelling  They will be given as a topical medicine to apply to your rash or as a pill  Manage contact dermatitis:   · Take short baths or showers in cool water  Use mild soap or soap-free cleansers  Add oatmeal, baking soda, or cornstarch to the bath water to help decrease skin irritation  · Avoid skin irritants , such as makeup, hair products, soaps, and cleansers  Use products that do not contain perfume or dye  · Apply a cool compress to your rash  This will help soothe your skin  · Keep your skin moist   Rub unscented cream or lotion on your skin to prevent dryness and itching  Do this right after a bath or shower when your skin is still damp  Follow up with your healthcare provider as directed:  Write down your questions so you remember to ask them during your visits  © 2017 [employer] Information is for End User's use only and may not be sold, redistributed or otherwise used for commercial purposes   All illustrations and images included in CareNotes® are the copyrighted property of [employer]  or Darryn Rodriguez  The above information is an  only  It is not intended as medical advice for individual conditions or treatments  Talk to your doctor, nurse or pharmacist before following any medical regimen to see if it is safe and effective for you

## 2019-11-24 NOTE — PROGRESS NOTES
[id number]        NAME: Josee Henriquez is a 62 y o  female  : 1955    MRN: [id number]  DATE: 2019  TIME: 11:22 AM    Assessment and Plan   Irritant contact dermatitis, unspecified trigger [L24 9]  1  Irritant contact dermatitis, unspecified trigger  hydrocortisone 2 5 % cream         Patient Instructions       Follow up with PCP in 3-5 days  Proceed to  ER if symptoms worsen  Chief Complaint     Chief Complaint   Patient presents with    Rash     pt reports having a rash on left buttock however she stated she feels nothing now  History of Present Illness       Rash   This is a new problem  The current episode started today  Location: Left buttock  The rash is characterized by redness  She was exposed to nothing  Pertinent negatives include no anorexia, congestion, diarrhea, eye pain, facial edema, fever, rhinorrhea, shortness of breath, sore throat or vomiting  Past treatments include nothing  Review of Systems   Review of Systems   Constitutional: Negative for fever  HENT: Negative for congestion, rhinorrhea and sore throat  Eyes: Negative for pain  Respiratory: Negative for shortness of breath  Gastrointestinal: Negative for anorexia, diarrhea and vomiting  Skin: Positive for rash           Current Medications       Current Outpatient Medications:     ACZONE 7 5 % GEL, APPLY ONCE DAILY, Disp: , Rfl: 3    aspirin 81 mg chewable tablet, Chew 81 mg daily, Disp: , Rfl:     atorvastatin (LIPITOR) 20 mg tablet, TAKE 1 TABLET BY MOUTH EVERY DAY, Disp: 90 tablet, Rfl: 3    Cholecalciferol (VITAMIN D3) 2000 units TABS, Take 2,000 Units by mouth daily, Disp: , Rfl:     Cyanocobalamin (VITAMIN B 12 PO), Take 5,000 mcg by mouth daily, Disp: , Rfl:     doxycycline monohydrate (MONODOX) 100 mg capsule, Take 100 mg by mouth as needed , Disp: , Rfl: 3    Multiple Vitamins-Calcium (ONE-A-DAY WOMENS FORMULA PO), Take 1 tablet by mouth daily, Disp: , Rfl:    Omega-3 Fatty Acids (FISH OIL) 1,000 mg, Take 4,000 mg by mouth daily, Disp: , Rfl:     omeprazole (PriLOSEC) 10 mg delayed release capsule, Take 10 mg by mouth daily, Disp: , Rfl:     promethazine-codeine (PHENERGAN WITH CODEINE) 6 25-10 mg/5 mL syrup, Take 5 mL by mouth every 4 (four) hours as needed for cough, Disp: 180 mL, Rfl: 0    SOOLANTRA 1 % CREA, , Disp: , Rfl:     hydrocortisone 2 5 % cream, Apply topically 4 (four) times a day as needed for rash for up to 5 days, Disp: 30 g, Rfl: 0    Current Allergies     Allergies as of 11/24/2019    (No Known Allergies)            The following portions of the patient's history were reviewed and updated as appropriate: allergies, current medications, past family history, past medical history, past social history, past surgical history and problem list      Past Medical History:   Diagnosis Date    Allergic rhinitis     last assessed: 9/29/2016    Anxiety     last assessed: 8/22/2017    Disease of thyroid gland     Diverticulosis     last assessed: 10/7/2013    GERD (gastroesophageal reflux disease)     Hydronephrosis, right     last assessed: 5/22/2013    Hyperlipidemia     last assessed 8/22/2017    Hypokalemia     last assessed: 3/24/2015    IBS (irritable bowel syndrome)     last assessed: 8/22/2017    Internal hemorrhoids     last assessed: 10/7/2013    Migraines     last assessed: 8/22/2017    Nephrolithiasis     Nonspecific abnormal results of function study of thyroid     last assessed: 10/8/2013    Osteoarthrosis of knee     last assessed: 2/28/2017    Osteopenia     last assessed: 8/22/2017    Renal calculi     last assessed: 4/29/2016    Renal cyst     last assessed: 7/15/2015    Total bilirubin, elevated     last assessed: 8/22/2017    Uterine leiomyoma        Past Surgical History:   Procedure Laterality Date    BREAST BIOPSY Right 1998    benign    core bx    BREAST EXCISIONAL BIOPSY Left 1999    benign    HAND SURGERY      KNEE CARTILAGE SURGERY Left     x3    KNEE SURGERY      LAPAROTOMY N/A 10/30/2017    Procedure: LAPAROTOMY EXPLORATORY, DRAINAGE PELVIC ABSCESS; APPENDECTOMY;  Surgeon: Willem Casarez DO;  [facility];  Service: General    MYOMECTOMY      SALIVARY GLAND SURGERY      and ducts    THYROID SURGERY      biopsy thyroid using percutaneous core needle    TONSILLECTOMY      TUBAL LIGATION      UTERINE FIBROID SURGERY      embolization       Family History   Problem Relation Age of Onset   [facility] Cancer Mother     Stomach cancer Mother     Other Father         CVA    Heart disease Father     Diabetes Sister     Arthritis Sister     Kidney disease Sister     Hypertension Brother     Breast cancer Cousin          Medications have been verified  Objective   /78   Pulse 69   Temp 98 4 °F (36 9 °C)   Resp 15   Ht 5' 3" (1 6 m)   Wt 71 9 kg (158 lb 9 6 oz)   SpO2 97%   BMI 28 09 kg/m²        Physical Exam     Physical Exam   Constitutional: She is oriented to person, place, and time  She appears well-developed and well-nourished  HENT:   Right Ear: Tympanic membrane and external ear normal    Left Ear: Tympanic membrane and external ear normal    Neck: Normal range of motion  No edema present  Cardiovascular: Normal rate, regular rhythm, S1 normal, S2 normal and normal heart sounds  No murmur heard  Pulmonary/Chest: Effort normal and breath sounds normal  No respiratory distress  She has no wheezes  She has no rales  She exhibits no tenderness  Lymphadenopathy:     She has no cervical adenopathy  Neurological: She is alert and oriented to person, place, and time  Skin: Skin is warm, dry and intact  Rash noted  Rash is maculopapular  Psychiatric: She has a normal mood and affect  Her speech is normal and behavior is normal    Nursing note and vitals reviewed

## 2019-11-27 ENCOUNTER — OFFICE VISIT (OUTPATIENT)
Dept: FAMILY MEDICINE CLINIC | Facility: OTHER | Age: 64
End: 2019-11-27
Payer: COMMERCIAL

## 2019-11-27 VITALS
HEIGHT: 63 IN | HEART RATE: 69 BPM | OXYGEN SATURATION: 99 % | SYSTOLIC BLOOD PRESSURE: 116 MMHG | WEIGHT: 159.13 LBS | DIASTOLIC BLOOD PRESSURE: 80 MMHG | BODY MASS INDEX: 28.2 KG/M2 | TEMPERATURE: 97.5 F

## 2019-11-27 DIAGNOSIS — Z23 ENCOUNTER FOR IMMUNIZATION: ICD-10-CM

## 2019-11-27 DIAGNOSIS — L74.0 HEAT RASH: Primary | ICD-10-CM

## 2019-11-27 PROCEDURE — 90682 RIV4 VACC RECOMBINANT DNA IM: CPT

## 2019-11-27 PROCEDURE — 1036F TOBACCO NON-USER: CPT

## 2019-11-27 PROCEDURE — 90471 IMMUNIZATION ADMIN: CPT

## 2019-11-27 PROCEDURE — 99213 OFFICE O/P EST LOW 20 MIN: CPT

## 2019-11-27 NOTE — PROGRESS NOTES
Assessment/Plan:         Problem List Items Addressed This Visit     None      Visit Diagnoses     Heat rash     --Rash resolved at this time with use of hydrocortisone  Picture on phone + history consistent with milaria rubra precipitated by hot water of shower, although cannot rule out papular urticaria, contact dermatitis  --Avoid overly hot showers for now  Call for recurrent  Encounter for immunization        Relevant Orders    influenza vaccine, [id number], quadrivalent, recombinant, PF, 0 5 mL, for patients 18 yr+ (FLUBLOK) (Completed)          Flu shot given  Discussed Shingrix  She will check with her insurance on this  Subjective:      Patient ID: West Almazan is a 62 y o  female  Here as follow-up to urgent care visit 11/24 for rash on left lower back/buttock  Records reviewed  Rash started abruptly earlier that day while she was taking a hot shower  No recent change in soaps, body washes, or other known exposures  Minimally itchy  Non-tender  Noted faintly on right lower back  No rash elsewhere  No associated fever, body aches, headache  Given Rx for hydrocortisone which she has been applying as directed  Rash largely gone  She does note history of "prickly heat rash" when younger  The following portions of the patient's history were reviewed and updated as appropriate: current medications, past family history, past medical history, past social history, past surgical history and problem list     Review of Systems   Constitutional: Negative for fatigue and fever  Musculoskeletal: Negative for myalgias  Skin: Positive for rash  Neurological: Negative for headaches           Objective:      /80 (BP Location: Left arm, Patient Position: Sitting, Cuff Size: Large)   Pulse 69   Temp 97 5 °F (36 4 °C) (Tympanic)   Ht 5' 3" (1 6 m)   Wt 72 2 kg (159 lb 2 oz)   SpO2 99%   BMI 28 19 kg/m²          Physical Exam   Constitutional: She appears well-developed  No distress  Pulmonary/Chest: Breath sounds normal    Skin: Skin is warm and dry  She is not diaphoretic  Area of symptomatology (lower back, buttocks) with  normal appearance  No further rash noted at this time  No skin hyperesthesia

## 2020-01-22 ENCOUNTER — TELEPHONE (OUTPATIENT)
Dept: ENDOCRINOLOGY | Facility: CLINIC | Age: 65
End: 2020-01-22

## 2020-01-22 LAB
ALBUMIN SERPL-MCNC: 4.4 G/DL (ref 3.6–5.1)
ALBUMIN/GLOB SERPL: 2.1 (CALC) (ref 1–2.5)
ALP SERPL-CCNC: 85 U/L (ref 33–130)
ALT SERPL-CCNC: 18 U/L (ref 6–29)
AST SERPL-CCNC: 19 U/L (ref 10–35)
BILIRUB SERPL-MCNC: 1.6 MG/DL (ref 0.2–1.2)
BUN SERPL-MCNC: 11 MG/DL (ref 7–25)
BUN/CREAT SERPL: ABNORMAL (CALC) (ref 6–22)
CALCIUM SERPL-MCNC: 10 MG/DL (ref 8.6–10.4)
CHLORIDE SERPL-SCNC: 106 MMOL/L (ref 98–110)
CO2 SERPL-SCNC: 26 MMOL/L (ref 20–32)
CREAT SERPL-MCNC: 0.88 MG/DL (ref 0.5–0.99)
GLOBULIN SER CALC-MCNC: 2.1 G/DL (CALC) (ref 1.9–3.7)
GLUCOSE SERPL-MCNC: 98 MG/DL (ref 65–99)
POTASSIUM SERPL-SCNC: 4.5 MMOL/L (ref 3.5–5.3)
PROT SERPL-MCNC: 6.5 G/DL (ref 6.1–8.1)
SL AMB EGFR AFRICAN AMERICAN: 81 ML/MIN/1.73M2
SL AMB EGFR NON AFRICAN AMERICAN: 70 ML/MIN/1.73M2
SODIUM SERPL-SCNC: 143 MMOL/L (ref 135–146)
T4 FREE SERPL-MCNC: 1.1 NG/DL (ref 0.8–1.8)
TSH SERPL-ACNC: 0.75 MIU/L (ref 0.4–4.5)

## 2020-01-22 NOTE — TELEPHONE ENCOUNTER
----- Message from [employer] sent at 1/22/2020  8:28 AM EST -----  Please call the patient regarding her abnormal result   Lab results normal

## 2020-01-27 ENCOUNTER — OFFICE VISIT (OUTPATIENT)
Dept: ENDOCRINOLOGY | Facility: CLINIC | Age: 65
End: 2020-01-27
Payer: COMMERCIAL

## 2020-01-27 VITALS
DIASTOLIC BLOOD PRESSURE: 90 MMHG | HEIGHT: 63 IN | HEART RATE: 79 BPM | SYSTOLIC BLOOD PRESSURE: 140 MMHG | BODY MASS INDEX: 28.88 KG/M2 | WEIGHT: 163 LBS

## 2020-01-27 DIAGNOSIS — E04.2 MULTIPLE THYROID NODULES: Primary | ICD-10-CM

## 2020-01-27 DIAGNOSIS — M85.80 OSTEOPENIA, UNSPECIFIED LOCATION: ICD-10-CM

## 2020-01-27 DIAGNOSIS — R79.89 LOW TSH LEVEL: ICD-10-CM

## 2020-01-27 DIAGNOSIS — M85.88 OTHER SPECIFIED DISORDERS OF BONE DENSITY AND STRUCTURE, OTHER SITE: ICD-10-CM

## 2020-01-27 PROCEDURE — 99214 OFFICE O/P EST MOD 30 MIN: CPT | Performed by: INTERNAL MEDICINE

## 2020-01-27 NOTE — ASSESSMENT & PLAN NOTE
- Stable spongiform nodules not requiring biopsy   - Patient's dysphagia symptoms are likely due to her underlying reflux disease as opposed to the thyroid nodules which are small and shouldn't be causing any compressive symptoms   Will continue to monitor

## 2020-01-27 NOTE — ASSESSMENT & PLAN NOTE
- Continue vitamin D supplements and dietary calcium as well as weight bearing exercises   - Patient to have repeat DEXA scan in December 2020

## 2020-01-27 NOTE — PROGRESS NOTES
Assessment/Plan:    Multiple thyroid nodules  - Stable spongiform nodules not requiring biopsy   - Patient's dysphagia symptoms are likely due to her underlying reflux disease as opposed to the thyroid nodules which are small and shouldn't be causing any compressive symptoms  Will continue to monitor       Low TSH level  - Thyroid function testing within normal limits   - Will continue to monitor with annual thyroid function tests    Osteopenia  - Continue vitamin D supplements and dietary calcium as well as weight bearing exercises   - Patient to have repeat DEXA scan in December 2020    Other specified disorders of bone density and structure, other site  - Management as above        Diagnoses and all orders for this visit:    Multiple thyroid nodules    Low TSH level    Osteopenia, unspecified location  -     DXA bone density spine hip and pelvis; Future    Other specified disorders of bone density and structure, other site  -     DXA bone density spine hip and pelvis; Future          Subjective:      Patient ID: Sukhdeep Henley is a 62 y o  female  HPI     Sukhdeep Henley is a 62 year female with a past medical history of thyroid nodules, low TSH and osteopenia who presents today for a 6 month follow up visit  Today patient states that she feels well although she has been diagnosed with a ventral hernia and is due to undergo surgery in April  Patient does report recent weight gain and difficultly  swallowing which occurs intermittently but denies any sleep disturbances, appetite changes or palpitations  Patient's thyroid issues have been occurring since the early 1990's where she was noted to have a low TSH and thyroid nodules  Patient did undergo a biopsy at that time which was benign and was never placed on any medication  Patient continues to take daily calcium and vitamin D supplements for Osteopenia and denies any recent falls or difficulty ambulating       The following portions of the patient's history were reviewed and updated as appropriate: allergies, current medications, past family history, past medical history, past social history, past surgical history and problem list     Review of Systems   Constitutional: Negative for activity change, appetite change, chills and fever  Weight gain    HENT: Positive for trouble swallowing  Negative for congestion, ear pain, rhinorrhea, sinus pain and sore throat  Eyes: Negative for pain and visual disturbance  Respiratory: Negative for cough, chest tightness, shortness of breath and wheezing  Cardiovascular: Negative for chest pain, palpitations and leg swelling  Gastrointestinal: Negative for abdominal pain, constipation, diarrhea, nausea and vomiting  Endocrine: Negative for cold intolerance, heat intolerance and polyuria  Genitourinary: Negative for difficulty urinating, dysuria and pelvic pain  Musculoskeletal: Negative for arthralgias, back pain, gait problem and myalgias  Skin: Negative for color change, pallor, rash and wound  Neurological: Negative for dizziness, weakness, light-headedness, numbness and headaches  Hematological: Negative for adenopathy  Does not bruise/bleed easily  Psychiatric/Behavioral: Negative for agitation, behavioral problems, confusion, decreased concentration and dysphoric mood  The patient is not nervous/anxious  Objective:      /90   Pulse 79   Ht 5' 3" (1 6 m)   Wt 73 9 kg (163 lb)   BMI 28 87 kg/m²          Physical Exam   Constitutional: She is oriented to person, place, and time  She appears well-developed and well-nourished  No distress  HENT:   Head: Normocephalic and atraumatic  Eyes: Pupils are equal, round, and reactive to light  EOM are normal  Right eye exhibits no discharge  Left eye exhibits no discharge  Neck: Normal range of motion  Neck supple  No thyromegaly present  Cardiovascular: Normal rate, normal heart sounds and intact distal pulses     No murmur heard   Pulmonary/Chest: Effort normal  No respiratory distress  She has no wheezes  She has no rales  She exhibits no tenderness  Abdominal: Soft  There is no tenderness  Musculoskeletal: Normal range of motion  She exhibits no edema, tenderness or deformity  Neurological: She is alert and oriented to person, place, and time  Coordination normal    Skin: Skin is warm  No rash noted  She is not diaphoretic  No erythema  No pallor  Psychiatric: She has a normal mood and affect   Her behavior is normal

## 2020-01-27 NOTE — ASSESSMENT & PLAN NOTE
- Thyroid function testing within normal limits   - Will continue to monitor with annual thyroid function tests

## 2020-02-10 ENCOUNTER — OFFICE VISIT (OUTPATIENT)
Dept: URGENT CARE | Facility: CLINIC | Age: 65
End: 2020-02-10
Payer: COMMERCIAL

## 2020-02-10 VITALS
RESPIRATION RATE: 16 BRPM | HEART RATE: 66 BPM | SYSTOLIC BLOOD PRESSURE: 122 MMHG | DIASTOLIC BLOOD PRESSURE: 76 MMHG | BODY MASS INDEX: 28.7 KG/M2 | OXYGEN SATURATION: 99 % | TEMPERATURE: 98.1 F | HEIGHT: 63 IN | WEIGHT: 162 LBS

## 2020-02-10 DIAGNOSIS — B34.9 VIRAL SYNDROME: ICD-10-CM

## 2020-02-10 DIAGNOSIS — R51.9 ACUTE NONINTRACTABLE HEADACHE, UNSPECIFIED HEADACHE TYPE: Primary | ICD-10-CM

## 2020-02-10 PROCEDURE — G0382 LEV 3 HOSP TYPE B ED VISIT: HCPCS | Performed by: PHYSICIAN ASSISTANT

## 2020-02-10 RX ORDER — KETOROLAC TROMETHAMINE 30 MG/ML
30 INJECTION, SOLUTION INTRAMUSCULAR; INTRAVENOUS ONCE
Status: COMPLETED | OUTPATIENT
Start: 2020-02-10 | End: 2020-02-10

## 2020-02-10 RX ADMIN — KETOROLAC TROMETHAMINE 30 MG: 30 INJECTION, SOLUTION INTRAMUSCULAR; INTRAVENOUS at 12:07

## 2020-02-10 NOTE — PATIENT INSTRUCTIONS
Continue Excedrin or ibuprofen for discomfort relief  Apply a warm compress to your face for 20-30 minutes at a time, as needed for swelling and discomfort  You may take Delsym for the cough  Rest your voice when possible and avoid whispering  Rest in a dark quiet room  Ensure you are drinking plenty of water and get lots of sleep  Remove any stressors  Follow up with family doctor in 3-5 days  Proceed to the ER with headache lasting greater than 72 hours or if symptoms worsen

## 2020-02-10 NOTE — PROGRESS NOTES
[address]        NAME: Gaston Chin is a 62 y o  female  : 1955    MRN: [id number]  DATE: February 10, 2020  TIME: 12:32 PM    Assessment and Plan   Acute nonintractable headache, unspecified headache type [R51]  1  Acute nonintractable headache, unspecified headache type  ketorolac (TORADOL) injection 30 mg   2  Viral syndrome       Patient Instructions   Continue Excedrin or ibuprofen for discomfort relief  Apply a warm compress to your face for 20-30 minutes at a time, as needed for swelling and discomfort  You may take Delsym for the cough  Rest your voice when possible and avoid whispering  Rest in a dark quiet room  Ensure you are drinking plenty of water and get lots of sleep  Remove any stressors  Follow up with family doctor in 3-5 days  Proceed to the ER with headache lasting greater than 72 hours or if symptoms worsen  Patient reported significant improvement of headache following Toradol administration  Discussed that hoarse voice and cough are likely secondary to impending viral syndrome which may be provoking headache symptoms  Encouraged supportive therapies and follow-up if symptoms persist or worsen  She was agreeable  All questions answered  Precautions given  Chief Complaint     Chief Complaint   Patient presents with    Headache     Patient presents today with persistant headache since   She states it is around her entire head and "shooting up my neck"  Also presents with slight cough since   Used migraine medication which normaly helps, but not in this case  History of Present Illness   50-year-old female presenting with c/o headache x 3 days  Reports a tension of the neck and a tightness wrapping around the head, with pressure felt throughout the head  Neck discomfort is equal b/l, worse on right side with touch/pressure application   Reports this headache is her common migraine type in characteristics, however, is lasting longer than typical  States she typically gets a floater and flashing stars of the right eye, that was pressent 3 days ago, now resolved  No further change in vision, sensitivity to light or noise, change in hearing, N/V, dizziness, or lightheadedness  No numbness, tingling, or weakness  Reports a hoarse voice and NP cough over the past few days  No F/C/S, ear pain, NC, RN, or ST  Notes some recent stress, though mild  No change in sleep  Reports to drinking plenty of water  No recent change in diet or medications  Has treated with Excedrin migraine for first 2 days, none so far today  Review of Systems   Review of Systems   Constitutional: Positive for fatigue  Negative for chills, diaphoresis and fever  HENT: Positive for voice change  Negative for congestion, ear pain, rhinorrhea and sore throat  Respiratory: Positive for cough  Negative for shortness of breath and wheezing  Cardiovascular: Negative for chest pain and palpitations  Gastrointestinal: Negative for abdominal pain, diarrhea, nausea and vomiting  Musculoskeletal: Positive for neck pain  Negative for myalgias  Skin: Negative for color change and rash  Neurological: Positive for headaches  Negative for dizziness, weakness and light-headedness       Current Medications       Current Outpatient Medications:     ACZONE 7 5 % GEL, APPLY ONCE DAILY, Disp: , Rfl: 3    aspirin 81 mg chewable tablet, Chew 81 mg daily, Disp: , Rfl:     atorvastatin (LIPITOR) 20 mg tablet, TAKE 1 TABLET BY MOUTH EVERY DAY, Disp: 90 tablet, Rfl: 3    Cholecalciferol (VITAMIN D3) 2000 units TABS, Take 2,000 Units by mouth daily, Disp: , Rfl:     Cyanocobalamin (VITAMIN B 12 PO), Take 5,000 mcg by mouth daily, Disp: , Rfl:     Multiple Vitamins-Calcium (ONE-A-DAY WOMENS FORMULA PO), Take 1 tablet by mouth daily, Disp: , Rfl:     Omega-3 Fatty Acids (FISH OIL) 1,000 mg, Take 4,000 mg by mouth daily, Disp: , Rfl:     omeprazole (PriLOSEC) 10 mg delayed release capsule, Take 10 mg by mouth daily, Disp: , Rfl:     SOOLANTRA 1 % CREA, , Disp: , Rfl:     doxycycline monohydrate (MONODOX) 100 mg capsule, Take 100 mg by mouth as needed , Disp: , Rfl: 3    hydrocortisone 2 5 % cream, Apply topically 4 (four) times a day as needed for rash for up to 5 days, Disp: 30 g, Rfl: 0  No current facility-administered medications for this visit       Current Allergies     Allergies as of 02/10/2020    (No Known Allergies)            The following portions of the patient's history were reviewed and updated as appropriate: allergies, current medications, past family history, past medical history, past social history, past surgical history and problem list      Past Medical History:   Diagnosis Date    Allergic rhinitis     last assessed: 9/29/2016    Anxiety     last assessed: 8/22/2017    Disease of thyroid gland     Diverticulosis     last assessed: 10/7/2013    GERD (gastroesophageal reflux disease)     Hydronephrosis, right     last assessed: 5/22/2013    Hyperlipidemia     last assessed 8/22/2017    Hypokalemia     last assessed: 3/24/2015    IBS (irritable bowel syndrome)     last assessed: 8/22/2017    Internal hemorrhoids     last assessed: 10/7/2013    Migraines     last assessed: 8/22/2017    Nephrolithiasis     Nonspecific abnormal results of function study of thyroid     last assessed: 10/8/2013    Osteoarthrosis of knee     last assessed: 2/28/2017    Osteopenia     last assessed: 8/22/2017    Renal calculi     last assessed: 4/29/2016    Renal cyst     last assessed: 7/15/2015    Total bilirubin, elevated     last assessed: 8/22/2017    Uterine leiomyoma        Past Surgical History:   Procedure Laterality Date    BREAST BIOPSY Right 1998    benign    core bx    BREAST EXCISIONAL BIOPSY Left 1999    benign    HAND SURGERY      KNEE CARTILAGE SURGERY Left     x3    KNEE SURGERY      LAPAROTOMY N/A 10/30/2017    Procedure: LAPAROTOMY EXPLORATORY, DRAINAGE PELVIC ABSCESS; APPENDECTOMY;  Surgeon: Kaitlyn Doran DO;  [facility];  Service: General    MYOMECTOMY      SALIVARY GLAND SURGERY      and ducts    THYROID SURGERY      biopsy thyroid using percutaneous core needle    TONSILLECTOMY      TUBAL LIGATION      UTERINE FIBROID SURGERY      embolization       Family History   Problem Relation Age of Onset    Cancer Mother     Stomach cancer Mother     Other Father         CVA    Heart disease Father     Diabetes Sister     Arthritis Sister     Kidney disease Sister     Hypertension Brother     Breast cancer Cousin 28    Breast cancer Other 62    No Known Problems Sister     No Known Problems Sister     No Known Problems Maternal Aunt     No Known Problems Maternal Aunt     No Known Problems Maternal Aunt     No Known Problems Maternal Aunt     Kidney disease Paternal Aunt          Medications have been verified  Objective   /76   Pulse 66   Temp 98 1 °F (36 7 °C)   Resp 16   Ht 5' 3" (1 6 m)   Wt 73 5 kg (162 lb)   SpO2 99%   BMI 28 70 kg/m²        Physical Exam     Physical Exam   Constitutional: Vital signs are normal  She appears well-developed and well-nourished  She is cooperative  She does not appear ill  No distress  HENT:   Head: Normocephalic and atraumatic  Right Ear: Hearing, tympanic membrane, external ear and ear canal normal    Left Ear: Hearing, tympanic membrane, external ear and ear canal normal    Nose: Nose normal    Mouth/Throat: Uvula is midline, oropharynx is clear and moist and mucous membranes are normal  Mucous membranes are not pale, not dry and not cyanotic  No oral lesions  No uvula swelling  No oropharyngeal exudate, posterior oropharyngeal edema, posterior oropharyngeal erythema or tonsillar abscesses  Eyes: Pupils are equal, round, and reactive to light  Conjunctivae, EOM and lids are normal  Right eye exhibits no discharge and no exudate   Left eye exhibits no discharge and no exudate  Neck: Trachea normal and phonation normal  Neck supple  Muscular tenderness (with palpable spasm of cervical psm) present  No tracheal tenderness and no spinous process tenderness present  No neck rigidity  No edema, no erythema and normal range of motion present  Cardiovascular: Normal rate, regular rhythm and normal heart sounds  Exam reveals no distant heart sounds  Pulmonary/Chest: Effort normal and breath sounds normal  No stridor  No respiratory distress  She has no decreased breath sounds  She has no wheezes  She has no rhonchi  She has no rales  Abdominal: Soft  Bowel sounds are normal  She exhibits no distension and no mass  There is no tenderness  There is no rigidity, no rebound and no guarding  Lymphadenopathy:     She has no cervical adenopathy  Neurological: She is alert  She has normal strength and normal reflexes  She is not disoriented  No cranial nerve deficit or sensory deficit  She displays a negative Romberg sign  Coordination and gait normal  GCS eye subscore is 4  GCS verbal subscore is 5  GCS motor subscore is 6  Skin: Skin is warm, dry and intact  No rash noted  She is not diaphoretic  No erythema  No pallor  Psychiatric: She has a normal mood and affect  Her behavior is normal  Judgment and thought content normal    Nursing note and vitals reviewed

## 2020-02-11 ENCOUNTER — HOSPITAL ENCOUNTER (OUTPATIENT)
Dept: RADIOLOGY | Age: 65
Discharge: HOME | End: 2020-02-11
Payer: COMMERCIAL

## 2020-02-11 VITALS — HEIGHT: 63 IN | BODY MASS INDEX: 28.7 KG/M2 | WEIGHT: 162 LBS

## 2020-02-11 DIAGNOSIS — Z12.31 ENCOUNTER FOR SCREENING MAMMOGRAM FOR MALIGNANT NEOPLASM OF BREAST: ICD-10-CM

## 2020-02-11 PROCEDURE — 77067 SCR MAMMO BI INCL CAD: CPT

## 2020-03-02 PROBLEM — K43.2 INCISIONAL HERNIA: Status: ACTIVE | Noted: 2020-03-02

## 2020-03-02 PROBLEM — K43.9 VENTRAL HERNIA WITHOUT OBSTRUCTION OR GANGRENE: Status: RESOLVED | Noted: 2019-09-29 | Resolved: 2020-03-02

## 2020-03-02 NOTE — H&P (VIEW-ONLY)
Assessment/Plan:  Patient presents with an incisional hernia at the level of the umbilicus  She desires undergo repair  Risks and benefits explained patient is agreeable  Diagnoses and all orders for this visit:    Incisional hernia        Subjective:      Patient ID: Amelia Fritz is a 62 y o  female  Presents for re evaluation of incisional hernia , seen in office 9/2019  Hernia has gotten larger and frequency of pain has increased  Desires repair at this time  The following portions of the patient's history were reviewed and updated as appropriate:     She  has a past medical history of Allergic rhinitis, Anxiety, Disease of thyroid gland, Diverticulosis, GERD (gastroesophageal reflux disease), Hydronephrosis, right, Hyperlipidemia, Hypokalemia, IBS (irritable bowel syndrome), Internal hemorrhoids, Migraines, Nephrolithiasis, Nonspecific abnormal results of function study of thyroid, Osteoarthrosis of knee, Osteopenia, Renal calculi, Renal cyst, Total bilirubin, elevated, and Uterine leiomyoma  She  has a past surgical history that includes Uterine fibroid surgery; Knee cartilage surgery (Left); LAPAROTOMY (N/A, 10/30/2017); Thyroid surgery; Hand surgery; Knee surgery; Salivary gland surgery; Tonsillectomy; Tubal ligation; Myomectomy; Breast excisional biopsy (Left, 1999); and Breast biopsy (Right, 1998)  Her family history includes Arthritis in her sister; Breast cancer (age of onset: 28) in her cousin; Breast cancer (age of onset: 62) in her other; Cancer in her mother; Diabetes in her sister; Heart disease in her father; Hypertension in her brother; Kidney disease in her paternal aunt and sister; No Known Problems in her maternal aunt, maternal aunt, maternal aunt, maternal aunt, sister, and sister; Other in her father; Stomach cancer in her mother  She  reports that she quit smoking about 8 years ago  She has a 6 00 pack-year smoking history   She has never used smokeless tobacco  She reports that she drinks alcohol  She reports that she does not use drugs  Current Outpatient Medications   Medication Sig Dispense Refill    ACZONE 7 5 % GEL APPLY ONCE DAILY  3    aspirin 81 mg chewable tablet Chew 81 mg daily      atorvastatin (LIPITOR) 20 mg tablet TAKE 1 TABLET BY MOUTH EVERY DAY 90 tablet 3    Cholecalciferol (VITAMIN D3) 2000 units TABS Take 2,000 Units by mouth daily      Cyanocobalamin (VITAMIN B 12 PO) Take 5,000 mcg by mouth daily      doxycycline monohydrate (MONODOX) 100 mg capsule Take 100 mg by mouth as needed   3    hydrocortisone 2 5 % cream Apply topically 4 (four) times a day as needed for rash for up to 5 days 30 g 0    Multiple Vitamins-Calcium (ONE-A-DAY WOMENS FORMULA PO) Take 1 tablet by mouth daily      Omega-3 Fatty Acids (FISH OIL) 1,000 mg Take 4,000 mg by mouth daily      omeprazole (PriLOSEC) 10 mg delayed release capsule Take 10 mg by mouth daily      SOOLANTRA 1 % CREA        No current facility-administered medications for this visit  She has No Known Allergies       Review of Systems   Constitutional: Negative  Negative for activity change  HENT: Negative  Eyes: Negative  Respiratory: Negative  Cardiovascular: Negative  Gastrointestinal: Positive for abdominal pain  Endocrine: Negative  Genitourinary: Negative  Musculoskeletal: Negative  Skin: Negative  Allergic/Immunologic: Negative  Neurological: Negative  Psychiatric/Behavioral: Negative for agitation, behavioral problems and confusion  The patient is not nervous/anxious  Objective: There were no vitals taken for this visit  Physical Exam   Constitutional: She is oriented to person, place, and time  She appears well-developed and well-nourished  HENT:   Head: Normocephalic and atraumatic  Nose: Nose normal    Eyes: Pupils are equal, round, and reactive to light  EOM are normal    Neck: Normal range of motion  Neck supple  Cardiovascular: Normal rate, regular rhythm and normal heart sounds  Pulmonary/Chest: Effort normal and breath sounds normal    Abdominal: Soft  Bowel sounds are normal  A hernia (Patient presents with an incisional hernia  This reducible  It is located at the level of the umbilicus ) is present  Musculoskeletal: Normal range of motion  Neurological: She is alert and oriented to person, place, and time  Skin: Skin is warm and dry  Psychiatric: She has a normal mood and affect

## 2020-03-03 ENCOUNTER — TRANSCRIBE ORDERS (OUTPATIENT)
Dept: LAB | Facility: CLINIC | Age: 65
End: 2020-03-03

## 2020-03-03 DIAGNOSIS — K43.2 INCISIONAL HERNIA: ICD-10-CM

## 2020-03-03 PROCEDURE — 93005 ELECTROCARDIOGRAM TRACING: CPT

## 2020-03-04 LAB
ATRIAL RATE: 62 BPM
ATRIAL RATE: 62 BPM
P AXIS: 50 DEGREES
P AXIS: 50 DEGREES
PR INTERVAL: 164 MS
PR INTERVAL: 164 MS
QRS AXIS: 10 DEGREES
QRS AXIS: 10 DEGREES
QRSD INTERVAL: 74 MS
QRSD INTERVAL: 74 MS
QT INTERVAL: 430 MS
QT INTERVAL: 430 MS
QTC INTERVAL: 436 MS
QTC INTERVAL: 436 MS
T WAVE AXIS: 30 DEGREES
T WAVE AXIS: 30 DEGREES
VENTRICULAR RATE: 62 BPM
VENTRICULAR RATE: 62 BPM

## 2020-03-04 PROCEDURE — 93010 ELECTROCARDIOGRAM REPORT: CPT | Performed by: INTERNAL MEDICINE

## 2020-03-10 NOTE — PRE-PROCEDURE INSTRUCTIONS
Pre-Surgery Instructions:   Medication Instructions    ACZONE 7 5 % GEL Instructed patient per Anesthesia Guidelines   aspirin 81 mg chewable tablet Patient was instructed by Physician and understands   atorvastatin (LIPITOR) 20 mg tablet Instructed patient per Anesthesia Guidelines   Cholecalciferol (VITAMIN D3) 2000 units TABS Instructed patient per Anesthesia Guidelines   Cyanocobalamin (VITAMIN B 12 PO) Instructed patient per Anesthesia Guidelines   Multiple Vitamins-Calcium (ONE-A-DAY WOMENS FORMULA PO) Instructed patient per Anesthesia Guidelines   Omega-3 Fatty Acids (FISH OIL) 1,000 mg Instructed patient per Anesthesia Guidelines   omeprazole (PriLOSEC) 10 mg delayed release capsule Instructed patient per Anesthesia Guidelines   SOOLANTRA 1 % CREA Instructed patient per Anesthesia Guidelines      Pre op,medications and showering instructions reviewed-Patient has hibiclens

## 2020-03-21 NOTE — PROGRESS NOTES
I called to discuss cancelling elective surgery with Mrs Love  She states that she continues with daily abdominal pain as her hernias difficult to manage  This is been delayed over the last 6 months  She does not believe that she can wait until another date  Given this information I recommended that we pursue hernia repair  Patient is agreeable

## 2020-03-26 ENCOUNTER — ANESTHESIA EVENT (OUTPATIENT)
Dept: PERIOP | Facility: HOSPITAL | Age: 65
End: 2020-03-26
Payer: COMMERCIAL

## 2020-03-26 ENCOUNTER — ANESTHESIA (OUTPATIENT)
Dept: PERIOP | Facility: HOSPITAL | Age: 65
End: 2020-03-26
Payer: COMMERCIAL

## 2020-03-26 ENCOUNTER — HOSPITAL ENCOUNTER (OUTPATIENT)
Facility: HOSPITAL | Age: 65
Setting detail: OUTPATIENT SURGERY
Discharge: HOME | End: 2020-03-26
Attending: SURGERY | Admitting: SURGERY
Payer: COMMERCIAL

## 2020-03-26 VITALS
BODY MASS INDEX: 28.7 KG/M2 | DIASTOLIC BLOOD PRESSURE: 67 MMHG | SYSTOLIC BLOOD PRESSURE: 121 MMHG | RESPIRATION RATE: 16 BRPM | HEART RATE: 76 BPM | TEMPERATURE: 97.6 F | OXYGEN SATURATION: 96 % | WEIGHT: 162 LBS | HEIGHT: 63 IN

## 2020-03-26 DIAGNOSIS — K43.2 INCISIONAL HERNIA, WITHOUT OBSTRUCTION OR GANGRENE: Primary | ICD-10-CM

## 2020-03-26 PROCEDURE — 49560 PR REPAIR INCISIONAL HERNIA,REDUCIBLE: CPT | Performed by: SURGERY

## 2020-03-26 PROCEDURE — 49568 PR IMPLANT MESH HERNIA REPAIR/DEBRIDEMENT CLOSURE: CPT | Performed by: SURGERY

## 2020-03-26 PROCEDURE — C1781 MESH (IMPLANTABLE): HCPCS | Performed by: SURGERY

## 2020-03-26 DEVICE — MODIFIED ONFLEX MESH
Type: IMPLANTABLE DEVICE | Site: ABDOMEN | Status: FUNCTIONAL
Brand: MODIFIED ONFLEX MESH

## 2020-03-26 RX ORDER — SODIUM CHLORIDE, SODIUM LACTATE, POTASSIUM CHLORIDE, CALCIUM CHLORIDE 600; 310; 30; 20 MG/100ML; MG/100ML; MG/100ML; MG/100ML
100 INJECTION, SOLUTION INTRAVENOUS CONTINUOUS
Status: DISCONTINUED | OUTPATIENT
Start: 2020-03-26 | End: 2020-03-26 | Stop reason: HOSPADM

## 2020-03-26 RX ORDER — EPHEDRINE SULFATE 50 MG/ML
INJECTION INTRAVENOUS AS NEEDED
Status: DISCONTINUED | OUTPATIENT
Start: 2020-03-26 | End: 2020-03-26 | Stop reason: SURG

## 2020-03-26 RX ORDER — ONDANSETRON 2 MG/ML
INJECTION INTRAMUSCULAR; INTRAVENOUS AS NEEDED
Status: DISCONTINUED | OUTPATIENT
Start: 2020-03-26 | End: 2020-03-26 | Stop reason: SURG

## 2020-03-26 RX ORDER — OXYCODONE HYDROCHLORIDE 5 MG/1
10 TABLET ORAL EVERY 4 HOURS PRN
Status: DISCONTINUED | OUTPATIENT
Start: 2020-03-26 | End: 2020-03-26 | Stop reason: HOSPADM

## 2020-03-26 RX ORDER — DEXAMETHASONE SODIUM PHOSPHATE 10 MG/ML
INJECTION, SOLUTION INTRAMUSCULAR; INTRAVENOUS AS NEEDED
Status: DISCONTINUED | OUTPATIENT
Start: 2020-03-26 | End: 2020-03-26 | Stop reason: SURG

## 2020-03-26 RX ORDER — OXYCODONE HYDROCHLORIDE 5 MG/1
5 TABLET ORAL EVERY 4 HOURS PRN
Status: DISCONTINUED | OUTPATIENT
Start: 2020-03-26 | End: 2020-03-26 | Stop reason: HOSPADM

## 2020-03-26 RX ORDER — FENTANYL CITRATE/PF 50 MCG/ML
25 SYRINGE (ML) INJECTION
Status: COMPLETED | OUTPATIENT
Start: 2020-03-26 | End: 2020-03-26

## 2020-03-26 RX ORDER — FENTANYL CITRATE 50 UG/ML
INJECTION, SOLUTION INTRAMUSCULAR; INTRAVENOUS AS NEEDED
Status: DISCONTINUED | OUTPATIENT
Start: 2020-03-26 | End: 2020-03-26 | Stop reason: SURG

## 2020-03-26 RX ORDER — SUCCINYLCHOLINE/SOD CL,ISO/PF 100 MG/5ML
SYRINGE (ML) INTRAVENOUS AS NEEDED
Status: DISCONTINUED | OUTPATIENT
Start: 2020-03-26 | End: 2020-03-26 | Stop reason: SURG

## 2020-03-26 RX ORDER — MAGNESIUM HYDROXIDE 1200 MG/15ML
LIQUID ORAL AS NEEDED
Status: DISCONTINUED | OUTPATIENT
Start: 2020-03-26 | End: 2020-03-26 | Stop reason: HOSPADM

## 2020-03-26 RX ORDER — BUPIVACAINE HYDROCHLORIDE 2.5 MG/ML
INJECTION, SOLUTION EPIDURAL; INFILTRATION; INTRACAUDAL AS NEEDED
Status: DISCONTINUED | OUTPATIENT
Start: 2020-03-26 | End: 2020-03-26 | Stop reason: HOSPADM

## 2020-03-26 RX ORDER — SODIUM CHLORIDE, SODIUM LACTATE, POTASSIUM CHLORIDE, CALCIUM CHLORIDE 600; 310; 30; 20 MG/100ML; MG/100ML; MG/100ML; MG/100ML
125 INJECTION, SOLUTION INTRAVENOUS CONTINUOUS
Status: DISCONTINUED | OUTPATIENT
Start: 2020-03-26 | End: 2020-03-26 | Stop reason: HOSPADM

## 2020-03-26 RX ORDER — OXYCODONE HYDROCHLORIDE AND ACETAMINOPHEN 5; 325 MG/1; MG/1
1 TABLET ORAL EVERY 4 HOURS PRN
Qty: 20 TABLET | Refills: 0 | Status: SHIPPED | OUTPATIENT
Start: 2020-03-26 | End: 2020-05-26

## 2020-03-26 RX ORDER — HYDROMORPHONE HCL/PF 1 MG/ML
1 SYRINGE (ML) INJECTION
Status: DISCONTINUED | OUTPATIENT
Start: 2020-03-26 | End: 2020-03-26 | Stop reason: HOSPADM

## 2020-03-26 RX ORDER — ONDANSETRON 2 MG/ML
4 INJECTION INTRAMUSCULAR; INTRAVENOUS ONCE AS NEEDED
Status: DISCONTINUED | OUTPATIENT
Start: 2020-03-26 | End: 2020-03-26 | Stop reason: HOSPADM

## 2020-03-26 RX ORDER — MIDAZOLAM HYDROCHLORIDE 2 MG/2ML
INJECTION, SOLUTION INTRAMUSCULAR; INTRAVENOUS AS NEEDED
Status: DISCONTINUED | OUTPATIENT
Start: 2020-03-26 | End: 2020-03-26 | Stop reason: SURG

## 2020-03-26 RX ORDER — CEFAZOLIN SODIUM 1 G/50ML
1000 SOLUTION INTRAVENOUS ONCE
Status: COMPLETED | OUTPATIENT
Start: 2020-03-26 | End: 2020-03-26

## 2020-03-26 RX ORDER — ACETAMINOPHEN 325 MG/1
650 TABLET ORAL EVERY 6 HOURS PRN
Status: DISCONTINUED | OUTPATIENT
Start: 2020-03-26 | End: 2020-03-26 | Stop reason: HOSPADM

## 2020-03-26 RX ORDER — PROPOFOL 10 MG/ML
INJECTION, EMULSION INTRAVENOUS AS NEEDED
Status: DISCONTINUED | OUTPATIENT
Start: 2020-03-26 | End: 2020-03-26 | Stop reason: SURG

## 2020-03-26 RX ORDER — ONDANSETRON 2 MG/ML
4 INJECTION INTRAMUSCULAR; INTRAVENOUS EVERY 4 HOURS PRN
Status: DISCONTINUED | OUTPATIENT
Start: 2020-03-26 | End: 2020-03-26 | Stop reason: HOSPADM

## 2020-03-26 RX ORDER — HYDROMORPHONE HCL/PF 1 MG/ML
0.5 SYRINGE (ML) INJECTION
Status: DISCONTINUED | OUTPATIENT
Start: 2020-03-26 | End: 2020-03-26 | Stop reason: HOSPADM

## 2020-03-26 RX ORDER — LIDOCAINE HYDROCHLORIDE 10 MG/ML
INJECTION, SOLUTION EPIDURAL; INFILTRATION; INTRACAUDAL; PERINEURAL AS NEEDED
Status: DISCONTINUED | OUTPATIENT
Start: 2020-03-26 | End: 2020-03-26 | Stop reason: SURG

## 2020-03-26 RX ORDER — OXYCODONE HYDROCHLORIDE AND ACETAMINOPHEN 5; 325 MG/1; MG/1
1 TABLET ORAL EVERY 4 HOURS PRN
Status: DISCONTINUED | OUTPATIENT
Start: 2020-03-26 | End: 2020-03-26 | Stop reason: HOSPADM

## 2020-03-26 RX ADMIN — MIDAZOLAM HYDROCHLORIDE 2 MG: 1 INJECTION, SOLUTION INTRAMUSCULAR; INTRAVENOUS at 14:45

## 2020-03-26 RX ADMIN — FENTANYL CITRATE 25 MCG: 50 INJECTION INTRAMUSCULAR; INTRAVENOUS at 16:40

## 2020-03-26 RX ADMIN — LIDOCAINE HYDROCHLORIDE 50 MG: 10 INJECTION, SOLUTION EPIDURAL; INFILTRATION; INTRACAUDAL; PERINEURAL at 14:48

## 2020-03-26 RX ADMIN — FENTANYL CITRATE 25 MCG: 50 INJECTION INTRAMUSCULAR; INTRAVENOUS at 16:24

## 2020-03-26 RX ADMIN — EPHEDRINE SULFATE 10 MG: 50 INJECTION, SOLUTION INTRAVENOUS at 15:08

## 2020-03-26 RX ADMIN — CEFAZOLIN SODIUM 1000 MG: 1 SOLUTION INTRAVENOUS at 14:45

## 2020-03-26 RX ADMIN — FENTANYL CITRATE 50 MCG: 50 INJECTION INTRAMUSCULAR; INTRAVENOUS at 14:48

## 2020-03-26 RX ADMIN — ONDANSETRON 4 MG: 2 INJECTION INTRAMUSCULAR; INTRAVENOUS at 14:55

## 2020-03-26 RX ADMIN — DEXAMETHASONE SODIUM PHOSPHATE 4 MG: 10 INJECTION, SOLUTION INTRAMUSCULAR; INTRAVENOUS at 14:55

## 2020-03-26 RX ADMIN — EPHEDRINE SULFATE 10 MG: 50 INJECTION, SOLUTION INTRAVENOUS at 15:00

## 2020-03-26 RX ADMIN — FENTANYL CITRATE 25 MCG: 50 INJECTION INTRAMUSCULAR; INTRAVENOUS at 16:47

## 2020-03-26 RX ADMIN — Medication 100 MG: at 14:48

## 2020-03-26 RX ADMIN — FENTANYL CITRATE 25 MCG: 50 INJECTION INTRAMUSCULAR; INTRAVENOUS at 16:33

## 2020-03-26 RX ADMIN — SODIUM CHLORIDE, SODIUM LACTATE, POTASSIUM CHLORIDE, AND CALCIUM CHLORIDE: .6; .31; .03; .02 INJECTION, SOLUTION INTRAVENOUS at 12:32

## 2020-03-26 RX ADMIN — FENTANYL CITRATE 50 MCG: 50 INJECTION INTRAMUSCULAR; INTRAVENOUS at 15:13

## 2020-03-26 RX ADMIN — PHENYLEPHRINE HYDROCHLORIDE 100 MCG: 10 INJECTION INTRAVENOUS at 14:54

## 2020-03-26 RX ADMIN — PROPOFOL 150 MG: 10 INJECTION, EMULSION INTRAVENOUS at 14:48

## 2020-03-26 RX ADMIN — PHENYLEPHRINE HYDROCHLORIDE 100 MCG: 10 INJECTION INTRAVENOUS at 15:23

## 2020-03-26 NOTE — OP NOTE
OPERATIVE REPORT  PATIENT NAME: Gaston Chin    :  1955  MRN: [id number]  Pt Location: AN OR ROOM 02    SURGERY DATE: 3/26/2020    Surgeon(s) and Role:     * Vida Marquis MD - Primary     * Rob Tapia MD - Assisting    Preop Diagnosis:  Incisional hernia [K43 2]    Post-Op Diagnosis Codes:     * Incisional hernia [K43 2]    Procedure(s) (LRB):  INCISIONAL HERNIA REPAIR WITH MESH (N/A)    Specimen(s):  * No specimens in log *    Estimated Blood Loss:   Minimal    Drains:  * No LDAs found *    Anesthesia Type:   General    Operative Indications:  Incisional hernia [K43 2]    Operative Findings:  1 x 2 inch incisional hernia  3 4 x 5 6 inch onflex mesh placed    Complications:   None    Procedure and Technique:  Patient was identified by name and arm band in the preoperative holding area  She was brought to the operative suite, where general anesthesia was induced and an an endotracheal tube was placed by the anesthesiology team  The patient was prepped and draped in the usual sterile fashion  A brief timeout was called, and all in the room were in agreement      1% lidocaine was used to infiltrate the skin, and 15 blade was used to make vertical periumbilical incision  Electrocautery was used to dissect down to the fascia  The hernia sac was identified, and entered  The fascial edges were cleared around the hernia using the bovie  The hernia defect measured approximately 1 x 2 in  A 3 5 x 5 6 in onflex patch was used to repair the defect  2-0 vicryl sutures x5 in a figure-of-eight fashion were used to tack the patch onto the fascial edges and reapproximate the fascia  A Vicryl suture was used tack the dermis of the umbilicus onto the abdominal fascia  Vicryl sutures were used to close a deep dermal layer  4-0 Monocryl and hystacryl was used to close the skin  All instrument, needle, and sponge counts were correct x2   RF wanding was negative for retained foreign bodies  An abdominal binder was placed at the conclusion of the case      Dr Niecy Plascencia was present fore the entire procedure    Patient Disposition:  PACU     SIGNATURE: Umer Gallagher  DATE: March 26, 2020  TIME: 4:13 PM

## 2020-03-26 NOTE — ANESTHESIA PREPROCEDURE EVALUATION
Review of Systems/Medical History  Patient summary reviewed  Chart reviewed      Cardiovascular  EKG reviewed, Exercise tolerance (METS): >4,  Hyperlipidemia,   Comment: Normal sinus rhythm  Left atrial enlargement  Low voltage QRS  Cannot rule out Anterior infarct , age undetermined  When compared with ECG of 30-OCT-2017 09:31,  No significant change was found  Confirmed by Myra David ([id number]) on 3/4/2020 10:38:15 AM,  Pulmonary  Negative pulmonary ROS        GI/Hepatic    GERD well controlled,        Kidney stones,        Endo/Other  History of thyroid disease , hyperthyroidism,      GYN       Hematology  Negative hematology ROS      Musculoskeletal    Arthritis     Neurology    Headaches,    Psychology   Anxiety,              Physical Exam    Airway    Mallampati score: II  TM Distance: >3 FB  Neck ROM: full     Dental   No notable dental hx     Cardiovascular  Cardiovascular exam normal    Pulmonary  Pulmonary exam normal     Other Findings        Anesthesia Plan  ASA Score- 2     Anesthesia Type- general with ASA Monitors  Additional Monitors:   Airway Plan: ETT  Plan Factors-  Patient did not smoke on day of surgery  Induction- intravenous  Postoperative Plan-     Informed Consent- Anesthetic plan and risks discussed with patient  I personally reviewed this patient with the CRNA  Discussed and agreed on the Anesthesia Plan with the CRNA  Linnea Sheppard

## 2020-03-26 NOTE — INTERVAL H&P NOTE
H&P reviewed  After examining the patient I find no changes in the patients condition since the H&P had been written      Vitals:    03/26/20 1229   BP: 136/60   Pulse: 73   Resp: 18   Temp: 98 6 °F (37 °C)   SpO2: 97%

## 2020-03-26 NOTE — ANESTHESIA POSTPROCEDURE EVALUATION
Post-Op Assessment Note    CV Status:  Stable    Pain management: adequate     Mental Status:  Alert and awake   Hydration Status:  Euvolemic   PONV Controlled:  Controlled   Airway Patency:  Patent   Post Op Vitals Reviewed: Yes      Staff: CRNA   Comments: Vss report rn          BP      Temp      Pulse     Resp      SpO2

## 2020-03-26 NOTE — DISCHARGE INSTRUCTIONS
Postoperative Instructions  please call [phone] to arrange a virtual telephone appointment for 2 weeks  I can contact you by phone at the time indicated and we can discuss postsurgical questions  If you have a smart phone, we can face time  [address], [address], [facility]  [address] between [employer] and [employer]     Activity:    Do not lift more than 10 pounds (a gallon of milk) for 1-2 weeks post-operatively    Walking is encouraged  Normal daily activities including climbing steps are okay  Do not engage in strenuous activity or contact sports for 4-6 weeks post-operatively    Return to work:    Return to work to be discussed at first post-operative visit    Diet:    You may return to your normal heart healthy diet    Wound Care: Your wound is closed with Histoacryl  It is okay to shower  Wash incision gently with soap and water and pat dry  Do not soak incisions in bath water or swim for two weeks  Do not apply any creams or ointments  Ice as needed    Pain Medication:    Please take as directed  No driving while taking narcotic pain medications    Other: The abdominal binder is for you  You can use it as much or as little as you would like  If you have questions after discharge please call the office    If you have increased pain, fever >101 5, increased drainage, redness or a bad smell at your surgery site, please call us immediately or come directly to the Emergency Room

## 2020-04-06 PROBLEM — K43.2 INCISIONAL HERNIA: Status: RESOLVED | Noted: 2020-03-02 | Resolved: 2020-04-06

## 2020-05-26 ENCOUNTER — TELEPHONE (OUTPATIENT)
Dept: FAMILY MEDICINE CLINIC | Facility: OTHER | Age: 65
End: 2020-05-26

## 2020-05-27 ENCOUNTER — OFFICE VISIT (OUTPATIENT)
Dept: FAMILY MEDICINE CLINIC | Facility: OTHER | Age: 65
End: 2020-05-27
Payer: COMMERCIAL

## 2020-05-27 VITALS
SYSTOLIC BLOOD PRESSURE: 128 MMHG | TEMPERATURE: 97.5 F | WEIGHT: 161 LBS | BODY MASS INDEX: 28.53 KG/M2 | OXYGEN SATURATION: 96 % | HEIGHT: 63 IN | DIASTOLIC BLOOD PRESSURE: 88 MMHG | HEART RATE: 72 BPM

## 2020-05-27 DIAGNOSIS — Z13.820 SCREENING FOR OSTEOPOROSIS: ICD-10-CM

## 2020-05-27 DIAGNOSIS — Z00.00 ANNUAL PHYSICAL EXAM: Primary | ICD-10-CM

## 2020-05-27 DIAGNOSIS — E78.2 MIXED HYPERLIPIDEMIA: ICD-10-CM

## 2020-05-27 DIAGNOSIS — R73.01 ELEVATED FASTING GLUCOSE: ICD-10-CM

## 2020-05-27 PROCEDURE — 99396 PREV VISIT EST AGE 40-64: CPT | Performed by: FAMILY MEDICINE

## 2020-06-12 LAB
ALBUMIN SERPL-MCNC: 4.3 G/DL (ref 3.6–5.1)
ALBUMIN/GLOB SERPL: 2.4 (CALC) (ref 1–2.5)
ALP SERPL-CCNC: 76 U/L (ref 37–153)
ALT SERPL-CCNC: 17 U/L (ref 6–29)
AST SERPL-CCNC: 17 U/L (ref 10–35)
BILIRUB SERPL-MCNC: 1.9 MG/DL (ref 0.2–1.2)
BUN SERPL-MCNC: 19 MG/DL (ref 7–25)
BUN/CREAT SERPL: ABNORMAL (CALC) (ref 6–22)
CALCIUM SERPL-MCNC: 9.6 MG/DL (ref 8.6–10.4)
CHLORIDE SERPL-SCNC: 109 MMOL/L (ref 98–110)
CHOLEST SERPL-MCNC: 173 MG/DL
CHOLEST/HDLC SERPL: 2.8 (CALC)
CO2 SERPL-SCNC: 28 MMOL/L (ref 20–32)
CREAT SERPL-MCNC: 0.93 MG/DL (ref 0.5–0.99)
EST. AVERAGE GLUCOSE BLD GHB EST-MCNC: 108 (CALC)
EST. AVERAGE GLUCOSE BLD GHB EST-SCNC: 6 (CALC)
GLOBULIN SER CALC-MCNC: 1.8 G/DL (CALC) (ref 1.9–3.7)
GLUCOSE SERPL-MCNC: 87 MG/DL (ref 65–99)
HBA1C MFR BLD: 5.4 % OF TOTAL HGB
HDLC SERPL-MCNC: 62 MG/DL
LDLC SERPL CALC-MCNC: 86 MG/DL (CALC)
NONHDLC SERPL-MCNC: 111 MG/DL (CALC)
POTASSIUM SERPL-SCNC: 4.4 MMOL/L (ref 3.5–5.3)
PROT SERPL-MCNC: 6.1 G/DL (ref 6.1–8.1)
SL AMB EGFR AFRICAN AMERICAN: 75 ML/MIN/1.73M2
SL AMB EGFR NON AFRICAN AMERICAN: 65 ML/MIN/1.73M2
SODIUM SERPL-SCNC: 145 MMOL/L (ref 135–146)
TRIGL SERPL-MCNC: 149 MG/DL

## 2020-06-22 ENCOUNTER — TELEPHONE (OUTPATIENT)
Dept: FAMILY MEDICINE CLINIC | Facility: OTHER | Age: 65
End: 2020-06-22

## 2020-07-30 DIAGNOSIS — E78.2 MIXED HYPERLIPIDEMIA: ICD-10-CM

## 2020-07-31 RX ORDER — ATORVASTATIN CALCIUM 20 MG/1
20 TABLET, FILM COATED ORAL
Qty: 90 TABLET | Refills: 1 | Status: SHIPPED | OUTPATIENT
Start: 2020-07-31 | End: 2020-08-04 | Stop reason: SDUPTHER

## 2020-08-04 ENCOUNTER — EVALUATION (OUTPATIENT)
Dept: PHYSICAL THERAPY | Facility: REHABILITATION | Age: 65
End: 2020-08-04
Payer: COMMERCIAL

## 2020-08-04 ENCOUNTER — OFFICE VISIT (OUTPATIENT)
Dept: FAMILY MEDICINE CLINIC | Facility: OTHER | Age: 65
End: 2020-08-04
Payer: COMMERCIAL

## 2020-08-04 VITALS
HEIGHT: 63 IN | WEIGHT: 160.13 LBS | DIASTOLIC BLOOD PRESSURE: 78 MMHG | OXYGEN SATURATION: 97 % | HEART RATE: 65 BPM | TEMPERATURE: 98.4 F | SYSTOLIC BLOOD PRESSURE: 120 MMHG | BODY MASS INDEX: 28.37 KG/M2

## 2020-08-04 DIAGNOSIS — M16.9 OSTEOARTHROSIS, HIP: ICD-10-CM

## 2020-08-04 DIAGNOSIS — Z11.4 SCREENING FOR HIV (HUMAN IMMUNODEFICIENCY VIRUS): ICD-10-CM

## 2020-08-04 DIAGNOSIS — E78.2 MIXED HYPERLIPIDEMIA: ICD-10-CM

## 2020-08-04 DIAGNOSIS — M54.41 ACUTE RIGHT-SIDED LOW BACK PAIN WITH RIGHT-SIDED SCIATICA: Primary | ICD-10-CM

## 2020-08-04 DIAGNOSIS — M25.551 ACUTE RIGHT HIP PAIN: ICD-10-CM

## 2020-08-04 DIAGNOSIS — M16.9 OSTEOARTHROSIS, HIP: Primary | ICD-10-CM

## 2020-08-04 DIAGNOSIS — M47.816 OSTEOARTHRITIS OF LUMBAR SPINE, UNSPECIFIED SPINAL OSTEOARTHRITIS COMPLICATION STATUS: ICD-10-CM

## 2020-08-04 PROCEDURE — 99213 OFFICE O/P EST LOW 20 MIN: CPT | Performed by: FAMILY MEDICINE

## 2020-08-04 PROCEDURE — 3008F BODY MASS INDEX DOCD: CPT | Performed by: FAMILY MEDICINE

## 2020-08-04 PROCEDURE — 97110 THERAPEUTIC EXERCISES: CPT | Performed by: PHYSICAL THERAPIST

## 2020-08-04 PROCEDURE — 97161 PT EVAL LOW COMPLEX 20 MIN: CPT | Performed by: PHYSICAL THERAPIST

## 2020-08-04 PROCEDURE — 1036F TOBACCO NON-USER: CPT | Performed by: FAMILY MEDICINE

## 2020-08-04 RX ORDER — ATORVASTATIN CALCIUM 20 MG/1
20 TABLET, FILM COATED ORAL
Qty: 90 TABLET | Refills: 1 | Status: SHIPPED | OUTPATIENT
Start: 2020-08-04 | End: 2021-10-19 | Stop reason: SDUPTHER

## 2020-08-04 NOTE — PROGRESS NOTES
PT Evaluation     Today's date: 2020  Patient name: Barbara Goins  : 1955  MRN: [id number]  Referring provider: Ravin Cunningham MD  Dx:   Encounter Diagnosis     ICD-10-CM    1  Acute right-sided low back pain with right-sided sciatica  M54 41    2  Osteoarthrosis, hip  M16 9 Ambulatory referral to Physical Therapy   3  Acute right hip pain  M25 551        Start Time: 1630  Stop Time: 1722  Total time in clinic (min): 52 minutes    Assessment  Assessment details: Barbara Goins is a 60y o  year old female who presents to IE with:   Acute right-sided low back pain with right-sided sciatica  (primary encounter diagnosis)  Osteoarthrosis, hip  Acute right hip pain  Based on evaluation findings today, patient presents with adequate lumbar and hip range of motion, however lacks right hip strength and core stability as indicated by special testing  Patient also with tenderness and recreation of pain to right PSIS and piriformis likely contributing to radicular hip pain  Patient also with lumbar hypomobility likely due to presence of osteoarthritis  Patient will benefit from initiation of therapy program focused on hip and core stability for return to prior level of function without pain  Ezio Steel presents with the impairments as listed above and would benefit from Physical Therapy to address these impairments and to maximize function  Impairments: impaired physical strength, lacks appropriate home exercise program, pain with function and poor posture   Understanding of Dx/Px/POC: good   Prognosis: good    Goals  Short-Term Goals:   1  Patient will report 50% decrease in pain at rest in 5 visits  2  Patient will demonstrate improved hip strength by 1-2 grades in 5 visits  Long-Term Goals:   1  Patient will be able to walk 1-2 miles without complaints of hip pain for return to exercise routine in 10 visits    2  Patient will report no limitations from right hip pain when completing yard work or housework in 10 visits  3  Patient independent with HEP at time of discharge  Plan  Plan details: Thank you for opportunity to participate in the care of Luis Bumps  Patient would benefit from: skilled physical therapy and PT eval  Planned modality interventions: TENS, unattended electrical stimulation and thermotherapy: hydrocollator packs  Planned therapy interventions: abdominal trunk stabilization, flexibility, home exercise program, therapeutic exercise, therapeutic activities, stretching, strengthening, postural training, patient education, neuromuscular re-education, massage, manual therapy and joint mobilization  Frequency: 2x week  Duration in visits: 10  Plan of Care beginning date: 2020  Treatment plan discussed with: patient        Subjective Evaluation    History of Present Illness  Mechanism of injury: Patient is a 60 y o  presenting to physical therapy with right hip pain and low back pain that has been ongoing for 4 weeks  Patient denies specific incident to cause onset of pain       Hx of Snapping, Popping, Catching, Locking: none  Pain with Coughing/Sneezing: none  Diagnostic Imaging: going for a DEXA scan if insurance approves  Radicualr sx: denies N/T, occasional shooting pain down back of leg to calf  Occupation: Retired  Functional Limitations/Goals: "whether it hurts or not I get it done" walking for exercise 1 5-2 miles    Quality of life: good    Pain  Current pain ratin  At best pain ratin  At worst pain ratin  Location: lateral hip and right low back  Quality: sharp  Relieving factors: change in position  Aggravating factors: standing, sitting, walking and lifting  Progression: no change    Social Support  Lives in: multiple-level home    Employment status: not working    Diagnostic Tests  No diagnostic tests performed  Treatments  No previous or current treatments  Patient Goals  Patient goals for therapy: decreased pain, increased motion, increased strength, return to work and return to sport/leisure activities          Objective     Concurrent Complaints  Positive for kidney problem  Negative for night pain, disturbed sleep, bladder dysfunction, bowel dysfunction, saddle (S4) numbness, cardiac problem, gallbladder problem, stomach problem, ulcer, appendix problem, spleen problem, pancreas problem, history of cancer, history of trauma and infection    Postural Observations  Seated posture: fair  Standing posture: fair        Palpation   Left   No palpable tenderness to the piriformis  Right   No palpable tenderness to the iliacus and iliopsoas  Tenderness of the piriformis  Tenderness     Lumbar Spine  Tenderness in the spinous process  Left Hip   No tenderness in the PSIS and greater trochanter  Right Hip   Tenderness in the PSIS and greater trochanter  Additional Tenderness Details  Moderate tenderness and recreation of pain to right PSIS    Active Range of Motion     Lumbar   Flexion:  WFL  Extension:  WFL  Left lateral flexion:  WFL  Right lateral flexion:  WFL  Left rotation:  WFL  Right rotation:  [facility]    Passive Range of Motion     Lumbar   Left rotation: [facility]  Right rotation: [facility]    Joint Play     Hypomobile: T8, T9, T10, T11, T12, L1, L2, L3, L4, L5 and S1     Pain: L2, L3, L4, L5 and S1     Strength/Myotome Testing     Left Hip   Planes of Motion   Flexion: 4  Extension: 4-  Abduction: 4-    Right Hip   Planes of Motion   Flexion: 3+  Extension: 3+  Abduction: 3+    Left Knee   Flexion: 3+  Extension: 5    Right Knee   Flexion: 3+  Extension: 5    Left Ankle/Foot   Dorsiflexion: 5  Plantar flexion: 5  Eversion: 5    Right Ankle/Foot   Dorsiflexion: 5  Plantar flexion: 5  Eversion: 5    Tests     Lumbar   Positive prone instability   Left   Negative passive SLR  Right   Negative passive SLR  Left Hip   Positive OSMAR  Negative scour  Right Hip   Positive OSMAR and piriformis  Negative scour     Graphical documentation             Precautions: Thyroid dysfunction, GERD, Hx Kidney stones, Osteopenia, Diverticulitis    Manuals 8/4            Piriformis STM/MWM             L1-S1 Gr II PA mobs             Educated self-piriformis release Done                         Neuro Re-Ed             PPT* 2x10            Prone press up nv            Prone hip ext                                                                 Ther Ex             Bike nv            Supine piriformis st* :30x3            Clamshells* OTB 2x10            SLR flx nv            SLR abd nv            Bridges nv            LTR nv                                      Ther Activity                                       Gait Training                                       Modalities             MHP prn

## 2020-08-04 NOTE — PROGRESS NOTES
Assessment/Plan:     Diagnoses and all orders for this visit:    Osteoarthrosis, hip  -     diclofenac sodium (VOLTAREN) 50 mg EC tablet; Take 1 tablet (50 mg total) by mouth 2 (two) times a day  -     Ambulatory referral to Physical Therapy; Future    Screening for HIV (human immunodeficiency virus)  -     HIV 1/2 Antigen/Antibody (4th Generation) w Reflex [facility]; Future    Mixed hyperlipidemia  -     atorvastatin (LIPITOR) 20 mg tablet; Take 1 tablet (20 mg total) by mouth daily at bedtime    Osteoarthritis of lumbar spine, unspecified spinal osteoarthritis complication status        BMI Counseling: Body mass index is 28 36 kg/m²  The BMI is above normal  Nutrition recommendations include reducing portion sizes, decreasing overall calorie intake, 3-5 servings of fruits/vegetables daily, reducing fast food intake, consuming healthier snacks, decreasing soda and/or juice intake, moderation in carbohydrate intake, increasing intake of lean protein, reducing intake of saturated fat and trans fat and reducing intake of cholesterol  Exercise recommendations include moderate aerobic physical activity for 150 minutes/week  Patient is a 60year old woman likely with Osteoarthritis of lumbar spine  Patient will begin using Voltaren 50mg BID with food  Will also begin Physical Therapy  Return in about 6 years (around 8/4/2026) for Recheck low back pain  The patient indicates understanding of these issues and agrees with the plan  Subjective:      Patient ID: Doreen Thompson is a 60 y o  female  HPI  Patient with past medical history significant for Osteoarthritis of the knee and osteopenia presents with right hip and lower back pain since for 3-4 weeks  She denies traumatic injury, fall or other precipitating event  She reports that the pain is moderate and is worse with movement including walking, bending and twisting and reports paint is better when lying on her right side and when she is swimming   She reports that she is consistently taking 800mg of Aleve daily which is not helping  She also has tried applying heating pad to the areas for 20 minutes at a time which has not improved pain  She denies swelling or erythema of the area  She denies numbness and tingling of the right hip/lower back or lower extremities  She denies weakness and bowel/bladder incontinence  The following portions of the patient's history were reviewed and updated as appropriate: allergies, current medications, past family history, past medical history, past social history, past surgical history and problem list     Review of Systems   Constitutional: Positive for activity change  Negative for appetite change, chills, diaphoresis, fatigue, fever and unexpected weight change  HENT: Negative for congestion, rhinorrhea, sneezing and sore throat  Respiratory: Negative for cough, shortness of breath and wheezing  Cardiovascular: Negative for chest pain, palpitations and leg swelling  Gastrointestinal: Negative for abdominal distention, abdominal pain, constipation, diarrhea, nausea and vomiting  Genitourinary: Negative for difficulty urinating and dysuria  Musculoskeletal: Positive for arthralgias, back pain and gait problem  Negative for joint swelling  Skin: Negative for color change, pallor, rash and wound  Neurological: Negative for dizziness, tremors, syncope, weakness, light-headedness, numbness and headaches  Psychiatric/Behavioral: Negative for sleep disturbance  Objective:  /78 (BP Location: Left arm, Patient Position: Sitting, Cuff Size: Large)   Pulse 65   Temp 98 4 °F (36 9 °C) (Temporal)   Ht 5' 3"   Wt 72 6 kg (160 lb 2 oz)   SpO2 97%   BMI 28 36 kg/m²          Physical Exam   Constitutional: She is oriented to person, place, and time  Non-toxic appearance  She does not appear ill  No distress  HENT:   Head: Normocephalic and atraumatic     Eyes: Conjunctivae are normal  Right eye exhibits no discharge  Left eye exhibits no discharge  No scleral icterus  Neck: Normal range of motion  Neck supple  Cardiovascular: Normal rate, regular rhythm, normal heart sounds and normal pulses  Pulmonary/Chest: Effort normal  No respiratory distress  She has no wheezes  She has no rhonchi  Abdominal: Normal appearance  Musculoskeletal: Normal range of motion  General: Tenderness (right lower back ) present  No swelling  Right lower leg: No edema  Left lower leg: No edema  Comments: L shoulder appears to be higher than right shoulder when seated  Neurological: She is alert and oriented to person, place, and time  She displays no weakness and normal reflexes  No cranial nerve deficit or sensory deficit  Coordination and gait normal    Skin: Skin is warm and dry  Capillary refill takes less than 2 seconds  She is not diaphoretic  Psychiatric: Her behavior is normal  Mood, judgment and thought content normal    Vitals reviewed

## 2020-08-07 ENCOUNTER — OFFICE VISIT (OUTPATIENT)
Dept: PHYSICAL THERAPY | Facility: REHABILITATION | Age: 65
End: 2020-08-07
Payer: COMMERCIAL

## 2020-08-07 DIAGNOSIS — M16.9 OSTEOARTHROSIS, HIP: Primary | ICD-10-CM

## 2020-08-07 DIAGNOSIS — M54.41 ACUTE RIGHT-SIDED LOW BACK PAIN WITH RIGHT-SIDED SCIATICA: ICD-10-CM

## 2020-08-07 DIAGNOSIS — M25.551 ACUTE RIGHT HIP PAIN: ICD-10-CM

## 2020-08-07 PROCEDURE — 97110 THERAPEUTIC EXERCISES: CPT | Performed by: PHYSICAL THERAPIST

## 2020-08-07 PROCEDURE — 97112 NEUROMUSCULAR REEDUCATION: CPT | Performed by: PHYSICAL THERAPIST

## 2020-08-07 PROCEDURE — 97140 MANUAL THERAPY 1/> REGIONS: CPT | Performed by: PHYSICAL THERAPIST

## 2020-08-07 NOTE — PROGRESS NOTES
Daily Note     Today's date: 2020  Patient name: Susie Jimenez  : 1955  MRN: [id number]  Referring provider: Erna Shetty MD  Dx:   Encounter Diagnosis     ICD-10-CM    1  Osteoarthrosis, hip  M16 9    2  Acute right-sided low back pain with right-sided sciatica  M54 41    3  Acute right hip pain  M25 551        Start Time: 0800  Stop Time: 0850  Total time in clinic (min): 50 minutes    Subjective: Patient states "I brought my tennis ball so you can make sure I am putting it in the right spot  When I used the ball that spot was tender but it felt so good afterward "      Objective: See treatment diary below      Assessment: Tolerated treatment well  Initiated lumbar mobilization and piriformis STM with patient noting positive response with pain relief  Initiated additional hip strengthening with a focus on glutes for improved glute activation with ambulation and lifting to decrease pain in low back  Educated patient on updated HEP and self-palpation to determine proper Patient demonstrated fatigue post treatment and would benefit from continued PT  Plan: Continue per plan of care        Precautions: Thyroid dysfunction, GERD, Hx Kidney stones, Osteopenia, Diverticulitis    Manuals            Piriformis STM/MWM  Done 10' total           L1-S1 Gr II PA mobs  Done           Educated self-piriformis release Done                         Neuro Re-Ed             PPT* 2x10 3x10x5"           Prone press up nv nv           Prone hip ext  3x5x3"                                                               Ther Ex             Bike nv 5' lvl 1           Supine piriformis st* :30x3 :30x3            Clamshells* OTB 2x10 OTB 3x12           SLR flx nv 1# 3x8           Hip flexor st EOT  2'           SLR abd nv            Bridges nv 3x10           LTR nv                                      Ther Activity                                       Gait Training Modalities             MHP prn

## 2020-08-10 ENCOUNTER — TELEPHONE (OUTPATIENT)
Dept: ENDOCRINOLOGY | Facility: CLINIC | Age: 65
End: 2020-08-10

## 2020-08-10 ENCOUNTER — OFFICE VISIT (OUTPATIENT)
Dept: PHYSICAL THERAPY | Facility: REHABILITATION | Age: 65
End: 2020-08-10
Payer: COMMERCIAL

## 2020-08-10 DIAGNOSIS — M16.9 OSTEOARTHROSIS, HIP: Primary | ICD-10-CM

## 2020-08-10 DIAGNOSIS — E04.2 MULTIPLE THYROID NODULES: ICD-10-CM

## 2020-08-10 DIAGNOSIS — Z78.0 POST-MENOPAUSAL: ICD-10-CM

## 2020-08-10 DIAGNOSIS — M54.41 ACUTE RIGHT-SIDED LOW BACK PAIN WITH RIGHT-SIDED SCIATICA: ICD-10-CM

## 2020-08-10 DIAGNOSIS — M85.80 OSTEOPENIA, UNSPECIFIED LOCATION: Primary | ICD-10-CM

## 2020-08-10 DIAGNOSIS — R79.89 LOW TSH LEVEL: ICD-10-CM

## 2020-08-10 DIAGNOSIS — M25.551 ACUTE RIGHT HIP PAIN: ICD-10-CM

## 2020-08-10 PROCEDURE — 97110 THERAPEUTIC EXERCISES: CPT | Performed by: PHYSICAL THERAPIST

## 2020-08-10 PROCEDURE — 97112 NEUROMUSCULAR REEDUCATION: CPT | Performed by: PHYSICAL THERAPIST

## 2020-08-10 PROCEDURE — 97140 MANUAL THERAPY 1/> REGIONS: CPT | Performed by: PHYSICAL THERAPIST

## 2020-08-10 NOTE — TELEPHONE ENCOUNTER
E-mailed lab orders and DEXA scan order to pt @ [email or web address]      CMP, TSH, freeT4, Vit D

## 2020-08-10 NOTE — PROGRESS NOTES
Daily Note     Today's date: 8/10/2020  Patient name: Mercedes Aschoff  : 1955  MRN: [id number]  Referring provider: Candy To MD  Dx:   Encounter Diagnosis     ICD-10-CM    1  Osteoarthrosis, hip  M16 9    2  Acute right-sided low back pain with right-sided sciatica  M54 41    3  Acute right hip pain  M25 551        Start Time: 1615  Stop Time: 1655  Total time in clinic (min): 40 minutes    Subjective: Patient with no new complaints this visit  Patient notes continued relief with self-piriformis release  Objective: See treatment diary below      Assessment: Tolerated treatment well  Patient demonstrated fatigue post treatment and would benefit from continued PT  Progressed patient in terms of increased repetitions this visit with patient tolerating well  Initiated prone press ups for improve lumbar mobility with patient tolerating  No recreation of pain throughout session  Will plan to initiate single leg standing exercises next visit for improved hip engagement with functional activities  Plan: Continue per plan of care        Precautions: Thyroid dysfunction, GERD, Hx Kidney stones, Osteopenia, Diverticulitis    Manuals 8/4 8/7 8/10          Piriformis STM/MWM  Done 10' total Done 8' total w/neuro-red          L1-S1 Gr II PA mobs  Done           Educated self-piriformis release Done                         Neuro Re-Ed             PPT* 2x10 3x10x5" 3x10x5"          Prone press up nv nv :10x10          Prone hip ext  3x5x3" 3x5x3" ea          SLS             SLS clock                                       Ther Ex             Bike nv 5' lvl 1 5' lvl 1          Supine piriformis st* :30x3 :30x3  :30x3           Clamshells* OTB 2x10 OTB 3x12 OTB 3x15          SLR flx nv 1# 3x8 1# 3x12          Hip flexor st EOT  2' 2'          SLR abd nv  3x8          Bridges nv 3x10 3x12          LTR nv                                      Ther Activity             Lateral hurdles             STS Gait Training                                       Modalities             MHP prn

## 2020-08-13 ENCOUNTER — OFFICE VISIT (OUTPATIENT)
Dept: PHYSICAL THERAPY | Facility: REHABILITATION | Age: 65
End: 2020-08-13
Payer: COMMERCIAL

## 2020-08-13 DIAGNOSIS — M54.41 ACUTE RIGHT-SIDED LOW BACK PAIN WITH RIGHT-SIDED SCIATICA: ICD-10-CM

## 2020-08-13 DIAGNOSIS — M25.551 ACUTE RIGHT HIP PAIN: ICD-10-CM

## 2020-08-13 DIAGNOSIS — M16.9 OSTEOARTHROSIS, HIP: Primary | ICD-10-CM

## 2020-08-13 PROCEDURE — 97112 NEUROMUSCULAR REEDUCATION: CPT | Performed by: PHYSICAL THERAPIST

## 2020-08-13 PROCEDURE — 97140 MANUAL THERAPY 1/> REGIONS: CPT | Performed by: PHYSICAL THERAPIST

## 2020-08-13 PROCEDURE — 97110 THERAPEUTIC EXERCISES: CPT | Performed by: PHYSICAL THERAPIST

## 2020-08-13 NOTE — PROGRESS NOTES
Daily Note     Today's date: 2020  Patient name: Eldon Brandt  : 1955  MRN: [id number]  Referring provider: Hamlet Nesbitt MD  Dx:   Encounter Diagnosis     ICD-10-CM    1  Osteoarthrosis, hip  M16 9    2  Acute right-sided low back pain with right-sided sciatica  M54 41    3  Acute right hip pain  M25 551        Start Time: 0800  Stop Time: 0855  Total time in clinic (min): 55 minutes    Subjective: Patient reports "the back and hip feel awful " Patient states she never sits down and is constantly doing something so she may be overdoing it  Patient also reports she tripped this morning and her back pain was "jarring " Patient has a DEXA scan scheduled for 2020  Objective: See treatment diary below      Assessment: Tolerated treatment well  Patient demonstrated fatigue post treatment and would benefit from continued PT  Patient notes bilateral low back pain with bridges today, educated on proper form with glute engagement and avoiding strain on low back with patient reporting decrease in symptoms with these cues  Initiated right hip distraction and lumbar flexion stretching for right lumbar spine opening and pain reduction  Plan: Continue per plan of care        Precautions: Thyroid dysfunction, GERD, Hx Kidney stones, Osteopenia, Diverticulitis    Manuals 8/4 8/7 8/10 8/13         Piriformis STM/MWM  Done 10' total Done 8' total w/neuro-red          L1-S1 Gr II PA mobs  Done           Right long axis hip distraction    :30x5                      Neuro Re-Ed             PPT* 2x10 3x10x5" 3x10x5" 3x10x5"         PPT marches    :30x5         Prone press up nv nv :10x10 hold         Prone hip ext  3x5x3" 3x5x3" ea hold         SLS    :20x3 ea         SLS clock                                       Ther Ex             Bike nv 5' lvl 1 5' lvl 1 5' lvl 1         Supine piriformis st* :30x3 :30x3  :30x3           Clamshells* OTB 2x10 OTB 3x12 OTB 3x15 OTB 3x15         SLR flx nv 1# 3x8 1# 3x12 1# 3x10 inc # nv         Hip flexor st EOT  2' 2' 2'         SLR abd nv  3x8 3x8         Bridges nv 3x10 3x12 3x10 # nv         LTR nv   :05x20         Lumbar flexion ball rolls L    :05x20                      Ther Activity             Lateral hurdles             STS    2x10         Gait Training                                       Modalities             MHP prn

## 2020-08-19 ENCOUNTER — OFFICE VISIT (OUTPATIENT)
Dept: PHYSICAL THERAPY | Facility: REHABILITATION | Age: 65
End: 2020-08-19
Payer: COMMERCIAL

## 2020-08-19 DIAGNOSIS — M16.9 OSTEOARTHROSIS, HIP: Primary | ICD-10-CM

## 2020-08-19 DIAGNOSIS — M54.41 ACUTE RIGHT-SIDED LOW BACK PAIN WITH RIGHT-SIDED SCIATICA: ICD-10-CM

## 2020-08-19 DIAGNOSIS — M25.551 ACUTE RIGHT HIP PAIN: ICD-10-CM

## 2020-08-19 PROCEDURE — 97140 MANUAL THERAPY 1/> REGIONS: CPT | Performed by: PHYSICAL THERAPIST

## 2020-08-19 PROCEDURE — 97112 NEUROMUSCULAR REEDUCATION: CPT | Performed by: PHYSICAL THERAPIST

## 2020-08-19 PROCEDURE — 97530 THERAPEUTIC ACTIVITIES: CPT | Performed by: PHYSICAL THERAPIST

## 2020-08-19 PROCEDURE — 97110 THERAPEUTIC EXERCISES: CPT | Performed by: PHYSICAL THERAPIST

## 2020-08-19 NOTE — PROGRESS NOTES
Daily Note     Today's date: 2020  Patient name: Susie Jimenez  : 1955  MRN: [id number]  Referring provider: Erna Shetty MD  Dx:   Encounter Diagnosis     ICD-10-CM    1  Osteoarthrosis, hip  M16 9    2  Acute right-sided low back pain with right-sided sciatica  M54 41    3  Acute right hip pain  M25 551        Start Time: 0800  Stop Time: 09  Total time in clinic (min): 68 minutes    Subjective: Patient reports continued right low back and hip pain stating "I might be doing things I shouldn't be doing at home but I don't know why it isn't getting better "      Objective: See treatment diary below      Assessment: Tolerated treatment well  Patient with palpable tightness and tenderness from right PSIS to right greater trochanter with positive response to muscle relaxation techniques and mobilizations with movement to right SI joint  Patient may benefit from trial of right sided lumbar opening mobilizations next visit  Progressed patient in terms of increased repetitions and resistance this visit with patient tolerating well and reporting glute med fatigue  Initiated lateral step ups and single leg standing for increased focus on right glute med engagement with functional tasks  Patient demonstrated fatigue post treatment and would benefit from continued PT  Plan: Continue per plan of care        Precautions: Thyroid dysfunction, GERD, Hx Kidney stones, Osteopenia, Diverticulitis    Manuals 8/4 8/7 8/10 8/13 8/18        Piriformis STM/MWM  Done 10' total Done 8' total w/neuro-red  Done 20'         L1-S1 Gr II PA mobs  Done   nv        Side-lying lumbar opening mobs      nv        Right long axis hip distraction    :30x5                      Neuro Re-Ed             PPT* 2x10 3x10x5" 3x10x5" 3x10x5"         PPT marches    :30x5         Prone press up nv nv :10x10 hold         Prone hip ext  3x5x3" 3x5x3" ea hold         SLS    :20x3 ea :20x3        SLS foam     :10x3        SLS clock Ther Ex             Bike nv 5' lvl 1 5' lvl 1 5' lvl 1 5' lvl 1        Supine piriformis st* :30x3 :30x3  :30x3   :30x3        Clamshells* OTB 2x10 OTB 3x12 OTB 3x15 OTB 3x15 GTB 3x12        SLR flx nv 1# 3x8 1# 3x12 1# 3x10 inc # nv 2# 3x15        Hip flexor st EOT  2' 2' 2'         SLR abd nv  3x8 3x8 1# 3x10        Bridges nv 3x10 3x12 3x10 # nv 3x10 10#         LTR nv   :05x20 :05x20        Lumbar flexion ball rolls L    :05x20 :05x20                     Ther Activity             Lateral hurdles             Lateral step ups     6" 3x10        STS    2x10 3x10                     Gait Training                                       Modalities             MHP prn

## 2020-08-24 ENCOUNTER — OFFICE VISIT (OUTPATIENT)
Dept: PHYSICAL THERAPY | Facility: REHABILITATION | Age: 65
End: 2020-08-24
Payer: COMMERCIAL

## 2020-08-24 DIAGNOSIS — M54.41 ACUTE RIGHT-SIDED LOW BACK PAIN WITH RIGHT-SIDED SCIATICA: ICD-10-CM

## 2020-08-24 DIAGNOSIS — M25.551 ACUTE RIGHT HIP PAIN: ICD-10-CM

## 2020-08-24 DIAGNOSIS — M16.9 OSTEOARTHROSIS, HIP: Primary | ICD-10-CM

## 2020-08-24 PROCEDURE — 97110 THERAPEUTIC EXERCISES: CPT | Performed by: PHYSICAL THERAPIST

## 2020-08-24 PROCEDURE — 97530 THERAPEUTIC ACTIVITIES: CPT | Performed by: PHYSICAL THERAPIST

## 2020-08-24 PROCEDURE — 97112 NEUROMUSCULAR REEDUCATION: CPT | Performed by: PHYSICAL THERAPIST

## 2020-08-24 NOTE — PROGRESS NOTES
Daily Note     Today's date: 2020  Patient name: Nancylee Romberg  : 1955  MRN: [id number]  Referring provider: Yury Giraldo MD  Dx:   Encounter Diagnosis     ICD-10-CM    1  Osteoarthrosis, hip  M16 9    2  Acute right-sided low back pain with right-sided sciatica  M54 41    3  Acute right hip pain  M25 551        Start Time: 0800  Stop Time: 0845  Total time in clinic (min): 45 minutes    Subjective: Patient reports "it is feeling better than it did "      Objective: See treatment diary below      Assessment: Tolerated treatment well  Patient demonstrated fatigue post treatment and would benefit from continued PT  Patient with good tolerance to exercise progressions this visit  Requires minimal verbal and tactile cues for hip drop correction with single leg standing  Initiated additional therex focused on functional hip abductor strengthening  Plan: Continue per plan of care        Precautions: Thyroid dysfunction, GERD, Hx Kidney stones, Osteopenia, Diverticulitis    Manuals 8/4 8/7 8/10 8/13 8/18 8/24       Piriformis STM/MWM  Done 10' total Done 8' total w/neuro-red  Done 20'         L1-S1 Gr II PA mobs  Done   nv        Side-lying lumbar opening mobs      nv        Right long axis hip distraction    :30x5                      Neuro Re-Ed             PPT* 2x10 3x10x5" 3x10x5" 3x10x5"  2x8x8"       PPT marches    :30x5  :30x5       Prone press up nv nv :10x10 hold         Prone hip ext  3x5x3" 3x5x3" ea hold         SLS    :20x3 ea :20x3 :30x2 ea       SLS foam     :10x3        SLS clock                                       Ther Ex             Bike nv 5' lvl 1 5' lvl 1 5' lvl 1 5' lvl 1 5' lvl 1       Supine piriformis st* :30x3 :30x3  :30x3   :30x3 :30x3       Clamshells* OTB 2x10 OTB 3x12 OTB 3x15 OTB 3x15 GTB 3x12        SLR flx nv 1# 3x8 1# 3x12 1# 3x10 inc # nv 2# 3x15 2# 3x15       Hip flexor st EOT  2' 2' 2'         SLR abd nv  3x8 3x8 1# 3x10 1# 3x10       Bridges nv 3x10 3x12 3x10 # nv 3x10 10#  3x12       LTR nv   :05x20 :05x20 :05x15 R       Lumbar flexion ball rolls L    :05x20 :05x20                     Ther Activity             Lateral hurdles      5 laps       Lateral step ups     6" 3x10 6" 3x12       STS    2x10 3x10 8# 3x10       Lateral side stepping      OTB 10 laps counter       Gait Training                                       Modalities             MHP prn

## 2020-08-27 ENCOUNTER — APPOINTMENT (OUTPATIENT)
Dept: PHYSICAL THERAPY | Facility: REHABILITATION | Age: 65
End: 2020-08-27
Payer: COMMERCIAL

## 2020-08-28 ENCOUNTER — OFFICE VISIT (OUTPATIENT)
Dept: PHYSICAL THERAPY | Facility: REHABILITATION | Age: 65
End: 2020-08-28
Payer: COMMERCIAL

## 2020-08-28 DIAGNOSIS — M16.9 OSTEOARTHROSIS, HIP: Primary | ICD-10-CM

## 2020-08-28 DIAGNOSIS — M16.9 OSTEOARTHROSIS, HIP: ICD-10-CM

## 2020-08-28 DIAGNOSIS — M25.551 ACUTE RIGHT HIP PAIN: ICD-10-CM

## 2020-08-28 DIAGNOSIS — M54.41 ACUTE RIGHT-SIDED LOW BACK PAIN WITH RIGHT-SIDED SCIATICA: ICD-10-CM

## 2020-08-28 PROCEDURE — 97530 THERAPEUTIC ACTIVITIES: CPT | Performed by: PHYSICAL THERAPIST

## 2020-08-28 PROCEDURE — 97140 MANUAL THERAPY 1/> REGIONS: CPT | Performed by: PHYSICAL THERAPIST

## 2020-08-28 PROCEDURE — 97110 THERAPEUTIC EXERCISES: CPT | Performed by: PHYSICAL THERAPIST

## 2020-08-28 NOTE — PROGRESS NOTES
Daily Note     Today's date: 2020  Patient name: Eldon Brandt  : 1955  MRN: [id number]  Referring provider: Hamlet Nesbitt MD  Dx:   Encounter Diagnosis     ICD-10-CM    1  Osteoarthrosis, hip  M16 9    2  Acute right-sided low back pain with right-sided sciatica  M54 41    3  Acute right hip pain  M25 551        Start Time: 0800  Stop Time: 08  Total time in clinic (min): 52 minutes    Subjective: Patient reports "the back definitely feels better than it did before  I still have a little twinge on my right side "      Objective: See treatment diary below      Assessment: Tolerated treatment well  Initiated IASTM with lumbar flexion roll outs with patient reporting positive response for decreased pain and tightness  Progressed standing exercises to promote improved engagement and strengthening of hip abductors  No complaints of pain noted throughout session  Patient demonstrated fatigue post treatment and would benefit from continued PT  Plan: Continue per plan of care        Precautions: Thyroid dysfunction, GERD, Hx Kidney stones, Osteopenia, Diverticulitis    Manuals 8/4 8/7 8/10 8/13 8/18 8/24 8/28      Piriformis STM/MWM  Done 10' total Done 8' total w/neuro-red  Done 20'         L1-S1 Gr II PA mobs  Done   nv        Side-lying lumbar opening mobs      nv        Right long axis hip distraction    :30x5   8'                   Neuro Re-Ed             PPT* 2x10 3x10x5" 3x10x5" 3x10x5"  2x8x8"       PPT marches    :30x5  :30x5       PPT Heel taps       3x5      SLS    :20x3 ea :20x3 :30x2 ea NV      SLS foam     :10x3  NV      SLS clock       NV                                Ther Ex             Bike nv 5' lvl 1 5' lvl 1 5' lvl 1 5' lvl 1 5' lvl 1 5' lvl 1      Supine piriformis st* :30x3 :30x3  :30x3   :30x3 :30x3 :30x3      Clamshells* OTB 2x10 OTB 3x12 OTB 3x15 OTB 3x15 GTB 3x12  home      SLR flx nv 1# 3x8 1# 3x12 1# 3x10 inc # nv 2# 3x15 2# 3x15 2# 3x15      Hip flexor st EOT 2' 2' 2'         SLR abd nv  3x8 3x8 1# 3x10 1# 3x10 1 5# 3x12      Bridges nv 3x10 3x12 3x10 # nv 3x10 10#  3x12 3x15      LTR nv   :05x20 :05x20 :05x15 R nv      Lumbar flexion ball rolls L    :05x20 :05x20  :10x20 IASTM RES                   Ther Activity             Lateral hurdles      5 laps 3 hurdles w/foam 5 laps      Lateral step ups     6" 3x10 6" 3x12 6" 3x12      STS    2x10 3x10 8# 3x10 10# 3x12      Lateral side stepping      OTB 10 laps counter OTB 5 laps      Gait Training                                       Modalities             MHP prn

## 2020-09-01 ENCOUNTER — OFFICE VISIT (OUTPATIENT)
Dept: PHYSICAL THERAPY | Facility: REHABILITATION | Age: 65
End: 2020-09-01
Payer: COMMERCIAL

## 2020-09-01 DIAGNOSIS — M25.551 ACUTE RIGHT HIP PAIN: ICD-10-CM

## 2020-09-01 DIAGNOSIS — M54.41 ACUTE RIGHT-SIDED LOW BACK PAIN WITH RIGHT-SIDED SCIATICA: ICD-10-CM

## 2020-09-01 DIAGNOSIS — M16.9 OSTEOARTHROSIS, HIP: Primary | ICD-10-CM

## 2020-09-01 PROCEDURE — 97112 NEUROMUSCULAR REEDUCATION: CPT | Performed by: PHYSICAL THERAPIST

## 2020-09-01 PROCEDURE — 97530 THERAPEUTIC ACTIVITIES: CPT | Performed by: PHYSICAL THERAPIST

## 2020-09-01 PROCEDURE — 97110 THERAPEUTIC EXERCISES: CPT | Performed by: PHYSICAL THERAPIST

## 2020-09-01 PROCEDURE — 97140 MANUAL THERAPY 1/> REGIONS: CPT | Performed by: PHYSICAL THERAPIST

## 2020-09-01 NOTE — PROGRESS NOTES
Daily Note     Today's date: 2020  Patient name: Doreen Thompson  : 1955  MRN: [id number]  Referring provider: Andrew Samano MD  Dx:   Encounter Diagnosis     ICD-10-CM    1  Osteoarthrosis, hip  M16 9    2  Acute right-sided low back pain with right-sided sciatica  M54 41    3  Acute right hip pain  M25 551        Start Time: 0800  Stop Time: 0858  Total time in clinic (min): 58 minutes    Subjective: Patient with no new complaints  Objective: See treatment diary below      Assessment: Tolerated treatment well  Patient demonstrated fatigue post treatment, exhibited good technique with therapeutic exercises and would benefit from continued PT  Progressed standing exercises for improved hip stabilization and improved hip strength for decreased compensation from lumbar spine  Patient demonstrates good form with all therex with minimal cues required  No complaints of pain throughout session and will continue to progress as tolerated  Plan: Continue per plan of care        Precautions: Thyroid dysfunction, GERD, Hx Kidney stones, Osteopenia, Diverticulitis    Manuals 8/4 8/7 8/10 8/13 8/18 8/24 8/28 9/1     Piriformis STM/MWM  Done 10' total Done 8' total w/neuro-red  Done 20'    5'     L1-S1 Gr II PA mobs  Done   nv        Side-lying lumbar opening mobs      nv        Right long axis hip distraction    :30x5   8' 7'                  Neuro Re-Ed             PPT* 2x10 3x10x5" 3x10x5" 3x10x5"  2x8x8"       PPT marches    :30x5  :30x5       PPT Heel taps       3x5      SLS    :20x3 ea :20x3 :30x2 ea NV :30x2 ea     SLS foam     :10x3  NV :15x3      SLS abduction       NV 3x10 ea                               Ther Ex             Bike nv 5' lvl 1 5' lvl 1 5' lvl 1 5' lvl 1 5' lvl 1 5' lvl 1 5' lvl 1     Supine piriformis st* :30x3 :30x3  :30x3   :30x3 :30x3 :30x3      Clamshells* OTB 2x10 OTB 3x12 OTB 3x15 OTB 3x15 GTB 3x12  home      SLR flx nv 1# 3x8 1# 3x12 1# 3x10 inc # nv 2# 3x15 2# 3x15 2# 3x15 2# 3x15     Hip flexor st EOT  2' 2' 2'         SLR abd nv  3x8 3x8 1# 3x10 1# 3x10 1 5# 3x12 2# 3x10     Bridges nv 3x10 3x12 3x10 # nv 3x10 10#  3x12 3x15 10# 3x10     LTR nv   :05x20 :05x20 :05x15 R nv      Lumbar flexion ball rolls L    :05x20 :05x20  :10x20 IASTM RES :10x15 IASTM RES                  Ther Activity             Lateral hurdles      5 laps 3 hurdles w/foam 5 laps 3 hurdles w/foam 5 laps     Lateral step ups     6" 3x10 6" 3x12 6" 3x12 overs6" 3x12 8#     Anterior step up        6" 3x10 8#     STS    2x10 3x10 8# 3x10 10# 3x12 10# 3x12     Lateral side stepping      OTB 10 laps counter OTB 5 laps GTB counters 6 laps     Gait Training                                       Modalities             MHP prn

## 2020-09-08 ENCOUNTER — OFFICE VISIT (OUTPATIENT)
Dept: PHYSICAL THERAPY | Facility: REHABILITATION | Age: 65
End: 2020-09-08
Payer: COMMERCIAL

## 2020-09-08 DIAGNOSIS — M16.9 OSTEOARTHROSIS, HIP: Primary | ICD-10-CM

## 2020-09-08 DIAGNOSIS — M54.41 ACUTE RIGHT-SIDED LOW BACK PAIN WITH RIGHT-SIDED SCIATICA: ICD-10-CM

## 2020-09-08 DIAGNOSIS — M25.551 ACUTE RIGHT HIP PAIN: ICD-10-CM

## 2020-09-08 PROCEDURE — 97110 THERAPEUTIC EXERCISES: CPT | Performed by: PHYSICAL THERAPIST

## 2020-09-08 PROCEDURE — 97112 NEUROMUSCULAR REEDUCATION: CPT | Performed by: PHYSICAL THERAPIST

## 2020-09-08 PROCEDURE — 97530 THERAPEUTIC ACTIVITIES: CPT | Performed by: PHYSICAL THERAPIST

## 2020-09-08 NOTE — PROGRESS NOTES
Daily Note     Today's date: 2020  Patient name: Karyle Hunter  : 1955  MRN: [id number]  Referring provider: Neha Olivia MD  Dx:   Encounter Diagnosis     ICD-10-CM    1  Osteoarthrosis, hip  M16 9    2  Acute right-sided low back pain with right-sided sciatica  M54 41    3  Acute right hip pain  M25 551        Start Time: 0800  Stop Time: 0843  Total time in clinic (min): 43 minutes    Subjective: Patient with no new complaints, denies soreness after last session  Patient states the back feels "pretty good "      Objective: See treatment diary below      Assessment: Tolerated treatment well  Patient demonstrated fatigue post treatment, exhibited good technique with therapeutic exercises and would benefit from continued PT  Patient maintains good form throughout session with all progressions and no complaints of pain noted  Improved left hip drop noted with step ups this visit without cues required  Will plan to re-evaluate next session for potential discharge to [facility] as patient demonstrates confidence with self-managing symptoms  Plan: Continue per plan of care        Precautions: Thyroid dysfunction, GERD, Hx Kidney stones, Osteopenia, Diverticulitis    Manuals 8/4 8/7 8/10 8/13 8/18 8/24 8/28 9/1 9/8    Piriformis STM/MWM  Done 10' total Done 8' total w/neuro-red  Done 20'    5'     L1-S1 Gr II PA mobs  Done   nv        Side-lying lumbar opening mobs      nv        Right long axis hip distraction    :30x5   8' 7'                  Neuro Re-Ed             PPT* 2x10 3x10x5" 3x10x5" 3x10x5"  2x8x8"       PPT marches    :30x5  :30x5       PPT Heel taps       3x5  3x8    SLS    :20x3 ea :20x3 :30x2 ea NV :30x2 ea     SLS foam     :10x3  NV :15x3  :15x3    SLS abduction       NV 3x10 ea 4x10 ea                              Ther Ex             Bike nv 5' lvl 1 5' lvl 1 5' lvl 1 5' lvl 1 5' lvl 1 5' lvl 1 5' lvl 1 5' lvl 1    Supine piriformis st* :30x3 :30x3  :30x3   :30x3 :30x3 :30x3  home Clamshells* OTB 2x10 OTB 3x12 OTB 3x15 OTB 3x15 GTB 3x12  home      SLR flx nv 1# 3x8 1# 3x12 1# 3x10 inc # nv 2# 3x15 2# 3x15 2# 3x15 2# 3x15 2# 3x15    Hip flexor st EOT  2' 2' 2'         SLR abd nv  3x8 3x8 1# 3x10 1# 3x10 1 5# 3x12 2# 3x10 2# 3x12    Bridges nv 3x10 3x12 3x10 # nv 3x10 10#  3x12 3x15 10# 3x10 10# 3x12    LTR nv   :05x20 :05x20 :05x15 R nv      Lumbar flexion ball rolls L    :05x20 :05x20  :10x20 IASTM RES :10x15 IASTM RES :10x15                 Ther Activity             Lateral hurdles      5 laps 3 hurdles w/foam 5 laps 3 hurdles w/foam 5 laps     Lateral step ups     6" 3x10 6" 3x12 6" 3x12 overs 6" 3x12 8# Overs 6" 3x12 8#    Anterior step up        6" 3x10 8# 6" 3x12 8#    STS    2x10 3x10 8# 3x10 10# 3x12 10# 3x12 10#    Lateral side stepping      OTB 10 laps counter OTB 5 laps GTB counters 6 laps GTB 4 laps    Gait Training                                       Modalities             MHP prn

## 2020-09-15 ENCOUNTER — EVALUATION (OUTPATIENT)
Dept: PHYSICAL THERAPY | Facility: REHABILITATION | Age: 65
End: 2020-09-15
Payer: COMMERCIAL

## 2020-09-15 DIAGNOSIS — M25.551 ACUTE RIGHT HIP PAIN: ICD-10-CM

## 2020-09-15 DIAGNOSIS — M16.9 OSTEOARTHROSIS, HIP: Primary | ICD-10-CM

## 2020-09-15 DIAGNOSIS — M54.41 ACUTE RIGHT-SIDED LOW BACK PAIN WITH RIGHT-SIDED SCIATICA: ICD-10-CM

## 2020-09-15 PROCEDURE — 97110 THERAPEUTIC EXERCISES: CPT | Performed by: PHYSICAL THERAPIST

## 2020-09-15 PROCEDURE — 97112 NEUROMUSCULAR REEDUCATION: CPT | Performed by: PHYSICAL THERAPIST

## 2020-09-15 PROCEDURE — 97530 THERAPEUTIC ACTIVITIES: CPT | Performed by: PHYSICAL THERAPIST

## 2020-09-15 NOTE — PROGRESS NOTES
PT Re-Evaluation  and PT Discharge    Today's date: 9/15/2020  Patient name: Ever Mcbride  : 1955  MRN: [id number]  Referring provider: Sesar Gage MD  Dx:   Encounter Diagnosis     ICD-10-CM    1  Osteoarthrosis, hip  M16 9    2  Acute right-sided low back pain with right-sided sciatica  M54 41    3  Acute right hip pain  M25 551        Start Time: 0805  Stop Time: 0850  Total time in clinic (min): 45 minutes    Assessment  Assessment details: Yvon Kinsey has made the following improvements since beginning PT: decreased pain, increased ROM, increased strength and increased tolerance to activities  Patient has made significant gains in bilateral hip strength, lumbar mobility and decreased tenderness to piriformis  Lester and PT mutually agree to transition to HEP at this time secondary to gains made in PT  She has been given updated HEP with verbalized understanding and compliance  Yvon Kinsey is encouraged to contact PT with any questions or concerns in the future  Impairments: impaired physical strength, lacks appropriate home exercise program, pain with function and poor posture   Understanding of Dx/Px/POC: good   Prognosis: good    Goals  Short-Term Goals:   1  Patient will report 50% decrease in pain at rest in 5 visits  (met)  2  Patient will demonstrate improved hip strength by 1-2 grades in 5 visits  (met)    Long-Term Goals:   1  Patient will be able to walk 1-2 miles without complaints of hip pain for return to exercise routine in 10 visits  (met)  2  Patient will report no limitations from right hip pain when completing yard work or housework in 10 visits  (met)  3  Patient independent with HEP at time of discharge  (met)      Plan  Plan details: Plan to discharge with comprehensive HEP at this time    Patient would benefit from: skilled physical therapy and PT eval  Planned modality interventions: TENS, unattended electrical stimulation and thermotherapy: hydrocollator packs  Planned therapy interventions: abdominal trunk stabilization, flexibility, home exercise program, therapeutic exercise, therapeutic activities, stretching, strengthening, postural training, patient education, neuromuscular re-education, massage, manual therapy and joint mobilization  Treatment plan discussed with: patient        Subjective Evaluation    History of Present Illness  Mechanism of injury: Eve Cornejo has been seen for total of 10 visits for OP PT for right hip and low back pain  Patient rates overall improvement since beginning PT 96%  Patient's global rating of change is " A great deal better (6) " Patient reports improvements with having minimal low back and hip pain  Patient notes she was able to cut her bushes and do yardwork yesterday without any limitation from pain when she was previously having pain with twisting motions  Patient states "it is much better than it was before " Patient is going for DEXA scan on 2020  Patient is a 60 y o  presenting to physical therapy with right hip pain and low back pain that has been ongoing for 4 weeks  Patient denies specific incident to cause onset of pain        Hx of Snapping, Popping, Catching, Locking: none  Pain with Coughing/Sneezing: none  Diagnostic Imaging: going for a DEXA scan if insurance approves  Radicualr sx: denies N/T, occasional shooting pain down back of leg to calf  Occupation: Retired  Functional Limitations/Goals: "whether it hurts or not I get it done" walking for exercise 1 5-2 miles    Quality of life: good    Pain  Current pain ratin  At best pain ratin  At worst pain ratin  Location: lateral hip and right low back  Quality: sharp  Relieving factors: change in position  Aggravating factors: standing, sitting, walking and lifting  Progression: no change    Social Support  Lives in: multiple-level home    Employment status: not working    Diagnostic Tests  No diagnostic tests performed  Treatments  No previous or current treatments  Patient Goals  Patient goals for therapy: decreased pain, increased motion, increased strength, return to work and return to sport/leisure activities          Objective     Concurrent Complaints  Positive for kidney problem  Negative for night pain, disturbed sleep, bladder dysfunction, bowel dysfunction, saddle (S4) numbness, cardiac problem, gallbladder problem, stomach problem, ulcer, appendix problem, spleen problem, pancreas problem, history of cancer, history of trauma and infection    Postural Observations  Seated posture: fair  Standing posture: fair        Palpation   Left   No palpable tenderness to the piriformis  Right   No palpable tenderness to the iliacus and iliopsoas  Tenderness of the piriformis  Tenderness     Lumbar Spine  Tenderness in the spinous process  Left Hip   No tenderness in the PSIS and greater trochanter  Right Hip   Tenderness in the PSIS  No tenderness in the greater trochanter  Additional Tenderness Details  Moderate tenderness and recreation of pain to right PSIS    Active Range of Motion     Lumbar   Flexion:  WFL  Extension:  WFL  Left lateral flexion:  WFL  Right lateral flexion:  WFL  Left rotation:  WFL  Right rotation:  [facility]    Passive Range of Motion     Lumbar   Left rotation: WF  Right rotation: [facility]    Joint Play     Hypomobile: T8, T9, T10, T11, T12, L1, L2, L3, L4, L5 and S1     Pain: L2, L3, L4, L5 and S1     Strength/Myotome Testing     Left Hip   Planes of Motion   Flexion: 4  Extension: 4-  Abduction: 4-    Right Hip   Planes of Motion   Flexion: 4  Extension: 4  Abduction: 4    Left Knee   Flexion: 3+  Extension: 5    Right Knee   Flexion: 3+  Extension: 5    Left Ankle/Foot   Dorsiflexion: 5  Plantar flexion: 5  Eversion: 5    Right Ankle/Foot   Dorsiflexion: 5  Plantar flexion: 5  Eversion: 5    Tests     Lumbar   Positive prone instability   Left   Negative passive SLR  Right   Negative passive SLR       Left Hip Positive OSMAR  Negative scour  Right Hip   Positive OSMAR and piriformis  Negative scour     Graphical documentation      Flowsheet Rows      Most Recent Value   PT/OT G-Codes   Current Score  99   Projected Score  73              Precautions: Thyroid dysfunction, GERD, Hx Kidney stones, Osteopenia, Diverticulitis    Manuals 8/4 8/7 8/10 8/13 8/18 8/24 8/28 9/1 9/8 9/15   Piriformis STM/MWM  Done 10' total Done 8' total w/neuro-red  Done 20'    5'     L1-S1 Gr II PA mobs  Done   nv        Side-lying lumbar opening mobs      nv        Right long axis hip distraction    :30x5   8' 7'                  Neuro Re-Ed             PPT* 2x10 3x10x5" 3x10x5" 3x10x5"  2x8x8"       PPT marches    :30x5  :30x5       PPT Heel taps       3x5  3x8 3x8   SLS    :20x3 ea :20x3 :30x2 ea NV :30x2 ea     SLS foam     :10x3  NV :15x3  :15x3 :15x3 ea   SLS abduction       NV 3x10 ea 4x10 ea 3x10 ea                             Ther Ex             Bike nv 5' lvl 1 5' lvl 1 5' lvl 1 5' lvl 1 5' lvl 1 5' lvl 1 5' lvl 1 5' lvl 1 5' lvl 1   Supine piriformis st* :30x3 :30x3  :30x3   :30x3 :30x3 :30x3  home    Clamshells* OTB 2x10 OTB 3x12 OTB 3x15 OTB 3x15 GTB 3x12  home      SLR flx nv 1# 3x8 1# 3x12 1# 3x10 inc # nv 2# 3x15 2# 3x15 2# 3x15 2# 3x15 2# 3x15 2# 3x15   Hip flexor st EOT  2' 2' 2'         SLR abd nv  3x8 3x8 1# 3x10 1# 3x10 1 5# 3x12 2# 3x10 2# 3x12 2# 3x12   Bridges nv 3x10 3x12 3x10 # nv 3x10 10#  3x12 3x15 10# 3x10 10# 3x12 10# 3x12   LTR nv   :05x20 :05x20 :05x15 R nv      Lumbar flexion ball rolls L    :05x20 :05x20  :10x20 IASTM RES :10x15 IASTM RES :10x15                 Ther Activity             Lateral hurdles      5 laps 3 hurdles w/foam 5 laps 3 hurdles w/foam 5 laps     Lateral step ups     6" 3x10 6" 3x12 6" 3x12 overs 6" 3x12 8# Overs 6" 3x12 8# Overs 6" 3x12 8#   Anterior step up        6" 3x10 8# 6" 3x12 8# 6" 3x12 8#   STS    2x10 3x10 8# 3x10 10# 3x12 10# 3x12 10# 10# 3x12   Lateral side stepping OTB 10 laps counter OTB 5 laps GTB counters 6 laps GTB 4 laps GTB 4 laps   Gait Training                                       Modalities             MHP prn

## 2020-09-17 ENCOUNTER — HOSPITAL ENCOUNTER (OUTPATIENT)
Dept: RADIOLOGY | Age: 65
Discharge: HOME | End: 2020-09-17
Payer: COMMERCIAL

## 2020-09-17 DIAGNOSIS — M85.80 OSTEOPENIA, UNSPECIFIED LOCATION: ICD-10-CM

## 2020-09-17 PROCEDURE — 77080 DXA BONE DENSITY AXIAL: CPT

## 2020-09-17 NOTE — RESULT ENCOUNTER NOTE
Please call the patient regarding DEXA scan-shows osteopenia, continue calcium and vitamin-D supplementations

## 2020-09-18 ENCOUNTER — TELEPHONE (OUTPATIENT)
Dept: ENDOCRINOLOGY | Facility: CLINIC | Age: 65
End: 2020-09-18

## 2020-09-18 NOTE — TELEPHONE ENCOUNTER
----- Message from Nathan Dewitt MD sent at 9/17/2020  2:17 PM EDT -----  Please call the patient regarding DEXA scan-shows osteopenia, continue calcium and vitamin-D supplementations

## 2020-10-02 ENCOUNTER — OFFICE VISIT (OUTPATIENT)
Dept: FAMILY MEDICINE CLINIC | Facility: OTHER | Age: 65
End: 2020-10-02
Payer: COMMERCIAL

## 2020-10-02 VITALS
DIASTOLIC BLOOD PRESSURE: 84 MMHG | WEIGHT: 158.13 LBS | SYSTOLIC BLOOD PRESSURE: 128 MMHG | OXYGEN SATURATION: 97 % | TEMPERATURE: 98.4 F | HEIGHT: 63 IN | BODY MASS INDEX: 28.02 KG/M2 | HEART RATE: 70 BPM

## 2020-10-02 DIAGNOSIS — M54.50 CHRONIC MIDLINE LOW BACK PAIN WITHOUT SCIATICA: Primary | ICD-10-CM

## 2020-10-02 DIAGNOSIS — E78.2 MIXED HYPERLIPIDEMIA: ICD-10-CM

## 2020-10-02 DIAGNOSIS — Z23 ENCOUNTER FOR IMMUNIZATION: ICD-10-CM

## 2020-10-02 DIAGNOSIS — G89.29 CHRONIC MIDLINE LOW BACK PAIN WITHOUT SCIATICA: Primary | ICD-10-CM

## 2020-10-02 DIAGNOSIS — M85.89 OSTEOPENIA OF MULTIPLE SITES: ICD-10-CM

## 2020-10-02 PROCEDURE — 3725F SCREEN DEPRESSION PERFORMED: CPT

## 2020-10-02 PROCEDURE — 1036F TOBACCO NON-USER: CPT

## 2020-10-02 PROCEDURE — 90682 RIV4 VACC RECOMBINANT DNA IM: CPT

## 2020-10-02 PROCEDURE — 90471 IMMUNIZATION ADMIN: CPT

## 2020-10-02 PROCEDURE — 99213 OFFICE O/P EST LOW 20 MIN: CPT

## 2020-10-03 LAB
25(OH)D3 SERPL-MCNC: 43 NG/ML (ref 30–100)
ALBUMIN SERPL-MCNC: 4.3 G/DL (ref 3.6–5.1)
ALBUMIN/GLOB SERPL: 2.3 (CALC) (ref 1–2.5)
ALP SERPL-CCNC: 82 U/L (ref 37–153)
ALT SERPL-CCNC: 17 U/L (ref 6–29)
AST SERPL-CCNC: 18 U/L (ref 10–35)
BILIRUB SERPL-MCNC: 1.7 MG/DL (ref 0.2–1.2)
BUN SERPL-MCNC: 13 MG/DL (ref 7–25)
BUN/CREAT SERPL: ABNORMAL (CALC) (ref 6–22)
CALCIUM SERPL-MCNC: 9.4 MG/DL (ref 8.6–10.4)
CHLORIDE SERPL-SCNC: 105 MMOL/L (ref 98–110)
CO2 SERPL-SCNC: 32 MMOL/L (ref 20–32)
CREAT SERPL-MCNC: 0.87 MG/DL (ref 0.5–0.99)
GLOBULIN SER CALC-MCNC: 1.9 G/DL (CALC) (ref 1.9–3.7)
GLUCOSE SERPL-MCNC: 89 MG/DL (ref 65–99)
POTASSIUM SERPL-SCNC: 4.3 MMOL/L (ref 3.5–5.3)
PROT SERPL-MCNC: 6.2 G/DL (ref 6.1–8.1)
SL AMB EGFR AFRICAN AMERICAN: 82 ML/MIN/1.73M2
SL AMB EGFR NON AFRICAN AMERICAN: 70 ML/MIN/1.73M2
SODIUM SERPL-SCNC: 142 MMOL/L (ref 135–146)
T4 FREE SERPL-MCNC: 1.2 NG/DL (ref 0.8–1.8)
TSH SERPL-ACNC: 0.39 MIU/L (ref 0.4–4.5)

## 2020-10-06 ENCOUNTER — TELEPHONE (OUTPATIENT)
Dept: ENDOCRINOLOGY | Facility: CLINIC | Age: 65
End: 2020-10-06

## 2020-10-06 DIAGNOSIS — R79.89 LOW TSH LEVEL: Primary | ICD-10-CM

## 2020-10-19 ENCOUNTER — CLINICAL SUPPORT (OUTPATIENT)
Dept: FAMILY MEDICINE CLINIC | Facility: OTHER | Age: 65
End: 2020-10-19
Payer: COMMERCIAL

## 2020-10-19 DIAGNOSIS — Z23 ENCOUNTER FOR IMMUNIZATION: Primary | ICD-10-CM

## 2020-10-19 PROCEDURE — 90750 HZV VACC RECOMBINANT IM: CPT

## 2020-10-19 PROCEDURE — 90471 IMMUNIZATION ADMIN: CPT

## 2020-11-27 ENCOUNTER — TELEMEDICINE (OUTPATIENT)
Dept: FAMILY MEDICINE CLINIC | Facility: OTHER | Age: 65
End: 2020-11-27
Payer: COMMERCIAL

## 2020-11-27 DIAGNOSIS — G43.911 INTRACTABLE MIGRAINE WITH STATUS MIGRAINOSUS, UNSPECIFIED MIGRAINE TYPE: Primary | ICD-10-CM

## 2020-11-27 DIAGNOSIS — H53.9 CHANGE IN VISION: ICD-10-CM

## 2020-11-27 PROCEDURE — 1036F TOBACCO NON-USER: CPT | Performed by: FAMILY MEDICINE

## 2020-11-27 PROCEDURE — 99214 OFFICE O/P EST MOD 30 MIN: CPT | Performed by: FAMILY MEDICINE

## 2020-12-09 ENCOUNTER — APPOINTMENT (OUTPATIENT)
Dept: RADIOLOGY | Facility: AMBULARY SURGERY CENTER | Age: 65
End: 2020-12-09
Attending: ORTHOPAEDIC SURGERY
Payer: COMMERCIAL

## 2020-12-09 ENCOUNTER — OFFICE VISIT (OUTPATIENT)
Dept: OBGYN CLINIC | Facility: CLINIC | Age: 65
End: 2020-12-09
Payer: COMMERCIAL

## 2020-12-09 VITALS — WEIGHT: 159 LBS | HEIGHT: 63 IN | BODY MASS INDEX: 28.17 KG/M2

## 2020-12-09 DIAGNOSIS — M79.672 PAIN IN LEFT FOOT: ICD-10-CM

## 2020-12-09 DIAGNOSIS — M25.572 PAIN, JOINT, ANKLE AND FOOT, LEFT: ICD-10-CM

## 2020-12-09 DIAGNOSIS — IMO0001 SUBLUXATION OF PERONEAL TENDON OF LEFT FOOT, INITIAL ENCOUNTER: Primary | ICD-10-CM

## 2020-12-09 PROBLEM — S93.332A SUBLUXATION OF PERONEAL TENDON OF LEFT FOOT: Status: ACTIVE | Noted: 2020-12-09

## 2020-12-09 PROCEDURE — 73610 X-RAY EXAM OF ANKLE: CPT

## 2020-12-09 PROCEDURE — 3008F BODY MASS INDEX DOCD: CPT | Performed by: ORTHOPAEDIC SURGERY

## 2020-12-09 PROCEDURE — 99203 OFFICE O/P NEW LOW 30 MIN: CPT | Performed by: ORTHOPAEDIC SURGERY

## 2020-12-09 PROCEDURE — 1036F TOBACCO NON-USER: CPT | Performed by: ORTHOPAEDIC SURGERY

## 2020-12-09 PROCEDURE — 73630 X-RAY EXAM OF FOOT: CPT

## 2020-12-09 RX ORDER — METHYLPREDNISOLONE 4 MG/1
TABLET ORAL
Qty: 1 EACH | Refills: 0 | Status: SHIPPED | OUTPATIENT
Start: 2020-12-09 | End: 2021-01-27

## 2020-12-10 ENCOUNTER — TELEPHONE (OUTPATIENT)
Dept: ENDOCRINOLOGY | Facility: CLINIC | Age: 65
End: 2020-12-10

## 2020-12-10 LAB
T4 FREE SERPL-MCNC: 1.2 NG/DL (ref 0.8–1.8)
TSH SERPL-ACNC: 0.94 MIU/L (ref 0.4–4.5)

## 2020-12-16 ENCOUNTER — EVALUATION (OUTPATIENT)
Dept: PHYSICAL THERAPY | Facility: REHABILITATION | Age: 65
End: 2020-12-16
Payer: COMMERCIAL

## 2020-12-16 DIAGNOSIS — IMO0001 SUBLUXATION OF PERONEAL TENDON OF LEFT FOOT, INITIAL ENCOUNTER: ICD-10-CM

## 2020-12-16 DIAGNOSIS — IMO0001: Primary | ICD-10-CM

## 2020-12-16 DIAGNOSIS — M25.572 ACUTE LEFT ANKLE PAIN: ICD-10-CM

## 2020-12-16 PROCEDURE — 97110 THERAPEUTIC EXERCISES: CPT | Performed by: PHYSICAL THERAPIST

## 2020-12-16 PROCEDURE — 97161 PT EVAL LOW COMPLEX 20 MIN: CPT | Performed by: PHYSICAL THERAPIST

## 2020-12-18 ENCOUNTER — OFFICE VISIT (OUTPATIENT)
Dept: PHYSICAL THERAPY | Facility: REHABILITATION | Age: 65
End: 2020-12-18
Payer: COMMERCIAL

## 2020-12-18 DIAGNOSIS — IMO0001: Primary | ICD-10-CM

## 2020-12-18 DIAGNOSIS — IMO0001 SUBLUXATION OF PERONEAL TENDON OF LEFT FOOT, INITIAL ENCOUNTER: ICD-10-CM

## 2020-12-18 DIAGNOSIS — M25.572 ACUTE LEFT ANKLE PAIN: ICD-10-CM

## 2020-12-18 PROCEDURE — 97112 NEUROMUSCULAR REEDUCATION: CPT | Performed by: PHYSICAL THERAPIST

## 2020-12-18 PROCEDURE — 97110 THERAPEUTIC EXERCISES: CPT | Performed by: PHYSICAL THERAPIST

## 2020-12-18 PROCEDURE — 97140 MANUAL THERAPY 1/> REGIONS: CPT | Performed by: PHYSICAL THERAPIST

## 2020-12-21 ENCOUNTER — APPOINTMENT (OUTPATIENT)
Dept: PHYSICAL THERAPY | Facility: REHABILITATION | Age: 65
End: 2020-12-21
Payer: COMMERCIAL

## 2020-12-22 ENCOUNTER — OFFICE VISIT (OUTPATIENT)
Dept: PHYSICAL THERAPY | Facility: REHABILITATION | Age: 65
End: 2020-12-22
Payer: COMMERCIAL

## 2020-12-22 DIAGNOSIS — IMO0001: Primary | ICD-10-CM

## 2020-12-22 DIAGNOSIS — M25.572 ACUTE LEFT ANKLE PAIN: ICD-10-CM

## 2020-12-22 DIAGNOSIS — IMO0001 SUBLUXATION OF PERONEAL TENDON OF LEFT FOOT, INITIAL ENCOUNTER: ICD-10-CM

## 2020-12-22 PROCEDURE — 97140 MANUAL THERAPY 1/> REGIONS: CPT | Performed by: PHYSICAL THERAPIST

## 2020-12-22 PROCEDURE — 97112 NEUROMUSCULAR REEDUCATION: CPT | Performed by: PHYSICAL THERAPIST

## 2020-12-22 PROCEDURE — 97110 THERAPEUTIC EXERCISES: CPT | Performed by: PHYSICAL THERAPIST

## 2020-12-24 ENCOUNTER — OFFICE VISIT (OUTPATIENT)
Dept: PHYSICAL THERAPY | Facility: REHABILITATION | Age: 65
End: 2020-12-24
Payer: COMMERCIAL

## 2020-12-24 DIAGNOSIS — IMO0001: Primary | ICD-10-CM

## 2020-12-24 DIAGNOSIS — M25.572 ACUTE LEFT ANKLE PAIN: ICD-10-CM

## 2020-12-24 PROCEDURE — 97140 MANUAL THERAPY 1/> REGIONS: CPT | Performed by: PHYSICAL THERAPIST

## 2020-12-24 PROCEDURE — 97110 THERAPEUTIC EXERCISES: CPT | Performed by: PHYSICAL THERAPIST

## 2020-12-24 PROCEDURE — 97112 NEUROMUSCULAR REEDUCATION: CPT | Performed by: PHYSICAL THERAPIST

## 2020-12-28 ENCOUNTER — OFFICE VISIT (OUTPATIENT)
Dept: PHYSICAL THERAPY | Facility: REHABILITATION | Age: 65
End: 2020-12-28
Payer: COMMERCIAL

## 2020-12-28 DIAGNOSIS — M25.572 ACUTE LEFT ANKLE PAIN: ICD-10-CM

## 2020-12-28 DIAGNOSIS — IMO0001: Primary | ICD-10-CM

## 2020-12-28 PROCEDURE — 97110 THERAPEUTIC EXERCISES: CPT | Performed by: PHYSICAL THERAPIST

## 2020-12-28 PROCEDURE — 97112 NEUROMUSCULAR REEDUCATION: CPT | Performed by: PHYSICAL THERAPIST

## 2020-12-28 PROCEDURE — 97140 MANUAL THERAPY 1/> REGIONS: CPT | Performed by: PHYSICAL THERAPIST

## 2020-12-31 ENCOUNTER — OFFICE VISIT (OUTPATIENT)
Dept: PHYSICAL THERAPY | Facility: REHABILITATION | Age: 65
End: 2020-12-31
Payer: COMMERCIAL

## 2020-12-31 DIAGNOSIS — M25.572 ACUTE LEFT ANKLE PAIN: ICD-10-CM

## 2020-12-31 DIAGNOSIS — IMO0001: Primary | ICD-10-CM

## 2020-12-31 PROCEDURE — 97140 MANUAL THERAPY 1/> REGIONS: CPT | Performed by: PHYSICAL THERAPIST

## 2020-12-31 PROCEDURE — 97112 NEUROMUSCULAR REEDUCATION: CPT | Performed by: PHYSICAL THERAPIST

## 2020-12-31 PROCEDURE — 97530 THERAPEUTIC ACTIVITIES: CPT | Performed by: PHYSICAL THERAPIST

## 2020-12-31 PROCEDURE — 97110 THERAPEUTIC EXERCISES: CPT | Performed by: PHYSICAL THERAPIST

## 2021-01-04 ENCOUNTER — EVALUATION (OUTPATIENT)
Dept: PHYSICAL THERAPY | Facility: REHABILITATION | Age: 66
End: 2021-01-04
Payer: COMMERCIAL

## 2021-01-04 ENCOUNTER — CLINICAL SUPPORT (OUTPATIENT)
Dept: FAMILY MEDICINE CLINIC | Facility: OTHER | Age: 66
End: 2021-01-04
Payer: COMMERCIAL

## 2021-01-04 DIAGNOSIS — M25.572 ACUTE LEFT ANKLE PAIN: ICD-10-CM

## 2021-01-04 DIAGNOSIS — IMO0001: Primary | ICD-10-CM

## 2021-01-04 DIAGNOSIS — Z23 ENCOUNTER FOR IMMUNIZATION: Primary | ICD-10-CM

## 2021-01-04 PROCEDURE — 90471 IMMUNIZATION ADMIN: CPT | Performed by: FAMILY MEDICINE

## 2021-01-04 PROCEDURE — 90750 HZV VACC RECOMBINANT IM: CPT | Performed by: FAMILY MEDICINE

## 2021-01-04 PROCEDURE — 97140 MANUAL THERAPY 1/> REGIONS: CPT | Performed by: PHYSICAL THERAPIST

## 2021-01-04 PROCEDURE — 97112 NEUROMUSCULAR REEDUCATION: CPT | Performed by: PHYSICAL THERAPIST

## 2021-01-04 PROCEDURE — 97530 THERAPEUTIC ACTIVITIES: CPT | Performed by: PHYSICAL THERAPIST

## 2021-01-04 NOTE — PROGRESS NOTES
PT Re-Evaluation  and PT Discharge    Today's date: 2021  Patient name: Jaime Gonzalez  : 1955  MRN: [id number]  Referring provider: Jessica Austin  Dx:   Encounter Diagnosis     ICD-10-CM    1  Subluxation of peroneal tendon of left foot, subsequent encounter  S93  05XD    2  Acute left ankle pain  M25 572        Start Time: 0802  Stop Time: 4864  Total time in clinic (min): 53 minutes    Assessment  Assessment details: Davian Ta has made the following improvements since beginning PT: decreased pain, decreased swelling, increased ROM, increased strength, increased balance and proprioception, increased tolerance to activities and increased normalized gait  Lester and PT mutually agree to transition to HEP at this time secondary to gains made in PT  Davian Ta demonstrates significant improvements in left ankle eversion range of motion, strength, balance and return to activity without limitation  She has been given updated HEP with verbalized understanding, confidence and compliance  Davian Ta is encouraged to contact PT with any questions or concerns in the future         Impairments: abnormal muscle firing, abnormal muscle tone, abnormal or restricted ROM, activity intolerance, impaired balance, impaired physical strength, lacks appropriate home exercise program, pain with function and weight-bearing intolerance  Understanding of Dx/Px/POC: excellent  Goals  ST-4 weeks   Patient will report 50% decrease in left ankle pain  (met)  Patient will demonstrate improvement in left ankle strength by at least 1-2 grades  (met)    LT-6 weeks  Patient will demonstrate full active ankle range of motion without limitation from pain  (met)  Patient will be able to ambulate for at least 1 hour without ankle pain for return to walking program without limitation  (met)  Patient will be independent with HEP  (met)      Plan  Plan details: Plan to discharge with comprehensive HEP for continued and maintained gains made in PT  Patient would benefit from: skilled physical therapy and PT eval  Planned modality interventions: cryotherapy, electrical stimulation/Russian stimulation, unattended electrical stimulation and TENS  Planned therapy interventions: balance, balance/weight bearing training, flexibility, functional ROM exercises, home exercise program, therapeutic exercise, therapeutic activities, stretching, strengthening, patient education, neuromuscular re-education, massage, manual therapy and joint mobilization  Treatment plan discussed with: patient        Subjective Evaluation    History of Present Illness  Date of onset: 2020  Mechanism of injury: Derik Salazar has been seen for total of 7 visits for OP PT for left ankle pain  Patient rates overall improvement since beginning PT 99%  Patient's global rating of change is " A great deal better (6) " Patient reports improvements with no sharp or burning pain in the foot or ankle and improvements with range of motion  Patient reports having no pain when walking with and without the boot  Patient reports an occasional "twinge" but states it is much better than before  Next follow up with MD on 2020        Quality of life: good    Pain  Current pain ratin  At best pain ratin  At worst pain ratin  Location: across top of foot and up side of leg   Quality: dull ache (hard muscle ache)  Relieving factors: medications  Aggravating factors: standing, walking, stair climbing, lifting and running  Progression: improved    Social Support  Steps to enter house: yes  Stairs in house: yes     Employment status: not working    Diagnostic Tests  X-ray: normal  Treatments  Current treatment: immobilization, medication and physical therapy  Patient Goals  Patient goals for therapy: decreased edema, decreased pain, improved balance, increased motion, increased strength and return to sport/leisure activities          Objective     Palpation   Left   No palpable tenderness to the anterior tibialis, lateral gastrocnemius, medial gastrocnemius, peroneus and posterior tibialis  Tenderness   Left Ankle/Foot   No tenderness in the fifth metatarsal base, dorsum foot and lateral malleolus  Active Range of Motion   Left Ankle/Foot   Dorsiflexion (ke): 5 degrees   Plantar flexion: 60 degrees   Inversion: 22 degrees with pain  Eversion: 30 degrees with pain    Right Ankle/Foot   Dorsiflexion (ke): 5 degrees   Plantar flexion: 60 degrees   Inversion: 25 degrees   Eversion: 33 degrees     Passive Range of Motion   Left Ankle/Foot    Inversion: 35 degrees   Eversion: 25 degrees     Right Ankle/Foot    Inversion: 35 degrees   Eversion: 35 degrees     Joint Play   Left Ankle/Foot  Joints within functional limits are the talocrural joint  Hypermobile in the midfoot and forefoot  Hypomobile in the subtalar joint  Right Ankle/Foot  Joints within functional limits are the talocrural joint  Hypermobile in the midfoot and forefoot  Hypomobile in the subtalar joint  Strength/Myotome Testing     Left Hip   Planes of Motion   Flexion: 4  Abduction: 4+    Right Hip   Planes of Motion   Flexion: 4  Abduction: 4+    Left Ankle/Foot   Dorsiflexion: 5  Plantar flexion: 5  Inversion: 5  Eversion: 5    Right Ankle/Foot   Dorsiflexion: 5  Plantar flexion: 5  Inversion: 5  Eversion: 5    General Comments:       Ankle/Foot Comments   RE 1/4/2021:  Heel rist rest: L 18  SLS: 30" improved stability compared to IE    Heel rise test: R 13, L 8  SLS: 30" bilaterally (increased unsteadiness noted on L)    Left soleus tightness in prone            Flowsheet Rows      Most Recent Value   PT/OT G-Codes   Current Score  99   Projected Score  79       Precautions: Thyroid dysfunction, GERD, Hx Kidney stones, Osteopenia, Diverticulitis    Daily Treatment Diary  * Indicates part of HEP      12/15 12/18 12/22 12/24 12/28 12/31 1/4    Manual           Left ankle PROM  Done Done  Done Done Done Done    Lateral calcaneal glide&medial gapping  Done Done  Done Done      TC AP mobs      Done                Neuro Re-Ed           Gastroc stretch w strap* :30x3 :30x5 :30x5        Standing gastroc stretch    :30x5 :30x5 :30x5 :30x5    Standing soleus stretch foam  x30 :30x4 x30 x30 x30 30    Towel curls*  :05x20 :05x30 :05x30 :05x30       Short foot   :05x30 :05x30 :05x30 :05x30 standing :05x30     Short foot to lunge on step    4" 2x10x5" 4" 2x10x5"      Seated heel raises            Seated toe raises                      SLS     NV :10x10 :10x10    SLS foam           SLS ball toss           SLS clock     NV 2x8 2x8               Tandem walking                      Ther Ex           Bike  5' lv 1 5' lvl 1 5' lv 1 5' lv 1                 TB ankle 4 way* DF/PF GTB 3x10 GTB 3x10 ea  GTB 3x10 ea GTB 3x12 ea GTB 3x12 ea GTB 3x15 ea     Inversion isometrics w ball* :05x20 :[id number] :[id number] dc       Eversion isometrics w ball  :[id number] :[id number] dc       SLR Flexion           SLR Abduction    3x10 3x10 3x12 3x12    Bridging    3x10 3x12 3x12 3x12               Heel raises  2x10x3" 2x10x3" 3x10x3" 3x10x3" 3x12x3"     Eccentric heel raises    2x8 3x8 3x8 3x10               Ther Activity           Leg press           STS    3x10 3x12 3x12 3x15 10# 3x12    Step ups (progress to foam)           Lateral step ups (progress to foam)           Step overs  4" 2x10 4" 2x10 4" 2x10 4" 2x10 4" 2x12 4" 2x12               Side stepping w TB on feet      OTB 3 laps OTB 3 laps    Monster walks           Walking lunges           Diagonal hopping           Gait Training            TM walking           TM jogging progression                      Modalities           CP prn                      * = on hep

## 2021-01-06 ENCOUNTER — OFFICE VISIT (OUTPATIENT)
Dept: OBGYN CLINIC | Facility: CLINIC | Age: 66
End: 2021-01-06
Payer: COMMERCIAL

## 2021-01-06 VITALS
SYSTOLIC BLOOD PRESSURE: 120 MMHG | HEART RATE: 71 BPM | WEIGHT: 159 LBS | DIASTOLIC BLOOD PRESSURE: 79 MMHG | HEIGHT: 63 IN | BODY MASS INDEX: 28.17 KG/M2

## 2021-01-06 DIAGNOSIS — M25.572 PAIN, JOINT, ANKLE AND FOOT, LEFT: ICD-10-CM

## 2021-01-06 DIAGNOSIS — IMO0001: Primary | ICD-10-CM

## 2021-01-06 PROCEDURE — 99213 OFFICE O/P EST LOW 20 MIN: CPT | Performed by: ORTHOPAEDIC SURGERY

## 2021-01-06 NOTE — PROGRESS NOTES
Assessment:    1  Subluxation of peroneal tendon of left foot, subsequent encounter     2  Pain, joint, ankle and foot, left       Scribe Attestation    I,:  Val Saeed am acting as a scribe while in the presence of the attending physician :       I,:  Quintin Bush, DO personally performed the services described in this documentation    as scribed in my presence :             Patient Active Problem List   Diagnosis    Allergic rhinitis    GERD without esophagitis    Mixed hyperlipidemia    Internal hemorrhoids    Migraine    Osteopenia    Diverticulosis of large intestine    Serum total bilirubin elevated    Low TSH level    Multiple thyroid nodules    Irritable bowel syndrome    Nonspecific abnormal results of function study of thyroid    Osteoarthrosis of knee    Screening for malignant neoplasm of breast    Neck pain on right side    Dysuria    Left lower quadrant pain    Rectal bleeding    Other specified disorders of bone density and structure, other site    Subluxation of peroneal tendon of left foot           Plan      Lester examination and review of her physical therapy notes does demonstrate significant improvements with her left ankle peroneal tendon subluxation she demonstrates full functional range of motion strength in all planes of the left ankle  There is also no evidence of snapping upon range of motion of the ankle  I do feel that she may progress out of the Cam walker boot and may wear regular shoes  I did remark however that she would benefit from a doctor ronn's arch support for her flats  She does not have to wear the arch support with regular sneakers  I did encourage her to continue her home exercise program from physical therapy  However, may discontinue formal therapy at this time  Qasim Erazo verbalized understanding of all information provided to her today and had no further questions  I will see her back on an as-needed basis        Subjective:  Left ankle pain Patient ID:    Chief Complaint:Lester Love 60 y o  female      HPI    Patient comes in today with regards to her left ankle  The patient reports that the pain in the left ankle had greatly reduced  She remarks that she has a 99% improvement from her last visit  She remarks that she has been compliant with her physical therapy, home exercises, and use the Cam walker boot  She does remark however that she did not wear the Cam walker boot while she was outside for cleanliness reasons for the boot  She denies any recurrence of stopping of the lateral aspect of her ankle  She does denies any recurrence of swelling on the eyes any bruising formation  She notes that she working with ambulate with no difficulty with a regular shoe  Today she denies any distal paresthesias        The following portions of the patient's history were reviewed and updated as appropriate: allergies, current medications, past family history, past social history, past surgical history and problem list         Social History     Socioeconomic History    Marital status: Single     Spouse name: Not on file    Number of children: Not on file    Years of education: Not on file    Highest education level: Not on file   Occupational History    Occupation: retired   Social Needs    Financial resource strain: Not on file    Food insecurity     Worry: Not on file     Inability: Not on file   [employer] needs     Medical: Not on file     Non-medical: Not on file   Tobacco Use    Smoking status: Former Smoker     Packs/day: 1 00     Years: 6 00     Pack years: 6      Quit date: 2012     Years since quittin 0    Smokeless tobacco: Never Used   Substance and Sexual Activity    Alcohol use: Yes     Frequency: Monthly or less     Drinks per session: 1 or 2     Binge frequency: Never     Comment:  socially    Drug use: No    Sexual activity: Yes     Partners: Male     Birth control/protection: None     Comment: pt  declines std/hiv testing   Lifestyle    Physical activity     Days per week: Not on file     Minutes per session: Not on file    Stress: Not on file   Relationships    Social connections     Talks on phone: Not on file     Gets together: Not on file     Attends Sikhism service: Not on file     Active member of club or organization: Not on file     Attends meetings of clubs or organizations: Not on file     Relationship status: Not on file    Intimate partner violence     Fear of current or ex partner: Not on file     Emotionally abused: Not on file     Physically abused: Not on file     Forced sexual activity: Not on file   Other Topics Concern    Not on file   Social History Narrative    Daily caffeinated cola consumption    Drinks coffee    Exercise habits     Past Medical History:   Diagnosis Date    Allergic rhinitis     last assessed: 9/29/2016    Anxiety     last assessed: 8/22/2017    Disease of thyroid gland     Multiple Nodules    Diverticulosis     last assessed: 10/7/2013    GERD (gastroesophageal reflux disease)     Hydronephrosis, right     last assessed: 5/22/2013    Hyperlipidemia     last assessed 8/22/2017    Hypokalemia     last assessed: 3/24/2015    IBS (irritable bowel syndrome)     last assessed: 8/22/2017    Internal hemorrhoids     last assessed: 10/7/2013    Migraines     last assessed: 8/22/2017    Nephrolithiasis     Osteoarthrosis of knee     last assessed: 2/28/2017    Osteopenia     last assessed: 8/22/2017    PONV (postoperative nausea and vomiting)     Renal calculi     last assessed: 4/29/2016    Renal cyst     last assessed: 7/15/2015    Total bilirubin, elevated     last assessed: 8/22/2017    Uterine leiomyoma      Past Surgical History:   Procedure Laterality Date    APPENDECTOMY      BREAST BIOPSY Right 1998    benign    core bx    BREAST EXCISIONAL BIOPSY Left 1999    benign    COLONOSCOPY      HAND SURGERY Left     Ganglion cyst    KNEE ARTHROSCOPY Left X3    LAPAROTOMY N/A 10/30/2017    Procedure: LAPAROTOMY EXPLORATORY, DRAINAGE PELVIC ABSCESS; APPENDECTOMY;  Surgeon: Shannon Howard DO;  [facility];  Service: [facility] N/A 3/26/2020    Procedure: INCISIONAL HERNIA REPAIR WITH MESH;  Surgeon: Karine Fleming MD;  [facility];  Service: General    SALIVARY GLAND SURGERY Right     Removal for Stones    THYROID SURGERY      biopsy thyroid using percutaneous core needle    TONSILLECTOMY      TUBAL LIGATION      UTERINE FIBROID SURGERY      embolization( Myomectomy)     No Known Allergies  Current Outpatient Medications on File Prior to Visit   Medication Sig Dispense Refill    ACZONE 7 5 % GEL daily   3    aspirin 81 mg chewable tablet Chew 81 mg daily      atorvastatin (LIPITOR) 20 mg tablet Take 1 tablet (20 mg total) by mouth daily at bedtime 90 tablet 1    Cholecalciferol (VITAMIN D3) 2000 units TABS Take 2,000 Units by mouth daily      methylPREDNISolone 4 MG tablet therapy pack Use as directed on package 1 each 0    Multiple Vitamins-Calcium (ONE-A-DAY WOMENS FORMULA PO) Take 1 tablet by mouth daily      Omega-3 Fatty Acids (FISH OIL) 1,000 mg Take 4,000 mg by mouth daily      omeprazole (PriLOSEC) 10 mg delayed release capsule Take 10 mg by mouth daily at bedtime       SOOLANTRA 1 % CREA daily        No current facility-administered medications on file prior to visit  Objective:    Review of Systems   Constitutional: Negative for chills, fever and unexpected weight change  HENT: Negative for hearing loss, nosebleeds and sore throat  Eyes: Negative for pain, redness and visual disturbance  Respiratory: Negative for cough, shortness of breath and wheezing  Cardiovascular: Negative for chest pain, palpitations and leg swelling  Gastrointestinal: Negative for abdominal pain, nausea and vomiting  Endocrine: Negative for polydipsia and polyuria     Genitourinary: Negative for dysuria and hematuria  Musculoskeletal:        See HPI   Skin: Negative for rash and wound  Neurological: Negative for dizziness, numbness and headaches  Psychiatric/Behavioral: Negative for decreased concentration and suicidal ideas  The patient is not nervous/anxious  Left Ankle Exam     Tenderness   The patient is experiencing no tenderness  Swelling: none    Range of Motion   Dorsiflexion: 30   Plantar flexion: 45   Eversion: 30   Inversion: 45     Muscle Strength   Dorsiflexion:  5/5   Plantar flexion:  5/5   Anterior tibial:  5/5   Posterior tibial:  5/5  Gastrocsoleus:  5/5  Peroneal muscle:  5/5    Other   Erythema: absent  Scars: absent  Sensation: normal  Pulse: present            Physical Exam  Vitals signs reviewed  HENT:      Head: Normocephalic and atraumatic  Eyes:      General:         Right eye: No discharge  Left eye: No discharge  Conjunctiva/sclera: Conjunctivae normal       Pupils: Pupils are equal, round, and reactive to light  Neck:      Musculoskeletal: Normal range of motion and neck supple  Cardiovascular:      Rate and Rhythm: Normal rate  Pulmonary:      Effort: Pulmonary effort is normal  No respiratory distress  Musculoskeletal:      Comments: As noted in HPI   Skin:     General: Skin is warm and dry  Neurological:      Mental Status: She is alert and oriented to person, place, and time  Procedures  No Procedures performed today        Portions of the record may have been created with voice recognition software   Occasional wrong word or "sound a like" substitutions may have occurred due to the inherent limitations of voice recognition software   Read the chart carefully and recognize, using context, where substitutions have occurred

## 2021-01-26 ENCOUNTER — TELEPHONE (OUTPATIENT)
Dept: ENDOCRINOLOGY | Facility: CLINIC | Age: 66
End: 2021-01-26

## 2021-01-27 ENCOUNTER — OFFICE VISIT (OUTPATIENT)
Dept: ENDOCRINOLOGY | Facility: CLINIC | Age: 66
End: 2021-01-27
Payer: COMMERCIAL

## 2021-01-27 VITALS
HEART RATE: 66 BPM | BODY MASS INDEX: 29.3 KG/M2 | WEIGHT: 165.4 LBS | DIASTOLIC BLOOD PRESSURE: 90 MMHG | SYSTOLIC BLOOD PRESSURE: 126 MMHG | HEIGHT: 63 IN

## 2021-01-27 DIAGNOSIS — E04.2 MULTIPLE THYROID NODULES: ICD-10-CM

## 2021-01-27 DIAGNOSIS — R79.89 LOW TSH LEVEL: ICD-10-CM

## 2021-01-27 DIAGNOSIS — M85.89 OSTEOPENIA OF MULTIPLE SITES: Primary | ICD-10-CM

## 2021-01-27 PROCEDURE — 3008F BODY MASS INDEX DOCD: CPT | Performed by: INTERNAL MEDICINE

## 2021-01-27 PROCEDURE — 99214 OFFICE O/P EST MOD 30 MIN: CPT | Performed by: INTERNAL MEDICINE

## 2021-01-27 PROCEDURE — 1036F TOBACCO NON-USER: CPT | Performed by: INTERNAL MEDICINE

## 2021-01-27 NOTE — PROGRESS NOTES
John Love 60 y o  female MRN: [id number]    Encounter: [id number]      Assessment/Plan     Problem List Items Addressed This Visit        Endocrine    Multiple thyroid nodules     Stable-will monitor clinically         Relevant Orders    T4, free Lab Collect    TSH, 3rd generation Lab Collect       Musculoskeletal and Integument    Osteopenia - Primary     Continue calcium and vitamin-D supplementations, fall prevention  Relevant Orders    Vitamin D 25 hydroxy Lab Collect       Other    Low TSH level     TSH has been mildly low in the past - currently normal             CC:  Osteopenia    History of Present Illness     HPI:  78-year-old female with history of osteopenia, thyroid nodules and low TSH here for follow-up  She is currently on Calcium 1200 mg daily  and Vitamin D3 2000 iu daily     No falls/frcatures since last visit-  Wore a boot and had PT due to sub laxation of peroneal tendon   No ambulatory dysfunction     Gained 6 lbs in the past year       Review of Systems   Constitutional: Negative for fatigue and unexpected weight change  HENT: Negative for trouble swallowing  Respiratory: Negative for cough and shortness of breath  Cardiovascular: Negative for palpitations and leg swelling  Gastrointestinal: Negative for constipation, diarrhea, nausea and vomiting  Musculoskeletal: Positive for arthralgias  Negative for gait problem and myalgias  Skin: Negative for wound  Psychiatric/Behavioral: Negative for sleep disturbance  All other systems reviewed and are negative        Historical Information   Past Medical History:   Diagnosis Date    Allergic rhinitis     last assessed: 9/29/2016    Anxiety     last assessed: 8/22/2017    Disease of thyroid gland     Multiple Nodules    Diverticulosis     last assessed: 10/7/2013    GERD (gastroesophageal reflux disease)     Hydronephrosis, right     last assessed: 5/22/2013    Hyperlipidemia     last assessed 8/22/2017    Hypokalemia     last assessed: 3/24/2015    IBS (irritable bowel syndrome)     last assessed: 2017    Internal hemorrhoids     last assessed: 10/7/2013    Migraines     last assessed: 2017    Nephrolithiasis     Osteoarthrosis of knee     last assessed: 2017    Osteopenia     last assessed: 2017    PONV (postoperative nausea and vomiting)     Renal calculi     last assessed: 2016    Renal cyst     last assessed: 7/15/2015    Total bilirubin, elevated     last assessed: 2017    Uterine leiomyoma      Past Surgical History:   Procedure Laterality Date    APPENDECTOMY      BREAST BIOPSY Right     benign    core bx    BREAST EXCISIONAL BIOPSY Left     benign    COLONOSCOPY      HAND SURGERY Left     Ganglion cyst    KNEE ARTHROSCOPY Left     X3    LAPAROTOMY N/A 10/30/2017    Procedure: LAPAROTOMY EXPLORATORY, DRAINAGE PELVIC ABSCESS; APPENDECTOMY;  Surgeon: Kaitlyn Doran DO;  [facility];  Service: General    [address] [place] [address] N/A 3/26/2020    Procedure: INCISIONAL HERNIA REPAIR WITH MESH;  Surgeon: Alton Galindo MD;  [facility];  Service: General    SALIVARY GLAND SURGERY Right     Removal for Stones    THYROID SURGERY      biopsy thyroid using percutaneous core needle    TONSILLECTOMY      TUBAL LIGATION      UTERINE FIBROID SURGERY      embolization( Myomectomy)     Social History   Social History     Substance and Sexual Activity   Alcohol Use Yes    Frequency: Monthly or less    Drinks per session: 1 or 2    Binge frequency: Never    Comment:  socially     Social History     Substance and Sexual Activity   Drug Use No     Social History     Tobacco Use   Smoking Status Former Smoker    Packs/day: 1 00    Years: 6 00    Pack years: 6 00    Quit date: 2012    Years since quittin 0   Smokeless Tobacco Never Used     Family History:   Family History   Problem Relation Age of Onset    Cancer Mother  Stomach cancer Mother     Other Father         CVA    Heart disease Father     Diabetes Sister     Arthritis Sister     Kidney disease Sister     Hypertension Brother     Breast cancer Cousin 28    Breast cancer Other 62    No Known Problems Sister     No Known Problems Sister     No Known Problems Maternal Aunt     No Known Problems Maternal Aunt     No Known Problems Maternal Aunt     No Known Problems Maternal Aunt     Kidney disease Paternal Aunt        Meds/Allergies   Current Outpatient Medications   Medication Sig Dispense Refill    ACZONE 7 5 % GEL daily   3    aspirin 81 mg chewable tablet Chew 81 mg daily      atorvastatin (LIPITOR) 20 mg tablet Take 1 tablet (20 mg total) by mouth daily at bedtime 90 tablet 1    Cholecalciferol (VITAMIN D3) 2000 units TABS Take 2,000 Units by mouth daily      Multiple Vitamins-Calcium (ONE-A-DAY WOMENS FORMULA PO) Take 1 tablet by mouth daily      Omega-3 Fatty Acids (FISH OIL) 1,000 mg Take 4,000 mg by mouth daily      omeprazole (PriLOSEC) 10 mg delayed release capsule Take 10 mg by mouth daily at bedtime       SOOLANTRA 1 % CREA daily        No current facility-administered medications for this visit  No Known Allergies    Objective   Vitals: Blood pressure 126/90, pulse 66, height 5' 3" (1 6 m), weight 75 kg (165 lb 6 4 oz)  Physical Exam  Vitals signs reviewed  Constitutional:       Appearance: Normal appearance  She is not ill-appearing or diaphoretic  HENT:      Head: Normocephalic and atraumatic  Eyes:      General: No scleral icterus  Extraocular Movements: Extraocular movements intact  Neck:      Musculoskeletal: Neck supple  Cardiovascular:      Rate and Rhythm: Normal rate and regular rhythm  Heart sounds: Normal heart sounds  No murmur  Pulmonary:      Effort: Pulmonary effort is normal  No respiratory distress  Breath sounds: Normal breath sounds  No wheezing or rales     Abdominal:      General: There is no distension  Palpations: Abdomen is soft  Tenderness: There is no abdominal tenderness  There is no guarding  Musculoskeletal:      Right lower leg: No edema  Left lower leg: No edema  Lymphadenopathy:      Cervical: No cervical adenopathy  Skin:     General: Skin is warm and dry  Neurological:      General: No focal deficit present  Mental Status: She is alert and oriented to person, place, and time  Psychiatric:         Mood and Affect: Mood normal          Behavior: Behavior normal          Thought Content: Thought content normal          Judgment: Judgment normal          The history was obtained from the review of the chart, patient  Lab Results:   Lab Results   Component Value Date/Time    Potassium 4 3 10/02/2020 02:16 PM    Potassium 4 4 06/11/2020 11:49 AM    Potassium 4 3 03/03/2020 03:01 PM    Chloride 105 10/02/2020 02:16 PM    Chloride 109 06/11/2020 11:49 AM    Chloride 105 03/03/2020 03:01 PM    CO2 32 10/02/2020 02:16 PM    CO2 28 06/11/2020 11:49 AM    CO2 30 03/03/2020 03:01 PM    BUN 13 10/02/2020 02:16 PM    BUN 19 06/11/2020 11:49 AM    BUN 10 03/03/2020 03:01 PM    Creatinine 0 87 10/02/2020 02:16 PM    Creatinine 0 93 06/11/2020 11:49 AM    Creatinine 0 94 03/03/2020 03:01 PM    Calcium 9 4 10/02/2020 02:16 PM    Calcium 9 6 06/11/2020 11:49 AM    Calcium 9 5 03/03/2020 03:01 PM    Free t4 1 2 12/09/2020 06:51 AM    Free t4 1 2 10/02/2020 02:16 PM         Imaging Studies:            Results for orders placed during the hospital encounter of 09/17/20   DXA bone density spine hip and pelvis    Impression 1  Based on the [facility] classification, the T-score of -2 1 in the left hip is consistent with LOW BONE MINERAL DENSITY (OSTEOPENIA)  2  When compared to the prior examination, there has been a 4  % DECREASE in the total bone mineral density of the lumbar spine and a  4 % DECREASE in the total bone mineral density of the left hip          3   Any secondary causes of low bone mineral density should be excluded prior to treatment, if clinically indicated  4   A daily intake of at least 1200 mg calcium and 800 to 1000 IU of Vitamin D, as well as weight bearing and muscle strengthening exercise, fall prevention and avoidance of tobacco and excessive alcohol intake as basic preventive measures are suggested  5   Repeat DXA  in 18 - 24 months, on the same machine, as clinically indicated  The 10 year risk of hip fracture is 2%, with the 10 year risk of major osteoporotic fracture being 11%, as calculated by the [facility] fracture risk assessment tool (FRAX)  The current NOF guidelines recommend treating patients with FRAX 10 year risk score of   >3% for hip fracture and >20% for major osteoporotic fracture  WHO CLASSIFICATION:  Normal (a T-score of -1 0 or higher)  Low bone mineral density (a T-score of less than -1 0 but higher than -2 5)  Osteoporosis (a T-score of -2 5 or less)  Severe osteoporosis (a T-score of -2 5 or less with a fragility fracture)    Workstation performed: [id number]                  I have personally reviewed pertinent reports  Portions of the record may have been created with voice recognition software  Occasional wrong word or "sound a like" substitutions may have occurred due to the inherent limitations of voice recognition software  Read the chart carefully and recognize, using context, where substitutions have occurred

## 2021-02-15 ENCOUNTER — HOSPITAL ENCOUNTER (OUTPATIENT)
Dept: RADIOLOGY | Age: 66
Discharge: HOME | End: 2021-02-15
Payer: COMMERCIAL

## 2021-02-15 VITALS — HEIGHT: 63 IN | BODY MASS INDEX: 28 KG/M2 | WEIGHT: 158 LBS

## 2021-02-15 DIAGNOSIS — Z12.31 ENCOUNTER FOR SCREENING MAMMOGRAM FOR MALIGNANT NEOPLASM OF BREAST: ICD-10-CM

## 2021-02-15 PROCEDURE — 77063 BREAST TOMOSYNTHESIS BI: CPT

## 2021-02-15 PROCEDURE — 77067 SCR MAMMO BI INCL CAD: CPT

## 2021-03-10 DIAGNOSIS — Z23 ENCOUNTER FOR IMMUNIZATION: ICD-10-CM

## 2021-04-06 ENCOUNTER — OFFICE VISIT (OUTPATIENT)
Dept: FAMILY MEDICINE CLINIC | Facility: OTHER | Age: 66
End: 2021-04-06
Payer: COMMERCIAL

## 2021-04-06 VITALS
HEIGHT: 63 IN | WEIGHT: 162.2 LBS | DIASTOLIC BLOOD PRESSURE: 80 MMHG | BODY MASS INDEX: 28.74 KG/M2 | RESPIRATION RATE: 18 BRPM | SYSTOLIC BLOOD PRESSURE: 128 MMHG | OXYGEN SATURATION: 98 % | HEART RATE: 70 BPM | TEMPERATURE: 98.2 F

## 2021-04-06 DIAGNOSIS — Z13.1 SCREENING FOR DIABETES MELLITUS: ICD-10-CM

## 2021-04-06 DIAGNOSIS — E78.2 MIXED HYPERLIPIDEMIA: ICD-10-CM

## 2021-04-06 DIAGNOSIS — Z00.00 WELCOME TO MEDICARE PREVENTIVE VISIT: Primary | ICD-10-CM

## 2021-04-06 LAB — ECG INTERP DURING EX: NORMAL MS

## 2021-04-06 PROCEDURE — 3288F FALL RISK ASSESSMENT DOCD: CPT | Performed by: FAMILY MEDICINE

## 2021-04-06 PROCEDURE — 1036F TOBACCO NON-USER: CPT | Performed by: FAMILY MEDICINE

## 2021-04-06 PROCEDURE — 3008F BODY MASS INDEX DOCD: CPT | Performed by: FAMILY MEDICINE

## 2021-04-06 PROCEDURE — G0438 PPPS, INITIAL VISIT: HCPCS | Performed by: FAMILY MEDICINE

## 2021-04-06 PROCEDURE — 93000 ELECTROCARDIOGRAM COMPLETE: CPT | Performed by: FAMILY MEDICINE

## 2021-04-06 PROCEDURE — 3725F SCREEN DEPRESSION PERFORMED: CPT | Performed by: FAMILY MEDICINE

## 2021-04-06 NOTE — PROGRESS NOTES
Assessment and Plan:     Problem List Items Addressed This Visit        Other    Mixed hyperlipidemia    Relevant Orders    Lipid Panel with Direct LDL reflex      Other Visit Diagnoses     Welcome to Medicare preventive visit    -  Primary    Relevant Orders    POCT ECG (Completed)    Screening for diabetes mellitus        Relevant Orders    Comprehensive metabolic panel        BMI Counseling: Body mass index is 28 73 kg/m²  The BMI is above normal  Nutrition recommendations include decreasing portion sizes, encouraging healthy choices of fruits and vegetables, consuming healthier snacks, limiting drinks that contain sugar, moderation in carbohydrate intake, increasing intake of lean protein, reducing intake of saturated and trans fat and reducing intake of cholesterol  Exercise recommendations include moderate physical activity 150 minutes/week and strength training exercises  No pharmacotherapy was ordered  Patient is a well appearing 60year old woman (will turn 73 later this month) who presents for Welcome to Medicare Visit  All health screenings are up to date  EKG normal  Patient is fully functioning and able to complete all of her ADLs and iADLs  Of note, patient informed that she could try beginning Riboflavin (400mg qd) and CoQ10 (200mg qd) for migraine headache prevention  Migraines currently occurring a few times a month according to patient  Return in about 1 year (around 4/6/2022) for AWV or sooner PRN  The patient indicates understanding of these issues and agrees with the plan  Preventive health issues were discussed with patient, and age appropriate screening tests were ordered as noted in patient's After Visit Summary  Personalized health advice and appropriate referrals for health education or preventive services given if needed, as noted in patient's After Visit Summary  History of Present Illness:     [facility]       Patient Care Team:  Kee Arriola MD as PCP - General (Family Medicine)  Elvis Rivera MD as PCP - Endocrinology (Endocrinology)  MD Armando Marinelli CRNP [place], [place]  Vergil Rook, MD Mortimer Mathieu, MD Hilliard Schroeder, MD (Colon and Rectal Surgery)     Review of Systems:     Review of Systems   Constitutional: Negative for activity change, appetite change, diaphoresis, fever and unexpected weight change  HENT: Negative for hearing loss  Eyes: Negative for visual disturbance  Respiratory: Negative for chest tightness and shortness of breath  Cardiovascular: Negative for chest pain, palpitations and leg swelling  Gastrointestinal: Negative for abdominal distention, abdominal pain, constipation, diarrhea, nausea and vomiting  Genitourinary: Negative for difficulty urinating and dysuria  Neurological: Positive for headaches (  chronic issue )  Psychiatric/Behavioral: Negative for sleep disturbance  The patient is not nervous/anxious         Problem List:     Patient Active Problem List   Diagnosis    Allergic rhinitis    GERD without esophagitis    Mixed hyperlipidemia    Internal hemorrhoids    Migraine    Osteopenia    Diverticulosis of large intestine    Serum total bilirubin elevated    Low TSH level    Multiple thyroid nodules    Irritable bowel syndrome    Nonspecific abnormal results of function study of thyroid    Osteoarthrosis of knee    Screening for malignant neoplasm of breast    Neck pain on right side    Dysuria    Left lower quadrant pain    Rectal bleeding    Other specified disorders of bone density and structure, other site    Subluxation of peroneal tendon of left foot      Past Medical and Surgical History:     Past Medical History:   Diagnosis Date    Allergic rhinitis     last assessed: 9/29/2016    Anxiety     last assessed: 8/22/2017    Disease of thyroid gland     Multiple Nodules    Diverticulosis     last assessed: 10/7/2013    GERD (gastroesophageal reflux disease)     Hydronephrosis, right     last assessed: 5/22/2013    Hyperlipidemia     last assessed 8/22/2017    Hypokalemia     last assessed: 3/24/2015    IBS (irritable bowel syndrome)     last assessed: 8/22/2017    Internal hemorrhoids     last assessed: 10/7/2013    Migraines     last assessed: 8/22/2017    Nephrolithiasis     Osteoarthrosis of knee     last assessed: 2/28/2017    Osteopenia     last assessed: 8/22/2017    PONV (postoperative nausea and vomiting)     Renal calculi     last assessed: 4/29/2016    Renal cyst     last assessed: 7/15/2015    Total bilirubin, elevated     last assessed: 8/22/2017    Uterine leiomyoma      Past Surgical History:   Procedure Laterality Date    APPENDECTOMY      BREAST BIOPSY Right 1998    benign    core bx    BREAST EXCISIONAL BIOPSY Left 1999    benign    COLONOSCOPY      HAND SURGERY Left     Ganglion cyst    KNEE ARTHROSCOPY Left     X3    LAPAROTOMY N/A 10/30/2017    Procedure: LAPAROTOMY EXPLORATORY, DRAINAGE PELVIC ABSCESS; APPENDECTOMY;  Surgeon: Hodan Kelly DO;  [facility];  Service: General    [address] [place] [address] N/A 3/26/2020    Procedure: INCISIONAL HERNIA REPAIR WITH MESH;  Surgeon: Madhavi Anderson MD;  [facility];  Service: General    SALIVARY GLAND SURGERY Right     Removal for Stones    THYROID SURGERY      biopsy thyroid using percutaneous core needle    TONSILLECTOMY      TUBAL LIGATION      UTERINE FIBROID SURGERY      embolization( Myomectomy)      Family History:     Family History   Problem Relation Age of Onset    Cancer Mother     Stomach cancer Mother     Other Father         CVA    Heart disease Father     Diabetes Sister     Arthritis Sister     Kidney disease Sister     Hypertension Brother     Breast cancer Cousin 28    Breast cancer Other 62    No Known Problems Sister     No Known Problems Sister     No Known Problems Maternal Aunt  No Known Problems Maternal Aunt     No Known Problems Maternal Aunt     No Known Problems Maternal Aunt     Kidney disease Paternal Aunt       Social History:        Social History     Socioeconomic History    Marital status: Single     Spouse name: None    Number of children: None    Years of education: None    Highest education level: None   Occupational History    Occupation: retired   Social Needs    Financial resource strain: None    Food insecurity     Worry: None     Inability: None    Transportation needs     Medical: None     Non-medical: None   Tobacco Use    Smoking status: Former Smoker     Packs/day: 1 00     Years: 6 00     Pack years: 6 00     Quit date: 2012     Years since quittin 2    Smokeless tobacco: Never Used   Substance and Sexual Activity    Alcohol use: Yes     Frequency: Monthly or less     Drinks per session: 1 or 2     Binge frequency: Never     Comment:  socially    Drug use: No    Sexual activity: Yes     Partners: Male     Birth control/protection: None     Comment: pt  declines std/hiv testing   Lifestyle    Physical activity     Days per week: None     Minutes per session: None    Stress: None   Relationships    Social connections     Talks on phone: None     Gets together: None     Attends Orthodox service: None     Active member of club or organization: None     Attends meetings of clubs or organizations: None     Relationship status: None    Intimate partner violence     Fear of current or ex partner: None     Emotionally abused: None     Physically abused: None     Forced sexual activity: None   Other Topics Concern    None   Social History Narrative    Daily caffeinated cola consumption    Drinks coffee    Exercise habits      Medications and Allergies:     Current Outpatient Medications   Medication Sig Dispense Refill    ACZONE 7 5 % GEL daily   3    aspirin 81 mg chewable tablet Chew 81 mg daily      atorvastatin (LIPITOR) 20 mg tablet Take 1 tablet (20 mg total) by mouth daily at bedtime 90 tablet 1    Cholecalciferol (VITAMIN D3) 2000 units TABS Take 2,000 Units by mouth daily      Multiple Vitamins-Calcium (ONE-A-DAY WOMENS FORMULA PO) Take 1 tablet by mouth daily      Omega-3 Fatty Acids (FISH OIL) 1,000 mg Take 4,000 mg by mouth daily      omeprazole (PriLOSEC) 10 mg delayed release capsule Take 10 mg by mouth daily at bedtime       SOOLANTRA 1 % CREA daily        No current facility-administered medications for this visit  No Known Allergies   Immunizations:     Immunization History   Administered Date(s) Administered    Influenza Quadrivalent Preservative Free 3 years and older IM 10/14/2014    Influenza Quadrivalent, 6-35 Months IM 09/29/2016    Influenza, recombinant, quadrivalent,injectable, preservative free 11/27/2019, 10/02/2020    Influenza, seasonal, injectable 09/17/2015    SARS-CoV-2 / COVID-19 mRNA IM (Moderna) 03/09/2021    Tdap 03/17/2015    Zoster Vaccine Recombinant 10/19/2020, 01/04/2021      Health Maintenance:         Topic Date Due    HIV Screening  Never done    Colonoscopy Surveillance  03/19/2021    Cervical Cancer Screening  01/28/2022    MAMMOGRAM  02/15/2023    Colorectal Cancer Screening  03/19/2028    Hepatitis C Screening  Completed         Topic Date Due    COVID-19 Vaccine (1) Never done      Medicare Screening Tests and Risk Assessments:     Araseli Mcbride is here for her Welcome to Medicare visit  Health Risk Assessment:   Patient rates overall health as good  Patient feels that their physical health rating is same  Patient is satisfied with their life  Eyesight was rated as same  Hearing was rated as same  Patient feels that their emotional and mental health rating is same  Patients states they are never, rarely angry  Patient states they are never, rarely unusually tired/fatigued  Pain experienced in the last 7 days has been some  Patient's pain rating has been 5/10   Patient states that she has experienced no weight loss or gain in last 6 months  Depression Screening:   PHQ-2 Score: 0      Fall Risk Screening: In the past year, patient has experienced: no history of falling in past year      Urinary Incontinence Screening:   Patient has not leaked urine accidently in the last six months  Home Safety:  Patient has trouble with stairs inside or outside of their home  Patient has working smoke alarms and has working carbon monoxide detector  Home safety hazards include: none  Nutrition:   Current diet is Regular  Medications:   Patient is currently taking over-the-counter supplements  OTC medications include: see medication list  Patient is able to manage medications  Activities of Daily Living (ADLs)/Instrumental Activities of Daily Living (IADLs):   Walk and transfer into and out of bed and chair?: Yes  Dress and groom yourself?: Yes    Bathe or shower yourself?: Yes    Feed yourself? Yes  Do your laundry/housekeeping?: Yes  Manage your money, pay your bills and track your expenses?: Yes  Make your own meals?: Yes    Do your own shopping?: Yes    Previous Hospitalizations:   Any hospitalizations or ED visits within the last 12 months?: Yes    How many hospitalizations have you had in the last year?: 1-2    Advance Care Planning:   Living will: Yes    Durable POA for healthcare: Yes    Advanced directive: Yes      Comments: Patient states that she is a level 1 (full code)  I strongly encouraged her to update her will and to inform her POA of her wishes  Patient wants Elizabeth Burn her niece who is a Psych NP to be her Healthcare POA  Her number is [phone]         PREVENTIVE SCREENINGS      Cardiovascular Screening:    General: History Lipid Disorder and Screening Current      Diabetes Screening:     General: Screening Current      Colorectal Cancer Screening:     General: Screening Current      Breast Cancer Screening:     General: Screening Current      Cervical Cancer Screening:    General: Screening Current      Osteoporosis Screening:    General: Screening Current      Abdominal Aortic Aneurysm (AAA) Screening:        General: Patient Declines      Lung Cancer Screening:     General: Screening Not Indicated      Hepatitis C Screening:    General: Screening Current    Screening, Brief Intervention, and Referral to Treatment (SBIRT)    Screening      Single Item Drug Screening:  How often have you used an illegal drug (including marijuana) or a prescription medication for non-medical reasons in the past year? never    Single Item Drug Screen Score: 0  Interpretation: Negative screen for possible drug use disorder    Other Counseling Topics:   Car/seat belt/driving safety, skin self-exam, sunscreen and calcium and vitamin D intake and regular weightbearing exercise  No exam data present     Physical Exam:     /80   Pulse 70   Temp 98 2 °F (36 8 °C)   Resp 18   Ht 5' 3" (1 6 m)   Wt 73 6 kg (162 lb 3 2 oz)   SpO2 98%   BMI 28 73 kg/m²     Physical Exam  Vitals signs and nursing note reviewed  Constitutional:       General: She is not in acute distress  Appearance: Normal appearance  She is well-developed  She is not ill-appearing, toxic-appearing or diaphoretic  HENT:      Head: Normocephalic and atraumatic  Right Ear: Tympanic membrane, ear canal and external ear normal       Left Ear: Tympanic membrane, ear canal and external ear normal       Nose: Nose normal       Mouth/Throat:      Mouth: Mucous membranes are moist       Pharynx: Oropharynx is clear  No oropharyngeal exudate or posterior oropharyngeal erythema  Eyes:      General: No scleral icterus  Right eye: No discharge  Left eye: No discharge  Extraocular Movements: Extraocular movements intact  Conjunctiva/sclera: Conjunctivae normal       Pupils: Pupils are equal, round, and reactive to light     Neck:      Musculoskeletal: Normal range of motion and neck supple  No neck rigidity or muscular tenderness  Vascular: No carotid bruit  Cardiovascular:      Rate and Rhythm: Normal rate and regular rhythm  Pulses: Normal pulses  Heart sounds: Normal heart sounds  Pulmonary:      Effort: Pulmonary effort is normal  No respiratory distress  Breath sounds: Normal breath sounds  Abdominal:      General: Abdomen is flat  Bowel sounds are normal  There is no distension  Palpations: Abdomen is soft  Tenderness: There is no abdominal tenderness  Musculoskeletal: Normal range of motion  General: No swelling, tenderness, deformity or signs of injury  Right lower leg: No edema  Left lower leg: No edema  Lymphadenopathy:      Cervical: No cervical adenopathy  Skin:     General: Skin is warm and dry  Capillary Refill: Capillary refill takes less than 2 seconds  Neurological:      General: No focal deficit present  Mental Status: She is alert and oriented to person, place, and time  Cranial Nerves: No cranial nerve deficit  Sensory: No sensory deficit  Motor: No weakness  Coordination: Coordination normal       Gait: Gait normal       Deep Tendon Reflexes: Reflexes normal    Psychiatric:         Mood and Affect: Mood normal          Behavior: Behavior normal          Thought Content:  Thought content normal          Judgment: Judgment normal           Mariah Deleon MD

## 2021-05-24 ENCOUNTER — OFFICE VISIT (OUTPATIENT)
Dept: SURGERY | Facility: CLINIC | Age: 66
End: 2021-05-24
Payer: COMMERCIAL

## 2021-05-24 VITALS — BODY MASS INDEX: 27.46 KG/M2 | HEIGHT: 63 IN | WEIGHT: 155 LBS

## 2021-05-24 DIAGNOSIS — K43.2 INCISIONAL HERNIA: Primary | ICD-10-CM

## 2021-05-24 PROCEDURE — 99213 OFFICE O/P EST LOW 20 MIN: CPT | Performed by: SURGERY

## 2021-05-24 PROCEDURE — 1036F TOBACCO NON-USER: CPT | Performed by: SURGERY

## 2021-05-24 NOTE — PROGRESS NOTES
Assessment/Plan:Patient complains of a pinching discomfort in the mid and lower abdomen  This extends over the left and right rectus abdominus muscles  It has been twice a day for 3 months  It is associated with bending or walking  She denies any abdominal trauma or heavy lifting  She denies fevers or chills  Denies change in bowel habits  Examination reveals no evidence for an incisional hernia  She does have a lower midline incision which has healed well  There is no mass effect  No guarding or rebound  Has her symptoms  Have lasted 3 months, I recommended ultrasound of the abdominal wall to further assess for herniation  She is agreeable  If this is normal, her symptoms would be most consistent with a muscular strain  Diagnoses and all orders for this visit:    Incisional hernia  -     US abdominal wall; Future        Subjective:      Patient ID: Nito Dubon is a 73 y o  female  Patient presents for follow up  History of incisional hernia repair with mesh 3/26/2020  States she has had pain and "pinching" lower abdomen and "shooting" pain mid abdomen for 3 months  Denies bulges  The following portions of the patient's history were reviewed and updated as appropriate:     She  has a past medical history of Allergic rhinitis, Anxiety, Disease of thyroid gland, Diverticulosis, GERD (gastroesophageal reflux disease), Hydronephrosis, right, Hyperlipidemia, Hypokalemia, IBS (irritable bowel syndrome), Internal hemorrhoids, Migraines, Nephrolithiasis, Osteoarthrosis of knee, Osteopenia, PONV (postoperative nausea and vomiting), Renal calculi, Renal cyst, Total bilirubin, elevated, and Uterine leiomyoma  She  has a past surgical history that includes Uterine fibroid surgery; LAPAROTOMY (N/A, 10/30/2017); Thyroid surgery; Salivary gland surgery (Right); Tonsillectomy; Tubal ligation; Breast excisional biopsy (Left, 1999); Breast biopsy (Right, 1998);  Hand surgery (Left); Appendectomy; Knee arthroscopy (Left); Colonoscopy; and pr repair incisional hernia,reducible (N/A, 3/26/2020)  Her family history includes Arthritis in her sister; Breast cancer (age of onset: 28) in her cousin; Breast cancer (age of onset: 62) in her other; Cancer in her mother; Diabetes in her sister; Heart disease in her father; Hypertension in her brother; Kidney disease in her paternal aunt and sister; No Known Problems in her maternal aunt, maternal aunt, maternal aunt, maternal aunt, sister, and sister; Other in her father; Stomach cancer in her mother  She  reports that she quit smoking about 9 years ago  She has a 6 00 pack-year smoking history  She has never used smokeless tobacco  She reports current alcohol use  She reports that she does not use drugs  Current Outpatient Medications   Medication Sig Dispense Refill    ACZONE 7 5 % GEL daily   3    aspirin 81 mg chewable tablet Chew 81 mg daily      atorvastatin (LIPITOR) 20 mg tablet Take 1 tablet (20 mg total) by mouth daily at bedtime 90 tablet 1    Cholecalciferol (VITAMIN D3) 2000 units TABS Take 2,000 Units by mouth daily      Multiple Vitamins-Calcium (ONE-A-DAY WOMENS FORMULA PO) Take 1 tablet by mouth daily      Omega-3 Fatty Acids (FISH OIL) 1,000 mg Take 4,000 mg by mouth daily      omeprazole (PriLOSEC) 10 mg delayed release capsule Take 10 mg by mouth daily at bedtime       SOOLANTRA 1 % CREA daily        No current facility-administered medications for this visit  She has No Known Allergies       Review of Systems   Constitutional: Negative  Negative for activity change  HENT: Negative  Eyes: Negative  Respiratory: Negative  Cardiovascular: Negative  Gastrointestinal: Positive for abdominal pain  Endocrine: Negative  Genitourinary: Negative  Musculoskeletal: Negative  Skin: Negative  Allergic/Immunologic: Negative  Neurological: Negative      Psychiatric/Behavioral: Negative for agitation, behavioral problems and confusion  The patient is not nervous/anxious  Objective:      Ht 5' 2 5" (1 588 m)   Wt 70 3 kg (155 lb)   BMI 27 90 kg/m²          Physical Exam  Constitutional:       Appearance: Normal appearance  She is well-developed  HENT:      Head: Normocephalic and atraumatic  Nose: Nose normal    Eyes:      Extraocular Movements: Extraocular movements intact  Conjunctiva/sclera: Conjunctivae normal    Neck:      Musculoskeletal: Normal range of motion  Cardiovascular:      Rate and Rhythm: Normal rate  Pulmonary:      Effort: Pulmonary effort is normal    Abdominal:      General: Abdomen is flat  Palpations: Abdomen is soft  Hernia: No hernia is present  Musculoskeletal: Normal range of motion  Skin:     General: Skin is warm and dry  Neurological:      Mental Status: She is alert and oriented to person, place, and time     Psychiatric:         Mood and Affect: Mood normal

## 2021-06-04 ENCOUNTER — HOSPITAL ENCOUNTER (OUTPATIENT)
Dept: ULTRASOUND IMAGING | Facility: HOSPITAL | Age: 66
Discharge: HOME | End: 2021-06-04
Attending: SURGERY
Payer: COMMERCIAL

## 2021-06-04 DIAGNOSIS — K43.2 INCISIONAL HERNIA: ICD-10-CM

## 2021-06-04 PROCEDURE — 76705 ECHO EXAM OF ABDOMEN: CPT

## 2021-07-13 PROBLEM — R14.0 ABDOMINAL BLOATING: Status: ACTIVE | Noted: 2021-07-13

## 2021-07-16 ENCOUNTER — APPOINTMENT (OUTPATIENT)
Dept: RADIOLOGY | Facility: OTHER | Age: 66
End: 2021-07-16
Payer: COMMERCIAL

## 2021-07-16 DIAGNOSIS — M25.512 ACUTE PAIN OF LEFT SHOULDER: ICD-10-CM

## 2021-07-16 PROCEDURE — 73030 X-RAY EXAM OF SHOULDER: CPT

## 2021-09-23 ENCOUNTER — ANNUAL EXAM (OUTPATIENT)
Dept: OBGYN CLINIC | Facility: CLINIC | Age: 66
End: 2021-09-23
Payer: COMMERCIAL

## 2021-09-23 VITALS
BODY MASS INDEX: 28.35 KG/M2 | SYSTOLIC BLOOD PRESSURE: 140 MMHG | HEIGHT: 63 IN | DIASTOLIC BLOOD PRESSURE: 86 MMHG | WEIGHT: 160 LBS

## 2021-09-23 DIAGNOSIS — R14.0 ABDOMINAL BLOATING: ICD-10-CM

## 2021-09-23 DIAGNOSIS — Z01.419 WOMEN'S ANNUAL ROUTINE GYNECOLOGICAL EXAMINATION: Primary | ICD-10-CM

## 2021-09-23 PROCEDURE — G0101 CA SCREEN;PELVIC/BREAST EXAM: HCPCS | Performed by: OBSTETRICS & GYNECOLOGY

## 2021-09-23 NOTE — PATIENT INSTRUCTIONS

## 2021-09-23 NOTE — PROGRESS NOTES
Assessment/Plan:    Normal annual gynecological exam  Pap smear deferred secondary to normal Pap smear with negative Co testing done January 2019  Up-to-date with mammogram and had next year's already scheduled   Up-to-date with colorectal screening and is currently working with colorectal and GI due to some bowel issues  Up-to-date with bone density testing   Given script for pelvic ultrasound to evaluate recent onset of bloating   Return to office in 1 year earlier as needed       Problem List Items Addressed This Visit        Other    Abdominal bloating    Relevant Orders    US pelvis complete w transvaginal      Other Visit Diagnoses     Women's annual routine gynecological examination    -  Primary            Subjective:      Patient ID: Vance Man is a 73 y o  female  Jonathan Hung is here for annual gyn exam - overall well but with some additional abdominal complaints and has been following with GI / colorectal - recommended by them to get gyn exam - no vaginal bleeding or discharge - no menopausal complaints - bowels and bladder normal otherwise - up to date with mammogram and has next one scheduled - up to date with dexascan and pap - done 1/2019 normal with neg cotesting -       The following portions of the patient's history were reviewed and updated as appropriate:   She  has a past medical history of Allergic rhinitis, Anxiety, Disease of thyroid gland, Diverticulosis, GERD (gastroesophageal reflux disease), Hydronephrosis, right, Hyperlipidemia, Hyperthyroidism, Hypokalemia, IBS (irritable bowel syndrome), Internal hemorrhoids, Migraines, Nephrolithiasis, Osteoarthrosis of knee, Osteopenia, PONV (postoperative nausea and vomiting), Renal calculi, Renal cyst, Total bilirubin, elevated, and Uterine leiomyoma    She   Patient Active Problem List    Diagnosis Date Noted    Abdominal bloating 07/13/2021    Subluxation of peroneal tendon of left foot 12/09/2020    Incisional hernia 03/02/2020    Other specified disorders of bone density and structure, other site 01/27/2020    Left lower quadrant pain 08/21/2019    Rectal bleeding 08/21/2019    Neck pain on right side 03/11/2019    Dysuria 03/11/2019    Screening for malignant neoplasm of breast 01/28/2019    Low TSH level 11/14/2018    Multiple thyroid nodules 11/14/2018    Serum total bilirubin elevated 09/07/2018    Diverticulosis of large intestine 08/27/2018    Osteopenia 02/27/2018    Migraine 08/10/2016    Osteoarthrosis of knee 03/28/2016    Allergic rhinitis 06/12/2014    Irritable bowel syndrome 06/12/2014    Mixed hyperlipidemia 10/08/2013    Nonspecific abnormal results of function study of thyroid 10/08/2013    GERD without esophagitis 10/07/2013    Internal hemorrhoids 10/07/2013     She  has a past surgical history that includes Uterine fibroid surgery; LAPAROTOMY (N/A, 10/30/2017); Thyroid surgery; Salivary gland surgery (Right); Tonsillectomy; Tubal ligation; Breast excisional biopsy (Left, 1999); Breast biopsy (Right, 1998); Hand surgery (Left); Appendectomy; Knee arthroscopy (Left); Colonoscopy; pr repair incisional hernia,reducible (N/A, 3/26/2020); and Myomectomy  Her family history includes Arthritis in her sister; Breast cancer (age of onset: 28) in her cousin; Breast cancer (age of onset: 62) in her other; Cancer in her mother; Deep vein thrombosis in her mother; Diabetes in her sister; Heart disease in her father; Hypertension in her brother; Kidney disease in her paternal aunt and sister; No Known Problems in her maternal aunt, maternal aunt, maternal aunt, maternal aunt, and sister; Osteoporosis in her sister and sister; Other in her father; Stomach cancer in her mother; Stroke in her father  She  reports that she quit smoking about 9 years ago  Her smoking use included cigarettes  She has a 15 00 pack-year smoking history   She has never used smokeless tobacco  She reports current alcohol use of about 1 0 standard drinks of alcohol per week  She reports that she does not use drugs  Current Outpatient Medications   Medication Sig Dispense Refill    ACZONE 7 5 % GEL daily   3    aspirin (ECOTRIN LOW STRENGTH) 81 mg EC tablet Take 81 mg by mouth daily      aspirin 81 mg chewable tablet Chew 81 mg daily      atorvastatin (LIPITOR) 20 mg tablet Take 1 tablet (20 mg total) by mouth daily at bedtime 90 tablet 1    Cholecalciferol (VITAMIN D3) 2000 units TABS Take 2,000 Units by mouth daily      Cholecalciferol 25 MCG (1000 UT) capsule TAKE 1 CAPSULE Daily      Multiple Vitamins-Calcium (ONE-A-DAY WOMENS FORMULA PO) Take 1 tablet by mouth daily      Omega-3 Fatty Acids (FISH OIL) 1,000 mg Take 4,000 mg by mouth daily      omeprazole (PriLOSEC) 10 mg delayed release capsule Take 10 mg by mouth daily at bedtime       SOOLANTRA 1 % CREA daily        No current facility-administered medications for this visit  Current Outpatient Medications on File Prior to Visit   Medication Sig    ACZONE 7 5 % GEL daily     aspirin (ECOTRIN LOW STRENGTH) 81 mg EC tablet Take 81 mg by mouth daily    aspirin 81 mg chewable tablet Chew 81 mg daily    atorvastatin (LIPITOR) 20 mg tablet Take 1 tablet (20 mg total) by mouth daily at bedtime    Cholecalciferol (VITAMIN D3) 2000 units TABS Take 2,000 Units by mouth daily    Cholecalciferol 25 MCG (1000 UT) capsule TAKE 1 CAPSULE Daily    Multiple Vitamins-Calcium (ONE-A-DAY WOMENS FORMULA PO) Take 1 tablet by mouth daily    Omega-3 Fatty Acids (FISH OIL) 1,000 mg Take 4,000 mg by mouth daily    omeprazole (PriLOSEC) 10 mg delayed release capsule Take 10 mg by mouth daily at bedtime     SOOLANTRA 1 % CREA daily      No current facility-administered medications on file prior to visit  She has No Known Allergies       Review of Systems   Constitutional: Negative for chills, fatigue, fever and unexpected weight change  HENT: Negative for dental problem, sinus pressure and sinus pain  Eyes: Negative for visual disturbance  Respiratory: Negative for cough, shortness of breath and wheezing  Cardiovascular: Negative for chest pain and leg swelling  Gastrointestinal: Positive for abdominal distention and abdominal pain  Negative for constipation, diarrhea, nausea and vomiting  Genitourinary: Negative for urgency  Musculoskeletal: Negative for back pain and joint swelling  Allergic/Immunologic: Negative for environmental allergies  Neurological: Negative for dizziness and headaches  Psychiatric/Behavioral: The patient is not nervous/anxious  Objective:      /86 (BP Location: Left arm, Patient Position: Sitting, Cuff Size: Standard)   Ht 5' 3" (1 6 m)   Wt 72 6 kg (160 lb)   BMI 28 34 kg/m²          Physical Exam  Vitals reviewed  Constitutional:       General: She is not in acute distress  Appearance: Normal appearance  She is normal weight  She is not ill-appearing  Comments: Youthful white female    HENT:      Head: Normocephalic and atraumatic  Neck:      Thyroid: No thyromegaly or thyroid tenderness  Cardiovascular:      Rate and Rhythm: Normal rate and regular rhythm  Pulses: Normal pulses  Heart sounds: Normal heart sounds  No murmur heard  Pulmonary:      Effort: Pulmonary effort is normal  No respiratory distress  Breath sounds: Normal breath sounds  No wheezing  Chest:      Breasts: Breasts are symmetrical          Right: Normal  No swelling, inverted nipple, mass, nipple discharge, skin change or tenderness  Left: Normal  No swelling, inverted nipple, mass, nipple discharge, skin change or tenderness  Abdominal:      General: Abdomen is flat  There is no distension  Palpations: Abdomen is soft  There is no mass  Tenderness: There is no abdominal tenderness  There is no right CVA tenderness, left CVA tenderness, guarding or rebound  Hernia: No hernia is present        Comments: Bloated but soft with no palpable masses  Well-healed midline lower abdominal scar   Genitourinary:     Comments: Normal female, no vulvar or vaginal lesions but atrophic, [place]'s and Bartholin glands are grossly normal   Urethra is in the midline and normal appearance  Cervix is grossly normal appearance  Uterus is anterior grossly normal in size, shape and mobility  There are no adnexal masses  The exam is nontender  Rectal exam reveals normal sphincter tone, no palpable masses and stool is guaiac negative  Musculoskeletal:         General: Normal range of motion  Cervical back: Neck supple  No muscular tenderness  Lymphadenopathy:      Upper Body:      Right upper body: No supraclavicular, axillary or pectoral adenopathy  Left upper body: No supraclavicular, axillary or pectoral adenopathy  Neurological:      General: No focal deficit present  Mental Status: She is alert and oriented to person, place, and time     Psychiatric:         Mood and Affect: Mood normal          Behavior: Behavior normal

## 2021-09-28 ENCOUNTER — HOSPITAL ENCOUNTER (OUTPATIENT)
Dept: ULTRASOUND IMAGING | Facility: HOSPITAL | Age: 66
Discharge: HOME | End: 2021-09-28
Attending: OBSTETRICS & GYNECOLOGY
Payer: COMMERCIAL

## 2021-09-28 DIAGNOSIS — R14.0 ABDOMINAL BLOATING: ICD-10-CM

## 2021-09-28 PROCEDURE — 76830 TRANSVAGINAL US NON-OB: CPT

## 2021-09-28 PROCEDURE — 76856 US EXAM PELVIC COMPLETE: CPT

## 2021-09-30 ENCOUNTER — CLINICAL SUPPORT (OUTPATIENT)
Dept: FAMILY MEDICINE CLINIC | Facility: OTHER | Age: 66
End: 2021-09-30
Payer: COMMERCIAL

## 2021-09-30 DIAGNOSIS — Z23 NEED FOR VACCINATION: Primary | ICD-10-CM

## 2021-09-30 PROCEDURE — 90662 IIV NO PRSV INCREASED AG IM: CPT

## 2021-09-30 PROCEDURE — G0008 ADMIN INFLUENZA VIRUS VAC: HCPCS

## 2021-10-19 DIAGNOSIS — E78.2 MIXED HYPERLIPIDEMIA: ICD-10-CM

## 2021-10-19 RX ORDER — ATORVASTATIN CALCIUM 20 MG/1
20 TABLET, FILM COATED ORAL
Qty: 90 TABLET | Refills: 1 | Status: SHIPPED | OUTPATIENT
Start: 2021-10-19 | End: 2022-04-18

## 2021-11-10 ENCOUNTER — IMMUNIZATIONS (OUTPATIENT)
Dept: FAMILY MEDICINE CLINIC | Facility: HOSPITAL | Age: 66
End: 2021-11-10

## 2021-11-10 DIAGNOSIS — Z23 ENCOUNTER FOR IMMUNIZATION: Primary | ICD-10-CM

## 2021-11-10 PROCEDURE — 0013A COVID-19 MODERNA VACC 0.25 ML BOOSTER: CPT

## 2021-11-10 PROCEDURE — 91306 COVID-19 MODERNA VACC 0.25 ML BOOSTER: CPT

## 2021-12-07 LAB
25(OH)D3 SERPL-MCNC: 33 NG/ML (ref 30–100)
ALBUMIN SERPL-MCNC: 4.4 G/DL (ref 3.6–5.1)
ALBUMIN/GLOB SERPL: 2.2 (CALC) (ref 1–2.5)
ALP SERPL-CCNC: 84 U/L (ref 37–153)
ALT SERPL-CCNC: 17 U/L (ref 6–29)
AST SERPL-CCNC: 17 U/L (ref 10–35)
BILIRUB SERPL-MCNC: 1.6 MG/DL (ref 0.2–1.2)
BUN SERPL-MCNC: 15 MG/DL (ref 7–25)
BUN/CREAT SERPL: ABNORMAL (CALC) (ref 6–22)
CALCIUM SERPL-MCNC: 9.5 MG/DL (ref 8.6–10.4)
CHLORIDE SERPL-SCNC: 106 MMOL/L (ref 98–110)
CHOLEST SERPL-MCNC: 193 MG/DL
CHOLEST/HDLC SERPL: 2.9 (CALC)
CO2 SERPL-SCNC: 29 MMOL/L (ref 20–32)
CREAT SERPL-MCNC: 0.86 MG/DL (ref 0.5–0.99)
GLOBULIN SER CALC-MCNC: 2 G/DL (CALC) (ref 1.9–3.7)
GLUCOSE SERPL-MCNC: 90 MG/DL (ref 65–99)
HDLC SERPL-MCNC: 67 MG/DL
LDLC SERPL CALC-MCNC: 102 MG/DL (CALC)
NONHDLC SERPL-MCNC: 126 MG/DL (CALC)
POTASSIUM SERPL-SCNC: 4 MMOL/L (ref 3.5–5.3)
PROT SERPL-MCNC: 6.4 G/DL (ref 6.1–8.1)
SL AMB EGFR AFRICAN AMERICAN: 82 ML/MIN/1.73M2
SL AMB EGFR NON AFRICAN AMERICAN: 71 ML/MIN/1.73M2
SODIUM SERPL-SCNC: 142 MMOL/L (ref 135–146)
T4 FREE SERPL-MCNC: 1.2 NG/DL (ref 0.8–1.8)
TRIGL SERPL-MCNC: 143 MG/DL
TSH SERPL-ACNC: 0.58 MIU/L (ref 0.4–4.5)

## 2022-01-31 ENCOUNTER — OFFICE VISIT (OUTPATIENT)
Dept: ENDOCRINOLOGY | Facility: CLINIC | Age: 67
End: 2022-01-31
Payer: COMMERCIAL

## 2022-01-31 VITALS
HEIGHT: 63 IN | DIASTOLIC BLOOD PRESSURE: 90 MMHG | BODY MASS INDEX: 29.48 KG/M2 | HEART RATE: 67 BPM | SYSTOLIC BLOOD PRESSURE: 130 MMHG | WEIGHT: 166.4 LBS

## 2022-01-31 DIAGNOSIS — M85.89 OSTEOPENIA OF MULTIPLE SITES: Primary | ICD-10-CM

## 2022-01-31 DIAGNOSIS — E04.2 MULTIPLE THYROID NODULES: ICD-10-CM

## 2022-01-31 DIAGNOSIS — R79.89 LOW TSH LEVEL: ICD-10-CM

## 2022-01-31 PROCEDURE — 1036F TOBACCO NON-USER: CPT | Performed by: INTERNAL MEDICINE

## 2022-01-31 PROCEDURE — 99214 OFFICE O/P EST MOD 30 MIN: CPT | Performed by: INTERNAL MEDICINE

## 2022-01-31 PROCEDURE — 3008F BODY MASS INDEX DOCD: CPT | Performed by: INTERNAL MEDICINE

## 2022-01-31 PROCEDURE — 1160F RVW MEDS BY RX/DR IN RCRD: CPT | Performed by: INTERNAL MEDICINE

## 2022-01-31 NOTE — PROGRESS NOTES
Santos Love 73 y o  female MRN: [id number]    Encounter: [id number]      Assessment/Plan     Problem List Items Addressed This Visit        Endocrine    Multiple thyroid nodules     Spongiform thyroid nodules-do not meet criteria for further imaging, monitor clinically         Relevant Orders    TSH, 3rd generation Lab Collect    T4, free Lab Collect       Musculoskeletal and Integument    Osteopenia - Primary     Advised to continue calcium 1200 mg daily either in diet or supplementations  Continue vitamin-D supplementations  Fall prevention    Repeat DEXA scan prior to next visit         Relevant Orders    Vitamin D 25 hydroxy Lab Collect    DXA bone density spine hip and pelvis       Other    Low TSH level    Relevant Orders    TSH, 3rd generation Lab Collect    T4, free Lab Collect        CC:   Osteopenia     History of Present Illness     HPI:  [de-identified] year old female osteopenia ,thyroid nodules and history of low TSH seen in follow-up  C/o abdominal pain and occasional constipation , bloating and is undergoing workup    She takes calcium supplementation as well as 2 serving of dairy in diet   Vitamin D3 2000 iu daily     No falls/farcture     No palpitations, fatigue , weight changes     Review of Systems    Historical Information   Past Medical History:   Diagnosis Date    Allergic rhinitis     last assessed: 9/29/2016    Anxiety     last assessed: 8/22/2017    Disease of thyroid gland     Multiple Nodules    Diverticulosis     last assessed: 10/7/2013    GERD (gastroesophageal reflux disease)     Hydronephrosis, right     last assessed: 5/22/2013    Hyperlipidemia     last assessed 8/22/2017    Hyperthyroidism     Hypokalemia     last assessed: 3/24/2015    IBS (irritable bowel syndrome)     last assessed: 8/22/2017    Internal hemorrhoids     last assessed: 10/7/2013    Migraines     last assessed: 8/22/2017    Nephrolithiasis     Osteoarthrosis of knee     last assessed: 2/28/2017  Osteopenia     last assessed: 8/22/2017    PONV (postoperative nausea and vomiting)     Renal calculi     last assessed: 4/29/2016    Renal cyst     last assessed: 7/15/2015    Total bilirubin, elevated     last assessed: 8/22/2017    Uterine leiomyoma      Past Surgical History:   Procedure Laterality Date    APPENDECTOMY      BREAST BIOPSY Right 1998    benign    core bx    BREAST EXCISIONAL BIOPSY Left 1999    benign    COLONOSCOPY      HAND SURGERY Left     Ganglion cyst    KNEE ARTHROSCOPY Left     X3    LAPAROTOMY N/A 10/30/2017    Procedure: LAPAROTOMY EXPLORATORY, DRAINAGE PELVIC ABSCESS; APPENDECTOMY;  Surgeon: Jeanette Cardoso DO;  [facility];  Service: General    MYOMECTOMY      [address] [place] [address] N/A 3/26/2020    Procedure: INCISIONAL HERNIA REPAIR WITH MESH;  Surgeon: Rocio Lockett MD;  [facility];  Service: General    SALIVARY GLAND SURGERY Right     Removal for Stones    THYROID SURGERY      biopsy thyroid using percutaneous core needle    TONSILLECTOMY      TUBAL LIGATION      UTERINE FIBROID SURGERY      embolization( Myomectomy)     Social History   Social History     Substance and Sexual Activity   Alcohol Use Yes    Alcohol/week: 1 0 standard drink    Types: 1 Glasses of wine per week    Comment:  socially     Social History     Substance and Sexual Activity   Drug Use Never     Social History     Tobacco Use   Smoking Status Former Smoker    Packs/day: 1 00    Years: 15 00    Pack years: 15 00    Types: Cigarettes    Quit date: 1/1/2012    Years since quitting: 10 0   Smokeless Tobacco Never Used     Family History:   Family History   Problem Relation Age of Onset    Cancer Mother         Gastric    Stomach cancer Mother     Deep vein thrombosis Mother     Other Father         CVA    Heart disease Father     Stroke Father     Diabetes Sister     Arthritis Sister     Kidney disease Sister     Osteoporosis Sister    Kurt Aracelis Hypertension Brother     Breast cancer Cousin 28    Breast cancer Other 62    Osteoporosis Sister     No Known Problems Sister     No Known Problems Maternal Aunt     No Known Problems Maternal Aunt     No Known Problems Maternal Aunt     No Known Problems Maternal Aunt     Kidney disease Paternal Aunt        Meds/Allergies   Current Outpatient Medications   Medication Sig Dispense Refill    ACZONE 7 5 % GEL daily   3    aspirin (ECOTRIN LOW STRENGTH) 81 mg EC tablet Take 81 mg by mouth daily      atorvastatin (LIPITOR) 20 mg tablet Take 1 tablet (20 mg total) by mouth daily at bedtime 90 tablet 1    Cholecalciferol (VITAMIN D3) 2000 units TABS Take 2,000 Units by mouth daily      Multiple Vitamins-Calcium (ONE-A-DAY WOMENS FORMULA PO) Take 1 tablet by mouth daily      Omega-3 Fatty Acids (FISH OIL) 1,000 mg Take 4,000 mg by mouth daily      omeprazole (PriLOSEC) 10 mg delayed release capsule Take 10 mg by mouth daily at bedtime       SOOLANTRA 1 % CREA daily       aspirin 81 mg chewable tablet Chew 81 mg daily (Patient not taking: Reported on 1/31/2022 )       No current facility-administered medications for this visit  No Known Allergies    Objective   Vitals: Blood pressure 130/90, pulse 67, height 5' 3" (1 6 m), weight 75 5 kg (166 lb 6 4 oz), not currently breastfeeding  Physical Exam  Vitals reviewed  Constitutional:       Appearance: Normal appearance  She is not ill-appearing or diaphoretic  HENT:      Head: Normocephalic and atraumatic  Eyes:      General: No scleral icterus  Extraocular Movements: Extraocular movements intact  Cardiovascular:      Rate and Rhythm: Normal rate and regular rhythm  Heart sounds: Normal heart sounds  No murmur heard  Pulmonary:      Effort: Pulmonary effort is normal  No respiratory distress  Breath sounds: Normal breath sounds  No wheezing  Abdominal:      General: There is no distension  Palpations: Abdomen is soft  Tenderness: There is no abdominal tenderness  Musculoskeletal:      Cervical back: Neck supple  Right lower leg: No edema  Left lower leg: No edema  Lymphadenopathy:      Cervical: No cervical adenopathy  Skin:     General: Skin is warm and dry  Neurological:      General: No focal deficit present  Mental Status: She is alert and oriented to person, place, and time  Psychiatric:         Mood and Affect: Mood normal          Behavior: Behavior normal          Thought Content: Thought content normal          Judgment: Judgment normal          The history was obtained from the review of the chart, patient  Lab Results:   Lab Results   Component Value Date/Time    Potassium 4 0 12/06/2021 12:19 PM    Chloride 106 12/06/2021 12:19 PM    CO2 29 12/06/2021 12:19 PM    BUN 15 12/06/2021 12:19 PM    Creatinine 0 86 12/06/2021 12:19 PM    Calcium 9 5 12/06/2021 12:19 PM    Free t4 1 2 12/06/2021 12:19 PM         Imaging Studies:         Results for orders placed during the hospital encounter of 09/17/20    DXA bone density spine hip and pelvis    Impression  1  Based on the [facility] classification, the T-score of -2 1 in the left hip is consistent with LOW BONE MINERAL DENSITY (OSTEOPENIA)  2  When compared to the prior examination, there has been a 4  % DECREASE in the total bone mineral density of the lumbar spine and a  4 % DECREASE in the total bone mineral density of the left hip  3   Any secondary causes of low bone mineral density should be excluded prior to treatment, if clinically indicated  4   A daily intake of at least 1200 mg calcium and 800 to 1000 IU of Vitamin D, as well as weight bearing and muscle strengthening exercise, fall prevention and avoidance of tobacco and excessive alcohol intake as basic preventive measures are suggested  5   Repeat DXA  in 18 - 24 months, on the same machine, as clinically indicated          The 10 year risk of hip fracture is 2%, with the 10 year risk of major osteoporotic fracture being 11%, as calculated by the [facility] fracture risk assessment tool (FRAX)  The current NOF guidelines recommend treating patients with FRAX 10 year risk score of  >3% for hip fracture and >20% for major osteoporotic fracture  WHO CLASSIFICATION:  Normal (a T-score of -1 0 or higher)  Low bone mineral density (a T-score of less than -1 0 but higher than -2 5)  Osteoporosis (a T-score of -2 5 or less)  Severe osteoporosis (a T-score of -2 5 or less with a fragility fracture)    Workstation performed: [id number]            I have personally reviewed pertinent reports  Portions of the record may have been created with voice recognition software  Occasional wrong word or "sound a like" substitutions may have occurred due to the inherent limitations of voice recognition software  Read the chart carefully and recognize, using context, where substitutions have occurred

## 2022-01-31 NOTE — ASSESSMENT & PLAN NOTE
Advised to continue calcium 1200 mg daily either in diet or supplementations  Continue vitamin-D supplementations  Fall prevention    Repeat DEXA scan prior to next visit

## 2022-01-31 NOTE — PATIENT INSTRUCTIONS
Calcium and Osteoporosis   WHAT YOU NEED TO KNOW:   Calcium is important for osteoporosis because calcium helps build bone mass  Osteoporosis is a long-term medical condition that causes your body to break down more bone than it makes  Your bones become weak, brittle, and more likely to fracture  DISCHARGE INSTRUCTIONS:   Your calcium needs:   · Women:        Over 50: 1,200 mg      Foods that are high in calcium: The following list shows the number of calcium milligrams (mg) per serving  Your dietitian or healthcare provider can help you create a balanced meal plan for your calcium needs  · Dairy:      ? 1 cup of low-fat plain yogurt (415 mg) or low-fat fruit yogurt (245 to 384 mg)    ? 1½ ounces of shredded cheddar cheese (306 mg) or part skim mozzarella cheese (275 mg)    ? 1 cup of skim, 2%, or whole milk (300 mg)    ? 1 cup of cottage cheese made with 2% milk fat (138 mg)    ? ½ cup of frozen yogurt (103 mg)    · Other foods:      ? 1 cup of calcium-fortified orange juice (300 mg)    ? ½ cup of cooked darwin greens (220 mg)    ? 4 canned sardines, with bones (242 mg)    ? ½ cup of tofu (with added calcium) (204 mg)       How to get extra calcium:   · Add powdered milk to puddings, cocoa, custard, or hot cereal     · Sift powdered milk into flour when you make cakes, cookies, or breads  · Use low-fat or fat-free milk instead of water in pancake mix, mashed potatoes, pudding, or hot breakfast cereal     · Add low-fat or fat-free cheese to salad, soup, or pasta  · Add tofu (with added calcium) to vegetable stir-olivo  · Take calcium supplements if you cannot get enough calcium from the foods you eat  Your body can absorb the most calcium from supplements when you take 500 mg or less at one time  Do not take more than 2,500 mg of calcium supplements each day  Follow up with your doctor or dietitian as directed:  Write down your questions so you remember to ask them during your visits    © Copyright [employer] 2021 Information is for [employer] use only and may not be sold, redistributed or otherwise used for commercial purposes  All illustrations and images included in CareNotes® are the copyrighted property of [employer]  or Joe Jarvis  The above information is an  only  It is not intended as medical advice for individual conditions or treatments  Talk to your doctor, nurse or pharmacist before following any medical regimen to see if it is safe and effective for you

## 2022-02-16 ENCOUNTER — HOSPITAL ENCOUNTER (OUTPATIENT)
Dept: RADIOLOGY | Age: 67
Discharge: HOME | End: 2022-02-16
Payer: COMMERCIAL

## 2022-02-16 VITALS — BODY MASS INDEX: 28.35 KG/M2 | WEIGHT: 160 LBS | HEIGHT: 63 IN

## 2022-02-16 DIAGNOSIS — Z12.31 ENCOUNTER FOR SCREENING MAMMOGRAM FOR MALIGNANT NEOPLASM OF BREAST: ICD-10-CM

## 2022-02-16 PROCEDURE — 77063 BREAST TOMOSYNTHESIS BI: CPT

## 2022-02-16 PROCEDURE — 77067 SCR MAMMO BI INCL CAD: CPT

## 2022-04-07 ENCOUNTER — OFFICE VISIT (OUTPATIENT)
Dept: FAMILY MEDICINE CLINIC | Facility: OTHER | Age: 67
End: 2022-04-07
Payer: COMMERCIAL

## 2022-04-07 VITALS
BODY MASS INDEX: 29.06 KG/M2 | DIASTOLIC BLOOD PRESSURE: 80 MMHG | OXYGEN SATURATION: 98 % | RESPIRATION RATE: 16 BRPM | WEIGHT: 164 LBS | HEART RATE: 88 BPM | TEMPERATURE: 97 F | HEIGHT: 63 IN | SYSTOLIC BLOOD PRESSURE: 120 MMHG

## 2022-04-07 DIAGNOSIS — Z13.1 SCREENING FOR DIABETES MELLITUS: ICD-10-CM

## 2022-04-07 DIAGNOSIS — E66.3 OVERWEIGHT (BMI 25.0-29.9): ICD-10-CM

## 2022-04-07 DIAGNOSIS — Z00.00 ANNUAL PHYSICAL EXAM: Primary | ICD-10-CM

## 2022-04-07 PROCEDURE — 1003F LEVEL OF ACTIVITY ASSESS: CPT | Performed by: FAMILY MEDICINE

## 2022-04-07 PROCEDURE — 3008F BODY MASS INDEX DOCD: CPT | Performed by: FAMILY MEDICINE

## 2022-04-07 PROCEDURE — G0439 PPPS, SUBSEQ VISIT: HCPCS | Performed by: FAMILY MEDICINE

## 2022-04-07 PROCEDURE — 3725F SCREEN DEPRESSION PERFORMED: CPT | Performed by: FAMILY MEDICINE

## 2022-04-07 PROCEDURE — 1036F TOBACCO NON-USER: CPT | Performed by: FAMILY MEDICINE

## 2022-04-07 PROCEDURE — 1160F RVW MEDS BY RX/DR IN RCRD: CPT | Performed by: FAMILY MEDICINE

## 2022-04-07 RX ORDER — OMEPRAZOLE 40 MG/1
40 CAPSULE, DELAYED RELEASE ORAL DAILY
COMMUNITY
Start: 2022-03-21

## 2022-04-07 NOTE — PATIENT INSTRUCTIONS

## 2022-04-07 NOTE — PROGRESS NOTES
ADULT ANNUAL [address]  [facility]    NAME: Veronica Love  AGE: 73 y o  SEX: female  : 1955     DATE: 2022     Assessment and Plan:     Problem List Items Addressed This Visit     None      Visit Diagnoses     Annual physical exam    -  Primary    Screening for diabetes mellitus        Relevant Orders    HEMOGLOBIN A1C W/ EAG ESTIMATION    Overweight (BMI 25 0-29  9)        Relevant Orders    Ambulatory Referral to Weight Management          Immunizations and preventive care screenings were discussed with patient today  Appropriate education was printed on patient's after visit summary  Counseling:  Alcohol/drug use: discussed moderation in alcohol intake, the recommendations for healthy alcohol use, and avoidance of illicit drug use  Dental Health: discussed importance of regular tooth brushing, flossing, and dental visits  Injury prevention: discussed safety/seat belts, safety helmets, smoke detectors, carbon dioxide detectors, and smoking near bedding or upholstery  · Exercise: the importance of regular exercise/physical activity was discussed  Recommend exercise 3-5 times per week for at least 30 minutes  BMI Counseling: Body mass index is 29 05 kg/m²  The BMI is above normal  Nutrition recommendations include decreasing portion sizes, encouraging healthy choices of fruits and vegetables, consuming healthier snacks, limiting drinks that contain sugar, moderation in carbohydrate intake and reducing intake of saturated and trans fat  Exercise recommendations include moderate physical activity 150 minutes/week and strength training exercises  Patient referred to weight management  Rationale for BMI follow-up plan is due to patient being overweight or obese  Depression Screening and Follow-up Plan: Patient was screened for depression during today's encounter  They screened negative with a PHQ-2 score of 0      Falls Plan of Care: balance, strength, and gait training instructions were provided  Return in about 6 months (around 10/7/2022) for Recheck weight  The patient indicates understanding of these issues and agrees with the plan  Chief Complaint:     Chief Complaint   Patient presents with    Physical Exam      History of Present Illness:     Adult Annual Physical   Patient here for a comprehensive physical exam  The patient reports she is following with [facility] due to chronic bloating and early satiety  She is scheduled to have gastric emptying test next week on April 12th, 2022  Patient has been referred for colonoscopy but has yet to schedule this  Reports no other issues  Diet and Physical Activity  · Diet/Nutrition: well balanced diet   3-5 servings of fruits and veggies daily  Limited junk food  Glass of mild or yogurt daily  · Exercise: walking, 5-7 times a week on average and 30-60 minutes on average  Depression Screening  PHQ-2/9 Depression Screening    Little interest or pleasure in doing things: 0 - not at all  Feeling down, depressed, or hopeless: 0 - not at all  PHQ-2 Score: 0  PHQ-2 Interpretation: Negative depression screen       General Health  · Sleep: sleeps well  Patient states she sleeps 4 hours nightly and has always functioned well on minimal sleep  · Hearing: normal - bilateral   · Vision: most recent eye exam <1 year ago and wears glasses  · Dental: regular dental visits, brushes teeth twice daily and flosses daily  /GYN Health  · Patient is: postmenopausal  · Mammogram in February of 2022 - normal   · Last pap smear with HPV co test in January of 2019 was normal       Review of Systems:     Review of Systems   Constitutional: Negative for chills, fatigue and fever  Eyes: Negative for pain and visual disturbance  Respiratory: Negative for cough and shortness of breath  Cardiovascular: Negative for chest pain, palpitations and leg swelling  Gastrointestinal: Negative for abdominal pain, nausea and vomiting  Genitourinary: Negative for difficulty urinating and dysuria  Musculoskeletal: Negative for arthralgias and back pain  Skin: Negative for color change and rash  Neurological: Negative for dizziness and syncope  Psychiatric/Behavioral: Negative for sleep disturbance  All other systems reviewed and are negative       Past Medical History:     Past Medical History:   Diagnosis Date    Allergic rhinitis     last assessed: 9/29/2016    Anxiety     last assessed: 8/22/2017    Disease of thyroid gland     Multiple Nodules    Diverticulosis     last assessed: 10/7/2013    GERD (gastroesophageal reflux disease)     Hydronephrosis, right     last assessed: 5/22/2013    Hyperlipidemia     last assessed 8/22/2017    Hyperthyroidism     Hypokalemia     last assessed: 3/24/2015    IBS (irritable bowel syndrome)     last assessed: 8/22/2017    Internal hemorrhoids     last assessed: 10/7/2013    Migraines     last assessed: 8/22/2017    Nephrolithiasis     Osteoarthrosis of knee     last assessed: 2/28/2017    Osteopenia     last assessed: 8/22/2017    PONV (postoperative nausea and vomiting)     Renal calculi     last assessed: 4/29/2016    Renal cyst     last assessed: 7/15/2015    Total bilirubin, elevated     last assessed: 8/22/2017    Uterine leiomyoma       Past Surgical History:     Past Surgical History:   Procedure Laterality Date    APPENDECTOMY      BREAST BIOPSY Right 1998    benign    core bx    BREAST EXCISIONAL BIOPSY Left 1999    benign    COLONOSCOPY      HAND SURGERY Left     Ganglion cyst    KNEE ARTHROSCOPY Left     X3    LAPAROTOMY N/A 10/30/2017    Procedure: LAPAROTOMY EXPLORATORY, DRAINAGE PELVIC ABSCESS; APPENDECTOMY;  Surgeon: Jozef Goddard DO;  [facility];  Service: General    MYOMECTOMY      [address] [place] [address] N/A 3/26/2020    Procedure: INCISIONAL HERNIA REPAIR WITH MESH;  Surgeon: Sandra Neil MD;  [facility];  Service: General    SALIVARY GLAND SURGERY Right     Removal for Stones    THYROID SURGERY      biopsy thyroid using percutaneous core needle    TONSILLECTOMY      TUBAL LIGATION      UTERINE FIBROID SURGERY      embolization( Myomectomy)      Social History:     Social History     Socioeconomic History    Marital status: Single     Spouse name: None    Number of children: None    Years of education: None    Highest education level: None   Occupational History    Occupation: retired   Tobacco Use    Smoking status: Former Smoker     Packs/day: 1 00     Years: 15 00     Pack years: 15 00     Types: Cigarettes     Quit date: 1/1/2012     Years since quitting: 10 2    Smokeless tobacco: Never Used   Vaping Use    Vaping Use: Never used   Substance and Sexual Activity    Alcohol use:  Yes     Alcohol/week: 1 0 standard drink     Types: 1 Glasses of wine per week     Comment:  socially    Drug use: Never    Sexual activity: Yes     Partners: Male     Birth control/protection: None     Comment: pt  declines std/hiv testing   Other Topics Concern    None   Social History Narrative    Daily caffeinated cola consumption    Drinks coffee    Exercise habits     Social Determinants of Health     Financial Resource Strain: Not on file   Food Insecurity: Not on file   Transportation Needs: Not on file   Physical Activity: Not on file   Stress: Not on file   Social Connections: Not on file   Intimate Partner Violence: Not on file   Housing Stability: Not on file      Family History:     Family History   Problem Relation Age of Onset    Cancer Mother         Gastric    Stomach cancer Mother     Deep vein thrombosis Mother     Other Father         CVA    Heart disease Father     Stroke Father     Diabetes Sister     Arthritis Sister     Kidney disease Sister     Osteoporosis Sister     Osteoporosis Sister     No Known Problems Sister     Hypertension Brother     No Known Problems Maternal Aunt     No Known Problems Maternal Aunt     No Known Problems Maternal Aunt     No Known Problems Maternal Aunt     Polycystic kidney disease Maternal Aunt     Polycystic kidney disease Maternal Aunt     Kidney disease Paternal Aunt     Breast cancer Cousin 28    Esophageal cancer Cousin     Esophageal cancer Cousin     Breast cancer Cousin     Breast cancer additional onset Other 62      Current Medications:     Current Outpatient Medications   Medication Sig Dispense Refill    ACZONE 7 5 % GEL daily   3    aspirin (ECOTRIN LOW STRENGTH) 81 mg EC tablet Take 81 mg by mouth daily      atorvastatin (LIPITOR) 20 mg tablet Take 1 tablet (20 mg total) by mouth daily at bedtime 90 tablet 1    Cholecalciferol (VITAMIN D3) 2000 units TABS Take 2,000 Units by mouth daily      Multiple Vitamins-Calcium (ONE-A-DAY WOMENS FORMULA PO) Take 1 tablet by mouth daily      Omega-3 Fatty Acids (FISH OIL) 1,000 mg Take 4,000 mg by mouth daily      omeprazole (PriLOSEC) 10 mg delayed release capsule Take 10 mg by mouth daily at bedtime       SOOLANTRA 1 % CREA daily       aspirin 81 mg chewable tablet Chew 81 mg daily (Patient not taking: Reported on 1/31/2022 )      omeprazole (PriLOSEC) 40 MG capsule Take 40 mg by mouth daily (Patient not taking: Reported on 4/7/2022 )       No current facility-administered medications for this visit  Allergies:     No Known Allergies   Physical Exam:     /80   Pulse 88   Temp (!) 97 °F (36 1 °C)   Resp 16   Ht 5' 3" (1 6 m)   Wt 74 4 kg (164 lb)   SpO2 98%   BMI 29 05 kg/m²     Physical Exam  Vitals and nursing note reviewed  Constitutional:       General: She is not in acute distress  Appearance: Normal appearance  She is well-developed  She is not ill-appearing, toxic-appearing or diaphoretic  HENT:      Head: Normocephalic and atraumatic        Right Ear: External ear normal       Left Ear: External ear normal       Mouth/Throat:      Mouth: Mucous membranes are moist       Pharynx: Oropharynx is clear  No oropharyngeal exudate or posterior oropharyngeal erythema  Eyes:      General: No scleral icterus  Right eye: No discharge  Left eye: No discharge  Extraocular Movements: Extraocular movements intact  Conjunctiva/sclera: Conjunctivae normal       Pupils: Pupils are equal, round, and reactive to light  Cardiovascular:      Rate and Rhythm: Normal rate and regular rhythm  Pulses: Normal pulses  Heart sounds: Normal heart sounds  Pulmonary:      Effort: Pulmonary effort is normal  No respiratory distress  Breath sounds: Normal breath sounds  Abdominal:      General: Bowel sounds are normal  There is no distension  Palpations: Abdomen is soft  Tenderness: There is no abdominal tenderness  Musculoskeletal:         General: Normal range of motion  Cervical back: Normal range of motion and neck supple  Right lower leg: No edema  Left lower leg: No edema  Skin:     General: Skin is warm and dry  Neurological:      General: No focal deficit present  Mental Status: She is alert and oriented to person, place, and time     Psychiatric:         Mood and Affect: Mood normal          Behavior: Behavior normal           Sabine Middleton MD  [place] [id number]

## 2022-04-11 LAB
EST. AVERAGE GLUCOSE BLD GHB EST-MCNC: 111 MG/DL
EST. AVERAGE GLUCOSE BLD GHB EST-SCNC: 6.2 MMOL/L
HBA1C MFR BLD: 5.5 % OF TOTAL HGB

## 2022-04-16 DIAGNOSIS — E78.2 MIXED HYPERLIPIDEMIA: ICD-10-CM

## 2022-04-18 RX ORDER — ATORVASTATIN CALCIUM 20 MG/1
TABLET, FILM COATED ORAL
Qty: 90 TABLET | Refills: 1 | Status: SHIPPED | OUTPATIENT
Start: 2022-04-18

## 2022-05-22 NOTE — MISCELLANEOUS
Message  GI Reminder Recall [address]:   Date: 04/28/2016   Dear Stephen Patton:     Review of our records shows you are due for the following: Colonoscopy  Please call the following office to schedule your appointment:   [email or web address], [address], [facility], [address]  [phone]  We look forward to hearing from you! Sincerely,         Signatures   Electronically signed by :  Rell Tiwari, ; Apr 28 2016  2:18PM EST                       (Author)
49.9
normal...

## 2022-10-11 ENCOUNTER — OFFICE VISIT (OUTPATIENT)
Dept: FAMILY MEDICINE CLINIC | Facility: OTHER | Age: 67
End: 2022-10-11

## 2022-10-11 VITALS
BODY MASS INDEX: 28 KG/M2 | TEMPERATURE: 97.8 F | OXYGEN SATURATION: 98 % | HEART RATE: 60 BPM | DIASTOLIC BLOOD PRESSURE: 78 MMHG | RESPIRATION RATE: 18 BRPM | SYSTOLIC BLOOD PRESSURE: 118 MMHG | WEIGHT: 158 LBS | HEIGHT: 63 IN

## 2022-10-11 DIAGNOSIS — Z13.1 SCREENING FOR DIABETES MELLITUS: ICD-10-CM

## 2022-10-11 DIAGNOSIS — Z23 ENCOUNTER FOR IMMUNIZATION: ICD-10-CM

## 2022-10-11 DIAGNOSIS — Z71.3 ENCOUNTER FOR WEIGHT LOSS COUNSELING: Primary | ICD-10-CM

## 2022-10-11 DIAGNOSIS — E66.3 OVERWEIGHT (BMI 25.0-29.9): ICD-10-CM

## 2022-10-11 DIAGNOSIS — E78.2 MIXED HYPERLIPIDEMIA: ICD-10-CM

## 2022-10-24 DIAGNOSIS — E78.2 MIXED HYPERLIPIDEMIA: ICD-10-CM

## 2022-10-24 RX ORDER — ATORVASTATIN CALCIUM 20 MG/1
TABLET, FILM COATED ORAL
Qty: 90 TABLET | Refills: 1 | Status: SHIPPED | OUTPATIENT
Start: 2022-10-24

## 2022-10-31 ENCOUNTER — TELEPHONE (OUTPATIENT)
Dept: ENDOCRINOLOGY | Facility: CLINIC | Age: 67
End: 2022-10-31

## 2022-10-31 NOTE — PROGRESS NOTES
Name: Tracie Mueller      : 1955      MRN: [id number]  Encounter Provider: Elisa Roberson DO  Encounter Date: 10/11/2022   Encounter department: [address] [place] [address]      1  Encounter for weight loss counseling  Comments:  Candia Goodpasture has been attempting weight loss and has so far lost >5 lb  She is recommended to continue to eat more whole food plant based meals when possible  She is also recommended to try meal planning and batch making meals that she can then have on-hand so she can have healthful whole food plant based options as often as possible  After discussing her interest in having access to healthy easy ready to eat or heat meals, multiple websites for recipes and [employer], a local whole food plant based market and ready-made meal delivery were recommended to her to try  She is also recommended to continue exercising with walking and she should do strength based exercises twice weekly  2  Mixed hyperlipidemia  -     Lipid Panel with Direct LDL reflex; Future    3  Overweight (BMI 25 0-29 9)  -     Comprehensive metabolic panel; Future    4  Screening for diabetes mellitus  -     Comprehensive metabolic panel; Future    5  Encounter for immunization  -     influenza vaccine, high-dose, PF 0 7 mL (FLUZONE HIGH-DOSE)    The patient indicates understanding of these issues and agrees with the plan  Return in about 4 weeks (around 2022) for Discuss migraines  BMI Counseling: Body mass index is 27 99 kg/m²  The BMI is above normal  Nutrition recommendations include decreasing portion sizes, encouraging healthy choices of fruits and vegetables and increasing intake of lean protein  Rationale for BMI follow-up plan is due to patient being overweight or obese  Nutrition Assessment and Intervention:     Recipes and/or cooking website resources provided to patient    Online resources such as NutritionFacts  [employer]ia Dung  com, plantstrong com, pcr org, or similar provided to patient        Alfonso Rangel is ia 79 yo female who presents for follow up of weight management  She states that she has been attempting to lose weight since her previous visit and has lost more than five pounds since her previous visit  She has been eating more fruits and vegetables and would like to eat whole food plant based when she can but reports difficulty finding whole food plant based ready-made meals or restaurants when cooking is not possible or inconvenient  She also exercises frequently by walking  Review of Systems   Constitutional: Negative for chills and fever  HENT: Negative for congestion  Eyes: Negative for visual disturbance  Respiratory: Negative for chest tightness  Cardiovascular: Negative for chest pain  Gastrointestinal: Negative for nausea  Genitourinary: Negative for dysuria  Musculoskeletal: Negative for myalgias  Psychiatric/Behavioral: Negative for sleep disturbance         Current Outpatient Medications on File Prior to Visit   Medication Sig   • ACZONE 7 5 % GEL daily    • Cholecalciferol (VITAMIN D3) 2000 units TABS Take 2,000 Units by mouth daily   • Multiple Vitamins-Calcium (ONE-A-DAY WOMENS FORMULA PO) Take 1 tablet by mouth daily   • Omega-3 Fatty Acids (FISH OIL) 1,000 mg Take 4,000 mg by mouth daily   • omeprazole (PriLOSEC) 10 mg delayed release capsule Take 10 mg by mouth daily at bedtime    • SOOLANTRA 1 % CREA daily    • [DISCONTINUED] aspirin (ECOTRIN LOW STRENGTH) 81 mg EC tablet Take 81 mg by mouth daily (Patient not taking: Reported on 10/11/2022)   • [DISCONTINUED] aspirin 81 mg chewable tablet Chew 81 mg daily (Patient not taking: Reported on 1/31/2022 )   • [DISCONTINUED] omeprazole (PriLOSEC) 40 MG capsule Take 40 mg by mouth daily (Patient not taking: Reported on 4/7/2022 )       Objective     /78 (BP Location: Left arm)   Pulse 60   Temp 97 8 °F (36 6 °C)   Resp 18   Ht 5' 3" (1 6 m)   Wt 71 7 kg (158 lb)   SpO2 98%   BMI 27 99 kg/m²     Physical Exam  Vitals reviewed  Constitutional:       General: She is awake  She is not in acute distress  Appearance: Normal appearance  Comments: Body mass index is 27 99 kg/m²  HENT:      Head: Normocephalic and atraumatic  Right Ear: External ear normal       Left Ear: External ear normal       Nose: No congestion  Mouth/Throat:      Mouth: Mucous membranes are moist       Pharynx: Oropharynx is clear  No posterior oropharyngeal erythema  Eyes:      General: No scleral icterus  Pupils: Pupils are equal, round, and reactive to light  Cardiovascular:      Rate and Rhythm: Normal rate and regular rhythm  Pulses: Normal pulses  Heart sounds: Normal heart sounds  No murmur heard  Pulmonary:      Effort: Pulmonary effort is normal  No respiratory distress  Breath sounds: Normal breath sounds  Abdominal:      General: Bowel sounds are normal       Palpations: Abdomen is soft  Tenderness: There is no abdominal tenderness  Musculoskeletal:         General: Normal range of motion  Right lower leg: No edema  Left lower leg: No edema  Skin:     General: Skin is warm and dry  Capillary Refill: Capillary refill takes less than 2 seconds  Neurological:      General: No focal deficit present  Mental Status: She is alert  Cranial Nerves: Cranial nerves are intact  No cranial nerve deficit  Sensory: No sensory deficit  Motor: No weakness  Coordination: Coordination normal       Gait: Gait normal    Psychiatric:         Attention and Perception: Attention and perception normal          Mood and Affect: Mood and affect normal          Behavior: Behavior normal  Behavior is cooperative         Carmelo Cramer DO

## 2022-11-02 ENCOUNTER — TELEPHONE (OUTPATIENT)
Dept: OBGYN CLINIC | Facility: CLINIC | Age: 67
End: 2022-11-02

## 2022-11-02 DIAGNOSIS — R10.2 PELVIC PAIN: Primary | ICD-10-CM

## 2022-11-02 NOTE — TELEPHONE ENCOUNTER
Patient stated she is having pain in her abdomen and its been happening for a couple of months on and off  She describes it as a stabbing pain  Patient denies fever, chills , foul odor or bleeding  Patient scheduled her annual in Jan and sent to complete and US  Told patient if any changes, questions or concerns to call our office  Told her we would be in touch when we get results  Provided patient with number for central scheduling

## 2022-11-02 NOTE — TELEPHONE ENCOUNTER
Pt lmom - hasn't been in office since 2021, Dr Constantino Banegas was gynecologist  Would like to come in for an appt  Not sure if issues having are related to anything female department wise or not  A lot of pain in stomach but does have issues with bowel  Wants to get everything taken care of and have a thorough check done since hasn't had one

## 2022-11-17 ENCOUNTER — RA CDI HCC (OUTPATIENT)
Dept: OTHER | Facility: HOSPITAL | Age: 67
End: 2022-11-17

## 2022-11-17 NOTE — PROGRESS NOTES
[facility]  coding opportunities       Chart reviewed, no opportunity found:   [address]        Patients Insurance     Medicare Insurance: [employer] Advantage

## 2022-11-22 ENCOUNTER — OFFICE VISIT (OUTPATIENT)
Dept: FAMILY MEDICINE CLINIC | Facility: OTHER | Age: 67
End: 2022-11-22

## 2022-11-22 ENCOUNTER — HOSPITAL ENCOUNTER (OUTPATIENT)
Dept: ULTRASOUND IMAGING | Facility: HOSPITAL | Age: 67
Discharge: HOME | End: 2022-11-22
Attending: OBSTETRICS & GYNECOLOGY

## 2022-11-22 VITALS
RESPIRATION RATE: 16 BRPM | HEIGHT: 63 IN | BODY MASS INDEX: 27.75 KG/M2 | DIASTOLIC BLOOD PRESSURE: 82 MMHG | HEART RATE: 81 BPM | WEIGHT: 156.6 LBS | SYSTOLIC BLOOD PRESSURE: 130 MMHG | TEMPERATURE: 97.3 F | OXYGEN SATURATION: 99 %

## 2022-11-22 DIAGNOSIS — R10.2 PELVIC PAIN: ICD-10-CM

## 2022-11-22 DIAGNOSIS — R20.0 NUMBNESS AND TINGLING OF RIGHT FACE: Primary | ICD-10-CM

## 2022-11-22 DIAGNOSIS — R20.2 NUMBNESS AND TINGLING OF RIGHT FACE: Primary | ICD-10-CM

## 2022-11-22 RX ORDER — CLASCOTERONE 1 G/100G
CREAM TOPICAL
COMMUNITY
Start: 2022-10-26

## 2022-11-22 NOTE — PATIENT INSTRUCTIONS
If you have any changes other than the numbness in your face such as fascial assymmetry, inability to move limb, other concerning symptoms, please go to Emergency department immediately

## 2022-11-22 NOTE — PROGRESS NOTES
Name: Meg Garcia      : 1955      MRN: [id number]  Encounter Provider: Samaria Olson DO  Encounter Date: 2022   Encounter department: [address] [place] [address] Dr Rodriguez  Numbness and tingling of right face  Comments:  Pt's symptoms are concerning atypical migraines, TIAs, vitamin deficiency, facial nerve dysfunction  Labs today per orders  Also, pt given referral to neurology today for further evaluation of symptoms  ED precautions were discussed with pt in office such as facial assymetry, inability to move a limb sudden weakness, sudden visual changes beyond her usual floaters that occur with her migraines, other concerning symptoms she should seek emergent care immediately  Also discussed and demonstrated FAST signs of stroke symptoms with pt while in office  Orders:  -     Ambulatory Referral to Neurology; Future  -     CBC and differential; Future  -     Vitamin B12; Future  -     Ambulatory Referral to Neurology; Future        The patient indicates understanding of these issues and agrees with the plan  Return in about 3 months (around 2023) for Recheck numbness in face  Sophy Pierce presents to the office to discuss a nunbing and tingling sensation that sometimes she feels in her face  She states that she has always had migraines and takes excedrin migraine for treatment which usually helps the symptoms  This has been occurring for 2-3 months  Her symptoms are described as a pins and needles sensation on the right side of her upper face only and if she touches her face she is able to sense the feeling  Her symptoms last about 15-30 minutes  She states that sometimes this occurs concurrently with migraine but can also occur with no migraine as well  When she gets migraines, she sees floaters and has numbness in her left arm prior to having headache pain   She thinks that the visual changes she has had with her current symptoms have occurred only when there is concurrent migraine and otherwise is just the sensation that she feels in her face  She states that the sensation she feels with her current symptoms is like when you are outside in the cold and come inside and are still cold but starting to thaw out  She states that she is able to go about her daily activities when she has these feelings  Review of Systems   Constitutional: Negative for activity change and fever  HENT: Negative for congestion, ear pain and trouble swallowing  Eyes: Positive for visual disturbance (due to migraines, relieves once finished with migraine)  Negative for photophobia and pain  Respiratory: Negative for chest tightness and shortness of breath  Cardiovascular: Negative for chest pain and palpitations  Musculoskeletal: Negative for back pain and neck pain  Skin: Negative for rash  Neurological: Positive for numbness  Negative for dizziness, facial asymmetry and light-headedness  Pins and needles on the right side  Hematological: Does not bruise/bleed easily  Psychiatric/Behavioral: Negative for confusion, decreased concentration and hallucinations  Current Outpatient Medications on File Prior to Visit   Medication Sig   • ACZONE 7 5 % GEL daily    • atorvastatin (LIPITOR) 20 mg tablet TAKE 1 TABLET BY MOUTH EVERYDAY AT BEDTIME   • Cholecalciferol (VITAMIN D3) 2000 units TABS Take 2,000 Units by mouth daily   • Multiple Vitamins-Calcium (ONE-A-DAY WOMENS FORMULA PO) Take 1 tablet by mouth daily   • Omega-3 Fatty Acids (FISH OIL) 1,000 mg Take 4,000 mg by mouth daily   • omeprazole (PriLOSEC) 10 mg delayed release capsule Take 10 mg by mouth daily at bedtime    • Winlevi 1 % CREA        Objective     /82   Pulse 81   Temp (!) 97 3 °F (36 3 °C)   Resp 16   Ht 5' 3" (1 6 m)   Wt 71 kg (156 lb 9 6 oz)   SpO2 99%   BMI 27 74 kg/m²     Physical Exam  Vitals reviewed  Constitutional:       General: She is awake   She is not in acute distress  Appearance: Normal appearance  HENT:      Head: Normocephalic and atraumatic  Right Ear: External ear normal       Left Ear: External ear normal       Nose: No congestion  Mouth/Throat:      Mouth: Mucous membranes are moist       Pharynx: Oropharynx is clear  No posterior oropharyngeal erythema  Eyes:      General: Lids are normal  No visual field deficit or scleral icterus  Extraocular Movements:      Right eye: Normal extraocular motion and no nystagmus  Left eye: Normal extraocular motion and no nystagmus  Conjunctiva/sclera: Conjunctivae normal       Pupils: Pupils are equal, round, and reactive to light  Right eye: Pupil is not sluggish  Left eye: Pupil is not sluggish  Cardiovascular:      Rate and Rhythm: Normal rate and regular rhythm  Pulses: Normal pulses  Heart sounds: Normal heart sounds  No murmur heard  Pulmonary:      Effort: Pulmonary effort is normal  No respiratory distress  Breath sounds: Normal breath sounds  Abdominal:      General: Bowel sounds are normal       Palpations: Abdomen is soft  Tenderness: There is no abdominal tenderness  Musculoskeletal:         General: Normal range of motion  Cervical back: Neck supple  No tenderness  Right lower leg: No edema  Left lower leg: No edema  Skin:     General: Skin is warm and dry  Capillary Refill: Capillary refill takes less than 2 seconds  Findings: No bruising, ecchymosis, petechiae or rash  Neurological:      General: No focal deficit present  Mental Status: She is alert and oriented to person, place, and time  Cranial Nerves: Cranial nerves are intact  No cranial nerve deficit, dysarthria or facial asymmetry  Sensory: No sensory deficit  Motor: No weakness, tremor, abnormal muscle tone or pronator drift        Coordination: Coordination normal  Finger-Nose-Finger Test and Heel to [facility] Test normal       Gait: Gait is intact  Deep Tendon Reflexes: Babinski sign absent on the right side  Babinski sign absent on the left side  Reflex Scores:       Tricep reflexes are 2+ on the right side and 2+ on the left side  Bicep reflexes are 2+ on the right side and 2+ on the left side  Brachioradialis reflexes are 2+ on the right side and 2+ on the left side  Patellar reflexes are 2+ on the right side and 2+ on the left side  Psychiatric:         Attention and Perception: Attention and perception normal          Mood and Affect: Mood and affect normal          Behavior: Behavior normal  Behavior is cooperative         Latricia Macdonald DO

## 2022-12-07 ENCOUNTER — TELEPHONE (OUTPATIENT)
Dept: NEUROLOGY | Facility: CLINIC | Age: 67
End: 2022-12-07

## 2022-12-07 NOTE — TELEPHONE ENCOUNTER
Patient called to schedule new patient appointment for numbness and neuropathy of face  Patient seen previously by [facility] 6 years ago but for headaches  No testing done  Triage intake sent

## 2022-12-21 LAB
25(OH)D3 SERPL-MCNC: 35 NG/ML (ref 30–100)
T4 FREE SERPL-MCNC: 1.3 NG/DL (ref 0.8–1.8)
TSH SERPL-ACNC: 0.51 MIU/L (ref 0.4–4.5)

## 2022-12-21 NOTE — RESULT ENCOUNTER NOTE
Please call the patient regarding labs -labs okay-thyroid function tests are normal, vitamin D okay-continue supplementations

## 2022-12-27 ENCOUNTER — IMMUNIZATIONS (OUTPATIENT)
Dept: FAMILY MEDICINE CLINIC | Facility: CLINIC | Age: 67
End: 2022-12-27

## 2022-12-27 DIAGNOSIS — Z23 ENCOUNTER FOR IMMUNIZATION: Primary | ICD-10-CM

## 2023-01-26 ENCOUNTER — ANNUAL EXAM (OUTPATIENT)
Dept: OBGYN CLINIC | Facility: CLINIC | Age: 68
End: 2023-01-26

## 2023-01-26 VITALS
HEIGHT: 63 IN | DIASTOLIC BLOOD PRESSURE: 82 MMHG | BODY MASS INDEX: 27.29 KG/M2 | WEIGHT: 154 LBS | SYSTOLIC BLOOD PRESSURE: 132 MMHG

## 2023-01-26 DIAGNOSIS — Z01.419 WELL WOMAN EXAM WITH ROUTINE GYNECOLOGICAL EXAM: Primary | ICD-10-CM

## 2023-01-26 DIAGNOSIS — Z12.4 PAP SMEAR FOR CERVICAL CANCER SCREENING: ICD-10-CM

## 2023-01-26 DIAGNOSIS — M25.559 HIP PAIN: ICD-10-CM

## 2023-01-26 DIAGNOSIS — Z12.31 ENCOUNTER FOR SCREENING MAMMOGRAM FOR MALIGNANT NEOPLASM OF BREAST: ICD-10-CM
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
## 2023-01-26 NOTE — PROGRESS NOTES
[facility]  Well Woman Annual Exam  Pat Pack MD  23    Assessment   78 y o  postmenopausal female who is sexually active presenting for annual exam     Plan     1  Cervical cancer screening: Pap history uncomplicated- Reviewed ASCCP guidelines of disc ontinuing pap smears at age 72 in low risk patients with normal cervical cancer screening in the last 10 years-patient desires Pap smear today which was completed  2   Breast cancer screening: up to date- last mammogram: 2022, mammogram ordered today  Reviewed ACOG recommendations of yearly mammogram for breast cancer screening   3  Colon cancer screening: Up-to-date  last Colonoscopy - polyp removed- f/u in 5 years  4  Screening for osteoporosis: Has a DEXA scan scheduled  5  S/p fall with hip hematoma- Hip Xray ordered    I emphasized the importance of an annual pelvic and breast exam    I have discussed the importance of monthly self-breast exams, exercise and healthy diet as well as adequate intake of calcium and vitamin D  All questions have been answered to her satisfaction  __________________________________________________________________      Subjective     78 y o  postmenopausal female who is sexually active presenting for annual exam  She reports complaints of abdominal bloating-patient does have a known history of diverticulosis and diverticulitis status post perforation and laparotomy  Recent pelvic ultrasound results reviewed and no concerns for ovarian lesions, fibroids noted with no interval change in size  GYN  Complaints: denies  Denies genital discharge, genital ulcers, pelvic pain and vulvar/vaginal symptoms  Menopause occurred at age 40  She has had no bleeding since this time    Menopausal symptoms: none  Sexually active: Yes - single partner - male  Hx STI: denies   Hx Abnormal pap: denies  Last pap: 2019- NILM, HPV neg    OB     Pregnancy complications: NA      Complaints: denies  Denies urinary frequency, hematuria, urinary hesitancy, urinary retention, urinary incontinence and dysuria    BREAST  Complaints: denies  Denies: breast lump, breast tenderness, changed mole, dryness, nipple discharge, pruritus, rash, skin color change and skin lesion(s)  Last mammogram: 2/16/2022- Bir[facility] 1, scheduled  Family hx: denies fhx of uterine, ovarian, or colon cancers  Niece breast cancer- double mastecomy  Patient does do regular self-exams    GENERAL  PMH reviewed/updated and is as below  Patient does follow with a PCP  Denies domestic violence    Exercise: Walks every day  Diet: Well balanced    Past Medical History:   Diagnosis Date   • Allergic rhinitis     last assessed: 9/29/2016   • Anxiety     last assessed: 8/22/2017   • Disease of thyroid gland     Multiple Nodules   • Diverticulosis     last assessed: 10/7/2013   • GERD (gastroesophageal reflux disease)    • Hydronephrosis, right     last assessed: 5/22/2013   • Hyperlipidemia     last assessed 8/22/2017   • Hyperthyroidism    • Hypokalemia     last assessed: 3/24/2015   • IBS (irritable bowel syndrome)     last assessed: 8/22/2017   • Internal hemorrhoids     last assessed: 10/7/2013   • Migraines     last assessed: 8/22/2017   • Nephrolithiasis    • Osteoarthrosis of knee     last assessed: 2/28/2017   • Osteopenia     last assessed: 8/22/2017   • PONV (postoperative nausea and vomiting)    • Renal calculi     last assessed: 4/29/2016   • Renal cyst     last assessed: 7/15/2015   • Total bilirubin, elevated     last assessed: 8/22/2017   • Uterine leiomyoma        Past Surgical History:   Procedure Laterality Date   • APPENDECTOMY     • BREAST BIOPSY Right 1998    benign    core bx   • BREAST EXCISIONAL BIOPSY Left 1999    benign   • COLONOSCOPY     • HAND SURGERY Left     Ganglion cyst   • KNEE ARTHROSCOPY Left     X3   • LAPAROTOMY N/A 10/30/2017    Procedure: LAPAROTOMY EXPLORATORY, DRAINAGE PELVIC ABSCESS; APPENDECTOMY;  Surgeon: Delinda Boeck DO [place];  [facility];  Service: General   • MYOMECTOMY     • NM REPAIR FIRST ABDOMINAL WALL HERNIA N/A 3/26/2020    Procedure: INCISIONAL HERNIA REPAIR WITH MESH;  Surgeon: Melba Johnston MD;  Location[facility];  Service: General   • SALIVARY GLAND SURGERY Right     Removal for Stones   • THYROID SURGERY      biopsy thyroid using percutaneous core needle   • TONSILLECTOMY     • TUBAL LIGATION     • UTERINE FIBROID SURGERY      embolization( Myomectomy)         Current Outpatient Medications:   •  ACZONE 7 5 % GEL, daily , Disp: , Rfl: 3  •  atorvastatin (LIPITOR) 20 mg tablet, TAKE 1 TABLET BY MOUTH EVERYDAY AT BEDTIME, Disp: 90 tablet, Rfl: 1  •  Cholecalciferol (VITAMIN D3) 2000 units TABS, Take 2,000 Units by mouth daily, Disp: , Rfl:   •  Multiple Vitamins-Calcium (ONE-A-DAY WOMENS FORMULA PO), Take 1 tablet by mouth daily, Disp: , Rfl:   •  Omega-3 Fatty Acids (FISH OIL) 1,000 mg, Take 4,000 mg by mouth daily, Disp: , Rfl:   •  omeprazole (PriLOSEC) 10 mg delayed release capsule, Take 10 mg by mouth daily at bedtime , Disp: , Rfl:   •  Winlevi 1 % CREA, , Disp: , Rfl:     No Known Allergies    Social History     Socioeconomic History   • Marital status: Single     Spouse name: Not on file   • Number of children: Not on file   • Years of education: Not on file   • Highest education level: Not on file   Occupational History   • Occupation: retired   Tobacco Use   • Smoking status: Former     Packs/day: 1 00     Years: 15 00     Pack years: 15 00     Types: Cigarettes     Quit date: 2012     Years since quittin 0   • Smokeless tobacco: Never   Vaping Use   • Vaping Use: Never used   Substance and Sexual Activity   • Alcohol use:  Yes     Alcohol/week: 1 0 standard drink     Types: 1 Glasses of wine per week     Comment:  socially   • Drug use: Never   • Sexual activity: Yes     Partners: Male     Birth control/protection: None     Comment: pt  declines std/hiv testing   Other Topics Concern • Not on file   Social History Narrative    Daily caffeinated cola consumption    Drinks coffee    Exercise habits     Social Determinants of Health     Financial Resource Strain: Not on file   Food Insecurity: Not on file   Transportation Needs: Not on file   Physical Activity: Not on file   Stress: Not on file   Social Connections: Not on file   Intimate Partner Violence: Not on file   Housing Stability: Not on file            Review of Systems   Constitutional: Negative for chills and fever  Eyes: Negative for visual disturbance  Respiratory: Negative for chest tightness, shortness of breath and wheezing  Cardiovascular: Negative for chest pain, palpitations and leg swelling  Gastrointestinal: Positive for abdominal pain  Negative for constipation, diarrhea, nausea and vomiting  Genitourinary: Negative for dysuria, flank pain, frequency, hematuria and urgency  Musculoskeletal: Positive for arthralgias and back pain  Negative for myalgias  Neurological: Negative for dizziness, weakness, light-headedness and headaches  Objective  /82 (BP Location: Left arm, Patient Position: Sitting, Cuff Size: Standard)   Ht 5' 3" (1 6 m)   Wt 69 9 kg (154 lb)   BMI 27 28 kg/m²      Physical Exam:  Physical Exam  Vitals reviewed  Constitutional:       General: She is not in acute distress  Appearance: She is well-developed  She is not diaphoretic  HENT:      Head: Normocephalic and atraumatic  Cardiovascular:      Rate and Rhythm: Normal rate and regular rhythm  Heart sounds: Normal heart sounds  No murmur heard  No friction rub  No gallop  Pulmonary:      Effort: Pulmonary effort is normal  No respiratory distress  Breath sounds: Normal breath sounds  No wheezing or rales  Abdominal:      Palpations: Abdomen is soft  Tenderness: There is no abdominal tenderness  There is no guarding or rebound        Comments: Vertical midline incision that is well-healed noted  Some mild tenderness to the left lower quadrant, no rebound or guarding   Genitourinary:     Vagina: Normal       Comments: Vulva notable for mild atrophy, no lesions, urethra midline without prolapse, normal Bartholin's and Tillson's glands  On speculum exam the vagina and cervix appear grossly normal with no lesions identified  Minimal thin white discharge appreciated with no vaginal bleeding seen  On bimanual exam the uterus is anteverted, mobile with no palpable adnexal masses or fullness appreciated  Musculoskeletal:      Cervical back: Normal range of motion and neck supple  Right lower leg: No edema  Left lower leg: No edema  Skin:     General: Skin is warm and dry  Neurological:      Mental Status: She is alert and oriented to person, place, and time     Psychiatric:         Mood and Affect: Mood normal          Behavior: Behavior normal        Breast inspection negative, no nipple discharge or bleeding, no masses or nodularity palpable Advancement Flap (Single) Text: The defect edges were debeveled with a #15 scalpel blade.  Given the location of the defect and the proximity to free margins a single advancement flap was deemed most appropriate.  Using a sterile surgical marker, an appropriate advancement flap was drawn incorporating the defect and placing the expected incisions within the relaxed skin tension lines where possible.    The area thus outlined was incised deep to adipose tissue with a #15 scalpel blade.  The skin margins were undermined to an appropriate distance in all directions utilizing iris scissors.

## 2023-01-27 ENCOUNTER — HOSPITAL ENCOUNTER (OUTPATIENT)
Dept: RADIOLOGY | Facility: HOSPITAL | Age: 68
Discharge: HOME | End: 2023-01-27
Attending: STUDENT IN AN ORGANIZED HEALTH CARE EDUCATION/TRAINING PROGRAM
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
## 2023-01-27 DIAGNOSIS — M25.559 HIP PAIN: ICD-10-CM

## 2023-01-30 LAB
CLINICAL INFO: NORMAL
CYTO CVX: NORMAL
DATE PREVIOUS BX: NORMAL
HPV E6+E7 MRNA CVX QL NAA+PROBE: NOT DETECTED
LMP START DATE: NORMAL
SL AMB PREV. PAP:: NORMAL
SPECIMEN SOURCE CVX/VAG CYTO: NORMAL

## 2023-02-06 ENCOUNTER — OFFICE VISIT (OUTPATIENT)
Dept: ENDOCRINOLOGY | Facility: CLINIC | Age: 68
End: 2023-02-06

## 2023-02-06 VITALS
DIASTOLIC BLOOD PRESSURE: 94 MMHG | BODY MASS INDEX: 27.39 KG/M2 | HEIGHT: 63 IN | HEART RATE: 66 BPM | WEIGHT: 154.6 LBS | SYSTOLIC BLOOD PRESSURE: 136 MMHG

## 2023-02-06 DIAGNOSIS — R79.89 LOW TSH LEVEL: ICD-10-CM

## 2023-02-06 DIAGNOSIS — M85.89 OSTEOPENIA OF MULTIPLE SITES: Primary | ICD-10-CM

## 2023-02-06 DIAGNOSIS — E04.2 MULTIPLE THYROID NODULES: ICD-10-CM

## 2023-02-06 NOTE — PROGRESS NOTES
[facility]    The only established Patient Progress Note      Chief Complaint   Patient presents with   • Osteopenia         History of Present Illness:   Shen Callaway is a 78 y o  female with hx of ostoepenia, thyroid nodules and low TSH seen in follow up  Last seen in the office 1/21/22  For osteopenia, she is currently taking 2000 IUs vitamin D3 and 2-3 servings of dietary calcium daily  She did have a recent fall about 2 to 3 weeks ago, underwent hip and pelvis x-ray and did not show any acute fractures or dislocations  She has DEXA scan scheduled for May  She walks daily for exercise  For the thyroid nodules, most recent imaging was from 2018 and did not meet criteria for biopsy or follow up ultrasounds  She denies any dysphagia or trouble breathing  She has had hx of low TSH, however this has been in normal range for last several years and remains stable  She is having numbness/tingling starting in the middle of right side of  head to shoulder that lasts about 20-25 minutes  She also has hx of migraines   She has appointments scheduled with cardiologist and neurologist        Patient Active Problem List   Diagnosis   • Allergic rhinitis   • GERD without esophagitis   • Mixed hyperlipidemia   • Internal hemorrhoids   • Migraine   • Osteopenia   • Diverticulosis of large intestine   • Serum total bilirubin elevated   • Low TSH level   • Multiple thyroid nodules   • Irritable bowel syndrome   • Nonspecific abnormal results of function study of thyroid   • Osteoarthrosis of knee   • Screening for malignant neoplasm of breast   • Neck pain on right side   • Dysuria   • Left lower quadrant pain   • Rectal bleeding   • Other specified disorders of bone density and structure, other site   • Incisional hernia   • Subluxation of peroneal tendon of left foot   • Abdominal bloating      Past Medical History:   Diagnosis Date   • Allergic rhinitis     last assessed: 9/29/2016   • Anxiety     last assessed: 8/22/2017   • Disease of thyroid gland     Multiple Nodules   • Diverticulosis     last assessed: 10/7/2013   • GERD (gastroesophageal reflux disease)    • Hydronephrosis, right     last assessed: 5/22/2013   • Hyperlipidemia     last assessed 8/22/2017   • Hyperthyroidism    • Hypokalemia     last assessed: 3/24/2015   • IBS (irritable bowel syndrome)     last assessed: 8/22/2017   • Internal hemorrhoids     last assessed: 10/7/2013   • Migraines     last assessed: 8/22/2017   • Nephrolithiasis    • Osteoarthrosis of knee     last assessed: 2/28/2017   • Osteopenia     last assessed: 8/22/2017   • PONV (postoperative nausea and vomiting)    • Renal calculi     last assessed: 4/29/2016   • Renal cyst     last assessed: 7/15/2015   • Total bilirubin, elevated     last assessed: 8/22/2017   • Uterine leiomyoma       Past Surgical History:   Procedure Laterality Date   • APPENDECTOMY     • BREAST BIOPSY Right 1998    benign    core bx   • BREAST EXCISIONAL BIOPSY Left 1999    benign   • COLONOSCOPY     • HAND SURGERY Left     Ganglion cyst   • KNEE ARTHROSCOPY Left     X3   • LAPAROTOMY N/A 10/30/2017    Procedure: LAPAROTOMY EXPLORATORY, DRAINAGE PELVIC ABSCESS; APPENDECTOMY;  Surgeon: Ricardo Richard DO;  Location[facility];  Service: General   • MYOMECTOMY     • DC REPAIR FIRST ABDOMINAL WALL HERNIA N/A 3/26/2020    Procedure: [address] [place];  Surgeon: Myra Soria MD;  Location[facility];  Service: General   • SALIVARY GLAND SURGERY Right     Removal for Stones   • THYROID SURGERY      biopsy thyroid using percutaneous core needle   • TONSILLECTOMY     • TUBAL LIGATION     • UTERINE FIBROID SURGERY      embolization( Myomectomy)      Family History   Problem Relation Age of Onset   • Cancer Mother         Gastric   • Stomach cancer Mother    • Deep vein thrombosis Mother    • Other Father         CVA   • Heart disease Father    • Stroke Father    • Diabetes Sister    • Arthritis Sister    • Kidney disease Sister    • Osteoporosis Sister    • Osteoporosis Sister    • No Known Problems Sister    • Hypertension Brother    • No Known Problems Maternal Aunt    • No Known Problems Maternal Aunt    • No Known Problems Maternal Aunt    • No Known Problems Maternal Aunt    • Polycystic kidney disease Maternal Aunt    • Polycystic kidney disease Maternal Aunt    • Kidney disease Paternal Aunt    • Breast cancer Cousin 28   • Esophageal cancer Cousin    • Esophageal cancer Cousin    • Breast cancer Cousin    • Breast cancer additional onset Other 62     Social History     Tobacco Use   • Smoking status: Former     Packs/day: 1 00     Years: 15      Pack years: 15      Types: Cigarettes     Quit date: 2012     Years since quittin 1   • Smokeless tobacco: Never   Substance Use Topics   • Alcohol use: Yes     Alcohol/week: 1 0 standard drink     Types: 1 Glasses of wine per week     Comment:  socially     No Known Allergies      Current Outpatient Medications:   •  ACZONE 7 5 % GEL, daily , Disp: , Rfl: 3  •  atorvastatin (LIPITOR) 20 mg tablet, TAKE 1 TABLET BY MOUTH EVERYDAY AT BEDTIME, Disp: 90 tablet, Rfl: 1  •  Cholecalciferol (VITAMIN D3) 2000 units TABS, Take 2,000 Units by mouth daily, Disp: , Rfl:   •  Multiple Vitamins-Calcium (ONE-A-DAY WOMENS FORMULA PO), Take 1 tablet by mouth daily, Disp: , Rfl:   •  Omega-3 Fatty Acids (FISH OIL) 1,000 mg, Take 4,000 mg by mouth daily, Disp: , Rfl:   •  omeprazole (PriLOSEC) 10 mg delayed release capsule, Take 10 mg by mouth daily at bedtime , Disp: , Rfl:   •  Winlevi 1 % CREA, , Disp: , Rfl:     Review of Systems   Constitutional: Negative for activity change, appetite change, chills, diaphoresis, fatigue, fever and unexpected weight change  HENT: Negative for sore throat, trouble swallowing and voice change  Respiratory: Negative for chest tightness and shortness of breath      Cardiovascular: Negative for chest pain, palpitations and leg swelling  Musculoskeletal: Negative for arthralgias, back pain and gait problem  Neurological: Positive for numbness (right side of head/shoulder)  All other systems reviewed and are negative  Physical Exam:  Body mass index is 27 39 kg/m²  /94   Pulse 66   Ht 5' 3" (1 6 m)   Wt 70 1 kg (154 lb 9 6 oz)   BMI 27 39 kg/m²    Wt Readings from Last 3 Encounters:   02/06/23 70 1 kg (154 lb 9 6 oz)   01/26/23 69 9 kg (154 lb)   11/22/22 71 kg (156 lb 9 6 oz)       Physical Exam  Vitals reviewed  Constitutional:       Appearance: Normal appearance  HENT:      Head: Normocephalic  Neck:      Thyroid: No thyroid mass, thyromegaly or thyroid tenderness  Cardiovascular:      Rate and Rhythm: Normal rate  Pulses: Normal pulses  Heart sounds: Normal heart sounds  Pulmonary:      Effort: Pulmonary effort is normal  No respiratory distress  Breath sounds: Normal breath sounds  No wheezing, rhonchi or rales  Neurological:      Mental Status: She is alert and oriented to person, place, and time  Psychiatric:         Mood and Affect: Mood normal          Behavior: Behavior normal          Thought Content:  Thought content normal          Judgment: Judgment normal        Labs:   Lab Results   Component Value Date    HGBA1C 5 5 04/11/2022    HGBA1C 5 4 06/11/2020    HGBA1C 5 4 03/07/2019     Lab Results   Component Value Date    CREATININE 0 86 12/06/2021    CREATININE 0 87 10/02/2020    CREATININE 0 93 06/11/2020    BUN 15 12/06/2021     09/20/2017    K 4 0 12/06/2021     12/06/2021    CO2 29 12/06/2021     eGFR   Date Value Ref Range Status   11/05/2017 95 ml/min/1 73sq m Final     Lab Results   Component Value Date    CHOL 161 08/10/2017    HDL 67 12/06/2021    TRIG 143 12/06/2021     Lab Results   Component Value Date    ALT 17 12/06/2021    AST 17 12/06/2021    ALKPHOS 84 12/06/2021    BILITOT 1 3 (H) 09/20/2017     Lab Results   Component Value Date    UGC7EWUSHUQY 0 36 (L) 09/20/2017    JYG6SJPCEJOX 1 027 09/07/2016    DEV1WCZCZCPF 0 784 09/17/2015     Lab Results   Component Value Date    FREET4 1 3 12/20/2022       Impression & Plan:    Problem List Items Addressed This Visit        Endocrine    Multiple thyroid nodules     She denies any compressive symptoms  No need for further imaging at this time  Musculoskeletal and Integument    Osteopenia - Primary     Continue with vitamin d supplementation and dietary calcium  Reviewed importance of strength building exercises  Reviewed fall prevention  She has upcoming DEXA scan  Other    Low TSH level     This has been normal - continue to monitor  Relevant Orders    TSH, 3rd generation    T4, free       Orders Placed This Encounter   Procedures   • TSH, 3rd generation     This is a patient instruction: This test is non-fasting  Please drink two glasses of water morning of bloodwork  Standing Status:   Future     Standing Expiration Date:   2/6/2025   • T4, free     Standing Status:   Future     Standing Expiration Date:   2/6/2025       There are no Patient Instructions on file for this visit  Discussed with the patient and all questioned fully answered  She will call me if any problems arise      AKASH Yi

## 2023-02-06 NOTE — ASSESSMENT & PLAN NOTE
Continue with vitamin d supplementation and dietary calcium  Reviewed importance of strength building exercises  Reviewed fall prevention  She has upcoming DEXA scan

## 2023-02-21 ENCOUNTER — TELEPHONE (OUTPATIENT)
Dept: FAMILY MEDICINE CLINIC | Facility: OTHER | Age: 68
End: 2023-02-21

## 2023-02-21 NOTE — TELEPHONE ENCOUNTER
The patient called asking for some advise  She tested positive for covid and she has a friend who has stage 4 cancer  How long does she need to stay away from this person? Is it still the 10 days? Please advise    Thank you!

## 2023-02-22 ENCOUNTER — TELEPHONE (OUTPATIENT)
Dept: FAMILY MEDICINE CLINIC | Facility: OTHER | Age: 68
End: 2023-02-22

## 2023-02-22 NOTE — TELEPHONE ENCOUNTER
After speaking with the patient in regards to her friend with stage 4 cancer, the patient stated she has an appt on Friday 2/24 for a follow up on face tingling  She wants to know since she originally tested positive on 2/13 and retested positive on 2/21, can she keep her appt on 2/24? She still has a runny nose, occasional cough, her voice isn't 100% back and she is worried about bronchitis because she stated she is prone to it    please advise    Thank you!

## 2023-02-24 ENCOUNTER — TELEPHONE (OUTPATIENT)
Dept: FAMILY MEDICINE CLINIC | Facility: OTHER | Age: 68
End: 2023-02-24

## 2023-02-24 ENCOUNTER — OFFICE VISIT (OUTPATIENT)
Dept: FAMILY MEDICINE CLINIC | Facility: OTHER | Age: 68
End: 2023-02-24

## 2023-02-24 VITALS
DIASTOLIC BLOOD PRESSURE: 86 MMHG | RESPIRATION RATE: 18 BRPM | WEIGHT: 153.2 LBS | SYSTOLIC BLOOD PRESSURE: 126 MMHG | OXYGEN SATURATION: 98 % | TEMPERATURE: 98 F | BODY MASS INDEX: 27.14 KG/M2 | HEIGHT: 63 IN | HEART RATE: 71 BPM

## 2023-02-24 DIAGNOSIS — R20.2 NUMBNESS AND TINGLING OF RIGHT FACE: ICD-10-CM

## 2023-02-24 DIAGNOSIS — R09.81 NASAL CONGESTION: ICD-10-CM

## 2023-02-24 DIAGNOSIS — R05.1 ACUTE COUGH: ICD-10-CM

## 2023-02-24 DIAGNOSIS — U07.1 COVID-19: ICD-10-CM

## 2023-02-24 DIAGNOSIS — R00.2 PALPITATIONS: Primary | ICD-10-CM

## 2023-02-24 DIAGNOSIS — R20.0 NUMBNESS AND TINGLING OF RIGHT FACE: ICD-10-CM

## 2023-02-24 LAB — ECG INTERP DURING EX: NORMAL MS

## 2023-02-24 RX ORDER — OMEPRAZOLE 40 MG/1
CAPSULE, DELAYED RELEASE ORAL
COMMUNITY
Start: 2022-01-01

## 2023-02-24 RX ORDER — BENZONATATE 100 MG/1
100 CAPSULE ORAL 3 TIMES DAILY PRN
Qty: 20 CAPSULE | Refills: 2 | Status: SHIPPED | OUTPATIENT
Start: 2023-02-24

## 2023-02-24 NOTE — TELEPHONE ENCOUNTER
Patient had colonoscopy 10/18/22 at [facility]  Can you please update her care gap    Thank you!

## 2023-02-24 NOTE — PROGRESS NOTES
Name: Latisha Eagle      : 1955      MRN: [id number]  Encounter Provider: Trinity Sanchez DO  Encounter Date: 2023   Encounter department: [address] [place] [address] Dr Rodriguez  Palpitations  Comments:  Pt reports palpitations recently and syncope at home one month ago  Syncope or near syncope has  Not occurred since that time  Denies residual symptoms  POC EKG in office sinus rhythm with no ST elevations  She is recommended keep her upcoming appointment with cardiology and will defer to her cardiologist for further evaluation and cardiac testing  Monitor for acute changes in symptoms  Pt was educated on ED precautions, signs and symptoms of heart attack and return to office precautions  Orders:  -     POCT ECG    2  Acute cough  Comments:  Pt has COVID 19 positive home test with bothersome nasal and chest congestion, some feelings of SOB, and cough  Reports improvement but still has cough and nasal congestion  Tessalon perles per orders for cough relief  They were counseled on usage, possible effects, and precautions of their new medication  Monitor for acute changes in symptoms  Discussed precautions for ED as patient is feeling SOB at times and is higher risk of complication due to her age >71  Originally had positive test,  and out of window for antiviral  Discussed zinc, vitamin C supplements   Discussed symptomatic treatment  If this does not improve once COVID19 improved, consider further evaluation with cardiology or pulmonology  Pt does have scheduled upcoming cardiology appointment for palpitations and syncope  Orders:  -     benzonatate (TESSALON PERLES) 100 mg capsule; Take 1 capsule (100 mg total) by mouth 3 (three) times a day as needed for cough    3  Numbness and tingling of right face  Comments:  Pt reports that she has still had tingling on her face at times since last visit   Possibly due to atypical migraine, trigeminal neuralgia, TIAs, facial nerve dysfunction, AI disorder, and needs further evaluation with neurology  She has appt scheduled with neurology as recommended at her previous appt but has not yet been seen  Continue to monitor for acute changes in symptoms  Pt was advised of return to office precautions, ED precautions, and educated about symptoms of stroke  4  Nasal congestion    5  COVID-19        Depression Screening and Follow-up Plan: Patient was screened for depression during today's encounter  They screened negative with a PHQ-2 score of 0  The patient indicates understanding of these issues and agrees with the plan  Return in about 2 months (around 4/30/2023) for AWV  Molina Bernstein is a 77 yo female who presents for follow up of tingling  She states that she has still at times had feelings of numbness in her fight face but is able to feel her face when she touches it with her hand and both sides feel the same  She denies other associated symptoms  She has history of migraines that lead to light sensitivity, visual changes and her headaches tend to be on the right side her head  She reports that she has an appointment scheduled with neurologist and also a cardiologist  She reports that she has been having palpitations and about one month ago, she syncopized at home and awoke on the floor  She denies further episodes of syncope or near syncope  There is not a change when she sits up or stands up quickly  She was also infected with COVID 19 recently and states that she has had cough (not productive), nasal congestion, post nasal drip, sometimes feelings of being winded but able to catch her breath, and fatigue  She states that she is starting to feel some improvement but her cough has been especially bothering her and she continues to feel congested  Review of Systems   Constitutional: Positive for fatigue  Negative for chills and fever  HENT: Positive for congestion, postnasal drip, sneezing and sore throat  "Negative for sinus pressure, sinus pain and trouble swallowing  Eyes: Positive for photophobia (with migraines) and visual disturbance (with migraines)  Respiratory: Positive for cough, chest tightness and shortness of breath (sometimes since starting COVID symptoms)  Negative for wheezing  Cardiovascular: Positive for palpitations  Negative for chest pain and leg swelling  Gastrointestinal: Negative for abdominal pain and nausea  Genitourinary: Negative for difficulty urinating and dysuria  Musculoskeletal: Positive for back pain  Negative for neck pain  Skin: Negative for rash and wound  Neurological: Positive for headaches (frequent migraines- on the right; gets visual changes)  Negative for facial asymmetry and light-headedness  Psychiatric/Behavioral: Negative for decreased concentration and sleep disturbance  Current Outpatient Medications on File Prior to Visit   Medication Sig   • ACZONE 7 5 % GEL daily    • atorvastatin (LIPITOR) 20 mg tablet TAKE 1 TABLET BY MOUTH EVERYDAY AT BEDTIME   • Cholecalciferol (VITAMIN D3) 2000 units TABS Take 2,000 Units by mouth daily   • Multiple Vitamins-Calcium (ONE-A-DAY WOMENS FORMULA PO) Take 1 tablet by mouth daily   • Omega-3 Fatty Acids (FISH OIL) 1,000 mg Take 4,000 mg by mouth daily   • omeprazole (PriLOSEC) 40 MG capsule    • Winlevi 1 % CREA        Objective     /86   Pulse 71   Temp 98 °F (36 7 °C)   Resp 18   Ht 5' 3\" (1 6 m)   Wt 69 5 kg (153 lb 3 2 oz)   SpO2 98%   BMI 27 14 kg/m²     Physical Exam  Vitals reviewed  Constitutional:       General: She is not in acute distress  Appearance: She is ill-appearing  She is not diaphoretic  HENT:      Head: Normocephalic and atraumatic  Right Ear: Tympanic membrane, ear canal and external ear normal       Left Ear: Tympanic membrane, ear canal and external ear normal       Nose: Congestion present        Mouth/Throat:      Mouth: Mucous membranes are moist       " Pharynx: Oropharynx is clear  No posterior oropharyngeal erythema  Eyes:      Extraocular Movements: Extraocular movements intact  Conjunctiva/sclera: Conjunctivae normal       Pupils: Pupils are equal, round, and reactive to light  Cardiovascular:      Rate and Rhythm: Normal rate and regular rhythm  Pulses: Normal pulses  Heart sounds: Normal heart sounds  Pulmonary:      Effort: Pulmonary effort is normal  No respiratory distress  Breath sounds: No wheezing, rhonchi or rales  Abdominal:      General: Bowel sounds are normal       Palpations: Abdomen is soft  Tenderness: There is no abdominal tenderness  Musculoskeletal:         General: No tenderness  Cervical back: Neck supple  No rigidity  Right lower leg: No edema  Left lower leg: No edema  Lymphadenopathy:      Cervical: No cervical adenopathy  Skin:     General: Skin is warm and dry  Capillary Refill: Capillary refill takes less than 2 seconds  Neurological:      Mental Status: She is alert and oriented to person, place, and time  Cranial Nerves: No cranial nerve deficit  Sensory: No sensory deficit  Motor: No weakness        Coordination: Coordination normal       Gait: Gait normal       Deep Tendon Reflexes: Reflexes normal    Psychiatric:         Mood and Affect: Mood normal          Behavior: Behavior normal        Jose Angel Amanda DO

## 2023-02-27 ENCOUNTER — TELEPHONE (OUTPATIENT)
Dept: ADMINISTRATIVE | Facility: OTHER | Age: 68
End: 2023-02-27

## 2023-02-27 NOTE — TELEPHONE ENCOUNTER
Upon review of the In Basket request we were able to locate, review, and update the patient chart as requested for CRC: Colonoscopy  Any additional questions or concerns should be emailed to the Practice Liaisons via the appropriate education email address, please do not reply via In Basket      Thank you  Corrie Nicholson MA

## 2023-02-27 NOTE — TELEPHONE ENCOUNTER
----- Message from Stan Rodriguez sent at 2/24/2023  2:46 PM EST -----  Regarding: care gap request  02/24/23 2:46 PM    Hello, our patient attached above has had CRC: Colonoscopy completed/performed  Please assist in updating the patient chart by pulling the Care Everywhere (CE) document  The date of service is 10/18/22       Thank you,  Opal HERRERA

## 2023-03-14 ENCOUNTER — OFFICE VISIT (OUTPATIENT)
Dept: CARDIOLOGY CLINIC | Facility: CLINIC | Age: 68
End: 2023-03-14

## 2023-03-14 VITALS
BODY MASS INDEX: 26.93 KG/M2 | WEIGHT: 152 LBS | SYSTOLIC BLOOD PRESSURE: 150 MMHG | HEIGHT: 63 IN | DIASTOLIC BLOOD PRESSURE: 92 MMHG | HEART RATE: 60 BPM

## 2023-03-14 DIAGNOSIS — R55 SYNCOPE, UNSPECIFIED SYNCOPE TYPE: ICD-10-CM

## 2023-03-14 DIAGNOSIS — R42 INTERMITTENT LIGHTHEADEDNESS: ICD-10-CM

## 2023-03-14 DIAGNOSIS — E78.2 MIXED HYPERLIPIDEMIA: Primary | ICD-10-CM

## 2023-03-14 DIAGNOSIS — R07.89 CHEST TIGHTNESS: ICD-10-CM

## 2023-03-14 DIAGNOSIS — R00.2 PALPITATION: ICD-10-CM

## 2023-03-14 RX ORDER — AZELAIC ACID 0.15 G/G
GEL TOPICAL
COMMUNITY
Start: 2023-03-11

## 2023-03-14 RX ORDER — DOXYCYCLINE 40 MG/1
CAPSULE ORAL
COMMUNITY
Start: 2023-03-11

## 2023-03-14 NOTE — PATIENT INSTRUCTIONS
Zio patch event monitor planned to correlate symptoms of palpitations with any cardiac rhythm abnormality  Echocardiogram planned to evaluate heart function and rule out significant valvular heart disease  Stress testing planned for further cardiac risk assessment  Keep follow up with neurology as planned

## 2023-03-14 NOTE — PROGRESS NOTES
[facility] [place]  [address] [place] [address] [address]  [address]  [facility] [address]  Phone#  [phone]  Fax#  [phone]  [facility]'s Cardiology Office Consultation             NAME: Kenneth Love  AGE: 78 y o  SEX: female   : 1955   MRN: [id number]    DATE: 3/14/2023  TIME: 10:27 AM    Cardiology Problem list:  Dyslipidemia: Statins  Family history of CVA  Chest tightness  Palpitations  Syncope  Elevated BP without hypertension    Assessment and plan:    COVID-19 infection   Palpitations, chest tightness worsened since then  Subsequent symptoms of chest pain, palpitations and dizziness  Further work-up is planned  Dyslipidemia  Lab Results   Component Value Date    LDLCALC 102 (H) 2021   Currently on moderate intensity statin therapy  Follow-up lipids with Dr Filippo Reyes  Chest tightness  Nonexertional   Worsened post COVID-19 infection  No definite exertional dyspnea or diaphoresis  Chest tightness has been present since October (intermittent)  Walks on a regular basis-with no worsening symptoms  The 10-year ASCVD risk score (Bob DK, et al , 2019) is: 7 6%    Values used to calculate the score:      Age: 78 years      Sex: Female      Is Non- : No      Diabetic: No      Tobacco smoker: No      Systolic Blood Pressure: 479 mmHg      Is BP treated: No      HDL Cholesterol: 67 mg/dL      Total Cholesterol: 193 mg/dL    Dizziness and lightheadedness  Unclear etiology  Reports accompanying palpitations, at times  These do not always result in lightheadedness and dizziness  Event monitor advised to correlate symptoms with any cardiac dysrhythmia  Syncope  Had syncope while walking last month, developed hematoma on the back  She feels that she may have lost consciousness for a few minutes  No head injury sustained  No recurrence of syncope  No obvious preceding symptoms  No incontinence  No aura    Zio patch event monitor planned to correlate symptoms of palpitations with any cardiac rhythm abnormality  Elevated blood pressure without diagnosis of hypertension  BP Readings from Last 3 Encounters:   03/14/23 150/92   02/24/23 126/86   02/06/23 136/94   BP mildly elevated today  Prior blood pressures were below 140 at office visits  Home blood pressures range between 118-upper 130s  She will continue home blood pressure monitoring  Chief Complaint   Patient presents with   • New Patient Visit     Patient admits to numbness on R side of head/neck intermittently   -lightheadedness/dizziness on occasion  -heart racing/palpitations on occasion        HPI:    Neal Osei is a 78y o -year-old female who presents to the cardiology clinic for initial evaluation  She had seen her primary care provider with complaints of tingling and headache  She was also scheduled to see a neurologist regarding these complaints  During that visit she also reported some chest tightness, shortness of breath and palpitations that began after a COVID infection last month  She had an episode of  syncope while walking last month, developed hematoma on the back  No preceding palpitations or chest pain  No headache prodrome  No prior syncope  Currently she reports lightheadedness and dizziness that is occasional   She continues to have intermittent right-sided head and neck numbness  She also reports intermittent palpitations but no recurrence of syncope  She has a history of dyslipidemia for which she is on statin therapy with no side effects  She reports a history of  stroke in her father  No premature CAD in the family members  She quit smoking over 11 years ago  She has a 15-pack-year smoking history  She has no history of hypertension or diabetes  She is also on high dose of omega-3 fatty acids for her knees  She walks daily around the [place]- does 4 to 5 laps  No symptoms on walking      ECG today is normal: Shows sinus rhythm, rate 66  No ST or T changes  She seems very concerned about her symptoms and wants to proceed with further testing  Past history, family history, social history, current medications, vital signs, recent lab and imaging studies and  prior cardiology studies reviewed independently on this visit    Wt Readings from Last 3 Encounters:   03/14/23 68 9 kg (152 lb)   02/24/23 69 5 kg (153 lb 3 2 oz)   02/06/23 70 1 kg (154 lb 9 6 oz)     Pulse Readings from Last 3 Encounters:   03/14/23 60   02/24/23 71   02/06/23 66     BP Readings from Last 3 Encounters:   03/14/23 150/92   02/24/23 126/86   02/06/23 136/94       No Known Allergies    Current Outpatient Medications:   •  ACZONE 7 5 % GEL, daily , Disp: , Rfl: 3  •  atorvastatin (LIPITOR) 20 mg tablet, TAKE 1 TABLET BY MOUTH EVERYDAY AT BEDTIME, Disp: 90 tablet, Rfl: 1  •  Azelaic Acid 15 % cream, , Disp: , Rfl:   •  Cholecalciferol (VITAMIN D3) 2000 units TABS, Take 2,000 Units by mouth daily, Disp: , Rfl:   •  doxycycline (ORACEA) 40 MG capsule, , Disp: , Rfl:   •  Multiple Vitamins-Calcium (ONE-A-DAY WOMENS FORMULA PO), Take 1 tablet by mouth daily, Disp: , Rfl:   •  Omega-3 Fatty Acids (FISH OIL) 1,000 mg, Take 4,000 mg by mouth daily, Disp: , Rfl:   •  omeprazole (PriLOSEC) 40 MG capsule, , Disp: , Rfl:   •  Winlevi 1 % CREA, , Disp: , Rfl:   •  benzonatate (TESSALON PERLES) 100 mg capsule, Take 1 capsule (100 mg total) by mouth 3 (three) times a day as needed for cough, Disp: 20 capsule, Rfl: 2    Past Medical History:   Diagnosis Date   • Allergic rhinitis     last assessed: 9/29/2016   • Anxiety     last assessed: 8/22/2017   • Disease of thyroid gland     Multiple Nodules   • Diverticulosis     last assessed: 10/7/2013   • GERD (gastroesophageal reflux disease)    • Hydronephrosis, right     last assessed: 5/22/2013   • Hyperlipidemia     last assessed 8/22/2017   • Hyperthyroidism    • Hypokalemia     last assessed: 3/24/2015   • IBS (irritable bowel syndrome)     last assessed: 8/22/2017   • Internal hemorrhoids     last assessed: 10/7/2013   • Migraines     last assessed: 8/22/2017   • Nephrolithiasis    • Osteoarthrosis of knee     last assessed: 2/28/2017   • Osteopenia     last assessed: 8/22/2017   • PONV (postoperative nausea and vomiting)    • Renal calculi     last assessed: 4/29/2016   • Renal cyst     last assessed: 7/15/2015   • Total bilirubin, elevated     last assessed: 8/22/2017   • Uterine leiomyoma      Past Surgical History:   Procedure Laterality Date   • APPENDECTOMY     • BREAST BIOPSY Right 1998    benign    core bx   • BREAST EXCISIONAL BIOPSY Left 1999    benign   • COLONOSCOPY     • HAND SURGERY Left     Ganglion cyst   • KNEE ARTHROSCOPY Left     X3   • LAPAROTOMY N/A 10/30/2017    Procedure: LAPAROTOMY EXPLORATORY, DRAINAGE PELVIC ABSCESS; APPENDECTOMY;  Surgeon: Juice Fuentes DO;  Location[facility];  Service: General   • MYOMECTOMY     • SC REPAIR FIRST ABDOMINAL WALL HERNIA N/A 3/26/2020    Procedure: [address] [place];  Surgeon: Dwight Grossman MD;  Location[facility];  Service: General   • SALIVARY GLAND SURGERY Right     Removal for Stones   • THYROID SURGERY      biopsy thyroid using percutaneous core needle   • TONSILLECTOMY     • TUBAL LIGATION     • UTERINE FIBROID SURGERY      embolization( Myomectomy)     Family History   Problem Relation Age of Onset   • Cancer Mother         Gastric   • Stomach cancer Mother    • Deep vein thrombosis Mother    • Other Father         CVA   • Heart disease Father    • Stroke Father    • Diabetes Sister    • Arthritis Sister    • Kidney disease Sister    • Osteoporosis Sister    • Osteoporosis Sister    • No Known Problems Sister    • Hypertension Brother    • No Known Problems Maternal Aunt    • No Known Problems Maternal Aunt    • No Known Problems Maternal Aunt    • No Known Problems Maternal Aunt    • Polycystic kidney disease Maternal Aunt    • Polycystic kidney disease Maternal Aunt    • Heart disease Maternal Uncle 58   • Kidney disease Paternal Aunt    • Breast cancer Cousin 28   • Esophageal cancer Cousin    • Esophageal cancer Cousin    • Breast cancer Cousin    • Breast cancer additional onset Other 62     Social History   reports that she quit smoking about 11 years ago  Her smoking use included cigarettes  She has a 15 00 pack-year smoking history  She has never used smokeless tobacco  She reports current alcohol use of about 1 0 standard drink per week  She reports that she does not use drugs  Review of Systems   Constitutional: Positive for fatigue  Eyes: Negative  Respiratory: Positive for chest tightness and shortness of breath  Cardiovascular: Positive for palpitations  Negative for chest pain  Gastrointestinal:        GERD   Endocrine: Negative  Musculoskeletal: Positive for back pain  Allergic/Immunologic: Negative  Neurological: Positive for numbness and headaches  Hematological: Does not bruise/bleed easily  Psychiatric/Behavioral: Negative  Objective:     Vitals:    03/14/23 0954   BP: 150/92   Pulse: 60     Physical Exam  Vitals reviewed  Constitutional:       General: She is not in acute distress  HENT:      Head: Normocephalic  Cardiovascular:      Rate and Rhythm: Normal rate and regular rhythm  Heart sounds: S1 normal and S2 normal  No murmur heard  Pulmonary:      Breath sounds: No wheezing or rhonchi  Musculoskeletal:      Right lower leg: No edema  Left lower leg: No edema  Skin:     General: Skin is warm  Neurological:      Mental Status: She is alert  Mental status is at baseline     Psychiatric:         Mood and Affect: Mood normal          Pertinent Laboratory/Diagnostic Studies:    Laboratory studies reviewed personally by Luisa Gurrola MD    BMP:   Lab Results   Component Value Date    SODIUM 142 12/06/2021    K 4 0 12/06/2021     12/06/2021    CO2 29 12/06/2021    BUN 15 12/06/2021    CREATININE 0 86 12/06/2021    GLUC 90 12/06/2021    CALCIUM 9 5 12/06/2021     CBC:  Lab Results   Component Value Date    WBC 7 9 03/03/2020    WBC 8 09 11/05/2017    WBC 5 7 09/20/2017    RBC 4 37 03/03/2020    RBC 3 66 (L) 11/05/2017    RBC 4 14 09/20/2017    HGB 13 2 03/03/2020    HGB 10 6 (L) 11/05/2017    HGB 12 6 09/20/2017    HCT 38 7 03/03/2020    HCT 33 1 (L) 11/05/2017    HCT 37 5 09/20/2017    MCV 88 6 03/03/2020    MCV 90 11/05/2017    MCV 90 6 09/20/2017    MCH 30 2 03/03/2020    MCH 29 0 11/05/2017    MCH 30 4 09/20/2017    RDW 12 1 03/03/2020    RDW 13 4 11/05/2017    RDW 12 7 09/20/2017     03/03/2020     11/05/2017     09/20/2017     Coags:    Lipid Profile:   Lab Results   Component Value Date    CHOL 161 08/10/2017     Lab Results   Component Value Date    HDL 67 12/06/2021     Lab Results   Component Value Date    LDLCALC 102 (H) 12/06/2021     Lab Results   Component Value Date    TRIG 143 12/06/2021      Other labs:  Lab Results   Component Value Date    GDK4SNLZARVA 0 36 (L) 09/20/2017    TSH 0 51 12/20/2022     Lab Results   Component Value Date    ALT 17 12/06/2021    AST 17 12/06/2021     No results found for: BNP   No results for input(s): NTBNP in the last 72 hours  Imaging Studies:     Pertinent cardiac studies and imaging studies were personally reviewed on this visit and results summarized  Visit diagnoses:  1  Mixed hyperlipidemia  POCT ECG      2  Palpitation  Echo complete w/ contrast if indicated    AMB extended holter monitor    Stress test only, exercise      3  Intermittent lightheadedness  Echo complete w/ contrast if indicated    AMB extended holter monitor    Stress test only, exercise      4  Syncope, unspecified syncope type  Echo complete w/ contrast if indicated    AMB extended holter monitor      5   Chest tightness  Stress test only, exercise          Portions of the record may have been created with voice recognition software  Occasional wrong word or "sound alike" substitutions may have occurred due to the inherent limitations of voice recognition software  Read the chart carefully and recognize, using context, where substitutions have occurred  Please reach out to me directly for any clarifications

## 2023-03-29 ENCOUNTER — HOSPITAL ENCOUNTER (OUTPATIENT)
Dept: NON INVASIVE DIAGNOSTICS | Facility: CLINIC | Age: 68
Discharge: HOME | End: 2023-03-29

## 2023-03-29 VITALS
BODY MASS INDEX: 26.93 KG/M2 | DIASTOLIC BLOOD PRESSURE: 82 MMHG | SYSTOLIC BLOOD PRESSURE: 144 MMHG | WEIGHT: 152 LBS | HEIGHT: 63 IN | HEART RATE: 70 BPM

## 2023-03-29 VITALS
HEART RATE: 75 BPM | HEIGHT: 63 IN | BODY MASS INDEX: 26.93 KG/M2 | OXYGEN SATURATION: 98 % | SYSTOLIC BLOOD PRESSURE: 144 MMHG | WEIGHT: 152 LBS | DIASTOLIC BLOOD PRESSURE: 82 MMHG

## 2023-03-29 DIAGNOSIS — R00.2 PALPITATION: ICD-10-CM

## 2023-03-29 DIAGNOSIS — R42 INTERMITTENT LIGHTHEADEDNESS: ICD-10-CM

## 2023-03-29 DIAGNOSIS — R55 SYNCOPE, UNSPECIFIED SYNCOPE TYPE: ICD-10-CM

## 2023-03-29 DIAGNOSIS — R07.89 CHEST TIGHTNESS: ICD-10-CM

## 2023-03-29 LAB
AORTIC ROOT: 2.6 CM
APICAL FOUR CHAMBER EJECTION FRACTION: 68 %
E WAVE DECELERATION TIME: 268 MS
FRACTIONAL SHORTENING: 34 % (ref 28–44)
INTERVENTRICULAR SEPTUM IN DIASTOLE (PARASTERNAL SHORT AXIS VIEW): 1.1 CM
INTERVENTRICULAR SEPTUM: 1.1 CM (ref 0.6–1.1)
LAAS-AP2: 10.8 CM2
LAAS-AP4: 14.5 CM2
LEFT ATRIUM AREA SYSTOLE SINGLE PLANE A4C: 14.3 CM2
LEFT ATRIUM SIZE: 2.5 CM
LEFT INTERNAL DIMENSION IN SYSTOLE: 2.9 CM (ref 2.1–4)
LEFT VENTRICULAR INTERNAL DIMENSION IN DIASTOLE: 4.4 CM (ref 3.5–6)
LEFT VENTRICULAR POSTERIOR WALL IN END DIASTOLE: 1.1 CM
LEFT VENTRICULAR STROKE VOLUME: 53 ML
LVSV (TEICH): 53 ML
MAX HR PERCENT: 86 %
MAX HR: 133 BPM
MV E'TISSUE VEL-SEP: 7 CM/S
MV PEAK A VEL: 0.77 M/S
MV PEAK E VEL: 39 CM/S
MV STENOSIS PRESSURE HALF TIME: 78 MS
MV VALVE AREA P 1/2 METHOD: 2.82 CM2
RA PRESSURE ESTIMATED: 3 MMHG
RATE PRESSURE PRODUCT: NORMAL
RIGHT ATRIUM AREA SYSTOLE A4C: 11.5 CM2
RIGHT VENTRICLE ID DIMENSION: 3.7 CM
RV PSP: 24 MMHG
SL CV LEFT ATRIUM LENGTH A2C: 3.7 CM
SL CV LV EF: 60
SL CV PED ECHO LEFT VENTRICLE DIASTOLIC VOLUME (MOD BIPLANE) 2D: 86 ML
SL CV PED ECHO LEFT VENTRICLE SYSTOLIC VOLUME (MOD BIPLANE) 2D: 33 ML
SL CV STRESS RECOVERY BP: NORMAL MMHG
SL CV STRESS RECOVERY HR: 82 BPM
SL CV STRESS RECOVERY O2 SAT: 98 %
SL CV STRESS STAGE REACHED: 3
STRESS ANGINA INDEX: 0
STRESS BASELINE BP: NORMAL MMHG
STRESS BASELINE HR: 75 BPM
STRESS DUKE TREADMILL SCORE: 8
STRESS O2 SAT REST: 98 %
STRESS PEAK HR: 131 BPM
STRESS POST ESTIMATED WORKLOAD: 10.1 METS
STRESS POST EXERCISE DUR MIN: 8 MIN
STRESS POST EXERCISE DUR SEC: 0 SEC
STRESS POST O2 SAT PEAK: 96 %
STRESS POST PEAK BP: 162 MMHG
STRESS ST DEPRESSION: 0 MM
TR MAX PG: 21 MMHG
TR PEAK VELOCITY: 2.3 M/S
TRICUSPID ANNULAR PLANE SYSTOLIC EXCURSION: 1.9 CM
TRICUSPID VALVE PEAK REGURGITATION VELOCITY: 2.27 M/S

## 2023-03-29 NOTE — RESULT ENCOUNTER NOTE
ECHO reviewed  Pumping strength (ejection fraction) is normal at 60%  Slightly increased stiffness of the heart muscle  Valves: Aortic and mitral valves are normal   Tricuspid valve shows some backflow  Results sent to patient via [address]

## 2023-03-30 LAB
CHEST PAIN STATEMENT: NORMAL
MAX DIASTOLIC BP: 82 MMHG
MAX HEART RATE: 133 BPM
MAX PREDICTED HEART RATE: 154 BPM
MAX. SYSTOLIC BP: 162 MMHG
PROTOCOL NAME: NORMAL
REASON FOR TERMINATION: NORMAL
TARGET HR FORMULA: NORMAL
TEST INDICATION: NORMAL
TIME IN EXERCISE PHASE: NORMAL

## 2023-04-24 ENCOUNTER — CLINICAL SUPPORT (OUTPATIENT)
Dept: CARDIOLOGY CLINIC | Facility: CLINIC | Age: 68
End: 2023-04-24

## 2023-04-24 DIAGNOSIS — R00.2 PALPITATION: ICD-10-CM

## 2023-04-24 DIAGNOSIS — R42 INTERMITTENT LIGHTHEADEDNESS: ICD-10-CM

## 2023-04-24 DIAGNOSIS — R55 SYNCOPE, UNSPECIFIED SYNCOPE TYPE: ICD-10-CM

## 2023-04-25 ENCOUNTER — TELEPHONE (OUTPATIENT)
Dept: OTHER | Facility: OTHER | Age: 68
End: 2023-04-25

## 2023-04-25 NOTE — TELEPHONE ENCOUNTER
Pt received a message on Yap that she completed a Nurse OV yesterday  Pt states she was not in the office and would like a call back to explain this reason  Please call

## 2023-04-25 NOTE — RESULT ENCOUNTER NOTE
[facility]    Event Recorder Results    Duration: 14 days    Indication:Palpitations    Findings:    Predominant rhythm is sinus  Patient had a min HR of 45 bpm, max HR of 127 bpm, and avg HR of 71 bpm, during sinus rhythm  Rare PACs and PVCs noted  12 brief supraventricular runs noted with average heart rate of 135  Longest duration was 25 beats  No symptoms reported during this episode  Multiple patient reported symptoms include chest fluttering, lightheadedness, chest pain or pressure  These events correlated with normal sinus rhythm on most of the occasions  1 patient triggered event with shortness of breath, lightheadedness, chest pain and tingling showed 1 PAC, another 1 correlated with mild sinus tachycardia  Results sent to patient via [address]

## 2023-05-03 ENCOUNTER — HOSPITAL ENCOUNTER (OUTPATIENT)
Dept: RADIOLOGY | Age: 68
Discharge: HOME | End: 2023-05-03

## 2023-05-03 DIAGNOSIS — M85.89 OSTEOPENIA OF MULTIPLE SITES: ICD-10-CM

## 2023-05-09 NOTE — RESULT ENCOUNTER NOTE
Please call the patient regarding DEXA scan-shows bone loss however still in osteopenia range-please make sure that she is taking calcium and vitamin D supplementations

## 2023-05-26 ENCOUNTER — CONSULT (OUTPATIENT)
Dept: NEUROLOGY | Facility: CLINIC | Age: 68
End: 2023-05-26

## 2023-05-26 VITALS
BODY MASS INDEX: 27.25 KG/M2 | DIASTOLIC BLOOD PRESSURE: 80 MMHG | HEART RATE: 72 BPM | WEIGHT: 153.8 LBS | SYSTOLIC BLOOD PRESSURE: 120 MMHG | HEIGHT: 63 IN

## 2023-05-26 DIAGNOSIS — M54.81 OCCIPITAL NEURALGIA: Primary | ICD-10-CM

## 2023-05-26 DIAGNOSIS — R20.2 NUMBNESS AND TINGLING OF RIGHT FACE: ICD-10-CM

## 2023-05-26 DIAGNOSIS — R20.0 NUMBNESS AND TINGLING OF RIGHT FACE: ICD-10-CM

## 2023-05-26 NOTE — PATIENT INSTRUCTIONS
Patient Instructions:  -please schedule your MRI cervical spine and we can discuss the results over MyChart/telephone afterward  -follow up with neurology as needed if you have new or worsening symptoms

## 2023-05-26 NOTE — PROGRESS NOTES
[address] Neurology Consult  PATIENT:  eGrmán Love  MRN:  [id number]  :  1955  DATE OF SERVICE:  2023  REFERRED BY: Jey Cardoza DO  PMD: Peace Carlos DO    Assessment/Plan:     Sandra Ledesma is a very pleasant 80 y o  female with a past medical history that includes GERD, HLD referred here for evaluation of occipital neuralgia  R occipital neuralgia  -describes intermittent numbness involving the R scalp, R occipital region, and R shoulder at times  Denies associated neuropathic pain  -c/f occipital neuralgia based on distribution of symptoms  -recommend MRI cervical spine for further evaluation to r/o structural causes  -defer medical management at this time given lack of associated neuropathic pain  If she does have accompanying painful symptoms in the future, could consider ONB vs gabapentin    CC: Occipital neuralgia    History of Present Illness:     80 y o  female with a past medical history that includes GERD, HLD referred here for evaluation of occipital neuralgia  Pt states that for the past several months she has had occasional episodes of numbness affecting the R side of her head  She states that currently her symptoms are only affecting the R back of her head radiating from the neck up to her scalp  Describes the symptoms as numbness - denies associated pins and needles sensation or burning pain  They typically only last for about 20 min before subsiding  She has a history of migraines but denies a clear association between these symptoms and her migraine headaches  Per chart review these symptoms seem to have changed slightly over the last several months - previous notes report changes in facial sensation, but today she denies any facial involvement and states that only the back of her head/scalp is involved  She has not tried any medications for these symptoms in the past  Only describes intermittent numbness currently, no pain       Past Medical History: Past Medical History:   Diagnosis Date   • Allergic rhinitis     last assessed: 9/29/2016   • Anxiety     last assessed: 8/22/2017   • Disease of thyroid gland     Multiple Nodules   • Diverticulosis     last assessed: 10/7/2013   • GERD (gastroesophageal reflux disease)    • Hydronephrosis, right     last assessed: 5/22/2013   • Hyperlipidemia     last assessed 8/22/2017   • Hyperthyroidism    • Hypokalemia     last assessed: 3/24/2015   • IBS (irritable bowel syndrome)     last assessed: 8/22/2017   • Internal hemorrhoids     last assessed: 10/7/2013   • Migraines     last assessed: 8/22/2017   • Nephrolithiasis    • Osteoarthrosis of knee     last assessed: 2/28/2017   • Osteopenia     last assessed: 8/22/2017   • PONV (postoperative nausea and vomiting)    • Renal calculi     last assessed: 4/29/2016   • Renal cyst     last assessed: 7/15/2015   • Total bilirubin, elevated     last assessed: 8/22/2017   • Uterine leiomyoma        Patient Active Problem List   Diagnosis   • Allergic rhinitis   • GERD without esophagitis   • Mixed hyperlipidemia   • Internal hemorrhoids   • Migraine   • Osteopenia   • Diverticulosis of large intestine   • Serum total bilirubin elevated   • Low TSH level   • Multiple thyroid nodules   • Irritable bowel syndrome   • Nonspecific abnormal results of function study of thyroid   • Osteoarthrosis of knee   • Screening for malignant neoplasm of breast   • Neck pain on right side   • Dysuria   • Left lower quadrant pain   • Rectal bleeding   • Other specified disorders of bone density and structure, other site   • Incisional hernia   • Subluxation of peroneal tendon of left foot   • Abdominal bloating   • Palpitation   • Intermittent lightheadedness   • Syncope       Medications:      Current Outpatient Medications   Medication Sig Dispense Refill   • ACZONE 7 5 % GEL daily   3   • atorvastatin (LIPITOR) 20 mg tablet TAKE 1 TABLET BY MOUTH EVERYDAY AT BEDTIME 90 tablet 1   • Azelaic Acid 15 % cream      • Cholecalciferol (VITAMIN D3) 2000 units TABS Take 2,000 Units by mouth daily     • doxycycline (ORACEA) 40 MG capsule      • Multiple Vitamins-Calcium (ONE-A-DAY WOMENS FORMULA PO) Take 1 tablet by mouth daily     • Omega-3 Fatty Acids (FISH OIL) 1,000 mg Take 4,000 mg by mouth daily     • omeprazole (PriLOSEC) 40 MG capsule      • Winlevi 1 % CREA        No current facility-administered medications for this visit          Allergies:    No Known Allergies    Family History:     Family History   Problem Relation Age of Onset   • Cancer Mother         Gastric   • Stomach cancer Mother    • Deep vein thrombosis Mother    • Other Father         CVA   • Heart disease Father    • Stroke Father    • Diabetes Sister    • Arthritis Sister    • Kidney disease Sister    • Osteoporosis Sister    • Osteoporosis Sister    • No Known Problems Sister    • Hypertension Brother    • No Known Problems Maternal Aunt    • No Known Problems Maternal Aunt    • No Known Problems Maternal Aunt    • No Known Problems Maternal Aunt    • Polycystic kidney disease Maternal Aunt    • Polycystic kidney disease Maternal Aunt    • Heart disease Maternal Uncle 58   • Kidney disease Paternal Aunt    • Breast cancer Cousin 28   • Esophageal cancer Cousin    • Esophageal cancer Cousin    • Breast cancer Cousin    • Breast cancer additional onset Other 62       Social History:       Social History     Socioeconomic History   • Marital status: Single     Spouse name: Not on file   • Number of children: Not on file   • Years of education: Not on file   • Highest education level: Not on file   Occupational History   • Occupation: retired   Tobacco Use   • Smoking status: Former     Packs/day: 1 00     Years: 15 00     Total pack years: 15 00     Types: Cigarettes     Quit date: 2012     Years since quittin 4   • Smokeless tobacco: Never   Vaping Use   • Vaping Use: Never used   Substance and Sexual Activity   • Alcohol use: "Yes     Alcohol/week: 1 0 standard drink of alcohol     Types: 1 Glasses of wine per week     Comment:  socially   • Drug use: Never   • Sexual activity: Yes     Partners: Male     Birth control/protection: None     Comment: pt  declines std/hiv testing   Other Topics Concern   • Not on file   Social History Narrative    Daily caffeinated cola consumption    Drinks coffee    Exercise habits     Social Determinants of Health     Financial Resource Strain: Low Risk  (4/15/2023)    Overall Financial Resource Strain (CARDIA)    • Difficulty of Paying Living Expenses: Not hard at all   Food Insecurity: Not on file   Transportation Needs: No Transportation Needs (4/15/2023)    PRAPARE - Transportation    • Lack of Transportation (Medical): No    • Lack of Transportation (Non-Medical): No   Physical Activity: Not on file   Stress: Not on file   Social Connections: Not on file   Intimate Partner Violence: Not on file   Housing Stability: Not on file         Objective:   /80 (BP Location: Left arm, Patient Position: Sitting, Cuff Size: Standard)   Pulse 72   Ht 5' 3\" (1 6 m)   Wt 69 8 kg (153 lb 12 8 oz)   BMI 27 24 kg/m²     General: Patient is not in any acute/apparent distress, well nourished, well developed and cooperative  HEENT: normocephalic, atraumatic, moist membranes  Neck: supple  Extremities: no edema noted   Skin: no lesions or rash  Musculosketal: no bony abnormalities    Neurologic Examination:   Mental status: alert, awake, oriented X 3 and following commands  Speech/Language: Speech is fluent without any dysarthria, no aphasia noted, comprehension intact    Cranial Nerves:   CN I: smell not tested  CN II: Visual fields full to confrontation  CN III, IV, VI: Extraocular movements intact bilaterally  Pupils equal round and reactive to light bilaterally  CN V: Facial sensation is normal    CN VII: Full and symmetric facial movement    CN VIII: Hearing is normal   CN IX, X: Palate elevates " symmetrically  CN XI: Shoulder shrug strength is normal   CN XII: Tongue midline without atrophy or fasciculations  Motor:   Strength 5/5 in all 4 extremities  Bulk/tone - normal   Fasiculations - none    Sensory:   Unable to trigger symptoms with compression of R occipital nerve trunks  Sensation intact to soft touch in all 4 extremities  Cerebellar:   Finger-to-nose intact, normal heel to shin  Reflexes: 2+ in all 4 extremities  Pathologic reflexes - babinski reflex negative    Gait:   Normal gait, Romberg sign negative       Review of Systems:     ROS:  Review of Systems   Constitutional: Negative  Negative for appetite change and fever  HENT: Positive for voice change (Once in awhile)  Negative for hearing loss, tinnitus and trouble swallowing  Eyes: Positive for visual disturbance (When she has a migraine)  Negative for photophobia and pain  Respiratory: Negative  Negative for shortness of breath  Cardiovascular: Positive for palpitations (Not all the time)  Gastrointestinal: Negative  Negative for nausea and vomiting  Endocrine: Negative  Negative for cold intolerance  Genitourinary: Negative  Negative for dysuria, frequency and urgency  Musculoskeletal: Positive for myalgias and neck pain  Negative for gait problem  Skin: Negative  Negative for rash  Allergic/Immunologic: Negative  Neurological: Positive for numbness (Middle of her scalp down to her right should) and headaches  Negative for dizziness, tremors, seizures, syncope, speech difficulty, weakness, light-headedness and numbness  Hematological: Negative  Does not bruise/bleed easily  Psychiatric/Behavioral: Negative  Negative for confusion, hallucinations and sleep disturbance       I have spent 53 minutes with Patient today in which greater than 50% of this time was spent in counseling/coordination of care regarding Risks and benefits of tx options, Instructions for management, Patient and family education, Risk factor reductions and Impressions  I also spent 10 minutes non face to face for this patient the same day

## 2023-05-30 ENCOUNTER — OFFICE VISIT (OUTPATIENT)
Dept: FAMILY MEDICINE CLINIC | Facility: OTHER | Age: 68
End: 2023-05-30

## 2023-05-30 VITALS
BODY MASS INDEX: 27.5 KG/M2 | HEART RATE: 71 BPM | SYSTOLIC BLOOD PRESSURE: 132 MMHG | TEMPERATURE: 98 F | OXYGEN SATURATION: 99 % | WEIGHT: 155.2 LBS | RESPIRATION RATE: 18 BRPM | HEIGHT: 63 IN | DIASTOLIC BLOOD PRESSURE: 86 MMHG

## 2023-05-30 DIAGNOSIS — R21 RASH: Primary | ICD-10-CM

## 2023-05-30 RX ORDER — FLUOCINONIDE 0.5 MG/G
OINTMENT TOPICAL 2 TIMES DAILY
Qty: 30 G | Refills: 0 | Status: SHIPPED | OUTPATIENT
Start: 2023-05-30

## 2023-05-30 NOTE — PATIENT INSTRUCTIONS
Poison Ivy   WHAT YOU NEED TO KNOW:   What is poison ivy? Poison ivy is a plant that can cause an itchy, uncomfortable rash on your skin  Poison ivy grows as a shrub or vine in woods, fields, and areas of thick [place]  It has 3 bright green leaves on each stem that turn red in [place]  What causes a poison ivy rash? A poison ivy rash can occur when the plant oil soaks into your skin  You may get a rash if you touch: Any part of the poison ivy plant: This includes the leaves, stem, vine, roots, flowers, and berries  Pets with poison ivy on their fur:  They can spread poison ivy oil to your skin and to items inside your car and house  Items with poison ivy oil on them: This includes clothing, shoes, camping or sports equipment, or outdoor tools  A person with poison ivy oil on him:  Poison ivy oil may be on their skin or clothing  What can I do if I have been exposed to poison ivy? If you think you have touched poison ivy, rinse your skin with cool water right away  Then, wash it with soap and water  Rinse your skin well  Do not use hot water because it may cause the oil to spread on your skin  You may also put rubbing alcohol or a solution of 1/2 alcohol and 1/2 water on your skin  This may help your rash to be less severe when it breaks out on your skin  What are the signs and symptoms of a poison ivy rash? A red, swollen, itchy rash that develops within hours to days of exposure to poison ivy    A rash that appears in thick patches or thin lines where the plant leaves rubbed against your skin    Blisters that may leak clear to yellow liquid, then crust over and become scaly    How is a poison ivy rash treated? Antiseptic or drying creams or ointments:  Your healthcare provider may recommend medicine to dry out the rash and decrease the itching  These products may be available without a doctor's order  Steroids: This medicine helps decrease itching and inflammation   It can be given as a cream to apply to your skin or as a pill  Antihistamines: This medicine may help decrease itching and help you sleep  It is available without a doctor's order  How can I manage my symptoms? Keep your rash clean and dry:  Wash it with soap and water  Gently pat it dry with a clean towel  Try not to scratch or rub your rash: This can cause your skin to become infected  Use a compress on your rash:  Dip a clean washcloth in cool water  Wring it out and place it on your rash  Leave the washcloth on your skin for 15 minutes  Do this at least 3 times per day  Take a cornstarch or oatmeal bath: If your rash is too large to cover with wet washcloths, take 3 or 4 cornstarch baths daily  Mix 1 pound of cornstarch with a little water to make a paste  Add the paste to a tub full of water and mix well  You may also use colloidal oatmeal in the bath water  Use lukewarm water  Avoid hot water because it may cause your itching to increase  Can a poison ivy rash be spread by scratching or touching it? You cannot spread poison ivy by touching your rash or the liquid from your blisters  Poison ivy is spread only if you scratch your skin while it still has oil on it  You may think your rash is spreading because new rashes appear over a number of days  This happens because areas covered by thin skin break out in a rash first  Your face or forearms may develop a rash before thicker areas, such as the palms of your hands  How can I prevent a poison ivy rash? Wear skin protection:  Wear long pants, a long-sleeved shirt, and gloves  Use a skin block lotion to protect your skin from poison ivy oil  You can find this at a drugstore without a prescription  Wash clothing after possible exposure: If you think you have been near a poison ivy plant, wash the clothes you were wearing separately from other clothes  Rinse the washing machine well after you take the clothes out   Scrub boots and shoes with warm, soapy water  Dry clean items and clothing that you cannot wash in water  Poison ivy oil is sticky and can stay on surfaces for a long time  It can cause a new rash even years later  Bathe your pet:  Use warm water and shampoo on your pet's fur  This will prevent the spread of oil to your skin, car, and home  Wear long sleeves, long pants, and gloves while washing pets or any items that may have oil on them  Reduce exposure to poison ivy:  Do not touch plants that look like poison ivy  Keep your yard free of poison ivy  While protecting your skin, remove the plant and the roots  Place them in a plastic bag and seal the bag tightly  Do not burn poison ivy plants: This can spread the oil through the air  If you breathe the oil into your lungs, you could have swelling and serious breathing problems  Oil that clings to the fire lex can land on your skin and cause a rash  When should I contact my healthcare provider? You have pus, soft yellow scabs, or tenderness on the rash  The itching gets worse or keeps you awake at night  The rash covers more than 1/4 of your skin or spreads to your eyes, mouth, or genital area  The rash is not better after 2 to 3 weeks  You have tender, swollen glands on the sides of your neck  You have swelling in your arms and legs  You have questions or concerns about your condition or care  When should I seek immediate care or call 911? You have a fever  You have redness, swelling, and tenderness around the rash  You have trouble breathing  CARE AGREEMENT:   You have the right to help plan your care  Learn about your health condition and how it may be treated  Discuss treatment options with your healthcare providers to decide what care you want to receive  You always have the right to refuse treatment  The above information is an  only  It is not intended as medical advice for individual conditions or treatments   Talk to your doctor, nurse or pharmacist before following any medical regimen to see if it is safe and effective for you  © Copyright [employer] 2022 Information is for End User's use only and may not be sold, redistributed or otherwise used for commercial purposes

## 2023-05-30 NOTE — PROGRESS NOTES
Name: Georgette Lr      : 1955      MRN: [id number]  Encounter Provider: Katia Weaver DO  Encounter Date: 2023   Encounter department: [address] [place] [address] Dr Rodriguez  Rash  Assessment & Plan:  Patient with 1-2 days of scattered erythematous lesions of the right arm that are intensely pruritic  Patient noted to have been doing yard work day prior to onset with possibly poison ivy contact  No associated fever, chills, N/v, malaise or lymphadenopathy appreciated  Lesions are scatted and vary in size with overlying yellow crusting; largest up to 10 mm in size  No signs of tracking  Non tender on palpation  Suspect contact dermatitis from poison ivy or other such irritant  Plan  - Start Lidex ointment 0 5% to be applied to areas of irritation twice daily until resolution  - Avoid application of steroid cream on face, especially eyelids   - F/u in 1-2 weeks if no resolution in symptoms despite routine use of ointment   - Advised patient to avoid itching and to also use calamine spray as needed for itching/irritation  - Advised patient to ensure any clothing items that were worn outside be washed prior to placing back on skin   - Patient handout provided     Orders:  -     fluocinonide (LIDEX) 0 05 % ointment; Apply topically 2 (two) times a day         Vilma Arguello is a 80 yoF presenting with the CC of onset of pruritic rashes on the upper extremities b/l  Patient notes scattered rashes appearing on mostly her right arm beginning on Thursday  Rashes appear to be extending up her arm  She notes being outside doing yard work on Wednesday with possibly poison ivy contact  She reports never having a rash like this before  Denies any N/V, fever, chills, myalgias or general malaise  No change in medications  No sick contacts  Review of Systems   Constitutional: Negative for chills and fever     HENT: Negative for congestion and "rhinorrhea  Eyes: Negative for visual disturbance  Respiratory: Negative for shortness of breath  Cardiovascular: Negative for chest pain and palpitations  Gastrointestinal: Negative for constipation, diarrhea, nausea and vomiting  Endocrine: Negative for polyuria  Musculoskeletal: Negative for arthralgias and myalgias  Skin: Positive for rash  Neurological: Negative for dizziness, light-headedness and headaches  Current Outpatient Medications on File Prior to Visit   Medication Sig   • ACZONE 7 5 % GEL daily    • atorvastatin (LIPITOR) 20 mg tablet TAKE 1 TABLET BY MOUTH EVERYDAY AT BEDTIME   • Azelaic Acid 15 % cream    • Cholecalciferol (VITAMIN D3) 2000 units TABS Take 2,000 Units by mouth daily   • doxycycline (ORACEA) 40 MG capsule    • Multiple Vitamins-Calcium (ONE-A-DAY WOMENS FORMULA PO) Take 1 tablet by mouth daily   • Omega-3 Fatty Acids (FISH OIL) 1,000 mg Take 4,000 mg by mouth daily   • omeprazole (PriLOSEC) 40 MG capsule    • Winlevi 1 % CREA        Objective     /86   Pulse 71   Temp 98 °F (36 7 °C)   Resp 18   Ht 5' 3\" (1 6 m)   Wt 70 4 kg (155 lb 3 2 oz)   SpO2 99%   BMI 27 49 kg/m²     Physical Exam  Constitutional:       General: She is not in acute distress  Appearance: Normal appearance  She is not ill-appearing  HENT:      Head: Normocephalic and atraumatic  Right Ear: External ear normal       Left Ear: External ear normal       Nose: Nose normal    Eyes:      Extraocular Movements: Extraocular movements intact  Conjunctiva/sclera: Conjunctivae normal    Cardiovascular:      Rate and Rhythm: Normal rate and regular rhythm  Pulses: Normal pulses  Heart sounds: Normal heart sounds  Pulmonary:      Effort: Pulmonary effort is normal       Breath sounds: Normal breath sounds  No wheezing, rhonchi or rales  Abdominal:      General: Abdomen is flat  There is no distension  Palpations: Abdomen is soft  There is no mass        " Tenderness: There is no abdominal tenderness  There is no guarding  Musculoskeletal:      Right lower leg: No edema  Left lower leg: No edema  Skin:     General: Skin is warm and dry  Findings: Erythema and rash present  Rash is crusting  Comments: Scattered erythematous lesions extending up arm with overlying yellow scabbing  Size variation with largest up to 10 mm in size  No tracking, purulent discharge or tenderness on palpation  No lymphadenopathy appreciated    Neurological:      Mental Status: She is alert  Motor: No weakness        Gait: Gait normal        Brandin Manuel DO

## 2023-06-01 PROBLEM — R21 RASH: Status: ACTIVE | Noted: 2023-06-01

## 2023-06-01 NOTE — ASSESSMENT & PLAN NOTE
Patient with 1-2 days of scattered erythematous lesions of the right arm that are intensely pruritic  Patient noted to have been doing yard work day prior to onset with possibly poison ivy contact  No associated fever, chills, N/v, malaise or lymphadenopathy appreciated  Lesions are scatted and vary in size with overlying yellow crusting; largest up to 10 mm in size  No signs of tracking  Non tender on palpation  Suspect contact dermatitis from poison ivy or other such irritant       Plan  - Start Lidex ointment 0 5% to be applied to areas of irritation twice daily until resolution  - Avoid application of steroid cream on face, especially eyelids   - F/u in 1-2 weeks if no resolution in symptoms despite routine use of ointment   - Advised patient to avoid itching and to also use calamine spray as needed for itching/irritation  - Advised patient to ensure any clothing items that were worn outside be washed prior to placing back on skin   - Patient handout provided

## 2023-06-02 LAB
ALBUMIN SERPL-MCNC: 4.2 G/DL (ref 3.6–5.1)
ALBUMIN/GLOB SERPL: 2.2 (CALC) (ref 1–2.5)
ALP SERPL-CCNC: 85 U/L (ref 37–153)
ALT SERPL-CCNC: 16 U/L (ref 6–29)
AST SERPL-CCNC: 16 U/L (ref 10–35)
BILIRUB SERPL-MCNC: 1.5 MG/DL (ref 0.2–1.2)
BUN SERPL-MCNC: 11 MG/DL (ref 7–25)
BUN/CREAT SERPL: ABNORMAL (CALC) (ref 6–22)
CALCIUM SERPL-MCNC: 9.3 MG/DL (ref 8.6–10.4)
CHLORIDE SERPL-SCNC: 105 MMOL/L (ref 98–110)
CHOLEST SERPL-MCNC: 177 MG/DL
CHOLEST/HDLC SERPL: 2.7 (CALC)
CO2 SERPL-SCNC: 30 MMOL/L (ref 20–32)
CREAT SERPL-MCNC: 0.78 MG/DL (ref 0.5–1.05)
GFR/BSA.PRED SERPLBLD CYS-BASED-ARV: 83 ML/MIN/1.73M2
GLOBULIN SER CALC-MCNC: 1.9 G/DL (CALC) (ref 1.9–3.7)
GLUCOSE SERPL-MCNC: 91 MG/DL (ref 65–99)
HDLC SERPL-MCNC: 65 MG/DL
LDLC SERPL CALC-MCNC: 84 MG/DL (CALC)
NONHDLC SERPL-MCNC: 112 MG/DL (CALC)
POTASSIUM SERPL-SCNC: 3.9 MMOL/L (ref 3.5–5.3)
PROT SERPL-MCNC: 6.1 G/DL (ref 6.1–8.1)
SODIUM SERPL-SCNC: 142 MMOL/L (ref 135–146)
TRIGL SERPL-MCNC: 186 MG/DL

## 2023-06-09 ENCOUNTER — HOSPITAL ENCOUNTER (OUTPATIENT)
Dept: RADIOLOGY | Facility: HOSPITAL | Age: 68
Discharge: HOME | End: 2023-06-09
Attending: STUDENT IN AN ORGANIZED HEALTH CARE EDUCATION/TRAINING PROGRAM
Payer: COMMERCIAL

## 2023-06-09 DIAGNOSIS — M54.81 OCCIPITAL NEURALGIA: ICD-10-CM

## 2023-06-09 PROCEDURE — G1004 CDSM NDSC: HCPCS

## 2023-06-09 PROCEDURE — 72141 MRI NECK SPINE W/O DYE: CPT

## 2023-07-05 ENCOUNTER — OFFICE VISIT (OUTPATIENT)
Dept: FAMILY MEDICINE CLINIC | Facility: OTHER | Age: 68
End: 2023-07-05
Payer: COMMERCIAL

## 2023-07-05 VITALS
RESPIRATION RATE: 18 BRPM | SYSTOLIC BLOOD PRESSURE: 112 MMHG | TEMPERATURE: 98 F | HEART RATE: 66 BPM | BODY MASS INDEX: 27.14 KG/M2 | OXYGEN SATURATION: 97 % | DIASTOLIC BLOOD PRESSURE: 68 MMHG | WEIGHT: 153.2 LBS | HEIGHT: 63 IN

## 2023-07-05 DIAGNOSIS — H01.139 ATOPIC DERMATITIS OF EYELID, UNSPECIFIED LATERALITY: Primary | ICD-10-CM

## 2023-07-05 DIAGNOSIS — L20.89 OTHER ATOPIC DERMATITIS: ICD-10-CM

## 2023-07-05 PROCEDURE — 1159F MED LIST DOCD IN RCRD: CPT

## 2023-07-05 PROCEDURE — 1160F RVW MEDS BY RX/DR IN RCRD: CPT

## 2023-07-05 PROCEDURE — 99214 OFFICE O/P EST MOD 30 MIN: CPT

## 2023-07-05 RX ORDER — PREDNISONE 10 MG/1
TABLET ORAL
Qty: 27 TABLET | Refills: 0 | Status: SHIPPED | OUTPATIENT
Start: 2023-07-05

## 2023-07-05 NOTE — PROGRESS NOTES
Name: Nikita Cardoza      : 1955      MRN: [id number]  Encounter Provider: Don Johnson MD  Encounter Date: 2023   Encounter department: [address]     1. Atopic dermatitis of eyelid, unspecified laterality  Given bilateral nature, lack of prodromal symptoms, shingles is unlikely. Additionally, patient has had no fever, pus/drainage from rash and it is not painful, making infection unlikely. Will treat for atopic dermatitis from likely poison ivy exposure. See prednisone taper as below for atopic derm on eyelid. We discussed risks and benefits of this medication. Discussed alleviating measures such as OTC antihistamines, calamine lotion and cool compresses. Patient instructed to return to office if rash does not improve. If any trouble breathing, immediately seek medical attention. - predniSONE 10 mg tablet; Take 4 tablets for 2 days, Then take 3 tablets for 3 days, Then take 2 tablets for 3 days, Then take 1 tablet for 3 days, Then take 1/2 a tablet for 2 days and stop    2. Other atopic dermatitis  - see above         Subjective      Yard work on , using gloves but may have touched face  Previously had poison ivy on arms x 1 month ago that resolved with steroid cream    Rash  This is a new problem. The current episode started yesterday. The problem has been gradually worsening since onset. The affected locations include the head, face, lips and neck (Present bilaterally). The problem is moderate. The rash is characterized by redness, swelling and itchiness (No prodromal symptoms, no drainage from rash). She was exposed to poison ivy/oak. The rash first occurred at home. Associated symptoms include facial edema and itching. Pertinent negatives include no congestion, cough, decreased sleep, drinking less, diarrhea, fatigue, fever, joint pain, rhinorrhea, shortness of breath, sore throat or vomiting.  (No swelling inside mouth or throat) Past treatments include cold compress and anti-itch cream. The treatment provided mild relief. There is no history of eczema. There were no sick contacts. Review of Systems   Constitutional: Negative for chills, fatigue and fever. HENT: Positive for facial swelling. Negative for congestion, ear pain, hearing loss, mouth sores, rhinorrhea, sore throat, trouble swallowing and voice change. Eyes: Positive for itching. Negative for photophobia, pain, discharge and visual disturbance. Respiratory: Negative for cough, chest tightness and shortness of breath. Cardiovascular: Negative for chest pain. Gastrointestinal: Negative for diarrhea and vomiting. Musculoskeletal: Negative for joint pain. Skin: Positive for itching and rash. Current Outpatient Medications on File Prior to Visit   Medication Sig   • ACZONE 7.5 % GEL daily    • atorvastatin (LIPITOR) 20 mg tablet TAKE 1 TABLET BY MOUTH EVERYDAY AT BEDTIME   • Azelaic Acid 15 % cream    • Cholecalciferol (VITAMIN D3) 2000 units TABS Take 2,000 Units by mouth daily   • doxycycline (ORACEA) 40 MG capsule    • fluocinonide (LIDEX) 0.05 % ointment Apply topically 2 (two) times a day   • Multiple Vitamins-Calcium (ONE-A-DAY WOMENS FORMULA PO) Take 1 tablet by mouth daily   • Omega-3 Fatty Acids (FISH OIL) 1,000 mg Take 4,000 mg by mouth daily   • omeprazole (PriLOSEC) 40 MG capsule    • Winlevi 1 % CREA        Objective     /68   Pulse 66   Temp 98 °F (36.7 °C)   Resp 18   Ht 5' 3" (1.6 m)   Wt 69.5 kg (153 lb 3.2 oz)   SpO2 97%   BMI 27.14 kg/m²     Physical Exam  Constitutional:       Appearance: Normal appearance. HENT:      Head: Normocephalic and atraumatic. Right Ear: External ear normal.      Left Ear: External ear normal.      Nose: No congestion. Mouth/Throat:      Mouth: Mucous membranes are moist.      Pharynx: Oropharynx is clear. No oropharyngeal exudate or posterior oropharyngeal erythema.    Eyes: Extraocular Movements: Extraocular movements intact. Conjunctiva/sclera: Conjunctivae normal.      Comments: Mild swelling and erythema of eyelids   Cardiovascular:      Rate and Rhythm: Normal rate and regular rhythm. Heart sounds: Normal heart sounds. Pulmonary:      Effort: Pulmonary effort is normal. No respiratory distress. Breath sounds: Normal breath sounds. No stridor. Abdominal:      Palpations: Abdomen is soft. Musculoskeletal:         General: Normal range of motion. Cervical back: Normal range of motion. Skin:     General: Skin is warm and dry. Findings: Rash present. Comments: Erythematous macules and papules on b/l neck, b/l cheek, b/l eyes and periorally   Neurological:      General: No focal deficit present. Mental Status: She is alert.    Psychiatric:         Mood and Affect: Mood normal.         Behavior: Behavior normal.            Slim Guerrier MD

## 2023-07-06 ENCOUNTER — DOCUMENTATION (OUTPATIENT)
Dept: FAMILY MEDICINE CLINIC | Facility: OTHER | Age: 68
End: 2023-07-06

## 2023-07-20 ENCOUNTER — OFFICE VISIT (OUTPATIENT)
Dept: FAMILY MEDICINE CLINIC | Facility: OTHER | Age: 68
End: 2023-07-20
Payer: COMMERCIAL

## 2023-07-20 VITALS
WEIGHT: 150 LBS | TEMPERATURE: 98.4 F | BODY MASS INDEX: 26.58 KG/M2 | SYSTOLIC BLOOD PRESSURE: 134 MMHG | RESPIRATION RATE: 14 BRPM | HEIGHT: 63 IN | HEART RATE: 79 BPM | OXYGEN SATURATION: 98 % | DIASTOLIC BLOOD PRESSURE: 84 MMHG

## 2023-07-20 DIAGNOSIS — H01.116: ICD-10-CM

## 2023-07-20 DIAGNOSIS — L25.5 RHUS DERMATITIS: Primary | ICD-10-CM

## 2023-07-20 PROCEDURE — 99213 OFFICE O/P EST LOW 20 MIN: CPT

## 2023-07-20 RX ORDER — PREDNISONE 10 MG/1
TABLET ORAL
Qty: 27 TABLET | Refills: 0 | Status: SHIPPED | OUTPATIENT
Start: 2023-07-20 | End: 2023-08-11

## 2023-07-20 NOTE — PROGRESS NOTES
Name: Linda Fournier      : 1955      MRN: [id number]  Encounter Provider: Abdirahman Dominguez DO  Encounter Date: 2023   Encounter department: [address]     1. Rhus Dermatitis    2. Allergic Dermatitis of left eyelid, unspecified eyelid  -     predniSONE 10 mg tablet; Take 4 tablets for 2 days, Then take 3 tablets for 3 days, Then take 2 tablets for 3 days, Then take 1 tablet for 3 days, Then take 1/2 a tablet for 2 days and stop    Pt with recent exposure to rhus containing poisonous plant while working in yard. Most likely, rash is hypersensitivity reaction to rhus. Start prednisone taper per orders. They were counseled on usage, possible effects, and precautions of their new medication. They were counseled on reasons to RTO including worsening of symptoms, involvement of eye or ear, or if symptoms do not improve with steroid treatment. Pt was seen and examined and care discussed with Dr. Mihai Meyers. BMI Counseling: Body mass index is 26.57 kg/m². The BMI is above normal. Nutrition recommendations include encouraging healthy choices of fruits and vegetables. Exercise recommendations include strength training exercises. Rationale for BMI follow-up plan is due to patient being overweight or obese. Falls Plan of Care: balance, strength, and gait training instructions were provided. The patient indicates understanding of these issues and agrees with the plan. Return if symptoms worsen or fail to improve. Darlene Whitehead is a 81 yo female who presents with eyelid irritation and rash. She recently was cutting and trimming bushes and trees along her property line. She came into contact with several species of plant including possible poison ivy or oak. She notes that the next day, she started having redness and irritation along her elbow and left side of her face. It has extended as far as her eyelid. It is very pruritic.  Has been trying not scratch. Compresses and soothing formulas have helped improved comfort. No visual disturbance or changes to vision or swelling of eyelid. No other associated symtpoms. Review of Systems   Constitutional: Negative for activity change, chills, diaphoresis, fatigue and fever. HENT: Negative for ear pain and sore throat. Eyes: Negative for pain, discharge, redness, itching and visual disturbance. Respiratory: Negative for cough and shortness of breath. Cardiovascular: Negative for chest pain and palpitations. Gastrointestinal: Negative for abdominal pain and vomiting. Genitourinary: Negative for dysuria and hematuria. Musculoskeletal: Negative for arthralgias and back pain. Skin: Positive for color change and rash. Negative for wound. Neurological: Positive for headaches (neuro onboard; occipital neuralgia). Negative for tremors, seizures, syncope, weakness and light-headedness. All other systems reviewed and are negative. Current Outpatient Medications on File Prior to Visit   Medication Sig   • atorvastatin (LIPITOR) 20 mg tablet TAKE 1 TABLET BY MOUTH EVERYDAY AT BEDTIME   • Azelaic Acid 15 % cream    • Cholecalciferol (VITAMIN D3) 2000 units TABS Take 2,000 Units by mouth daily   • Multiple Vitamins-Calcium (ONE-A-DAY WOMENS FORMULA PO) Take 1 tablet by mouth daily   • Omega-3 Fatty Acids (FISH OIL) 1,000 mg Take 4,000 mg by mouth daily   • omeprazole (PriLOSEC) 40 MG capsule    • Winlevi 1 % CREA        Objective     /84   Pulse 79   Temp 98.4 °F (36.9 °C)   Resp 14   Ht 5' 3" (1.6 m)   Wt 68 kg (150 lb)   SpO2 98%   BMI 26.57 kg/m²     Physical Exam  Vitals reviewed. Constitutional:       General: She is not in acute distress. Appearance: She is not diaphoretic. HENT:      Head: Normocephalic. Comments: Rash along left eyelid without involvement of eye and left side of face toward ear without involvement of left ear.       Right Ear: Tympanic membrane, ear canal and external ear normal.      Left Ear: Tympanic membrane, ear canal and external ear normal.      Mouth/Throat:      Mouth: Mucous membranes are moist.      Pharynx: Oropharynx is clear. Eyes:      General: No scleral icterus. Right eye: No discharge. Left eye: No discharge. Extraocular Movements: Extraocular movements intact. Conjunctiva/sclera: Conjunctivae normal.      Pupils: Pupils are equal, round, and reactive to light. Cardiovascular:      Rate and Rhythm: Normal rate and regular rhythm. Pulses: Normal pulses. Heart sounds: Normal heart sounds. Pulmonary:      Effort: Pulmonary effort is normal. No respiratory distress. Abdominal:      General: Bowel sounds are normal.      Palpations: Abdomen is soft. Tenderness: There is no abdominal tenderness. Musculoskeletal:         General: No swelling. Cervical back: Neck supple. No rigidity or tenderness. Right lower leg: No edema. Left lower leg: No edema. Lymphadenopathy:      Cervical: No cervical adenopathy. Skin:     Capillary Refill: Capillary refill takes less than 2 seconds. Findings: Erythema and rash (Right and left elbow and forearm with small excoriations from scratching. Erythematous rash. ) present. Neurological:      Mental Status: She is alert and oriented to person, place, and time.    Psychiatric:         Mood and Affect: Mood normal.         Behavior: Behavior normal.       Abdirahman Dominguez DO

## 2023-07-21 ENCOUNTER — PATIENT MESSAGE (OUTPATIENT)
Dept: NEUROLOGY | Facility: CLINIC | Age: 68
End: 2023-07-21

## 2023-08-03 NOTE — PATIENT COMMUNICATION
received vm from 12:28pm-Yes, my name is Alonzo luis, YOB: 1955. I had an appointment in June with doris moreau. I also went for an MRI. And I have not received any phone calls in regards to what my outcome was, what my, what I'm supposed to be doing, or what I'm not supposed to be doing. I sent 2 messages via my chart, and I have not received any responses as of yet. If someone could please call me at [phone]. Thank you.

## 2023-08-04 NOTE — PATIENT COMMUNICATION
Kristin Hoyt MD  to [facility]    8/4/23  9:29 AM   MRI cervical spine shows mild multilevel degenerative changes with no compression on the spinal cord and no other compression on the nerves that would explain. No change in recommendations. If she develops occipital region neuropathic pain in the fut ure, can consider ONB vs gabapentin.

## 2023-08-04 NOTE — TELEPHONE ENCOUNTER
MRI cervical spine shows mild multilevel degenerative changes with no compression on the spinal cord and no other compression on the nerves that would explain. No change in recommendations. If she develops occipital region neuropathic pain in the future, can consider ONB vs gabapentin.

## 2023-08-11 ENCOUNTER — OFFICE VISIT (OUTPATIENT)
Dept: FAMILY MEDICINE CLINIC | Facility: OTHER | Age: 68
End: 2023-08-11
Payer: COMMERCIAL

## 2023-08-11 VITALS
BODY MASS INDEX: 26.86 KG/M2 | TEMPERATURE: 97.8 F | RESPIRATION RATE: 18 BRPM | HEIGHT: 63 IN | OXYGEN SATURATION: 98 % | WEIGHT: 151.6 LBS | DIASTOLIC BLOOD PRESSURE: 90 MMHG | SYSTOLIC BLOOD PRESSURE: 130 MMHG | HEART RATE: 78 BPM

## 2023-08-11 DIAGNOSIS — K13.0 CHEILITIS: ICD-10-CM

## 2023-08-11 DIAGNOSIS — B37.81 THRUSH OF MOUTH AND ESOPHAGUS: ICD-10-CM

## 2023-08-11 DIAGNOSIS — B37.0 THRUSH OF MOUTH AND ESOPHAGUS (HCC): Primary | ICD-10-CM

## 2023-08-11 DIAGNOSIS — B37.81 THRUSH OF MOUTH AND ESOPHAGUS (HCC): Primary | ICD-10-CM

## 2023-08-11 DIAGNOSIS — B37.0 THRUSH OF MOUTH AND ESOPHAGUS: ICD-10-CM

## 2023-08-11 PROCEDURE — 99213 OFFICE O/P EST LOW 20 MIN: CPT

## 2023-08-11 RX ORDER — DOXYCYCLINE 40 MG/1
40 CAPSULE ORAL DAILY
COMMUNITY
Start: 2023-08-03

## 2023-08-11 RX ORDER — DIPHENHYDRAMINE HYDROCHLORIDE AND LIDOCAINE HYDROCHLORIDE AND ALUMINUM HYDROXIDE AND MAGNESIUM HYDRO
10 KIT EVERY 4 HOURS PRN
Refills: 1 | OUTPATIENT
Start: 2023-08-11

## 2023-08-11 RX ORDER — DIPHENHYDRAMINE HYDROCHLORIDE AND LIDOCAINE HYDROCHLORIDE AND ALUMINUM HYDROXIDE AND MAGNESIUM HYDRO
10 KIT EVERY 4 HOURS PRN
Qty: 119 ML | Refills: 1 | Status: SHIPPED | OUTPATIENT
Start: 2023-08-11 | End: 2023-08-15

## 2023-08-11 NOTE — TELEPHONE ENCOUNTER
Yes, hi, this is Lester Early calling. I just picked up two prescriptions at the pharmacy. However, the one that they would not my insurance will not cover is first mouthwash BLM suspension. Is there something else that could be prescribed or should I just have them fill this cost? The bid is 100 and I think they said it's 103 or 110 dollars. I mean, if that would be the case, then I'll just have them fill it and pay for it. But I was just wondering if there was something else that you could prescribe or if the medications that I picked up, the hydrocortisone cream and the nice statin that I have to do, the syrup will be sufficient. If you could give me a call at [phone], it would be greatly appreciated. Thank you.

## 2023-08-11 NOTE — TELEPHONE ENCOUNTER
Pt wondering if she can get something else to substitute for the magic mouthwash. States her insurance wont cover it.

## 2023-08-11 NOTE — PROGRESS NOTES
Name: Gilberto Bustos      : 1955      MRN: [id number]  Encounter Provider: Agustín Monroy DO  Encounter Date: 2023   Encounter department: [address]     1. Thrush of mouth and esophagus (HCC)  -     nystatin (MYCOSTATIN) 500,000 units/5 mL suspension; Apply 5 mL (500,000 Units total) to the mouth or throat 4 (four) times a day    2. Cheilitis  -     Hydrocortisone (Cortibalm) 1 % STCK; Apply 1 Application topically 2 (two) times a day  -     nystatin (MYCOSTATIN) 500,000 units/5 mL suspension; Apply 5 mL (500,000 Units total) to the mouth or throat 4 (four) times a day         -    diphenhydramine, lidocaine, Al/Mg hydroxide, simethicone (Magic Mouthwash) SUSP   Swish and spit 10 mL every 4 (four) hours as needed for mouth pain or discomfort, Starting 2023, Until Tue 8/15/2023, Normal    Pt's presentation concerning for thrush in mouth with symptoms concerning for thrush in esophagus as well in setting of abx use. Nystatin per orders. Also found to have significant dryness in oropharynx, tongue and of both labia that is painful. For symptomatic improvement, patient can use magic mouthwash and cortibalm per orders. Pt was counseled to use cortibalm sparingly as there is topical low potency steroid in lip balm. They were counseled on usage, possible effects, and precautions of their new medications. Decrease acidic and spicy food intake to help improve symptoms of GERD. The patient indicates understanding of these issues and agrees with the plan. Return in about 1 week (around 2023) for recheck tongue changes. Subjective      This is a 81 yo female who presents for pain of mouth and throat and small cuts on her tongue. She reports she went to derm on 8/3, was rx'd doxycycline for rosecea and acne. Started on . Had taken previously and it "worked great, no issues".  On  ate a tootsie pop and afterwards noticed pain and cuts on tongue. Used peroxide and salt water rinses which did not help. She also has a cut on the side of mouth but notes no history cold sores. She reports good oral hygenie, brushing and flossing >2 times daily. She uses Listerine daily. She continues to take doxycycline. She has been applying vaseline to corner of mouth. Patient has history of reflux. Currently 40mg omeprazole daily. Has noted recent increase in reflux symptoms as pain in epigastric area and up throat. Has been having increased amount of acidic foods. Review of Systems   Constitutional: Negative for chills, diaphoresis, fatigue and fever. HENT: Positive for sore throat. Negative for congestion, dental problem, facial swelling, postnasal drip and trouble swallowing. Respiratory: Negative for cough, choking, shortness of breath and wheezing. Cardiovascular: Negative for chest pain, palpitations and leg swelling. Gastrointestinal: Negative for abdominal distention, abdominal pain, nausea and vomiting. Increased acid reflux pain recently       Current Outpatient Medications on File Prior to Visit   Medication Sig   • atorvastatin (LIPITOR) 20 mg tablet TAKE 1 TABLET BY MOUTH EVERYDAY AT BEDTIME   • Azelaic Acid 15 % cream    • Cholecalciferol (VITAMIN D3) 2000 units TABS Take 2,000 Units by mouth daily   • doxycycline (ORACEA) 40 MG capsule Take 40 mg by mouth daily   • Multiple Vitamins-Calcium (ONE-A-DAY WOMENS FORMULA PO) Take 1 tablet by mouth daily   • Omega-3 Fatty Acids (FISH OIL) 1,000 mg Take 4,000 mg by mouth daily   • omeprazole (PriLOSEC) 40 MG capsule    • Winlevi 1 % CREA        Objective     /90   Pulse 78   Temp 97.8 °F (36.6 °C)   Resp 18   Ht 5' 3" (1.6 m)   Wt 68.8 kg (151 lb 9.6 oz)   SpO2 98%   BMI 26.85 kg/m²     Physical Exam  Vitals reviewed. Constitutional:       General: She is not in acute distress. HENT:      Head: Normocephalic and atraumatic.       Right Ear: External ear normal.      Left Ear: External ear normal.      Nose: No congestion. Mouth/Throat:      Mouth: Mucous membranes are moist. Oral lesions (white 1-3mm papules on tongue and oral mucosa ) present. Tongue: Lesions (multiple fissures and dryness) present. Palate: No lesions. Pharynx: Uvula midline. No posterior oropharyngeal erythema. Eyes:      General: No scleral icterus. Pupils: Pupils are equal, round, and reactive to light. Cardiovascular:      Rate and Rhythm: Normal rate and regular rhythm. Pulses: Normal pulses. Heart sounds: Normal heart sounds. No murmur heard. Pulmonary:      Effort: Pulmonary effort is normal. No respiratory distress. Breath sounds: No rhonchi or rales. Abdominal:      General: Bowel sounds are normal.      Palpations: Abdomen is soft. Tenderness: There is no abdominal tenderness. Musculoskeletal:      Right lower leg: No edema. Left lower leg: No edema. Skin:     General: Skin is warm and dry. Capillary Refill: Capillary refill takes less than 2 seconds. Neurological:      Mental Status: She is alert and oriented to person, place, and time. Cranial Nerves: No cranial nerve deficit.    Psychiatric:         Mood and Affect: Mood normal.         Behavior: Behavior normal.     Labs reviewed with pt: CMP, lipid panel    Fidel Cordero DO

## 2023-08-13 ENCOUNTER — RA CDI HCC (OUTPATIENT)
Dept: OTHER | Facility: HOSPITAL | Age: 68
End: 2023-08-13

## 2023-08-13 NOTE — PROGRESS NOTES
[address] [facility] coding opportunities       Chart reviewed, no opportunity found: [address]        Patients Insurance     Medicare Insurance: [employer]

## 2023-08-14 DIAGNOSIS — B37.0 THRUSH: ICD-10-CM

## 2023-08-14 DIAGNOSIS — K13.0 CHEILITIS: ICD-10-CM

## 2023-08-14 DIAGNOSIS — B37.0 THRUSH: Primary | ICD-10-CM

## 2023-08-14 NOTE — PROGRESS NOTES
Received notification that pt's insurance would not cover prescription for magic mouthwash suspension ordered. Will order alternate version of suspension for patient.

## 2023-08-15 RX ORDER — LIDOCAINE HYDROCHLORIDE 20 MG/ML
SOLUTION OROPHARYNGEAL
Qty: 120 ML | Refills: 1 | Status: SHIPPED | OUTPATIENT
Start: 2023-08-15 | End: 2023-08-21

## 2023-08-18 ENCOUNTER — OFFICE VISIT (OUTPATIENT)
Dept: FAMILY MEDICINE CLINIC | Facility: OTHER | Age: 68
End: 2023-08-18
Payer: COMMERCIAL

## 2023-08-18 VITALS
DIASTOLIC BLOOD PRESSURE: 90 MMHG | SYSTOLIC BLOOD PRESSURE: 122 MMHG | WEIGHT: 152.4 LBS | BODY MASS INDEX: 27 KG/M2 | HEIGHT: 63 IN | RESPIRATION RATE: 18 BRPM | HEART RATE: 63 BPM | OXYGEN SATURATION: 98 % | TEMPERATURE: 98 F

## 2023-08-18 DIAGNOSIS — B37.81 THRUSH OF MOUTH AND ESOPHAGUS (HCC): Primary | ICD-10-CM

## 2023-08-18 DIAGNOSIS — B37.0 THRUSH OF MOUTH AND ESOPHAGUS (HCC): Primary | ICD-10-CM

## 2023-08-18 DIAGNOSIS — K13.0 CHEILITIS: ICD-10-CM

## 2023-08-18 PROCEDURE — 99213 OFFICE O/P EST LOW 20 MIN: CPT

## 2023-08-18 RX ORDER — DIAPER,BRIEF,INFANT-TODD,DISP
1 EACH MISCELLANEOUS 2 TIMES DAILY
COMMUNITY
Start: 2023-08-11

## 2023-08-18 NOTE — PROGRESS NOTES
Name: Ja Keith      : 1955      MRN: [id number]  Encounter Provider: Ramesh Rangel DO  Encounter Date: 2023   Encounter department: [address]     1. Thrush of mouth and esophagus ([address])    2. Cheilitis    Significant improvement in pt's discomfort and no signs of oral thrush on today's exam. Pt recommended to finish her course of nystatin treatment which is due to complete today. She can also discontinue cortibalm as her cheilitis has resolved. Monitor for changes in symptoms. Recommended to remain well hydrated. The patient indicates understanding of these issues and agrees with the plan. Return if symptoms return, worsen. Reed Pinto is a 81 yo female who presents for follow up of thrush. Reports significant improvement since starting nystatin on . Her insurance would not cover magic mouthwash even with change in suspension that was ordered, so she has not used it but states that with nystatin treatment, she is no longer having mouth discomfort. . Avoiding acidic foods and spicy foods. Cortibalm has significantly improved her very dry lips. She has also been increasing water intake. Review of Systems   Constitutional: Negative for chills and fever. HENT: Negative for congestion, dental problem, sore throat and voice change. Respiratory: Negative for chest tightness and shortness of breath. Cardiovascular: Negative for chest pain and palpitations. Gastrointestinal: Negative for abdominal pain, nausea and vomiting.        Current Outpatient Medications on File Prior to Visit   Medication Sig   • atorvastatin (LIPITOR) 20 mg tablet TAKE 1 TABLET BY MOUTH EVERYDAY AT BEDTIME   • Azelaic Acid 15 % cream    • Cholecalciferol (VITAMIN D3) 2000 units TABS Take 2,000 Units by mouth daily   • doxycycline (ORACEA) 40 MG capsule Take 40 mg by mouth daily   • hydrocortisone 1 % cream Apply 1 Application topically 2 (two) times a day To affected area   • Multiple Vitamins-Calcium (ONE-A-DAY WOMENS FORMULA PO) Take 1 tablet by mouth daily   • nystatin (MYCOSTATIN) 500,000 units/5 mL suspension Apply 5 mL (500,000 Units total) to the mouth or throat 4 (four) times a day   • Omega-3 Fatty Acids (FISH OIL) 1,000 mg Take 4,000 mg by mouth daily   • omeprazole (PriLOSEC) 40 MG capsule    • Winlevi 1 % CREA    • [DISCONTINUED] Hydrocortisone (Cortibalm) 1 % STCK Apply 1 Application topically 2 (two) times a day   • [DISCONTINUED] Lidocaine Viscous HCl (XYLOCAINE) 2 % mucosal solution SWISH AND SPIT 10 ML EVERY 4 (FOUR) HOURS AS NEEDED FOR MOUTH PAIN OR DISCOMFORT FOR UP TO 7 DAYS       Objective     /90   Pulse 63   Temp 98 °F (36.7 °C)   Resp 18   Ht 5' 3" (1.6 m)   Wt 69.1 kg (152 lb 6.4 oz)   SpO2 98%   BMI 27.00 kg/m²     Physical Exam  Vitals reviewed. Constitutional:       General: She is not in acute distress. HENT:      Head: Normocephalic and atraumatic. Right Ear: External ear normal.      Left Ear: External ear normal.      Nose: No congestion. Mouth/Throat:      Mouth: Mucous membranes are moist.      Pharynx: Oropharynx is clear. No posterior oropharyngeal erythema. Comments: Small fissures of tongue, significantly improved  Eyes:      General: No scleral icterus. Pupils: Pupils are equal, round, and reactive to light. Cardiovascular:      Rate and Rhythm: Normal rate and regular rhythm. Pulses: Normal pulses. Heart sounds: Normal heart sounds. No murmur heard. Pulmonary:      Effort: Pulmonary effort is normal. No respiratory distress. Breath sounds: No rhonchi or rales. Abdominal:      General: Bowel sounds are normal.      Palpations: Abdomen is soft. Tenderness: There is no abdominal tenderness. Musculoskeletal:      Right lower leg: No edema. Left lower leg: No edema. Skin:     General: Skin is warm and dry.       Capillary Refill: Capillary refill takes less than 2 seconds. Neurological:      Mental Status: She is alert and oriented to person, place, and time.    Psychiatric:         Mood and Affect: Mood normal.         Behavior: Behavior normal.       Hugo Johnson DO

## 2023-09-26 DIAGNOSIS — Z71.89 ENCOUNTER FOR HERB AND VITAMIN SUPPLEMENT MANAGEMENT: ICD-10-CM

## 2023-09-26 DIAGNOSIS — Z78.9 TAKES DAILY MULTIVITAMINS: Primary | ICD-10-CM

## 2023-09-26 DIAGNOSIS — E78.2 MIXED HYPERLIPIDEMIA: ICD-10-CM

## 2023-09-26 RX ORDER — ATORVASTATIN CALCIUM 20 MG/1
20 TABLET, FILM COATED ORAL
Qty: 90 TABLET | Refills: 1 | Status: SHIPPED | OUTPATIENT
Start: 2023-09-26

## 2023-09-26 RX ORDER — MULTIVIT,CALC,MINS/IRON/FOLIC 500-18-0.4
1 TABLET ORAL DAILY
Qty: 30 TABLET | Refills: 6 | Status: SHIPPED | OUTPATIENT
Start: 2023-09-26

## 2023-10-12 ENCOUNTER — CONSULT (OUTPATIENT)
Dept: FAMILY MEDICINE CLINIC | Facility: OTHER | Age: 68
End: 2023-10-12
Payer: COMMERCIAL

## 2023-10-12 VITALS
BODY MASS INDEX: 26.75 KG/M2 | WEIGHT: 151 LBS | TEMPERATURE: 98.4 F | DIASTOLIC BLOOD PRESSURE: 80 MMHG | RESPIRATION RATE: 13 BRPM | HEIGHT: 63 IN | OXYGEN SATURATION: 98 % | HEART RATE: 77 BPM | SYSTOLIC BLOOD PRESSURE: 144 MMHG

## 2023-10-12 DIAGNOSIS — Z23 ENCOUNTER FOR IMMUNIZATION: ICD-10-CM

## 2023-10-12 DIAGNOSIS — Z01.810 PREOP CARDIOVASCULAR EXAM: Primary | ICD-10-CM

## 2023-10-12 PROCEDURE — 99213 OFFICE O/P EST LOW 20 MIN: CPT

## 2023-10-12 PROCEDURE — 93000 ELECTROCARDIOGRAM COMPLETE: CPT

## 2023-10-12 NOTE — LETTER
October 12, 2023     [place], [address] [facility] [address]    Patient: Janae Henson   YOB: 1955   Date of Visit: 10/12/2023       Dear Dr. Bright Canas: Thank you for referring Deidra Aase to me for evaluation. Below are my notes for this consultation. If you have questions, please do not hesitate to call me. I look forward to following your patient along with you. Sincerely,        Mary Lou Gutierres DO        CC: No Recipients    Mary Lou Gutierres DO  10/12/2023  2:44 PM  Sign when Signing Visit  Nick Love  1955    Janae Henson is a 80 y.o. female with history of palpitations with reassuring extended Holter monitoring, hyperlipidemia, GERD, IBS, osteoarthritis who is planning to undergo total knee replacement under general by Dr. Bright Canas on 10/30/2023. The procedure is indicated for the following condition: osteoarthritis of left knee. Patient has not had complications with anesthesia in the past.    ROS:   Chest pain: no   Shortness of breath: no  Shortness of breath with exertion: no  Orthopnea: no  Dizziness: no  Unexplained weight change: no    PMH:  CAD: yes  HTN: no  CKD: no  DM: no on insulin: no  History of CVA: no    She  reports that she quit smoking about 11 years ago. Her smoking use included cigarettes. She has a 15.00 pack-year smoking history. She has never used smokeless tobacco. She reports current alcohol use of about 1.0 standard drink of alcohol per week. She reports that she does not use drugs. There were no vitals taken for this visit. Physical Exam  Vitals and nursing note reviewed. Constitutional:       General: She is not in acute distress. Appearance: She is well-developed. She is not ill-appearing or diaphoretic. HENT:      Head: Normocephalic and atraumatic. Right Ear: External ear normal.      Left Ear: External ear normal.      Nose: No congestion.       Mouth/Throat: Mouth: Mucous membranes are moist.      Pharynx: Oropharynx is clear. Eyes:      General: No scleral icterus. Extraocular Movements: Extraocular movements intact. Conjunctiva/sclera: Conjunctivae normal.      Pupils: Pupils are equal, round, and reactive to light. Cardiovascular:      Rate and Rhythm: Normal rate and regular rhythm. Pulses: Normal pulses. Heart sounds: Normal heart sounds. No murmur heard. Pulmonary:      Effort: Pulmonary effort is normal. No respiratory distress. Breath sounds: Normal breath sounds. No wheezing, rhonchi or rales. Abdominal:      General: Bowel sounds are normal.      Palpations: Abdomen is soft. Tenderness: There is no abdominal tenderness. There is no right CVA tenderness, left CVA tenderness, guarding or rebound. Musculoskeletal:         General: Swelling (knee and thigh on left) and tenderness (left knee medially) present. Cervical back: Normal range of motion and neck supple. No rigidity or tenderness. Right lower leg: No edema. Left lower leg: No edema. Comments: Pain with ROM of left knee   Lymphadenopathy:      Cervical: No cervical adenopathy. Skin:     General: Skin is warm and dry. Capillary Refill: Capillary refill takes less than 2 seconds. Coloration: Skin is not jaundiced. Findings: No rash. Neurological:      Mental Status: She is alert and oriented to person, place, and time. Cranial Nerves: No cranial nerve deficit.    Psychiatric:         Mood and Affect: Mood normal.         Behavior: Behavior normal.       Revised Cardiac Risk Index (RCRI) for Pre-Operative Risk   (estimates risk of cardiac complications after noncardiac surgery)    High-risk surgery: No 0 / Yes +1  Intraperitoneal, intrathoracic, suprainguinal vascular  History of ischemic heart disease: No 0 / Yes +1  Hx of MI, (+) exercise test, current chest pain considered due to myocardial ischemia, use of nitrate therapy or ECG with pathological Q waves)  History of CHF: No 0 / Yes +1  Pulmonary edema, B/L rales or S3 gallop; MI, orthopnea, PND, CXR showing pulmonary vascular redistribution)  History of cerebrovascular disease: No 0 / Yes +1  Prior TIA or stroke  Pre-operative treatment with insulin: No 0 / Yes +1  Pre-operative creatinine >2 mg/dL: No 0 / Yes +1    RCRI Scorin points: Class I Risk, 3.9% 30-day risk of death, MI, or cardiac arrest  1 point: Class II Risk, 6.0% 30-day risk of death, MI, or cardiac arrest  2 points: Class III Risk, 10.1% 30-day risk of death, MI, or cardiac arrest  3 points: Class IV Risk, 15% 30-day risk of death, MI, or cardiac arrest  4 points: Class IV Risk, 15% 30-day risk of death, MI, or cardiac arrest  5 points: Class IV Risk, 15% 30-day risk of death, MI, or cardiac arrest  6 points: Class IV Risk, 15% 30-day risk of death, MI, or cardiac arrest    Lab Results   Component Value Date    CREATININE 0.78 2023       There are no diagnoses linked to this encounter. Recommendations:  Fannie Casas is undergoing an elective Moderate Risk surgery, total knee arthroplasty. She is RCRI class I risk (0 points for N/A) with 3.9% 30-day risk of death, MI, or cardiac arrest. She may proceed with surgery as planned without further workup. EKG today in office show NSR at 60 BPM without ST elevations. Pre-operative form completed and faxed today to office as requested. Discussed with Dr. Shai Fisher, who is in agreement with the plan as outlined above.

## 2023-10-12 NOTE — PROGRESS NOTES
PRE-OPERATIVE EXAMINATION  Farntz Love  1955    Ja Keith is a 80 y.o. female with history of palpitations with reassuring extended Holter monitoring, hyperlipidemia, GERD, IBS, osteoarthritis who is planning to undergo total knee replacement under general by Dr. Drake Hays on 10/30/2023. The procedure is indicated for the following condition: osteoarthritis of left knee. Patient has not had complications with anesthesia in the past.    ROS:   Chest pain: no   Shortness of breath: no  Shortness of breath with exertion: no  Orthopnea: no  Dizziness: no  Unexplained weight change: no    PMH:  CAD: yes  HTN: no  CKD: no  DM: no on insulin: no  History of CVA: no    She  reports that she quit smoking about 11 years ago. Her smoking use included cigarettes. She has a 15.00 pack-year smoking history. She has never used smokeless tobacco. She reports current alcohol use of about 1.0 standard drink of alcohol per week. She reports that she does not use drugs. There were no vitals taken for this visit. Physical Exam  Vitals and nursing note reviewed. Constitutional:       General: She is not in acute distress. Appearance: She is well-developed. She is not ill-appearing or diaphoretic. HENT:      Head: Normocephalic and atraumatic. Right Ear: External ear normal.      Left Ear: External ear normal.      Nose: No congestion. Mouth/Throat:      Mouth: Mucous membranes are moist.      Pharynx: Oropharynx is clear. Eyes:      General: No scleral icterus. Extraocular Movements: Extraocular movements intact. Conjunctiva/sclera: Conjunctivae normal.      Pupils: Pupils are equal, round, and reactive to light. Cardiovascular:      Rate and Rhythm: Normal rate and regular rhythm. Pulses: Normal pulses. Heart sounds: Normal heart sounds. No murmur heard. Pulmonary:      Effort: Pulmonary effort is normal. No respiratory distress. Breath sounds: Normal breath sounds.  No wheezing, rhonchi or rales. Abdominal:      General: Bowel sounds are normal.      Palpations: Abdomen is soft. Tenderness: There is no abdominal tenderness. There is no right CVA tenderness, left CVA tenderness, guarding or rebound. Musculoskeletal:         General: Swelling (knee and thigh on left) and tenderness (left knee medially) present. Cervical back: Normal range of motion and neck supple. No rigidity or tenderness. Right lower leg: No edema. Left lower leg: No edema. Comments: Pain with ROM of left knee   Lymphadenopathy:      Cervical: No cervical adenopathy. Skin:     General: Skin is warm and dry. Capillary Refill: Capillary refill takes less than 2 seconds. Coloration: Skin is not jaundiced. Findings: No rash. Neurological:      Mental Status: She is alert and oriented to person, place, and time. Cranial Nerves: No cranial nerve deficit.    Psychiatric:         Mood and Affect: Mood normal.         Behavior: Behavior normal.       Revised Cardiac Risk Index (RCRI) for Pre-Operative Risk   (estimates risk of cardiac complications after noncardiac surgery)    High-risk surgery: No 0 / Yes +1  Intraperitoneal, intrathoracic, suprainguinal vascular  History of ischemic heart disease: No 0 / Yes +1  Hx of MI, (+) exercise test, current chest pain considered due to myocardial ischemia, use of nitrate therapy or ECG with pathological Q waves)  History of CHF: No 0 / Yes +1  Pulmonary edema, B/L rales or S3 gallop; MI, orthopnea, PND, CXR showing pulmonary vascular redistribution)  History of cerebrovascular disease: No 0 / Yes +1  Prior TIA or stroke  Pre-operative treatment with insulin: No 0 / Yes +1  Pre-operative creatinine >2 mg/dL: No 0 / Yes +1    RCRI Scorin points: Class I Risk, 3.9% 30-day risk of death, MI, or cardiac arrest  1 point: Class II Risk, 6.0% 30-day risk of death, MI, or cardiac arrest  2 points: Class III Risk, 10.1% 30-day risk of death, MI, or cardiac arrest  3 points: Class IV Risk, 15% 30-day risk of death, MI, or cardiac arrest  4 points: Class IV Risk, 15% 30-day risk of death, MI, or cardiac arrest  5 points: Class IV Risk, 15% 30-day risk of death, MI, or cardiac arrest  6 points: Class IV Risk, 15% 30-day risk of death, MI, or cardiac arrest    Lab Results   Component Value Date    CREATININE 0.78 06/01/2023       There are no diagnoses linked to this encounter. Recommendations:  Ja Keith is undergoing an elective Moderate Risk surgery, total knee arthroplasty. She is RCRI class I risk (0 points for N/A) with 3.9% 30-day risk of death, MI, or cardiac arrest. She may proceed with surgery as planned without further workup. EKG today in office show NSR at 60 BPM without ST elevations. Pre-operative form completed and faxed today to office as requested. Discussed with Dr. Clovis Cockayne, who is in agreement with the plan as outlined above.

## 2023-11-21 ENCOUNTER — OFFICE VISIT (OUTPATIENT)
Dept: FAMILY MEDICINE CLINIC | Facility: OTHER | Age: 68
End: 2023-11-21
Payer: COMMERCIAL

## 2023-11-21 VITALS
HEART RATE: 72 BPM | TEMPERATURE: 98.4 F | BODY MASS INDEX: 26.05 KG/M2 | DIASTOLIC BLOOD PRESSURE: 86 MMHG | WEIGHT: 147 LBS | SYSTOLIC BLOOD PRESSURE: 152 MMHG | HEIGHT: 63 IN | OXYGEN SATURATION: 97 % | RESPIRATION RATE: 14 BRPM

## 2023-11-21 DIAGNOSIS — M17.12 PRIMARY OSTEOARTHRITIS OF LEFT KNEE: Primary | ICD-10-CM

## 2023-11-21 PROCEDURE — 99495 TRANSJ CARE MGMT MOD F2F 14D: CPT

## 2023-11-21 NOTE — PROGRESS NOTES
Assessment & Plan     1. Primary osteoarthritis of left knee    Pt presents for follow up after left total knee replacement (TKR). Doing well today. Pain well controlled. Continue current regimen and wean as tolerated. If patient needs any stronger pain medication, was advised to reach out to her surgeon's office. Recommended to keep upcoming appointments for orthopedic follow up and continue PT. Monitor for acute changes in symptoms. Reviewed concerning signs/symptoms to look for in setting of recent surgery with patient. Reviewed precautions with infection risks increase in setting of Total knee replacement with patient. Pt reports she is not recommended to have any dental work for at least 5-6 months per her surgeon and will reschedule her upcoming routine dental appointment. The patient indicates understanding of these issues and agrees with the plan. Return for Next scheduled follow up. Subjective     Transitional Care Management Review:   Shu Todd is a 80 y.o. female here for TCM follow up. During the TCM phone call patient stated:  TCM Call       None          TCM Call       None          Beena Devi is a 81 yo female who presents for TCM today after left TKR. The surgery was performed by Dr. Jordana Greene and there were no acute complications. Reports she had long standing degenerative joint disease and had tried injections multiple times. LOS was as expected for TKR as patient was discharged same day. She worked with PT prior to being discharged and was able to walk and stair climb slowly. Working with PT as outpatient. She has had follow up with orthopedics and at that time, incision CDI. Pt reports that she has been walking and stair climbing. Having mild swelling and discomfort which is well controlled with current pain regimen per orthopedics. Hospital Course per Inga Woods PA-C  "Patient tolerated the procedure well.  She ambulated safely and voided and was discharged home in stable condition with scheduled follow-up. Discharge instructions given."      Review of Systems   Constitutional:  Negative for chills and fever. HENT:  Negative for dental problem. Eyes:  Negative for pain and visual disturbance. Respiratory:  Negative for cough, chest tightness and shortness of breath. Cardiovascular:  Positive for leg swelling. Negative for chest pain and palpitations. Gastrointestinal:  Negative for abdominal pain, constipation and vomiting. Genitourinary:  Negative for difficulty urinating and dysuria. Musculoskeletal:  Positive for gait problem and myalgias. Negative for arthralgias and back pain. Recent TKR   Skin:  Negative for color change and rash. Neurological:  Negative for tremors, weakness, numbness and headaches. Psychiatric/Behavioral:  Negative for sleep disturbance. Objective     /86   Pulse 72   Temp 98.4 °F (36.9 °C)   Resp 14   Ht 5' 3" (1.6 m)   Wt 66.7 kg (147 lb)   SpO2 97%   BMI 26.04 kg/m²      Physical Exam  Vitals and nursing note reviewed. Constitutional:       General: She is not in acute distress. Appearance: She is well-developed. She is not ill-appearing. HENT:      Head: Normocephalic and atraumatic. Eyes:      Conjunctiva/sclera: Conjunctivae normal.   Cardiovascular:      Rate and Rhythm: Normal rate and regular rhythm. Pulses: Normal pulses. Heart sounds: No murmur heard. Pulmonary:      Effort: Pulmonary effort is normal. No respiratory distress. Breath sounds: Normal breath sounds. Abdominal:      General: Bowel sounds are normal.      Palpations: Abdomen is soft. Tenderness: There is no abdominal tenderness. There is no guarding or rebound. Musculoskeletal:         General: No swelling. Cervical back: Neck supple. Comments: Surgical site incision is clean dry and intact at left knee. Healing site. No discharge or drainage present. No erythema.  Mild swelling at knee to ankle compared to right LE. Glue overlying incision. Skin:     General: Skin is warm and dry. Capillary Refill: Capillary refill takes less than 2 seconds. Neurological:      Mental Status: She is alert and oriented to person, place, and time. Cranial Nerves: No cranial nerve deficit.       Gait: Gait abnormal.   Psychiatric:         Mood and Affect: Mood normal.       Medications have been reviewed by provider in current encounter    Benjamin Mendoza DO

## 2024-01-17 DIAGNOSIS — Z00.6 ENCOUNTER FOR EXAMINATION FOR NORMAL COMPARISON OR CONTROL IN CLINICAL RESEARCH PROGRAM: ICD-10-CM

## 2024-01-18 ENCOUNTER — APPOINTMENT (OUTPATIENT)
Dept: LAB | Facility: CLINIC | Age: 69
End: 2024-01-18

## 2024-01-18 DIAGNOSIS — Z00.6 ENCOUNTER FOR EXAMINATION FOR NORMAL COMPARISON OR CONTROL IN CLINICAL RESEARCH PROGRAM: ICD-10-CM

## 2024-01-18 PROCEDURE — 36415 COLL VENOUS BLD VENIPUNCTURE: CPT

## 2024-01-26 LAB
T4 FREE SERPL-MCNC: 1.1 NG/DL (ref 0.8–1.8)
TSH SERPL-ACNC: 0.33 MIU/L (ref 0.4–4.5)

## 2024-02-08 ENCOUNTER — OFFICE VISIT (OUTPATIENT)
Dept: ENDOCRINOLOGY | Facility: CLINIC | Age: 69
End: 2024-02-08
Payer: COMMERCIAL

## 2024-02-08 VITALS
SYSTOLIC BLOOD PRESSURE: 134 MMHG | BODY MASS INDEX: 26.82 KG/M2 | DIASTOLIC BLOOD PRESSURE: 90 MMHG | WEIGHT: 151.4 LBS | HEART RATE: 64 BPM | HEIGHT: 63 IN

## 2024-02-08 DIAGNOSIS — M89.9 DISORDER OF BONE, UNSPECIFIED: ICD-10-CM

## 2024-02-08 DIAGNOSIS — E04.2 MULTIPLE THYROID NODULES: ICD-10-CM

## 2024-02-08 DIAGNOSIS — M85.89 OSTEOPENIA OF MULTIPLE SITES: Primary | ICD-10-CM

## 2024-02-08 DIAGNOSIS — R79.89 LOW TSH LEVEL: ICD-10-CM

## 2024-02-08 PROCEDURE — 99214 OFFICE O/P EST MOD 30 MIN: CPT | Performed by: INTERNAL MEDICINE

## 2024-02-08 RX ORDER — AMOXICILLIN 500 MG
CAPSULE ORAL
COMMUNITY

## 2024-02-08 NOTE — PROGRESS NOTES
Lester Love 68 y.o. female MRN: [id number]    Encounter: [id number]      Assessment/Plan     Problem List Items Addressed This Visit          Endocrine    Multiple thyroid nodules    Relevant Orders    T4, free    TSH, 3rd generation       Musculoskeletal and Integument    Osteopenia - Primary     Make sure to get calcium 1200 mg daily in diet or supplementations.  Continue vitamin D supplementation.  Fall prevention.  Repeat DEXA next year         Relevant Orders    Vitamin D 25 hydroxy    DXA bone density spine hip and pelvis       Other    Low TSH level     TSH mildly low but stable-will monitor         Relevant Orders    T4, free    TSH, 3rd generation    DXA bone density spine hip and pelvis     Other Visit Diagnoses       Disorder of bone, unspecified        Relevant Orders    Vitamin D 25 hydroxy             CC:   osteopenia    History of Present Illness     HPI:  68-year-old female with osteopenia, low TSH and vitamin D deficiency seen in follow-up  She underwent knee replacement in October - still going to PT and doing well     No falls /fractures since last visit     No neck symptoms       For vitamin D deficiency she is currently on Vitamin D3 2000 IU DAILY     3  servings of dairy a day     Review of Systems    Historical Information   Past Medical History:   Diagnosis Date    Allergic rhinitis     last assessed: 9/29/2016    Anxiety     last assessed: 8/22/2017    Disease of thyroid gland     Multiple Nodules    Diverticulosis     last assessed: 10/7/2013    GERD (gastroesophageal reflux disease)     Hydronephrosis, right     last assessed: 5/22/2013    Hyperlipidemia     last assessed 8/22/2017    Hyperthyroidism     Hypokalemia     last assessed: 3/24/2015    IBS (irritable bowel syndrome)     last assessed: 8/22/2017    Internal hemorrhoids     last assessed: 10/7/2013    Migraines     last assessed: 8/22/2017    Nephrolithiasis     Osteoarthrosis of knee     last assessed: 2/28/2017     Osteopenia     last assessed: 2017    PONV (postoperative nausea and vomiting)     Renal calculi     last assessed: 2016    Renal cyst     last assessed: 7/15/2015    Total bilirubin, elevated     last assessed: 2017    Uterine leiomyoma      Past Surgical History:   Procedure Laterality Date    APPENDECTOMY      BREAST BIOPSY Right 1998    benign    core bx    BREAST EXCISIONAL BIOPSY Left 1999    benign    COLONOSCOPY      HAND SURGERY Left     Ganglion cyst    KNEE ARTHROSCOPY Left     X3    LAPAROTOMY N/A 10/30/2017    Procedure: LAPAROTOMY EXPLORATORY, DRAINAGE PELVIC ABSCESS; APPENDECTOMY;  Surgeon: Abdoul Ye DO;  [facility];  Service: General    MYOMECTOMY      IL REPAIR FIRST ABDOMINAL WALL HERNIA N/A 3/26/2020    Procedure: INCISIONAL HERNIA REPAIR WITH MESH;  Surgeon: Juvencio Rangel MD;  Location[facility];  Service: General    SALIVARY GLAND SURGERY Right     Removal for Stones    THYROID SURGERY      biopsy thyroid using percutaneous core needle    TONSILLECTOMY      TUBAL LIGATION      UTERINE FIBROID SURGERY      embolization( Myomectomy)     Social History   Social History     Substance and Sexual Activity   Alcohol Use Yes    Alcohol/week: 1.0 standard drink of alcohol    Types: 1 Glasses of wine per week    Comment:  socially     Social History     Substance and Sexual Activity   Drug Use Never     Social History     Tobacco Use   Smoking Status Former    Current packs/day: 0.00    Average packs/day: 1 pack/day for 15.0 years (15.0 ttl pk-yrs)    Types: Cigarettes    Start date: 1997    Quit date: 2012    Years since quittin.1   Smokeless Tobacco Never     Family History:   Family History   Problem Relation Age of Onset    Cancer Mother         Gastric    Stomach cancer Mother     Deep vein thrombosis Mother     Other Father         CVA    Heart disease Father     Stroke Father     Diabetes Sister     Arthritis Sister     Kidney disease Sister      "Osteoporosis Sister     Osteoporosis Sister     No Known Problems Sister     Hypertension Brother     No Known Problems Maternal Aunt     No Known Problems Maternal Aunt     No Known Problems Maternal Aunt     No Known Problems Maternal Aunt     Polycystic kidney disease Maternal Aunt     Polycystic kidney disease Maternal Aunt     Heart disease Maternal Uncle 62    Kidney disease Paternal Aunt     Breast cancer Cousin 35    Esophageal cancer Cousin     Esophageal cancer Cousin     Breast cancer Cousin     Breast cancer additional onset Other 57       Meds/Allergies   Current Outpatient Medications   Medication Sig Dispense Refill    atorvastatin (LIPITOR) 20 mg tablet Take 1 tablet (20 mg total) by mouth daily at bedtime 90 tablet 1    Azelaic Acid 15 % cream       Multiple Vitamins-Calcium (One-A-Day Womens Formula) TABS Take 1 tablet by mouth daily 30 tablet 6    Omega-3 Fatty Acids (Fish Oil) 1200 MG CAPS Take by mouth      omeprazole (PriLOSEC) 40 MG capsule        No current facility-administered medications for this visit.     No Known Allergies    Objective   Vitals: Blood pressure 134/90, pulse 64, height 5' 3\" (1.6 m), weight 68.7 kg (151 lb 6.4 oz), not currently breastfeeding.    Physical Exam  Vitals reviewed.   Constitutional:       General: She is not in acute distress.     Appearance: Normal appearance. She is not ill-appearing, toxic-appearing or diaphoretic.   HENT:      Head: Normocephalic and atraumatic.   Eyes:      General: No scleral icterus.     Extraocular Movements: Extraocular movements intact.   Cardiovascular:      Rate and Rhythm: Normal rate and regular rhythm.      Heart sounds: Normal heart sounds. No murmur heard.  Pulmonary:      Effort: Pulmonary effort is normal. No respiratory distress.      Breath sounds: Normal breath sounds. No wheezing or rales.   Musculoskeletal:      Cervical back: Neck supple.      Right lower leg: No edema.      Left lower leg: No edema. "   Lymphadenopathy:      Cervical: No cervical adenopathy.   Skin:     General: Skin is warm and dry.   Neurological:      General: No focal deficit present.      Mental Status: She is alert and oriented to person, place, and time.      Gait: Gait normal.   Psychiatric:         Mood and Affect: Mood normal.         Behavior: Behavior normal.         Thought Content: Thought content normal.         Judgment: Judgment normal.         The history was obtained from the review of the chart, patient.    Lab Results:   Lab Results   Component Value Date/Time    Potassium 4.0 09/28/2023 09:24 AM    Potassium 3.9 06/01/2023 12:36 PM    Chloride 105 09/28/2023 09:24 AM    Chloride 105 06/01/2023 12:36 PM    Carbon Dioxide 31 09/28/2023 09:24 AM    CO2 30 06/01/2023 12:36 PM    BUN 14 09/28/2023 09:24 AM    BUN 11 06/01/2023 12:36 PM    Creatinine 0.93 09/28/2023 09:24 AM    Creatinine 0.78 06/01/2023 12:36 PM    Calcium 9.5 09/28/2023 09:24 AM    Calcium 9.3 06/01/2023 12:36 PM    eGFRcr 67 09/28/2023 09:24 AM    eGFR 83 06/01/2023 12:36 PM    Free t4 1.1 01/26/2024 08:46 AM         Imaging Studies:         Results for orders placed during the hospital encounter of 05/03/23    DXA bone density spine hip and pelvis    Impression  1. Low bone mass (osteopenia).    2.  Since a DXA study from 9/17/2020, there has been:  A  STATISTICALLY SIGNIFICANT DECREASE in bone mineral density of 0.039 g/cm2 (4.9%) in the left total hip.        3.  The 10 year risk of hip fracture is 2.5% with the 10 year risk of major osteoporotic fracture being 13% as calculated by the [employer] fracture risk assessment tool (FRAX, which is based on data generated by the WHO Collaborating [place]  for Metabolic Bone Diseases).    4.  The current NOF guidelines recommend treating patients with a T-score of -2.5 or less in the lumbar spine or hips, or in post-menopausal women and men over the age of 50 with low bone mass (osteopenia) and a FRAX 10  "year risk score of >3% for hip  fracture and/or >20% for major osteoporotic fracture.    5.  The NOF recommends follow-up DXA in 1-2 years after initiating therapy for osteoporosis and every 2 years thereafter. More frequent evaluation is appropriate for patients with conditions associated with rapid bone loss, such as glucocorticoid  therapy. The interval between DXA screenings may be longer for individuals without major risk factors and initial T-score in the normal or upper low bone mass range.    The FRAX algorithm has certain limitations:  -FRAX has not been validated in patients currently or previously treated with pharmacotherapy for osteoporosis.  In such patients, clinical judgment must be exercised in interpreting FRAX scores.  -Prior hip, vertebral and humeral fragility fractures appear to confer greater risk of subsequent fracture than fractures at other sites (this is especially true for individuals with severe vertebral fractures), but quantification of this incremental  risk is not possible with FRAX.  -FRAX underestimates fracture risk in patients with history of multiple fragility fractures.  -FRAX may underestimate fracture risk in patients with history of frequent falls.  -It is not appropriate to use FRAX to monitor treatment response.      WHO CLASSIFICATION:  Normal (a T-score of -1.0 or higher)  Low bone mineral density (a T-score of less than -1.0 but higher than -2.5)  Osteoporosis (a T-score of -2.5 or less)  Severe osteoporosis (a T-score of -2.5 or less with a fragility fracture)    LEAST SIGNIFICANT CHANGE (AT 95% C.I):  Lumbar spine 0.036 g/cm2; 2.2%  Total hip: 0.022 g/cm2; 2.6%  Forearm: 0.017 g/cm2; 3.0%.            Workstation performed: [id number]            I have personally reviewed pertinent reports.      Portions of the record may have been created with voice recognition software. Occasional wrong word or \"sound a like\" substitutions may have occurred due to the inherent " limitations of voice recognition software. Read the chart carefully and recognize, using context, where substitutions have occurred.

## 2024-02-08 NOTE — ASSESSMENT & PLAN NOTE
Make sure to get calcium 1200 mg daily in diet or supplementations.  Continue vitamin D supplementation.  Fall prevention.  Repeat DEXA next year

## 2024-02-18 LAB
APOB+LDLR+PCSK9 GENE MUT ANL BLD/T: NOT DETECTED
BRCA1+BRCA2 DEL+DUP + FULL MUT ANL BLD/T: NOT DETECTED
MLH1+MSH2+MSH6+PMS2 GN DEL+DUP+FUL M: NOT DETECTED

## 2024-02-20 ENCOUNTER — TELEPHONE (OUTPATIENT)
Dept: ENDOCRINOLOGY | Facility: CLINIC | Age: 69
End: 2024-02-20

## 2024-02-21 PROBLEM — Z12.39 SCREENING FOR MALIGNANT NEOPLASM OF BREAST: Status: RESOLVED | Noted: 2019-01-28 | Resolved: 2024-02-21

## 2024-03-18 ENCOUNTER — OFFICE VISIT (OUTPATIENT)
Dept: FAMILY MEDICINE CLINIC | Facility: OTHER | Age: 69
End: 2024-03-18
Payer: COMMERCIAL

## 2024-03-18 VITALS
RESPIRATION RATE: 13 BRPM | WEIGHT: 150 LBS | TEMPERATURE: 97.8 F | SYSTOLIC BLOOD PRESSURE: 110 MMHG | HEART RATE: 60 BPM | OXYGEN SATURATION: 99 % | HEIGHT: 63 IN | DIASTOLIC BLOOD PRESSURE: 70 MMHG | BODY MASS INDEX: 26.58 KG/M2

## 2024-03-18 DIAGNOSIS — R30.0 DYSURIA: ICD-10-CM

## 2024-03-18 DIAGNOSIS — N30.00 ACUTE CYSTITIS WITHOUT HEMATURIA: Primary | ICD-10-CM

## 2024-03-18 LAB
SL AMB  POCT GLUCOSE, UA: 100
SL AMB LEUKOCYTE ESTERASE,UA: 70
SL AMB POCT BILIRUBIN,UA: NORMAL
SL AMB POCT BLOOD,UA: NORMAL
SL AMB POCT CLARITY,UA: CLEAR
SL AMB POCT COLOR,UA: YELLOW
SL AMB POCT KETONES,UA: NORMAL
SL AMB POCT NITRITE,UA: NEGATIVE
SL AMB POCT PH,UA: 6
SL AMB POCT SPECIFIC GRAVITY,UA: 1.01
SL AMB POCT URINE PROTEIN: 15
SL AMB POCT UROBILINOGEN: 0.2

## 2024-03-18 PROCEDURE — 81002 URINALYSIS NONAUTO W/O SCOPE: CPT | Performed by: STUDENT IN AN ORGANIZED HEALTH CARE EDUCATION/TRAINING PROGRAM

## 2024-03-18 PROCEDURE — 99213 OFFICE O/P EST LOW 20 MIN: CPT | Performed by: STUDENT IN AN ORGANIZED HEALTH CARE EDUCATION/TRAINING PROGRAM

## 2024-03-18 RX ORDER — DOXYCYCLINE 40 MG/1
40 CAPSULE ORAL DAILY
COMMUNITY
Start: 2024-02-18

## 2024-03-18 RX ORDER — CEPHALEXIN 500 MG/1
500 CAPSULE ORAL 2 TIMES DAILY
Qty: 14 CAPSULE | Refills: 0 | Status: SHIPPED | OUTPATIENT
Start: 2024-03-18 | End: 2024-03-25

## 2024-03-18 NOTE — PROGRESS NOTES
Name: Lester Love      : 1955      MRN: [id number]  Encounter Provider: Flor Bowen MD  Encounter Date: 3/18/2024   Encounter department: [facility]    Assessment & Plan     1. Acute cystitis without hematuria  -     Urine culture; Future  -     cephalexin (KEFLEX) 500 mg capsule; Take 1 capsule (500 mg total) by mouth 2 (two) times a day for 7 days    2. Dysuria  -     POCT urine dip         Urine dipstick shows positive for leukocytes. Treat for acute cystitis with keflex. Send urine for culture.  ED precuaitons for systemic symptoms. Return in about 1 week (around 3/25/2024) for Recheck abdominal pain.            Subjective      HPI    Patient complains of suprapubic/lower abdominal pain x2 weeks with right sided low back pain.  She reports urinary frequency without having much urine to excrete as well as pressure in her rectum. No hematuria, fevers, chills or vomiting.     Of not she was sick about 1 month ago with nausea and vomiting which self resolved quickly. Patient has history of diverticulosis s/p laparotomy in 2017 for diverticulitis with  complete SBO.    Review of Systems   Constitutional:  Negative for activity change, appetite change, chills, diaphoresis, fatigue and fever.   Respiratory:  Negative for cough, chest tightness and shortness of breath.    Cardiovascular:  Negative for chest pain, palpitations and leg swelling.   Gastrointestinal:  Positive for abdominal pain and nausea. Negative for blood in stool, constipation, diarrhea, rectal pain and vomiting.   Genitourinary:  Positive for flank pain and frequency. Negative for dysuria and hematuria.   Musculoskeletal:  Positive for back pain.   Skin:  Negative for color change and rash.   All other systems reviewed and are negative.      Current Outpatient Medications on File Prior to Visit   Medication Sig    atorvastatin (LIPITOR) 20 mg tablet Take 1 tablet (20 mg total) by mouth daily at bedtime  "   Azelaic Acid 15 % cream     doxycycline (ORACEA) 40 MG capsule Take 40 mg by mouth daily    Multiple Vitamins-Calcium (One-A-Day Womens Formula) TABS Take 1 tablet by mouth daily    Omega-3 Fatty Acids (Fish Oil) 1200 MG CAPS Take by mouth    omeprazole (PriLOSEC) 40 MG capsule        Objective     /70   Pulse 60   Temp 97.8 °F (36.6 °C)   Resp 13   Ht 5' 3\" (1.6 m)   Wt 68 kg (150 lb)   SpO2 99%   BMI 26.57 kg/m²     Physical Exam  Constitutional:       General: She is not in acute distress.     Appearance: She is not toxic-appearing.   HENT:      Head: Normocephalic and atraumatic.      Nose: Nose normal.   Eyes:      Pupils: Pupils are equal, round, and reactive to light.   Cardiovascular:      Rate and Rhythm: Normal rate and regular rhythm.      Heart sounds: No murmur heard.     No friction rub. No gallop.   Pulmonary:      Effort: Pulmonary effort is normal.      Breath sounds: No wheezing, rhonchi or rales.   Abdominal:      General: Abdomen is flat. There is no distension.      Tenderness: There is no abdominal tenderness. There is no right CVA tenderness, left CVA tenderness or guarding.   Neurological:      Mental Status: She is alert.       Flor Bowen MD    "

## 2024-03-22 ENCOUNTER — TELEPHONE (OUTPATIENT)
Dept: FAMILY MEDICINE CLINIC | Facility: OTHER | Age: 69
End: 2024-03-22

## 2024-03-22 DIAGNOSIS — R10.32 LEFT LOWER QUADRANT ABDOMINAL PAIN: Primary | ICD-10-CM

## 2024-03-22 NOTE — TELEPHONE ENCOUNTER
Yes, ryan my name is Lester Early. YOB: 1955. I was just in to see the doctor doctor earlier this week in regards to pain in my stomach. I was diagnosed with a urinary tract infection With the blood work, I mean the urinalysis that came back. She mentioned to me that I should call and let her know if I'm still experiencing any pain, and I just wanted to let her know that I am still having pain in my stomach. And she had mentioned at that time maybe going for either an MRI or a CAT scan to see if it wasn't any diverticulosis or diverticulitis, diverticulitis that was causing the issues. My phone number is [phone]. She needs to call me. That's the number she can reach me at. Or if she needs to schedule one of those appointments, if she would put everything on my chart or need to contact me or to prately appreciate it. Thank you so much. Again, it's at [phone]. Thank you.

## 2024-03-22 NOTE — TELEPHONE ENCOUNTER
Patient called to discuss LLQ abdominal pain which has persisted despite treatment for UTI with keflex.  She is tolerating PO liquids and solids and having Bms.  Patient would prefer not to present to ED at this time and does not feel her symptoms are severe engouh to do so.   Given history of diverticulosis and diverticulitis with SBO requiring ex-lap I have ordered CT abdomen/pelvis with contrast as well as CBC and CMP.

## 2024-03-28 ENCOUNTER — RA CDI HCC (OUTPATIENT)
Dept: OTHER | Facility: HOSPITAL | Age: 69
End: 2024-03-28

## 2024-03-28 ENCOUNTER — HOSPITAL ENCOUNTER (OUTPATIENT)
Dept: RADIOLOGY | Age: 69
Discharge: HOME | End: 2024-03-28
Payer: COMMERCIAL

## 2024-03-28 ENCOUNTER — NURSE TRIAGE (OUTPATIENT)
Age: 69
End: 2024-03-28

## 2024-03-28 ENCOUNTER — TELEPHONE (OUTPATIENT)
Dept: FAMILY MEDICINE CLINIC | Facility: OTHER | Age: 69
End: 2024-03-28

## 2024-03-28 DIAGNOSIS — R10.32 LEFT LOWER QUADRANT ABDOMINAL PAIN: ICD-10-CM

## 2024-03-28 DIAGNOSIS — N20.1 URETERAL STONE: Primary | ICD-10-CM

## 2024-03-28 PROBLEM — R21 RASH: Status: RESOLVED | Noted: 2023-06-01 | Resolved: 2024-03-28

## 2024-03-28 PROCEDURE — 74177 CT ABD & PELVIS W/CONTRAST: CPT

## 2024-03-28 RX ORDER — TAMSULOSIN HYDROCHLORIDE 0.4 MG/1
0.4 CAPSULE ORAL
Qty: 30 CAPSULE | Refills: 0 | Status: SHIPPED | OUTPATIENT
Start: 2024-03-28

## 2024-03-28 RX ADMIN — IOHEXOL 85 ML: 350 INJECTION, SOLUTION INTRAVENOUS at 09:33

## 2024-03-28 NOTE — TELEPHONE ENCOUNTER
Radiology called and said there are Significant findings on pt CT abdomen and Pelvis that was done today

## 2024-03-28 NOTE — TELEPHONE ENCOUNTER
----- Message from Nancy Álvarez sent at 3/28/2024  2:08 PM EDT -----  New Patient    What is the reason for the patient's appointment?:5mm obstructing kidney stone    What office location does the patient prefer?:Any location    Does patient have Imaging/Lab Results:In Epic    Have patient records been requested?:In Epic  If No, are the records showing in Epic:       INSURANCE:   Do we accept the patient's insurance or is the patient Self-Pay?:Yes    Insurance Provider:[employer]  Plan Type/Number:   Member ID#: [id number]      HISTORY:   Has the patient had any previous Urologist(s)?:Dr. Sellers 2018    Was the patient seen in the ED?:No    Has the patient had any outside testing done?:3/28/24 In [facility]    Does the patient have a personal history of cancer?:No      Pt called to schedule appt for 5mm obstructing kidney stone.  Per decision tree, appt within 1 week-URG spot ok to use.  Unable to find appt within 1 week, tried multiple locations.  Pt sts she will go to any location at this point.  Please review.    Pt call back: [phone]

## 2024-03-28 NOTE — TELEPHONE ENCOUNTER
Returned call to patient. Reports having some slight discomfort and intermittent blood in urine. Pt denies any fever, chills or nausea. Did have an episode last week but doing better now. PCP started pt on Flomax and she has strainer for urine. Appt scheduled for 04/15 soonest at a location near her. Pt ok with this. Did review in detail ER precautions. Advised will add to wait list and call office with any changes.

## 2024-04-04 ENCOUNTER — OFFICE VISIT (OUTPATIENT)
Dept: FAMILY MEDICINE CLINIC | Facility: OTHER | Age: 69
End: 2024-04-04
Payer: COMMERCIAL

## 2024-04-04 VITALS
RESPIRATION RATE: 18 BRPM | DIASTOLIC BLOOD PRESSURE: 70 MMHG | BODY MASS INDEX: 26.97 KG/M2 | SYSTOLIC BLOOD PRESSURE: 128 MMHG | TEMPERATURE: 97.8 F | OXYGEN SATURATION: 98 % | HEIGHT: 63 IN | WEIGHT: 152.2 LBS | HEART RATE: 72 BPM

## 2024-04-04 DIAGNOSIS — N20.0 NEPHROLITHIASIS: Primary | ICD-10-CM

## 2024-04-04 PROCEDURE — 99213 OFFICE O/P EST LOW 20 MIN: CPT | Performed by: STUDENT IN AN ORGANIZED HEALTH CARE EDUCATION/TRAINING PROGRAM

## 2024-04-04 PROCEDURE — G2211 COMPLEX E/M VISIT ADD ON: HCPCS | Performed by: STUDENT IN AN ORGANIZED HEALTH CARE EDUCATION/TRAINING PROGRAM

## 2024-04-04 NOTE — ASSESSMENT & PLAN NOTE
Pt reports much improvement to b/l hypogastric & flank pains compared to ~2 weeks prior when she began tamsulosin. Still has occasional pains that occur for seconds to minutes approx QOD, nowhere near severity of initial complaint presentation. No serious accompanying sx such as fevers, NVDC, or dysuria, only occasional hematuria still present. Hx of 1 other kidney stone many years ago, no known fam hx. Pt has uro scheduled for a couple weeks out, which was the earliest any uro could see her.    Visit w/ uro at scheduled time in 2 weeks  Continue on Tamsulosin unless directed otherwise by uro  Go to ER if any worsening of sx such as severe abd pains  Return to PCP office for scheduled appt in a few weeks

## 2024-04-04 NOTE — PROGRESS NOTES
Name: Lester Love      : 1955      MRN: [id number]  Encounter Provider: Flor Bowen MD  Encounter Date: 2024   Encounter department: [facility]    Assessment & Plan     1. Nephrolithiasis  Assessment & Plan:  Pt reports much improvement to b/l hypogastric & flank pains compared to ~2 weeks prior when she began tamsulosin. Still has occasional pains that occur for seconds to minutes approx QOD, nowhere near severity of initial complaint presentation. No serious accompanying sx such as fevers, NVDC, or dysuria, only occasional hematuria still present. Hx of 1 other kidney stone many years ago, no known fam hx. Pt has uro scheduled for a couple weeks out, which was the earliest any uro could see her.    Visit w/ uro at scheduled time in 2 weeks  Continue on Tamsulosin unless directed otherwise by uro  Go to ER if any worsening of sx such as severe abd pains  Return to PCP office for scheduled appt in a few weeks             Subjective      Abdominal Pain  This is a new problem. The current episode started 1 to 4 weeks ago. The onset quality is sudden. The problem occurs every several days. The problem has been waxing and waning. The pain is located in the suprapubic region, RLQ, LLQ, right flank and left flank. The pain is severe. The quality of the pain is sharp. Associated symptoms include hematuria. Pertinent negatives include no arthralgias, constipation, diarrhea, dysuria, fever, frequency, headaches, myalgias, nausea or vomiting. Nothing aggravates the pain. The pain is relieved by Nothing. Her past medical history is significant for abdominal surgery. There is no history of Crohn's disease, gallstones, GERD or ulcerative colitis.     Review of Systems   Constitutional:  Negative for appetite change, chills, fatigue and fever.   HENT:  Negative for congestion, ear pain, postnasal drip, rhinorrhea, sinus pressure, sinus pain, sneezing and sore throat.    Eyes:  "Negative.  Negative for photophobia, pain and visual disturbance.   Respiratory:  Negative for cough and shortness of breath.    Cardiovascular: Negative.  Negative for chest pain, palpitations and leg swelling.   Gastrointestinal:  Positive for abdominal pain. Negative for constipation, diarrhea, nausea and vomiting.   Endocrine: Negative.  Negative for cold intolerance and heat intolerance.   Genitourinary:  Positive for hematuria. Negative for dysuria, frequency and urgency.   Musculoskeletal:  Positive for gait problem. Negative for arthralgias, back pain, joint swelling, myalgias and neck pain.   Skin: Negative.  Negative for color change and rash.   Allergic/Immunologic: Negative.  Negative for environmental allergies, food allergies and immunocompromised state.   Neurological:  Negative for dizziness, tremors, seizures, syncope, facial asymmetry, speech difficulty, weakness, light-headedness, numbness and headaches.   Hematological: Negative.  Negative for adenopathy. Does not bruise/bleed easily.   Psychiatric/Behavioral: Negative.  Negative for confusion, hallucinations and sleep disturbance.    All other systems reviewed and are negative.      Current Outpatient Medications on File Prior to Visit   Medication Sig    atorvastatin (LIPITOR) 20 mg tablet Take 1 tablet (20 mg total) by mouth daily at bedtime    Azelaic Acid 15 % cream     Multiple Vitamins-Calcium (One-A-Day Womens Formula) TABS Take 1 tablet by mouth daily    Omega-3 Fatty Acids (Fish Oil) 1200 MG CAPS Take by mouth    omeprazole (PriLOSEC) 40 MG capsule     tamsulosin (FLOMAX) 0.4 mg Take 1 capsule (0.4 mg total) by mouth daily with dinner    [DISCONTINUED] doxycycline (ORACEA) 40 MG capsule Take 40 mg by mouth daily       Objective     /70   Pulse 72   Temp 97.8 °F (36.6 °C)   Resp 18   Ht 5' 3\" (1.6 m)   Wt 69 kg (152 lb 3.2 oz)   SpO2 98%   BMI 26.96 kg/m²     Physical Exam  Vitals and nursing note reviewed.   Constitutional: "       General: She is not in acute distress.     Appearance: She is not ill-appearing, toxic-appearing or diaphoretic.   HENT:      Head: Normocephalic and atraumatic.      Nose: Nose normal.   Eyes:      General: No scleral icterus.        Right eye: No discharge.         Left eye: No discharge.      Extraocular Movements: Extraocular movements intact.      Conjunctiva/sclera: Conjunctivae normal.      Pupils: Pupils are equal, round, and reactive to light.   Cardiovascular:      Rate and Rhythm: Normal rate and regular rhythm.      Pulses: Normal pulses.      Heart sounds: Normal heart sounds.   Pulmonary:      Effort: Pulmonary effort is normal. No respiratory distress.      Breath sounds: Normal breath sounds.   Chest:      Chest wall: No tenderness.   Abdominal:      General: Bowel sounds are normal.      Palpations: There is no hepatomegaly or splenomegaly.      Tenderness: There is abdominal tenderness in the right lower quadrant, periumbilical area, suprapubic area and left lower quadrant. There is no right CVA tenderness, left CVA tenderness or guarding.      Hernia: No hernia is present.   Musculoskeletal:         General: No swelling or tenderness.      Cervical back: Neck supple. No rigidity.   Skin:     General: Skin is warm and dry.   Neurological:      General: No focal deficit present.      Mental Status: She is alert and oriented to person, place, and time. Mental status is at baseline.      Cranial Nerves: Cranial nerves 2-12 are intact.      Sensory: Sensation is intact.      Motor: Motor function is intact.      Coordination: Coordination is intact.      Gait: Gait is intact.      Deep Tendon Reflexes: Reflexes are normal and symmetric.   Psychiatric:         Mood and Affect: Mood normal. Mood is not anxious or depressed.         Behavior: Behavior normal.         Thought Content: Thought content normal.         Judgment: Judgment normal.       Flor Bowen MD     Alert-The patient is alert, awake and responds to voice. The patient is oriented to time, place, and person. The triage nurse is able to obtain subjective information.

## 2024-04-15 ENCOUNTER — OFFICE VISIT (OUTPATIENT)
Dept: UROLOGY | Facility: CLINIC | Age: 69
End: 2024-04-15
Payer: COMMERCIAL

## 2024-04-15 VITALS
SYSTOLIC BLOOD PRESSURE: 130 MMHG | HEART RATE: 67 BPM | WEIGHT: 154 LBS | BODY MASS INDEX: 27.29 KG/M2 | DIASTOLIC BLOOD PRESSURE: 80 MMHG | OXYGEN SATURATION: 98 % | HEIGHT: 63 IN

## 2024-04-15 DIAGNOSIS — N20.1 URETERAL STONE: ICD-10-CM

## 2024-04-15 PROCEDURE — 99203 OFFICE O/P NEW LOW 30 MIN: CPT

## 2024-04-15 RX ORDER — TAMSULOSIN HYDROCHLORIDE 0.4 MG/1
0.4 CAPSULE ORAL
Qty: 30 CAPSULE | Refills: 0 | Status: SHIPPED | OUTPATIENT
Start: 2024-04-15

## 2024-04-15 NOTE — PROGRESS NOTES
Brynn Martinez is a 62 year old female presenting with a consult abdominal pain. Referred by Cindi Villatoro MD      Medications verified, no changes  .  History   Smoking Status   • Former Smoker   • Packs/day: 1.00   • Years: 20.00   • Types: Cigarettes   • Quit date: 5/1/1993   Smokeless Tobacco   • Never Used            Office Visit- Urology  Lester Love 1955 MRN: [id number]      Assessment/Discussion/Plan    68 y.o. female managed by     Obstructing ureteral stone  -CT of the abdomen pelvis demonstrated a 5 mm obstructing proximal left ureteral calculus with mild left hydronephrosis on 3/28  -Discussed option of 4 to 6 weeks of medical expulsive therapy with Flomax versus surgical intervention  -At this point in time patient was to continue with medical expulsive therapy.  Refill of Flomax given  -Will plan for a CT scan in the first week of May with follow-up afterwards.  If stone persist then we will plan for surgical intervention.  If stone is passed we will plan for yearly imaging with ultrasound kidneys and bladder/KUB      Chief Complaint:   Lester is a 68 y.o. female presenting to the office for a follow up visit regarding obstructing ureteral stone        Subjective    Patient is a 68-year-old female presents the office today for evaluation of nephrolithiasis.  At the beginning of March patient had flank pain associated with abdominal pain with urinary frequency.  She dealt with the pain for 2 weeks then presented to her PCP who ordered a CT of the abdomen and pelvis with contrast which demonstrated a 5 mm obstructing proximal left ureteral stone with mild left hydronephrosis on 3/28/2024.  Patient states that she has been straining her urine but there has been no new stone passage.  She was still having intermittent symptoms with right flank pain.  She has been utilizing Flomax. patient does have a known right-sided renal cyst    Medical comorbidities include hypothyroidism, diverticulosis, IBS.  Patient had previous surgery on salivary gland due to stones          ROS:   Review of Systems   Constitutional: Negative.  Negative for chills, fatigue and fever.   HENT: Negative.     Respiratory:  Negative for shortness of breath.    Cardiovascular:  Negative for chest pain.   Gastrointestinal: Negative.  Negative  for abdominal pain.   Endocrine: Negative.    Musculoskeletal: Negative.    Skin: Negative.    Neurological: Negative.  Negative for dizziness and light-headedness.   Hematological: Negative.    Psychiatric/Behavioral: Negative.           Past Medical History  Past Medical History:   Diagnosis Date    Allergic rhinitis     last assessed: 9/29/2016    Anxiety     last assessed: 8/22/2017    Disease of thyroid gland     Multiple Nodules    Diverticulosis     last assessed: 10/7/2013    GERD (gastroesophageal reflux disease)     Hydronephrosis, right     last assessed: 5/22/2013    Hyperlipidemia     last assessed 8/22/2017    Hyperthyroidism     Hypokalemia     last assessed: 3/24/2015    IBS (irritable bowel syndrome)     last assessed: 8/22/2017    Internal hemorrhoids     last assessed: 10/7/2013    Migraines     last assessed: 8/22/2017    Nephrolithiasis     Osteoarthrosis of knee     last assessed: 2/28/2017    Osteopenia     last assessed: 8/22/2017    PONV (postoperative nausea and vomiting)     Renal calculi     last assessed: 4/29/2016    Renal cyst     last assessed: 7/15/2015    Total bilirubin, elevated     last assessed: 8/22/2017    Uterine leiomyoma        Past Surgical History  Past Surgical History:   Procedure Laterality Date    APPENDECTOMY      BREAST BIOPSY Right 1998    benign    core bx    BREAST EXCISIONAL BIOPSY Left 1999    benign    COLONOSCOPY      HAND SURGERY Left     Ganglion cyst    KNEE ARTHROSCOPY Left     X3    LAPAROTOMY N/A 10/30/2017    Procedure: LAPAROTOMY EXPLORATORY, DRAINAGE PELVIC ABSCESS; APPENDECTOMY;  Surgeon: Abdoul Ye DO;  [facility];  Service: General    MYOMECTOMY      [place] REPAIR FIRST ABDOMINAL WALL HERNIA N/A 3/26/2020    Procedure: INCISIONAL HERNIA REPAIR WITH MESH;  Surgeon: Juvencio Rangel MD;  [facility];  Service: General    SALIVARY GLAND SURGERY Right     Removal for Stones    THYROID SURGERY      biopsy thyroid using percutaneous core  needle    TONSILLECTOMY      TUBAL LIGATION      UTERINE FIBROID SURGERY      embolization( Myomectomy)       Past Family History  Family History   Problem Relation Age of Onset    Cancer Mother         Gastric    Stomach cancer Mother     Deep vein thrombosis Mother     Other Father         CVA    Heart disease Father     Stroke Father     Diabetes Sister     Arthritis Sister     Kidney disease Sister     Osteoporosis Sister     Osteoporosis Sister     No Known Problems Sister     Hypertension Brother     No Known Problems Maternal Aunt     No Known Problems Maternal Aunt     No Known Problems Maternal Aunt     No Known Problems Maternal Aunt     Polycystic kidney disease Maternal Aunt     Polycystic kidney disease Maternal Aunt     Heart disease Maternal Uncle 62    Kidney disease Paternal Aunt     Breast cancer Cousin 35    Esophageal cancer Cousin     Esophageal cancer Cousin     Breast cancer Cousin     Breast cancer additional onset Other 57       Past Social history  Social History     Socioeconomic History    Marital status: Single     Spouse name: Not on file    Number of children: Not on file    Years of education: Not on file    Highest education level: Not on file   Occupational History    Occupation: retired   Tobacco Use    Smoking status: Former     Current packs/day: 0.00     Average packs/day: 1 pack/day for 15.0 years (15.0 ttl pk-yrs)     Types: Cigarettes     Start date: 1997     Quit date: 2012     Years since quittin.2    Smokeless tobacco: Never   Vaping Use    Vaping status: Never Used   Substance and Sexual Activity    Alcohol use: Yes     Alcohol/week: 1.0 standard drink of alcohol     Types: 1 Glasses of wine per week     Comment:  socially    Drug use: Never    Sexual activity: Yes     Partners: Male     Birth control/protection: None     Comment: pt. declines std/hiv testing   Other Topics Concern    Not on file   Social History Narrative    Daily caffeinated cola  "consumption    Drinks coffee    Exercise habits     Social Determinants of Health     Financial Resource Strain: Low Risk  (4/15/2023)    Overall Financial Resource Strain (CARDIA)     Difficulty of Paying Living Expenses: Not hard at all   Food Insecurity: Not on file   Transportation Needs: No Transportation Needs (4/15/2023)    PRAPARE - Transportation     Lack of Transportation (Medical): No     Lack of Transportation (Non-Medical): No   Physical Activity: Not on file   Stress: Not on file   Social Connections: Not on file   Intimate Partner Violence: Not on file   Housing Stability: Not on file       Current Medications  Current Outpatient Medications   Medication Sig Dispense Refill    atorvastatin (LIPITOR) 20 mg tablet Take 1 tablet (20 mg total) by mouth daily at bedtime 90 tablet 1    Azelaic Acid 15 % cream       Multiple Vitamins-Calcium (One-A-Day Womens Formula) TABS Take 1 tablet by mouth daily 30 tablet 6    Omega-3 Fatty Acids (Fish Oil) 1200 MG CAPS Take by mouth      omeprazole (PriLOSEC) 40 MG capsule       tamsulosin (FLOMAX) 0.4 mg Take 1 capsule (0.4 mg total) by mouth daily with dinner 30 capsule 0     No current facility-administered medications for this visit.       Allergies  No Known Allergies    OBJECTIVE    Vitals   Vitals:    04/15/24 0836   BP: 130/80   BP Location: Left arm   Patient Position: Standing   Cuff Size: Adult   Pulse: 67   SpO2: 98%   Weight: 69.9 kg (154 lb)   Height: 5' 3\" (1.6 m)       PVR:    Physical Exam  Constitutional:       General: She is not in acute distress.     Appearance: Normal appearance. She is normal weight. She is not ill-appearing or toxic-appearing.   HENT:      Head: Normocephalic and atraumatic.   Eyes:      Conjunctiva/sclera: Conjunctivae normal.   Cardiovascular:      Rate and Rhythm: Normal rate.   Pulmonary:      Effort: Pulmonary effort is normal. No respiratory distress.   Skin:     General: Skin is warm and dry.   Neurological:      " General: No focal deficit present.      Mental Status: She is alert and oriented to person, place, and time.      Cranial Nerves: No cranial nerve deficit.   Psychiatric:         Mood and Affect: Mood normal.         Behavior: Behavior normal.         Thought Content: Thought content normal.          Lab Results   Component Value Date    CREATININE 0.93 09/28/2023      Lab Results   Component Value Date    HGBA1C 5.5 04/11/2022     Lab Results   Component Value Date    GLUCOSE 92 09/17/2015    CALCIUM 9.5 09/28/2023     09/20/2017    K 4.0 09/28/2023    CO2 31 09/28/2023     09/28/2023    BUN 14 09/28/2023    CREATININE 0.93 09/28/2023       I have personally reviewed all pertinent lab results and reviewed with patient    Imaging   Narrative & Impression         CT ABDOMEN AND PELVIS WITH IV CONTRAST     INDICATION:   Left lower quadrant pain.       COMPARISON: CT dated 8/28/2019     TECHNIQUE:  CT examination of the abdomen and pelvis was performed. Multiplanar 2D reformatted images were created from the source data.     This examination, like all CT scans performed in the [facility], was performed utilizing techniques to minimize radiation dose exposure, including the use of iterative reconstruction and automated exposure control. Radiation dose length   product (DLP) for this visit:     IV Contrast:  iohexol (OMNIPAQUE) 350 MG/ML injection (MULTI-DOSE) 85 mL -   Enteric Contrast:  Enteric contrast was not administered.     FINDINGS:     ABDOMEN     LOWER CHEST: No clinically significant abnormality in the visualized lower chest.     LIVER/BILIARY TREE: Unremarkable.     GALLBLADDER: No calcified gallstones. No pericholecystic inflammatory change.     SPLEEN: Unremarkable.     PANCREAS: Unremarkable.      ADRENAL GLANDS: Unremarkable.     KIDNEYS/URETERS: 5 mm obstructing proximal left ureteral calculus resulting in mild left hydronephrosis     STOMACH AND BOWEL: Colonic diverticulosis  without findings of acute diverticulitis.     APPENDIX: No findings to suggest appendicitis.     ABDOMINOPELVIC CAVITY: No ascites. No pneumoperitoneum. No lymphadenopathy.     VESSELS: Unremarkable for patient's age.     PELVIS     REPRODUCTIVE ORGANS: Calcified uterine myomas     URINARY BLADDER: Unremarkable.     ABDOMINAL WALL/INGUINAL REGIONS: Small left fat-containing inguinal hernia. Midline postsurgical changes in the ventral abdomen with hernia repair     BONES: No acute fracture or suspicious osseous lesion.     IMPRESSION:     5 mm obstructing proximal left ureteral calculus resulting in mild left hydronephrosis             Brody Stark PA-C  Date: 4/15/2024 Time: 8:42 AM  [facility]    This note was written using fluency dictation software. Please excuse any resulting minor grammatical errors.

## 2024-04-18 ENCOUNTER — RA CDI HCC (OUTPATIENT)
Dept: OTHER | Facility: HOSPITAL | Age: 69
End: 2024-04-18

## 2024-04-22 ENCOUNTER — HOSPITAL ENCOUNTER (OUTPATIENT)
Dept: RADIOLOGY | Age: 69
Discharge: HOME | End: 2024-04-22
Payer: COMMERCIAL

## 2024-04-22 VITALS — HEIGHT: 63 IN | WEIGHT: 145 LBS | BODY MASS INDEX: 25.69 KG/M2

## 2024-04-22 DIAGNOSIS — Z12.31 ENCOUNTER FOR SCREENING MAMMOGRAM FOR MALIGNANT NEOPLASM OF BREAST: ICD-10-CM

## 2024-04-22 PROCEDURE — 77067 SCR MAMMO BI INCL CAD: CPT

## 2024-04-22 PROCEDURE — 77063 BREAST TOMOSYNTHESIS BI: CPT

## 2024-04-25 ENCOUNTER — HOSPITAL ENCOUNTER (OUTPATIENT)
Dept: CT IMAGING | Facility: HOSPITAL | Age: 69
Discharge: HOME | End: 2024-04-25
Payer: COMMERCIAL

## 2024-04-25 DIAGNOSIS — N20.1 URETERAL STONE: ICD-10-CM

## 2024-04-25 PROCEDURE — 74176 CT ABD & PELVIS W/O CONTRAST: CPT

## 2024-05-06 ENCOUNTER — TELEPHONE (OUTPATIENT)
Dept: UROLOGY | Facility: CLINIC | Age: 69
End: 2024-05-06

## 2024-05-06 NOTE — TELEPHONE ENCOUNTER
Stone is unfortunately still present.  Please arrange for urgent follow-up for planning/scheduling of ureteroscopy.  Should be sooner than currently scheduled An[place]

## 2024-05-09 ENCOUNTER — OFFICE VISIT (OUTPATIENT)
Dept: UROLOGY | Facility: CLINIC | Age: 69
End: 2024-05-09
Payer: COMMERCIAL

## 2024-05-09 VITALS
HEIGHT: 62 IN | OXYGEN SATURATION: 99 % | DIASTOLIC BLOOD PRESSURE: 80 MMHG | SYSTOLIC BLOOD PRESSURE: 132 MMHG | RESPIRATION RATE: 17 BRPM | BODY MASS INDEX: 28.12 KG/M2 | WEIGHT: 152.8 LBS | HEART RATE: 60 BPM

## 2024-05-09 DIAGNOSIS — N20.1 URETERAL STONE: ICD-10-CM

## 2024-05-09 PROCEDURE — 99214 OFFICE O/P EST MOD 30 MIN: CPT

## 2024-05-09 RX ORDER — TAMSULOSIN HYDROCHLORIDE 0.4 MG/1
0.4 CAPSULE ORAL
Qty: 30 CAPSULE | Refills: 0 | Status: SHIPPED | OUTPATIENT
Start: 2024-05-09

## 2024-05-09 RX ORDER — DOXYCYCLINE 40 MG/1
40 CAPSULE ORAL DAILY
COMMUNITY
Start: 2024-04-19 | End: 2024-05-14

## 2024-05-09 NOTE — PROGRESS NOTES
5/9/2024  Lester Love  1955  [id number]      Assessment      Discussion  Lester is a 69 y.o. female being managed by AP team.  Patient has a 5 mm proximal left ureteral stone with minimal left hydronephrosis that has been persistent since at least 3/28/2024.  Repeat CT stone study in 4/25/2024 demonstrated persistence despite use of tamsulosin.  Discussed cystoscopy, left ureteroscopy with holmium laser lithotripsy, basket stone extraction, retrograde pyelogram, and insertion of left ureteral stent.  We reviewed the risks and benefits of this procedure including but not limited to cardiopulmonary complications, bleeding, infection, damage to nearby structures such as bladder/ureter/kidney, need for nephrostomy tube, need for additional surgeries.  Patient verbalized understanding.  Consent obtained today all questions were answered.    History of Present Illness  69 y.o. female presents today to discuss her upcoming surgery.   Patient  office today for evaluation of nephrolithiasis.  At the beginning of March patient had flank pain associated with abdominal pain with urinary frequency.  She dealt with the pain for 2 weeks then presented to her PCP who ordered a CT of the abdomen and pelvis with contrast which demonstrated a 5 mm obstructing proximal left ureteral stone with mild left hydronephrosis on 3/28/2024.  Patient states that she has been straining her urine but there has been no new stone passage.  She was still having intermittent symptoms with right flank pain.  She has been utilizing Flomax. patient does have a known right-sided renal cyst.  She had a repeat CT scan on 4/25/2024 which demonstrated persistence of left proximal ureteral stone with minimal hydronephrosis.  She presents to the office today to discuss surgical procedure to have stone removed.    Medical comorbidities include hypothyroidism, diverticulosis, IBS.  Patient had previous surgery on salivary gland due to stones  Patient  denies pulmonary or cardiac history.  Patient denies history of DVT/PE or obstructive sleep apnea.  Patient history symptoms feels nauseous after anesthesia.  She denies any use of aspirin or anticoagulation.  No current smoking or recreational drug use.  Social alcohol use    Review of Systems  Review of Systems   Constitutional: Negative.  Negative for chills, fatigue and fever.   HENT: Negative.     Respiratory:  Negative for shortness of breath.    Cardiovascular:  Negative for chest pain.   Gastrointestinal: Negative.  Negative for abdominal pain.   Endocrine: Negative.    Musculoskeletal: Negative.    Skin: Negative.    Neurological: Negative.  Negative for dizziness and light-headedness.   Hematological: Negative.    Psychiatric/Behavioral: Negative.         Past Medical History  Past Medical History:   Diagnosis Date    Allergic rhinitis     last assessed: 9/29/2016    Anxiety     last assessed: 8/22/2017    Disease of thyroid gland     Multiple Nodules    Diverticulosis     last assessed: 10/7/2013    GERD (gastroesophageal reflux disease)     Hydronephrosis, right     last assessed: 5/22/2013    Hyperlipidemia     last assessed 8/22/2017    Hyperthyroidism     Hypokalemia     last assessed: 3/24/2015    IBS (irritable bowel syndrome)     last assessed: 8/22/2017    Internal hemorrhoids     last assessed: 10/7/2013    Migraines     last assessed: 8/22/2017    Nephrolithiasis     Osteoarthrosis of knee     last assessed: 2/28/2017    Osteopenia     last assessed: 8/22/2017    PONV (postoperative nausea and vomiting)     Renal calculi     last assessed: 4/29/2016    Renal cyst     last assessed: 7/15/2015    Total bilirubin, elevated     last assessed: 8/22/2017    Uterine leiomyoma        Surgical History  Past Surgical History:   Procedure Laterality Date    APPENDECTOMY      BREAST BIOPSY Right 1998    benign    core bx    BREAST EXCISIONAL BIOPSY Left 1999    benign    COLONOSCOPY      HAND SURGERY Left      Ganglion cyst    KNEE ARTHROSCOPY Left     X3    LAPAROTOMY N/A 10/30/2017    Procedure: LAPAROTOMY EXPLORATORY, DRAINAGE PELVIC ABSCESS; APPENDECTOMY;  Surgeon: Abdoul Ye DO;  [facility];  Service: General    MYOMECTOMY      [place] REPAIR FIRST ABDOMINAL WALL HERNIA N/A 3/26/2020    Procedure: INCISIONAL HERNIA REPAIR WITH MESH;  Surgeon: Juvencio Rangel MD;  [facility];  Service: General    SALIVARY GLAND SURGERY Right     Removal for Stones    THYROID SURGERY      biopsy thyroid using percutaneous core needle    TONSILLECTOMY      TUBAL LIGATION      UTERINE FIBROID SURGERY      embolization( Myomectomy)       Family History  Family History   Problem Relation Age of Onset    Cancer Mother         Gastric    Stomach cancer Mother     Deep vein thrombosis Mother     Other Father         CVA    Heart disease Father     Stroke Father     Diabetes Sister     Arthritis Sister     Kidney disease Sister     Osteoporosis Sister     Osteoporosis Sister     No Known Problems Sister     No Known Problems Maternal Grandmother     No Known Problems Paternal Grandmother     Hypertension Brother     No Known Problems Maternal Aunt     No Known Problems Maternal Aunt     No Known Problems Maternal Aunt     No Known Problems Maternal Aunt     Polycystic kidney disease Maternal Aunt     Polycystic kidney disease Maternal Aunt     Heart disease Maternal Uncle 62    Kidney disease Paternal Aunt     Breast cancer Cousin 35    Esophageal cancer Cousin     Esophageal cancer Cousin     Breast cancer Cousin     Breast cancer additional onset Other 57       Social History  Social History     Socioeconomic History    Marital status: Single     Spouse name: Not on file    Number of children: Not on file    Years of education: Not on file    Highest education level: Not on file   Occupational History    Occupation: retired   Tobacco Use    Smoking status: Former     Current packs/day: 0.00     Average packs/day: 1  pack/day for 15.0 years (15.0 ttl pk-yrs)     Types: Cigarettes     Start date: 1997     Quit date: 2012     Years since quittin.3    Smokeless tobacco: Never   Vaping Use    Vaping status: Never Used   Substance and Sexual Activity    Alcohol use: Yes     Alcohol/week: 1.0 standard drink of alcohol     Types: 1 Glasses of wine per week     Comment:  socially    Drug use: Never    Sexual activity: Yes     Partners: Male     Birth control/protection: None     Comment: pt. declines std/hiv testing   Other Topics Concern    Not on file   Social History Narrative    Daily caffeinated cola consumption    Drinks coffee    Exercise habits     Social Determinants of Health     Financial Resource Strain: Low Risk  (4/15/2023)    Overall Financial Resource Strain (CARDIA)     Difficulty of Paying Living Expenses: Not hard at all   Food Insecurity: No Food Insecurity (2024)    Hunger Vital Sign     Worried About Running Out of Food in the Last Year: Never true     Ran Out of Food in the Last Year: Never true   Transportation Needs: No Transportation Needs (2024)    PRAPARE - Transportation     Lack of Transportation (Medical): No     Lack of Transportation (Non-Medical): No   Physical Activity: Not on file   Stress: Not on file   Social Connections: Not on file   Intimate Partner Violence: Not on file   Housing Stability: Low Risk  (2024)    Housing Stability Vital Sign     Unable to Pay for Housing in the Last Year: No     Number of Places Lived in the Last Year: 1     Unstable Housing in the Last Year: No       Current Medications  Current Outpatient Medications   Medication Sig Dispense Refill    atorvastatin (LIPITOR) 20 mg tablet Take 1 tablet (20 mg total) by mouth daily at bedtime 90 tablet 1    Azelaic Acid 15 % cream       doxycycline (ORACEA) 40 MG capsule Take 40 mg by mouth daily      Multiple Vitamins-Calcium (One-A-Day Womens Formula) TABS Take 1 tablet by mouth daily 30 tablet 6     "Omega-3 Fatty Acids (Fish Oil) 1200 MG CAPS Take by mouth      omeprazole (PriLOSEC) 40 MG capsule       tamsulosin (FLOMAX) 0.4 mg Take 1 capsule (0.4 mg total) by mouth daily with dinner 30 capsule 0     No current facility-administered medications for this visit.       Allergies  No Known Allergies    Vitals  Vitals:    05/09/24 0814   BP: 132/80   BP Location: Left arm   Patient Position: Sitting   Cuff Size: Adult   Pulse: 60   Resp: 17   SpO2: 99%   Weight: 69.3 kg (152 lb 12.8 oz)   Height: 5' 2\" (1.575 m)       Physical Exam  Physical Exam  Constitutional:       General: She is not in acute distress.     Appearance: Normal appearance. She is normal weight. She is not ill-appearing or toxic-appearing.   HENT:      Head: Normocephalic and atraumatic.   Eyes:      Conjunctiva/sclera: Conjunctivae normal.   Cardiovascular:      Rate and Rhythm: Normal rate and regular rhythm.   Pulmonary:      Effort: Pulmonary effort is normal. No respiratory distress.      Breath sounds: No stridor. No wheezing, rhonchi or rales.   Skin:     General: Skin is warm and dry.   Neurological:      General: No focal deficit present.      Mental Status: She is alert and oriented to person, place, and time.      Cranial Nerves: No cranial nerve deficit.   Psychiatric:         Mood and Affect: Mood normal.         Behavior: Behavior normal.         Thought Content: Thought content normal.           "

## 2024-05-10 ENCOUNTER — PREP FOR PROCEDURE (OUTPATIENT)
Dept: UROLOGY | Facility: CLINIC | Age: 69
End: 2024-05-10

## 2024-05-10 DIAGNOSIS — N20.1 URETERAL STONE: ICD-10-CM

## 2024-05-10 DIAGNOSIS — R39.89 SUSPECTED UTI: Primary | ICD-10-CM

## 2024-05-10 DIAGNOSIS — Z01.812 PRE-OPERATIVE LABORATORY EXAMINATION: ICD-10-CM

## 2024-05-10 NOTE — PROGRESS NOTES
Spoke with patient and confirmed surgery date of: 5/22/24  Type of surgery:#5   Operating physician: Dr. Schreiber  Location of surgery: [facility]     Verbally went over prep with patient on: 5/10/24  NPO  Bowel prep? No  Hospital calls afternoon prior with arrival time -Calls Friday afternoon for Monday surgeries  Patient needs ride to and from surgery (outpatient/inpatient)   Pre-op testing to be done 2 weeks prior to surgery  Cbc,cmp,urine culure  Blood thinners:   None  PAT WILL CALL A [facility] PRIOR   Clearances needed: none    Mailed/emailed to patient on:5/10/24  Copy of packet scanned into Media on:5/10/24  Labs in packet  Soap / Bowel prep in packet  Pre-op & Post-op in packet  Dates of H&P and post-op if needed    Consent: in Media I

## 2024-05-13 ENCOUNTER — APPOINTMENT (OUTPATIENT)
Dept: LAB | Facility: CLINIC | Age: 69
End: 2024-05-13
Payer: COMMERCIAL

## 2024-05-13 DIAGNOSIS — Z01.812 PRE-OPERATIVE LABORATORY EXAMINATION: ICD-10-CM

## 2024-05-13 DIAGNOSIS — R10.32 LEFT LOWER QUADRANT ABDOMINAL PAIN: ICD-10-CM

## 2024-05-13 DIAGNOSIS — R39.89 SUSPECTED UTI: ICD-10-CM

## 2024-05-13 DIAGNOSIS — N20.1 URETERAL STONE: ICD-10-CM

## 2024-05-13 DIAGNOSIS — N30.00 ACUTE CYSTITIS WITHOUT HEMATURIA: ICD-10-CM

## 2024-05-13 LAB
ALBUMIN SERPL BCP-MCNC: 4 G/DL (ref 3.5–5)
ALP SERPL-CCNC: 71 U/L (ref 34–104)
ALT SERPL W P-5'-P-CCNC: 14 U/L (ref 7–52)
ANION GAP SERPL CALCULATED.3IONS-SCNC: 6 MMOL/L (ref 4–13)
AST SERPL W P-5'-P-CCNC: 13 U/L (ref 13–39)
BASOPHILS # BLD AUTO: 0.04 THOUSANDS/ÂΜL (ref 0–0.1)
BASOPHILS NFR BLD AUTO: 1 % (ref 0–1)
BILIRUB SERPL-MCNC: 1.91 MG/DL (ref 0.2–1)
BUN SERPL-MCNC: 11 MG/DL (ref 5–25)
CALCIUM SERPL-MCNC: 8.9 MG/DL (ref 8.4–10.2)
CHLORIDE SERPL-SCNC: 105 MMOL/L (ref 96–108)
CO2 SERPL-SCNC: 30 MMOL/L (ref 21–32)
CREAT SERPL-MCNC: 0.8 MG/DL (ref 0.6–1.3)
EOSINOPHIL # BLD AUTO: 0.05 THOUSAND/ÂΜL (ref 0–0.61)
EOSINOPHIL NFR BLD AUTO: 1 % (ref 0–6)
ERYTHROCYTE [DISTWIDTH] IN BLOOD BY AUTOMATED COUNT: 13.2 % (ref 11.6–15.1)
GFR SERPL CREATININE-BSD FRML MDRD: 75 ML/MIN/1.73SQ M
GLUCOSE SERPL-MCNC: 83 MG/DL (ref 65–140)
HCT VFR BLD AUTO: 40.1 % (ref 34.8–46.1)
HGB BLD-MCNC: 13.3 G/DL (ref 11.5–15.4)
IMM GRANULOCYTES # BLD AUTO: 0.04 THOUSAND/UL (ref 0–0.2)
IMM GRANULOCYTES NFR BLD AUTO: 1 % (ref 0–2)
LYMPHOCYTES # BLD AUTO: 2.16 THOUSANDS/ÂΜL (ref 0.6–4.47)
LYMPHOCYTES NFR BLD AUTO: 31 % (ref 14–44)
MCH RBC QN AUTO: 29.8 PG (ref 26.8–34.3)
MCHC RBC AUTO-ENTMCNC: 33.2 G/DL (ref 31.4–37.4)
MCV RBC AUTO: 90 FL (ref 82–98)
MONOCYTES # BLD AUTO: 0.55 THOUSAND/ÂΜL (ref 0.17–1.22)
MONOCYTES NFR BLD AUTO: 8 % (ref 4–12)
NEUTROPHILS # BLD AUTO: 4.03 THOUSANDS/ÂΜL (ref 1.85–7.62)
NEUTS SEG NFR BLD AUTO: 58 % (ref 43–75)
NRBC BLD AUTO-RTO: 0 /100 WBCS
PLATELET # BLD AUTO: 255 THOUSANDS/UL (ref 149–390)
PMV BLD AUTO: 10.3 FL (ref 8.9–12.7)
POTASSIUM SERPL-SCNC: 3.8 MMOL/L (ref 3.5–5.3)
PROT SERPL-MCNC: 6.2 G/DL (ref 6.4–8.4)
RBC # BLD AUTO: 4.47 MILLION/UL (ref 3.81–5.12)
SODIUM SERPL-SCNC: 141 MMOL/L (ref 135–147)
WBC # BLD AUTO: 6.87 THOUSAND/UL (ref 4.31–10.16)

## 2024-05-13 PROCEDURE — 87086 URINE CULTURE/COLONY COUNT: CPT

## 2024-05-13 PROCEDURE — 80053 COMPREHEN METABOLIC PANEL: CPT

## 2024-05-13 PROCEDURE — 85025 COMPLETE CBC W/AUTO DIFF WBC: CPT

## 2024-05-13 PROCEDURE — 36415 COLL VENOUS BLD VENIPUNCTURE: CPT

## 2024-05-13 NOTE — TELEPHONE ENCOUNTER
Patient wants to come to office to  orders for blood work and urine tests-as she needs to go to [facility] for these tests. She needs to get the tests done ASAP.    Please call pt when orders are ready to be picked up at office.   Pt has surgery scheduled 5/22/24 and was todl to get these test done asap.    Pt  [phone]

## 2024-05-14 LAB — BACTERIA UR CULT: NORMAL

## 2024-05-14 NOTE — PRE-PROCEDURE INSTRUCTIONS
Pre-Surgery Instructions:   Medication Instructions    atorvastatin (LIPITOR) 20 mg tablet Take night before surgery    Azelaic Acid 15 % cream Stop taking 1 day prior to surgery.    Multiple Vitamins-Calcium (One-A-Day Womens Formula) TABS Stop taking 7 days prior to surgery.    Omega-3 Fatty Acids (Fish Oil) 1200 MG CAPS Stop taking 7 days prior to surgery.    omeprazole (PriLOSEC) 40 MG capsule Take day of surgery.    tamsulosin (FLOMAX) 0.4 mg Take night before surgery    Medication instructions for day surgery reviewed. Please use only a sip of water to take your instructed medications. Avoid all over the counter vitamins, supplements and NSAIDS for one week prior to surgery per anesthesia guidelines. Tylenol is ok to take as needed.     You will receive a call one business day prior to surgery with an arrival time and hospital directions. If your surgery is scheduled on a Monday, the hospital will be calling you on the Friday prior to your surgery. If you have not heard from anyone by 8pm, please call the hospital supervisor through the hospital  at [phone]. ([place] [phone] or [place] [phone]).    Do not eat or drink anything after midnight the night before your surgery, including candy, mints, lifesavers, or chewing gum. Do not drink alcohol 24hrs before your surgery. Try not to smoke at least 24hrs before your surgery.       Follow the pre surgery showering instructions as listed in the “My Surgical Experience Booklet” or otherwise provided by your surgeon's office. Do not use a blade to shave the surgical area 1 week before surgery. It is okay to use a clean electric clippers up to 24 hours before surgery. Do not apply any lotions, creams, including makeup, cologne, deodorant, or perfumes after showering on the day of your surgery. Do not use dry shampoo, hair spray, hair gel, or any type of hair products.     No contact lenses, eye make-up, or artificial eyelashes. Remove nail  polish, including gel polish, and any artificial, gel, or acrylic nails if possible. Remove all jewelry including rings and body piercing jewelry.     Wear causal clothing that is easy to take on and off. Consider your type of surgery.    Keep any valuables, jewelry, piercings at home. Please bring any specially ordered equipment (sling, braces) if indicated.    Arrange for a responsible person to drive you to and from the hospital on the day of your surgery. Please confirm the visitor policy for the day of your procedure when you receive your phone call with an arrival time.     Call the surgeon's office with any new illnesses, exposures, or additional questions prior to surgery.    Please reference your “My Surgical Experience Booklet” for additional information to prepare for your upcoming surgery.

## 2024-05-16 ENCOUNTER — NURSE TRIAGE (OUTPATIENT)
Age: 69
End: 2024-05-16

## 2024-05-16 DIAGNOSIS — E78.2 MIXED HYPERLIPIDEMIA: ICD-10-CM

## 2024-05-16 RX ORDER — ATORVASTATIN CALCIUM 20 MG/1
20 TABLET, FILM COATED ORAL
Qty: 90 TABLET | Refills: 1 | Status: SHIPPED | OUTPATIENT
Start: 2024-05-16

## 2024-05-16 NOTE — TELEPHONE ENCOUNTER
Patient calling to inquire if after the surgery she can have a teeth cleaning. Inquiring if she needs to take antibiotics prior or wait 6 months. Explains this is just for a teeth cleaning, not a procedure. Please advise.

## 2024-05-16 NOTE — TELEPHONE ENCOUNTER
Pt notified:    AKASH Elizabeth  [facility]8 minutes ago (11:42 AM)       Okay for teeth cleaning following surgery. Does not require any abx treatment.     Pt very thankful for the quick return call.

## 2024-05-20 NOTE — TELEPHONE ENCOUNTER
Spoke with patient and let her know if she needs authorization they would of let her know because we have a department that works specifically on that

## 2024-05-20 NOTE — TELEPHONE ENCOUNTER
Patient called stating she has a question about the surgery on 05/22/24.  She wanted to know if the surgery needed to be authorized by the insurance.    Patient can be reached at [phone]

## 2024-05-22 ENCOUNTER — HOSPITAL ENCOUNTER (OUTPATIENT)
Facility: HOSPITAL | Age: 69
Setting detail: OUTPATIENT SURGERY
Discharge: HOME | End: 2024-05-22
Attending: UROLOGY | Admitting: UROLOGY
Payer: COMMERCIAL

## 2024-05-22 ENCOUNTER — ANESTHESIA (OUTPATIENT)
Dept: PERIOP | Facility: HOSPITAL | Age: 69
End: 2024-05-22
Payer: COMMERCIAL

## 2024-05-22 ENCOUNTER — ANESTHESIA EVENT (OUTPATIENT)
Dept: PERIOP | Facility: HOSPITAL | Age: 69
End: 2024-05-22
Payer: COMMERCIAL

## 2024-05-22 ENCOUNTER — APPOINTMENT (OUTPATIENT)
Dept: RADIOLOGY | Facility: HOSPITAL | Age: 69
End: 2024-05-22
Payer: COMMERCIAL

## 2024-05-22 VITALS
TEMPERATURE: 97 F | SYSTOLIC BLOOD PRESSURE: 143 MMHG | DIASTOLIC BLOOD PRESSURE: 76 MMHG | WEIGHT: 150.79 LBS | OXYGEN SATURATION: 96 % | BODY MASS INDEX: 27.58 KG/M2 | HEART RATE: 73 BPM | RESPIRATION RATE: 18 BRPM

## 2024-05-22 DIAGNOSIS — Z87.442 PERSONAL HISTORY OF KIDNEY STONES: Primary | ICD-10-CM

## 2024-05-22 DIAGNOSIS — N20.1 URETERAL STONE: Primary | ICD-10-CM

## 2024-05-22 PROCEDURE — 74420 UROGRAPHY RTRGR +-KUB: CPT

## 2024-05-22 PROCEDURE — C2617 STENT, NON-COR, TEM W/O DEL: HCPCS | Performed by: UROLOGY

## 2024-05-22 PROCEDURE — 52356 CYSTO/URETERO W/LITHOTRIPSY: CPT | Performed by: UROLOGY

## 2024-05-22 PROCEDURE — C1894 INTRO/SHEATH, NON-LASER: HCPCS | Performed by: UROLOGY

## 2024-05-22 PROCEDURE — NC001 PR NO CHARGE: Performed by: UROLOGY

## 2024-05-22 PROCEDURE — C1769 GUIDE WIRE: HCPCS | Performed by: UROLOGY

## 2024-05-22 DEVICE — INLAY URETERAL STENT W/O GUIDEWIRE
Type: IMPLANTABLE DEVICE | Status: FUNCTIONAL
Brand: BARD® INLAY® URETERAL STENT

## 2024-05-22 RX ORDER — MIDAZOLAM HYDROCHLORIDE 2 MG/2ML
INJECTION, SOLUTION INTRAMUSCULAR; INTRAVENOUS AS NEEDED
Status: DISCONTINUED | OUTPATIENT
Start: 2024-05-22 | End: 2024-05-22

## 2024-05-22 RX ORDER — EPHEDRINE SULFATE 50 MG/ML
INJECTION INTRAVENOUS AS NEEDED
Status: DISCONTINUED | OUTPATIENT
Start: 2024-05-22 | End: 2024-05-22

## 2024-05-22 RX ORDER — DEXAMETHASONE SODIUM PHOSPHATE 10 MG/ML
INJECTION, SOLUTION INTRAMUSCULAR; INTRAVENOUS AS NEEDED
Status: DISCONTINUED | OUTPATIENT
Start: 2024-05-22 | End: 2024-05-22

## 2024-05-22 RX ORDER — CEFADROXIL 500 MG/1
500 CAPSULE ORAL EVERY 12 HOURS
Qty: 6 CAPSULE | Refills: 0 | Status: SHIPPED | OUTPATIENT
Start: 2024-05-22 | End: 2024-05-25

## 2024-05-22 RX ORDER — FENTANYL CITRATE 50 UG/ML
INJECTION, SOLUTION INTRAMUSCULAR; INTRAVENOUS AS NEEDED
Status: DISCONTINUED | OUTPATIENT
Start: 2024-05-22 | End: 2024-05-22

## 2024-05-22 RX ORDER — PROPOFOL 10 MG/ML
INJECTION, EMULSION INTRAVENOUS AS NEEDED
Status: DISCONTINUED | OUTPATIENT
Start: 2024-05-22 | End: 2024-05-22

## 2024-05-22 RX ORDER — HYDROMORPHONE HCL/PF 1 MG/ML
0.5 SYRINGE (ML) INJECTION
Status: DISCONTINUED | OUTPATIENT
Start: 2024-05-22 | End: 2024-05-22 | Stop reason: HOSPADM

## 2024-05-22 RX ORDER — ONDANSETRON 2 MG/ML
4 INJECTION INTRAMUSCULAR; INTRAVENOUS ONCE AS NEEDED
Status: DISCONTINUED | OUTPATIENT
Start: 2024-05-22 | End: 2024-05-22 | Stop reason: HOSPADM

## 2024-05-22 RX ORDER — CEFAZOLIN SODIUM 1 G/50ML
1000 SOLUTION INTRAVENOUS ONCE
Status: COMPLETED | OUTPATIENT
Start: 2024-05-22 | End: 2024-05-22

## 2024-05-22 RX ORDER — FENTANYL CITRATE/PF 50 MCG/ML
50 SYRINGE (ML) INJECTION
Status: DISCONTINUED | OUTPATIENT
Start: 2024-05-22 | End: 2024-05-22 | Stop reason: HOSPADM

## 2024-05-22 RX ORDER — LIDOCAINE HYDROCHLORIDE 10 MG/ML
INJECTION, SOLUTION EPIDURAL; INFILTRATION; INTRACAUDAL; PERINEURAL AS NEEDED
Status: DISCONTINUED | OUTPATIENT
Start: 2024-05-22 | End: 2024-05-22

## 2024-05-22 RX ORDER — ONDANSETRON 2 MG/ML
INJECTION INTRAMUSCULAR; INTRAVENOUS AS NEEDED
Status: DISCONTINUED | OUTPATIENT
Start: 2024-05-22 | End: 2024-05-22

## 2024-05-22 RX ORDER — SODIUM CHLORIDE, SODIUM LACTATE, POTASSIUM CHLORIDE, CALCIUM CHLORIDE 600; 310; 30; 20 MG/100ML; MG/100ML; MG/100ML; MG/100ML
INJECTION, SOLUTION INTRAVENOUS CONTINUOUS PRN
Status: DISCONTINUED | OUTPATIENT
Start: 2024-05-22 | End: 2024-05-22

## 2024-05-22 RX ORDER — METOCLOPRAMIDE HYDROCHLORIDE 5 MG/ML
10 INJECTION INTRAMUSCULAR; INTRAVENOUS ONCE AS NEEDED
Status: DISCONTINUED | OUTPATIENT
Start: 2024-05-22 | End: 2024-05-22 | Stop reason: HOSPADM

## 2024-05-22 RX ORDER — MAGNESIUM HYDROXIDE 1200 MG/15ML
LIQUID ORAL AS NEEDED
Status: DISCONTINUED | OUTPATIENT
Start: 2024-05-22 | End: 2024-05-22 | Stop reason: HOSPADM

## 2024-05-22 RX ADMIN — SODIUM CHLORIDE, SODIUM LACTATE, POTASSIUM CHLORIDE, AND CALCIUM CHLORIDE: .6; .31; .03; .02 INJECTION, SOLUTION INTRAVENOUS at 11:17

## 2024-05-22 RX ADMIN — ONDANSETRON 4 MG: 2 INJECTION INTRAMUSCULAR; INTRAVENOUS at 11:30

## 2024-05-22 RX ADMIN — LIDOCAINE HYDROCHLORIDE 50 MG: 10 INJECTION, SOLUTION EPIDURAL; INFILTRATION; INTRACAUDAL; PERINEURAL at 11:25

## 2024-05-22 RX ADMIN — FENTANYL CITRATE 50 MCG: 50 INJECTION, SOLUTION INTRAMUSCULAR; INTRAVENOUS at 11:36

## 2024-05-22 RX ADMIN — DEXAMETHASONE SODIUM PHOSPHATE 10 MG: 10 INJECTION, SOLUTION INTRAMUSCULAR; INTRAVENOUS at 11:30

## 2024-05-22 RX ADMIN — EPHEDRINE SULFATE 5 MG: 50 INJECTION, SOLUTION INTRAVENOUS at 11:46

## 2024-05-22 RX ADMIN — CEFAZOLIN SODIUM 1000 MG: 1 SOLUTION INTRAVENOUS at 11:17

## 2024-05-22 RX ADMIN — MIDAZOLAM 2 MG: 1 INJECTION INTRAMUSCULAR; INTRAVENOUS at 11:17

## 2024-05-22 RX ADMIN — PROPOFOL 200 MG: 10 INJECTION, EMULSION INTRAVENOUS at 11:25

## 2024-05-22 NOTE — DISCHARGE INSTR - AVS FIRST PAGE
Antibiotic Instructions: These antibiotics were called to the [facility] today.  Post-op period:  Take first dose tonight. Take second and third dose tomorrow. (take one basically every 12 hours for a total of 3 doses)    For day of stent removal:  Restart morning of stent removal and take until finished (every 12 hours).  3 doses.

## 2024-05-22 NOTE — H&P
History & Physical - [facility]  Lester Love 69 y.o. female MRN: [id number]  Unit/Bed#: OR [place] Encounter: [id number]    ASSESSMENT  Left proximal ureteral stone 5 mm    PLAN:  Ureteroscopy with laser lithotripsy and stent placement with Dangler string.  Risks as discussed.  Consent signed.  All questions answered.    HISTORY OF PRESENT ILLNESS:  Lester is a 69 y.o. female being managed by AP team.  Patient has a 5 mm proximal left ureteral stone with minimal left hydronephrosis that has been persistent since at least 3/28/2024.  Repeat CT stone study in 4/25/2024 demonstrated persistence despite use of tamsulosin.  Discussed cystoscopy, left ureteroscopy with holmium laser lithotripsy, basket stone extraction, retrograde pyelogram, and insertion of left ureteral stent.  We reviewed the risks and benefits of this procedure including but not limited to cardiopulmonary complications, bleeding, infection, damage to nearby structures such as bladder/ureter/kidney, need for nephrostomy tube, need for additional surgeries.  Patient verbalized understanding.  Consent obtained today all questions were answered.     PAST MEDICAL HISTORY:  Past Medical History:   Diagnosis Date    Allergic rhinitis     last assessed: 9/29/2016    Anxiety     last assessed: 8/22/2017    Disease of thyroid gland     Multiple Nodules    Diverticulosis     last assessed: 10/7/2013    GERD (gastroesophageal reflux disease)     Hydronephrosis, right     last assessed: 5/22/2013    Hyperlipidemia     last assessed 8/22/2017    Hyperthyroidism     Hypokalemia     last assessed: 3/24/2015    IBS (irritable bowel syndrome)     last assessed: 8/22/2017    Internal hemorrhoids     last assessed: 10/7/2013    Kidney stone     Migraines     last assessed: 8/22/2017    Nephrolithiasis     Osteoarthrosis of knee     last assessed: 2/28/2017    Osteopenia     last assessed: 8/22/2017    PONV (postoperative nausea and vomiting)     Renal calculi      last assessed: 2016    Renal cyst     last assessed: 7/15/2015    Total bilirubin, elevated     last assessed: 2017    Uterine leiomyoma        PAST SURGICAL HISTORY:  Past Surgical History:   Procedure Laterality Date    APPENDECTOMY      BREAST BIOPSY Right 1998    benign    core bx    BREAST EXCISIONAL BIOPSY Left     benign    COLONOSCOPY      HAND SURGERY Left     Ganglion cyst    KNEE ARTHROSCOPY Left     X3    LAPAROTOMY N/A 10/30/2017    Procedure: LAPAROTOMY EXPLORATORY, DRAINAGE PELVIC ABSCESS; APPENDECTOMY;  Surgeon: Abdoul Ye DO;  Location: [facility];  Service: General    MYOMECTOMY      NC REPAIR FIRST ABDOMINAL WALL HERNIA N/A 3/26/2020    Procedure: INCISIONAL HERNIA REPAIR WITH MESH;  Surgeon: Juvencio Rangel MD;  Location: [facility];  Service: General    SALIVARY GLAND SURGERY Right     Removal for Stones    THYROID SURGERY      biopsy thyroid using percutaneous core needle    TONSILLECTOMY      TUBAL LIGATION      UTERINE FIBROID SURGERY      embolization( Myomectomy)       ALLERGIES:  No Known Allergies    SOCIAL HISTORY:  Social History     Substance and Sexual Activity   Alcohol Use Yes    Alcohol/week: 1.0 standard drink of alcohol    Types: 1 Glasses of wine per week    Comment:  socially     Social History     Substance and Sexual Activity   Drug Use Never     Social History     Tobacco Use   Smoking Status Former    Current packs/day: 0.00    Average packs/day: 1 pack/day for 15.0 years (15.0 ttl pk-yrs)    Types: Cigarettes    Start date: 1997    Quit date: 2012    Years since quittin.3   Smokeless Tobacco Never       FAMILY HISTORY:  Family History   Problem Relation Age of Onset    Cancer Mother         Gastric    Stomach cancer Mother     Deep vein thrombosis Mother     Other Father         CVA    Heart disease Father     Stroke Father     Diabetes Sister     Arthritis Sister     Kidney disease Sister     Osteoporosis Sister     Osteoporosis Sister      "No Known Problems Sister     No Known Problems Maternal Grandmother     No Known Problems Paternal Grandmother     Hypertension Brother     No Known Problems Maternal Aunt     No Known Problems Maternal Aunt     No Known Problems Maternal Aunt     No Known Problems Maternal Aunt     Polycystic kidney disease Maternal Aunt     Polycystic kidney disease Maternal Aunt     Heart disease Maternal Uncle 62    Kidney disease Paternal Aunt     Breast cancer Cousin 35    Esophageal cancer Cousin     Esophageal cancer Cousin     Breast cancer Cousin     Breast cancer additional onset Other 57       MEDICATIONS:    Current Facility-Administered Medications:     ceFAZolin (ANCEF) IVPB (premix in dextrose) 1,000 mg 50 mL, 1,000 mg, Intravenous, Once, Brody Stark PA-C    Review of Systems   Constitutional:  Negative for chills and fever.   HENT:  Negative for ear pain and sore throat.    Eyes:  Negative for pain and visual disturbance.   Respiratory:  Negative for cough and shortness of breath.    Cardiovascular:  Negative for chest pain and palpitations.   Gastrointestinal:  Negative for abdominal pain and vomiting.   Genitourinary:  Positive for flank pain. Negative for dysuria and hematuria.   Musculoskeletal:  Negative for arthralgias and back pain.   Skin:  Negative for color change and rash.   Neurological:  Negative for seizures and syncope.   All other systems reviewed and are negative.      PHYSICAL EXAM:  Physical Exam    LAB RESULTS:  Lab Results   Component Value Date    WBC 6.87 05/13/2024    HGB 13.3 05/13/2024    HCT 40.1 05/13/2024    MCV 90 05/13/2024     05/13/2024     Lab Results   Component Value Date    SODIUM 141 05/13/2024    K 3.8 05/13/2024     05/13/2024    CO2 30 05/13/2024    BUN 11 05/13/2024    CREATININE 0.80 05/13/2024    GLUC 83 05/13/2024    CALCIUM 8.9 05/13/2024     Lab Results   Component Value Date    CALCIUM 8.9 05/13/2024     No results found for: \"PSA\"    OTHER " "STUDIES:  4/25/2024 CT abdomen pelvis  5 mm left proximal ureteral stone with mild hydronephrosis.    Salinas Schreiber MD  05/22/24    Portions of the record may have been created with voice recognition software.  Occasional wrong word or \"sound alike\" substitutions may have occurred due to the inherent limitations of voice recognition software.  Read the chart carefully and recognize, using context, where substitutions have occurred.  "

## 2024-05-22 NOTE — ANESTHESIA PREPROCEDURE EVALUATION
Procedure:  CYSTOSCOPY URETEROSCOPY WITH LITHOTRIPSY HOLMIUM LASER, RETROGRADE PYELOGRAM AND INSERTION STENT URETERAL (Left: Bladder)    Relevant Problems   CARDIO   (+) Migraine   (+) Mixed hyperlipidemia      GI/HEPATIC   (+) GERD without esophagitis   (+) Rectal bleeding      /RENAL   (+) Nephrolithiasis      MUSCULOSKELETAL   (+) Osteoarthrosis of knee      NEURO/PSYCH   (+) Migraine        Physical Exam    Airway    Mallampati score: II  TM Distance: >3 FB  Neck ROM: full     Dental   No notable dental hx     Cardiovascular  Cardiovascular exam normal    Pulmonary  Pulmonary exam normal     Other Findings  post-pubertal.      Anesthesia Plan  ASA Score- 2     Anesthesia Type- general with ASA Monitors.         Additional Monitors:     Airway Plan: LMA.           Plan Factors-Exercise tolerance (METS): >4 METS.    Chart reviewed. EKG reviewed.  Existing labs reviewed. Patient summary reviewed.    Patient is not a current smoker.              Induction- intravenous.    Postoperative Plan- Plan for postoperative opioid use.     Perioperative Resuscitation Plan - Level 1 - Full Code.       Informed Consent- Anesthetic plan and risks discussed with patient.  I personally reviewed this patient with the CRNA. Discussed and agreed on the Anesthesia Plan with the CRNA..

## 2024-05-22 NOTE — OP NOTE
OPERATIVE REPORT- Dr. Schreiber  PATIENT NAME: Lester Love    :  1955  MRN: [id number]  Pt Location: [facility] OR ROOM 04    SURGERY DATE: 2024    Surgeon: Salinas Schreiber MD    Pre-op Diagnosis:  Left renal stone    Post-op Diagnosis:  Same    Procedure:  Cystoscopy  Fluoroscopy  Left retrograde pyelography  Left ureteroscopy  Laser lithotripsy  Left ureteral stent placement    Specimen(s):  * No specimens in log *    Estimated Blood Loss:   Minimal    Complications:    None    Drains:   6 X 24 cm left ureteral stent    Anesthesia type:   General    Indications for surgery:  Please see the dictated history and physical.  5 mm proximal left ureteral stone on CT imaging    Findings:  Left renal stone.    Procedure and Technique:   After obtaining consent and identifying the patient, antibiotics were given as ordered and the patient was brought to the room.  All appropriate leads and monitors were placed and the patient was appropriately positioned on the table.  Anesthesia was administered and the patient was sterilely prepped and draped.  A timeout was performed where the patient name, , procedure, antibiotics, allergies, etc. were discussed.  All in the room were satisfied before the start of the operative procedure.  What follows are the operative findings and events.     Cystoscopy was performed.  imaging was obtained. The stone was located in the proximal left ureter and was not easily visualized on x-ray.  A retrograde pyelogram was performed.  The stone was then seen as a negative filling defect.  A guidewire was passed up the ureter to the kidney.  A second wire was passed without difficulty.  The safety wire was secured to the drape and then a 25 cm long ureteral access sheath was gently passed over the second wire using fluoroscopic guidance.  The flexible ureteroscope was passed through the sheath up to the stone. The stone had migrated to the middle of the kidney.  All of the other  "calyces were evaluated.  There was a small stone starting in an upper pole calyx also.  The laser fiber was introduced. The small stone was broken with the laser fiber.  The larger stone was then addressed.  No significantly sized fragments remained.  The scope was withdrawn down the ureter evaluating for any trauma. There were no stone fragments or trauma noted.     The safety wire was backloaded through the cystoscope and the stent was placed over the wire.  The guidewire was removed and a good coil was noted proximally in the kidney and distally in the bladder.  The string was secured to the right labia with tape and Mastisol.  The patient was awakened and  transferred to the PACU in satisfactory condition.    Plan:  Stent removal in 2-3 days on abx.      SIGNATURE: Salinas Schreiber MD  DATE: May 22, 2024  TIME: 12:17 PM    Portions of the record may have been created with voice recognition software.  Occasional wrong word or \"sound alike\" substitutions may have occurred due to the inherent limitations of voice recognition software.  Read the chart carefully and recognize, using context, where substitutions have occurred.  "

## 2024-05-24 ENCOUNTER — TELEPHONE (OUTPATIENT)
Dept: UROLOGY | Facility: CLINIC | Age: 69
End: 2024-05-24

## 2024-05-24 NOTE — TELEPHONE ENCOUNTER
Post Op Note    Lester Love is a 69 y.o. female s/p CYSTOSCOPY URETEROSCOPY WITH LITHOTRIPSY HOLMIUM LASER, RETROGRADE PYELOGRAM AND INSERTION STENT URETERAL (Left: Bladder)  performed 5/22/24.  Lester Love is a patient of Dr. Dr. Schreiber and is seen at the [place] office.     Plan per OP note:  Plan:  Stent removal in 2-3 days on abx.    Called and spoke with patient at this time. She is feeling ok. She removed her stent this morning. Reviewed post stent removal discomfort measures.     F/u appt scheduled for 6 months. She knows to get KUB done 1-2 weeks prior, order in place. She verbalized understanding.

## 2024-06-17 ENCOUNTER — OFFICE VISIT (OUTPATIENT)
Age: 69
End: 2024-06-17
Payer: COMMERCIAL

## 2024-06-17 VITALS
TEMPERATURE: 97.6 F | OXYGEN SATURATION: 97 % | WEIGHT: 153 LBS | SYSTOLIC BLOOD PRESSURE: 122 MMHG | BODY MASS INDEX: 28.16 KG/M2 | HEIGHT: 62 IN | HEART RATE: 71 BPM | DIASTOLIC BLOOD PRESSURE: 60 MMHG

## 2024-06-17 DIAGNOSIS — G43.E09 CHRONIC MIGRAINE WITH AURA WITHOUT STATUS MIGRAINOSUS, NOT INTRACTABLE: ICD-10-CM

## 2024-06-17 DIAGNOSIS — Z96.652 S/P TKR (TOTAL KNEE REPLACEMENT), LEFT: ICD-10-CM

## 2024-06-17 DIAGNOSIS — M79.674 TOE PAIN, RIGHT: ICD-10-CM

## 2024-06-17 DIAGNOSIS — Z00.00 MEDICARE ANNUAL WELLNESS VISIT, SUBSEQUENT: Primary | ICD-10-CM

## 2024-06-17 PROCEDURE — 99213 OFFICE O/P EST LOW 20 MIN: CPT

## 2024-06-17 PROCEDURE — G0439 PPPS, SUBSEQ VISIT: HCPCS

## 2024-06-17 RX ORDER — RIZATRIPTAN BENZOATE 5 MG/1
5 TABLET ORAL ONCE AS NEEDED
Qty: 20 TABLET | Refills: 0 | Status: SHIPPED | OUTPATIENT
Start: 2024-06-17

## 2024-06-17 NOTE — PATIENT INSTRUCTIONS
Riboflavin and Magnesium supplements daily   Migraine Montez     Medicare Preventive Visit Patient Instructions  Thank you for completing your Welcome to Medicare Visit or Medicare Annual Wellness Visit today. Your next wellness visit will be due in one year (6/18/2025).  The screening/preventive services that you may require over the next 5-10 years are detailed below. Some tests may not apply to you based off risk factors and/or age. Screening tests ordered at today's visit but not completed yet may show as past due. Also, please note that scanned in results may not display below.  Preventive Screenings:  Service Recommendations Previous Testing/Comments   Colorectal Cancer Screening  * Colonoscopy    * Fecal Occult Blood Test (FOBT)/Fecal Immunochemical Test (FIT)  * Fecal DNA/Cologuard Test  * Flexible Sigmoidoscopy Age: 45-75 years old   Colonoscopy: every 10 years (may be performed more frequently if at higher risk)  OR  FOBT/FIT: every 1 year  OR  Cologuard: every 3 years  OR  Sigmoidoscopy: every 5 years  Screening may be recommended earlier than age 45 if at higher risk for colorectal cancer. Also, an individualized decision between you and your healthcare provider will decide whether screening between the ages of 76-85 would be appropriate. Colonoscopy: 10/18/2022  FOBT/FIT: Not on file  Cologuard: Not on file  Sigmoidoscopy: Not on file    Screening Current     Breast Cancer Screening Age: 40+ years old  Frequency: every 1-2 years  Not required if history of left and right mastectomy Mammogram: 04/22/2024    Screening Current   Cervical Cancer Screening Between the ages of 21-29, pap smear recommended once every 3 years.   Between the ages of 30-65, can perform pap smear with HPV co-testing every 5 years.   Recommendations may differ for women with a history of total hysterectomy, cervical cancer, or abnormal pap smears in past. Pap Smear: 01/26/2023    Screening Not Indicated   Hepatitis C Screening Once  for adults born between 1945 and 1965  More frequently in patients at high risk for Hepatitis C Hep C Antibody: 03/07/2019    Screening Current   Diabetes Screening 1-2 times per year if you're at risk for diabetes or have pre-diabetes Fasting glucose: No results in last 5 years (No results in last 5 years)  A1C: 5.5 % of total Hgb (4/11/2022)  Screening Current   Cholesterol Screening Once every 5 years if you don't have a lipid disorder. May order more often based on risk factors. Lipid panel: 06/01/2023    Screening Not Indicated  History Lipid Disorder     Other Preventive Screenings Covered by Medicare:  Abdominal Aortic Aneurysm (AAA) Screening: covered once if your at risk. You're considered to be at risk if you have a family history of AAA.  Lung Cancer Screening: covers low dose CT scan once per year if you meet all of the following conditions: (1) Age 55-77; (2) No signs or symptoms of lung cancer; (3) Current smoker or have quit smoking within the last 15 years; (4) You have a tobacco smoking history of at least 20 pack years (packs per day multiplied by number of years you smoked); (5) You get a written order from a healthcare provider.  Glaucoma Screening: covered annually if you're considered high risk: (1) You have diabetes OR (2) Family history of glaucoma OR (3)  aged 50 and older OR (4)  American aged 65 and older  Osteoporosis Screening: covered every 2 years if you meet one of the following conditions: (1) You're estrogen deficient and at risk for osteoporosis based off medical history and other findings; (2) Have a vertebral abnormality; (3) On glucocorticoid therapy for more than 3 months; (4) Have primary hyperparathyroidism; (5) On osteoporosis medications and need to assess response to drug therapy.   Last bone density test (DXA Scan): 05/03/2023.  HIV Screening: covered annually if you're between the age of 15-65. Also covered annually if you are younger than 15 and  older than 65 with risk factors for HIV infection. For pregnant patients, it is covered up to 3 times per pregnancy.    Immunizations:  Immunization Recommendations   Influenza Vaccine Annual influenza vaccination during flu season is recommended for all persons aged >= 6 months who do not have contraindications   Pneumococcal Vaccine   * Pneumococcal conjugate vaccine = PCV13 (Prevnar 13), PCV15 (Vaxneuvance), PCV20 (Prevnar 20)  * Pneumococcal polysaccharide vaccine = PPSV23 (Pneumovax) Adults 19-63 yo with certain risk factors or if 65+ yo  If never received any pneumonia vaccine: recommend Prevnar 20 (PCV20)  Give PCV20 if previously received 1 dose of PCV13 or PPSV23   Hepatitis B Vaccine 3 dose series if at intermediate or high risk (ex: diabetes, end stage renal disease, liver disease)   Respiratory syncytial virus (RSV) Vaccine - COVERED BY MEDICARE PART D  * RSVPreF3 (Arexvy) CDC recommends that adults 60 years of age and older may receive a single dose of RSV vaccine using shared clinical decision-making (SCDM)   Tetanus (Td) Vaccine - COST NOT COVERED BY MEDICARE PART B Following completion of primary series, a booster dose should be given every 10 years to maintain immunity against tetanus. Td may also be given as tetanus wound prophylaxis.   Tdap Vaccine - COST NOT COVERED BY MEDICARE PART B Recommended at least once for all adults. For pregnant patients, recommended with each pregnancy.   Shingles Vaccine (Shingrix) - COST NOT COVERED BY MEDICARE PART B  2 shot series recommended in those 19 years and older who have or will have weakened immune systems or those 50 years and older     Health Maintenance Due:      Topic Date Due    Cervical Cancer Screening  01/26/2026    Breast Cancer Screening: Mammogram  04/22/2026    Colorectal Cancer Screening  10/18/2027    Hepatitis C Screening  Completed     Immunizations Due:      Topic Date Due    Pneumococcal Vaccine: 65+ Years (1 of 1 - PCV) Never done     COVID-19 Vaccine (5 - 2023-24 season) 09/01/2023    Influenza Vaccine (Season Ended) 09/01/2024     Advance Directives   What are advance directives?  Advance directives are legal documents that state your wishes and plans for medical care. These plans are made ahead of time in case you lose your ability to make decisions for yourself. Advance directives can apply to any medical decision, such as the treatments you want, and if you want to donate organs.   What are the types of advance directives?  There are many types of advance directives, and each state has rules about how to use them. You may choose a combination of any of the following:  Living will:  This is a written record of the treatment you want. You can also choose which treatments you do not want, which to limit, and which to stop at a certain time. This includes surgery, medicine, IV fluid, and tube feedings.   Durable power of  for healthcare (DPAHC):  This is a written record that states who you want to make healthcare choices for you when you are unable to make them for yourself. This person, called a proxy, is usually a family member or a friend. You may choose more than 1 proxy.  Do not resuscitate (DNR) order:  A DNR order is used in case your heart stops beating or you stop breathing. It is a request not to have certain forms of treatment, such as CPR. A DNR order may be included in other types of advance directives.  Medical directive:  This covers the care that you want if you are in a coma, near death, or unable to make decisions for yourself. You can list the treatments you want for each condition. Treatment may include pain medicine, surgery, blood transfusions, dialysis, IV or tube feedings, and a ventilator (breathing machine).  Values history:  This document has questions about your views, beliefs, and how you feel and think about life. This information can help others choose the care that you would choose.  Why are advance  directives important?  An advance directive helps you control your care. Although spoken wishes may be used, it is better to have your wishes written down. Spoken wishes can be misunderstood, or not followed. Treatments may be given even if you do not want them. An advance directive may make it easier for your family to make difficult choices about your care.   Weight Management   Why it is important to manage your weight:  Being overweight increases your risk of health conditions such as heart disease, high blood pressure, type 2 diabetes, and certain types of cancer. It can also increase your risk for osteoarthritis, sleep apnea, and other respiratory problems. Aim for a slow, steady weight loss. Even a small amount of weight loss can lower your risk of health problems.  How to lose weight safely:  A safe and healthy way to lose weight is to eat fewer calories and get regular exercise. You can lose up about 1 pound a week by decreasing the number of calories you eat by 500 calories each day.   Healthy meal plan for weight management:  A healthy meal plan includes a variety of foods, contains fewer calories, and helps you stay healthy. A healthy meal plan includes the following:  Eat whole-grain foods more often.  A healthy meal plan should contain fiber. Fiber is the part of grains, fruits, and vegetables that is not broken down by your body. Whole-grain foods are healthy and provide extra fiber in your diet. Some examples of whole-grain foods are whole-wheat breads and pastas, oatmeal, brown rice, and bulgur.  Eat a variety of vegetables every day.  Include dark, leafy greens such as spinach, kale, darwin greens, and mustard greens. Eat yellow and orange vegetables such as carrots, sweet potatoes, and winter squash.   Eat a variety of fruits every day.  Choose fresh or canned fruit (canned in its own juice or light syrup) instead of juice. Fruit juice has very little or no fiber.  Eat low-fat dairy foods.  Drink  fat-free (skim) milk or 1% milk. Eat fat-free yogurt and low-fat cottage cheese. Try low-fat cheeses such as mozzarella and other reduced-fat cheeses.  Choose meat and other protein foods that are low in fat.  Choose beans or other legumes such as split peas or lentils. Choose fish, skinless poultry (chicken or turkey), or lean cuts of red meat (beef or pork). Before you cook meat or poultry, cut off any visible fat.   Use less fat and oil.  Try baking foods instead of frying them. Add less fat, such as margarine, sour cream, regular salad dressing and mayonnaise to foods. Eat fewer high-fat foods. Some examples of high-fat foods include french fries, doughnuts, ice cream, and cakes.  Eat fewer sweets.  Limit foods and drinks that are high in sugar. This includes candy, cookies, regular soda, and sweetened drinks.  Exercise:  Exercise at least 30 minutes per day on most days of the week. Some examples of exercise include walking, biking, dancing, and swimming. You can also fit in more physical activity by taking the stairs instead of the elevator or parking farther away from stores. Ask your healthcare provider about the best exercise plan for you.      © Copyright [employer] 2018 Information is for End User's use only and may not be sold, redistributed or otherwise used for commercial purposes. All illustrations and images included in CareNotes® are the copyrighted property of [employer] or [employer]

## 2024-06-17 NOTE — PROGRESS NOTES
Ambulatory Visit  Name: Lester Love      : 1955      MRN: [id number]  Encounter Provider: Diamante Marroquin MD  Encounter Date: 2024   Encounter department: [facility]    Assessment & Plan   1. Medicare annual wellness visit, subsequent  2. Chronic migraine with aura without status migrainosus, not intractable  -     rizatriptan (MAXALT) 5 mg tablet; Take 1 tablet (5 mg total) by mouth once as needed for migraine may repeat in 2 hours if necessary  3. S/P TKR (total knee replacement), left  -     amoxicillin (AMOXIL) 500 mg capsule; Take 4 capsules (2,000 mg total) by mouth 1 (one) time for 1 dose Take 30 minutes before dental procedure.  4. Toe pain, right    Chronic migraines occurring 4x/week likely exacerbated by rebound headaches with chronic Excedrin use.   - taper off Excedrin. Patient counseled about potential rebound/withdrawal.   - Maxalt PRN for migraines   - Complete migraine journal   - Riboflavin and Magnesium supplements daily     Traumatic injury to R 5th toe last month with persistent discomfort.   - XRAY will not change treatment/management.   - Recommend buddy taping digit     Abx prophylaxis ordered as above for s/p L total knee replacement.    Return in approximately 4 weeks for f/u migraines.       Depression Screening and Follow-up Plan: Patient was screened for depression during today's encounter. They screened negative with a PHQ-2 score of 0.    Nutrition Assessment and Intervention:     Reviewed food recall journal      Physical Activity Assessment and Intervention:    Activity journal reviewed      Emotional and Mental Well-being, Sleep, Connectedness Assessment and Intervention:    Sleep/stress assessment performed    Depression and anxiety screening performed and reviewed      Tobacco and Toxic Substance Assessment and Intervention:     Tobacco use screening performed    Alcohol and drug use screening performed      Preventive health issues  were discussed with patient, and age appropriate screening tests were ordered as noted in patient's After Visit Summary. Personalized health advice and appropriate referrals for health education or preventive services given if needed, as noted in patient's After Visit Summary.    History of Present Illness     HPI     Patient presents today for AWV, but also has concerns as below:     Chronic Migraines   - increasing in frequency, up to four times a week   - with aura but no other vision changes or tinnitus   - associated with photophobia, phonophobia, and nausea   - going out and doing yard work exacerbates the pain   - has been taking Excedrin daily. No abdominal pain/discomfort.     Potentially broke R little toe   - jammed her foot/toe on the radiator in May, has had pain ever since     Dentist requiring prophylactic antibiotic prescription   - patient s/p L total knee replacement October 2023 by [facility]   - needs a teeth cleaning and her dentist wants her to obtain pre-procedural abx from her PCP   - Follows with Dr. Riojas at [facility] on [facility] ([phone])    Patient Care Team:  Diamante Daniels DO as PCP - General (Family Medicine)  Patrizia Bridges MD as PCP - Endocrinology (Endocrinology)  MD Blu Holland CRNP Krista Verdi, PA-C Devang Dave, MD Brett Gibson, MD Camille Eyvazzadeh, MD (Colon and Rectal Surgery)    Review of Systems   Constitutional:  Negative for chills and fever.   HENT:  Negative for congestion, rhinorrhea and tinnitus.    Eyes:  Negative for visual disturbance.   Respiratory:  Negative for shortness of breath.    Cardiovascular:  Negative for chest pain.   Gastrointestinal:  Negative for abdominal pain, constipation, diarrhea, nausea and vomiting.   Endocrine: Negative for polydipsia and polyuria.   Genitourinary:  Negative for difficulty urinating.   Musculoskeletal:  Positive for arthralgias.   Skin:  Negative for color change and rash.    Allergic/Immunologic: Negative for environmental allergies and food allergies.   Neurological:  Positive for headaches.   Hematological:  Negative for adenopathy.   Psychiatric/Behavioral:  Negative for sleep disturbance.      Medical History Reviewed by provider this encounter:  Tobacco  Allergies  Meds  Problems  Med Hx  Surg Hx  Fam Hx       Annual Wellness Visit Questionnaire   Lester is here for her Initial Wellness visit. Last Medicare Wellness visit information reviewed, patient interviewed and updates made to the record today.      Health Risk Assessment:   Patient rates overall health as very good. Patient feels that their physical health rating is slightly better. Patient is satisfied with their life. Eyesight was rated as same. Hearing was rated as same. Patient feels that their emotional and mental health rating is same. Patients states they are never, rarely angry. Patient states they are never, rarely unusually tired/fatigued. Pain experienced in the last 7 days has been some. Patient's pain rating has been 3/10. Patient states that she has experienced no weight loss or gain in last 6 months.     Depression Screening:   PHQ-2 Score: 0      Fall Risk Screening:   In the past year, patient has experienced: no history of falling in past year      Urinary Incontinence Screening:   Patient has not leaked urine accidently in the last six months.     Home Safety:  Patient does not have trouble with stairs inside or outside of their home. Patient has working smoke alarms and has working carbon monoxide detector. Home safety hazards include: none.     Nutrition:   Current diet is Regular.     Medications:   Patient is currently taking over-the-counter supplements. OTC medications include: see medication list. Patient is able to manage medications.     Activities of Daily Living (ADLs)/Instrumental Activities of Daily Living (IADLs):   Walk and transfer into and out of bed and chair?: Yes  Dress and  groom yourself?: Yes    Bathe or shower yourself?: Yes    Feed yourself? Yes  Do your laundry/housekeeping?: Yes  Manage your money, pay your bills and track your expenses?: Yes  Make your own meals?: Yes    Do your own shopping?: Yes    Durable Medical Equipment Suppliers  N/A    Previous Hospitalizations:   Any hospitalizations or ED visits within the last 12 months?: Yes    How many hospitalizations have you had in the last year?: 1-2    Advance Care Planning:   Living will: Yes    Durable POA for healthcare: No    Advanced directive: Yes      Cognitive Screening:   Provider or family/friend/caregiver concerned regarding cognition?: No    PREVENTIVE SCREENINGS      Cardiovascular Screening:    General: Screening Not Indicated and History Lipid Disorder      Diabetes Screening:     General: Screening Current      Colorectal Cancer Screening:     General: Screening Current      Breast Cancer Screening:     General: Screening Current      Cervical Cancer Screening:    General: Screening Not Indicated      Osteoporosis Screening:    General: Screening Current      Abdominal Aortic Aneurysm (AAA) Screening:        General: Screening Not Indicated      Lung Cancer Screening:     General: Screening Not Indicated      Hepatitis C Screening:    General: Screening Current    Screening, Brief Intervention, and Referral to Treatment (SBIRT)    Screening  Typical number of drinks in a day: 0  Typical number of drinks in a week: 1  Interpretation: Low risk drinking behavior.    AUDIT-C Screenin) How often did you have a drink containing alcohol in the past year? monthly or less  2) How many drinks did you have on a typical day when you were drinking in the past year? 1 to 2  3) How often did you have 6 or more drinks on one occasion in the past year? never    AUDIT-C Score: 1  Interpretation: Score 0-2 (female): Negative screen for alcohol misuse    Single Item Drug Screening:  How often have you used an illegal drug  "(including marijuana) or a prescription medication for non-medical reasons in the past year? never    Single Item Drug Screen Score: 0  Interpretation: Negative screen for possible drug use disorder    Brief Intervention  Alcohol & drug use screenings were reviewed. No concerns regarding substance use disorder identified.     Other Counseling Topics:   Calcium and vitamin D intake and regular weightbearing exercise.     Social Determinants of Health     Financial Resource Strain: Low Risk  (4/15/2023)    Overall Financial Resource Strain (CARDIA)     Difficulty of Paying Living Expenses: Not hard at all   Food Insecurity: No Food Insecurity (6/15/2024)    Hunger Vital Sign     Worried About Running Out of Food in the Last Year: Never true     Ran Out of Food in the Last Year: Never true   Transportation Needs: No Transportation Needs (6/15/2024)    PRAPARE - Transportation     Lack of Transportation (Medical): No     Lack of Transportation (Non-Medical): No   Housing Stability: Low Risk  (6/15/2024)    Housing Stability Vital Sign     Unable to Pay for Housing in the Last Year: No     Number of Times Moved in the Last Year: 0     Homeless in the Last Year: No   Utilities: Not At Risk (6/15/2024)    [facility] Utilities     Threatened with loss of utilities: No     No results found.    Objective     /60   Pulse 71   Temp 97.6 °F (36.4 °C)   Ht 5' 2\" (1.575 m)   Wt 69.4 kg (153 lb)   SpO2 97%   BMI 27.98 kg/m²     Physical Exam  Vitals and nursing note reviewed.   Constitutional:       General: She is not in acute distress.     Appearance: Normal appearance. She is not ill-appearing, toxic-appearing or diaphoretic.   HENT:      Head: Normocephalic and atraumatic.      Right Ear: External ear normal.      Left Ear: External ear normal.      Nose: Nose normal. No congestion or rhinorrhea.      Mouth/Throat:      Mouth: Mucous membranes are moist.   Eyes:      General: No scleral icterus.        Right eye: No " discharge.         Left eye: No discharge.   Cardiovascular:      Rate and Rhythm: Normal rate and regular rhythm.      Heart sounds: Normal heart sounds. No murmur heard.     No friction rub. No gallop.   Pulmonary:      Effort: Pulmonary effort is normal. No respiratory distress.      Breath sounds: No wheezing, rhonchi or rales.   Abdominal:      General: Abdomen is flat. There is no distension.      Palpations: Abdomen is soft.      Tenderness: There is no abdominal tenderness. There is no guarding.   Musculoskeletal:         General: Tenderness (Tenderness to palpation over R 5th toe) present. No swelling, deformity or signs of injury.      Right lower leg: No edema.      Left lower leg: No edema.   Skin:     General: Skin is warm and dry.      Capillary Refill: Capillary refill takes less than 2 seconds.      Coloration: Skin is not jaundiced or pale.      Findings: No bruising, erythema, lesion or rash.   Neurological:      Mental Status: She is alert.      Motor: No weakness.      Gait: Gait normal.   Psychiatric:         Behavior: Behavior normal.       Administrative Statements   I have spent a total time of 40 minutes on 06/17/24 In caring for this patient including Risks and benefits of tx options, Instructions for management, Patient and family education, Impressions, Counseling / Coordination of care, Documenting in the medical record, Reviewing / ordering tests, medicine, procedures  , and Obtaining or reviewing history  .      Diamante Marrouqin MD    PGY-2

## 2024-06-24 RX ORDER — AMOXICILLIN 500 MG/1
2000 CAPSULE ORAL ONCE
Qty: 4 CAPSULE | Refills: 0 | Status: SHIPPED | OUTPATIENT
Start: 2024-06-24 | End: 2024-06-24

## 2024-06-25 ENCOUNTER — TELEPHONE (OUTPATIENT)
Age: 69
End: 2024-06-25

## 2024-06-25 NOTE — TELEPHONE ENCOUNTER
Left detailed message on patient machine regarding letter in her mychart and also that letter was faxed to   Nydia Traore

## 2024-06-25 NOTE — TELEPHONE ENCOUNTER
----- Message from Diamante Marroquin MD sent at 6/24/2024  2:42 PM EDT -----  Regarding: Dental Antibiotic Prophylaxis  Good afternoon,     Please call patient to let her know that I sent prophylactic antibiotics to take 30 minutes prior to her dental procedure to her pharmacy. I've also sent patient a message on [employer]. If the patient asks why, please let her know that we feel it's best to be safe and we need to keep her, her knee, and her leg, safe.    Thank you,   Dr. Marroquin

## 2024-06-25 NOTE — TELEPHONE ENCOUNTER
Left detailed message on patient machine in regards to Dr Marroquin message below, If patient has any questions please send thru to our office.    Thank you

## 2024-06-25 NOTE — TELEPHONE ENCOUNTER
Pt came in and said the dentist would like a letter stating she is medical cleared to have this cleaning done she is currently scheduled for Aug 21,2024.    Please advise    Thank you     Please fax the letter to   [facility]  [phone]

## 2024-07-10 ENCOUNTER — RA CDI HCC (OUTPATIENT)
Dept: OTHER | Facility: HOSPITAL | Age: 69
End: 2024-07-10

## 2024-07-15 DIAGNOSIS — G43.E09 CHRONIC MIGRAINE WITH AURA WITHOUT STATUS MIGRAINOSUS, NOT INTRACTABLE: ICD-10-CM

## 2024-07-15 RX ORDER — RIZATRIPTAN BENZOATE 5 MG/1
5 TABLET ORAL ONCE AS NEEDED
Qty: 18 TABLET | Refills: 1 | Status: SHIPPED | OUTPATIENT
Start: 2024-07-15

## 2024-09-17 ENCOUNTER — OFFICE VISIT (OUTPATIENT)
Age: 69
End: 2024-09-17
Payer: COMMERCIAL

## 2024-09-17 ENCOUNTER — APPOINTMENT (OUTPATIENT)
Dept: LAB | Facility: CLINIC | Age: 69
End: 2024-09-17
Payer: COMMERCIAL

## 2024-09-17 VITALS
HEART RATE: 75 BPM | SYSTOLIC BLOOD PRESSURE: 122 MMHG | TEMPERATURE: 97.8 F | BODY MASS INDEX: 27.54 KG/M2 | WEIGHT: 153 LBS | DIASTOLIC BLOOD PRESSURE: 72 MMHG | OXYGEN SATURATION: 99 %

## 2024-09-17 DIAGNOSIS — N20.1 URETEROLITHIASIS: ICD-10-CM

## 2024-09-17 DIAGNOSIS — R10.32 LLQ PAIN: ICD-10-CM

## 2024-09-17 DIAGNOSIS — K57.92 DIVERTICULITIS: ICD-10-CM

## 2024-09-17 DIAGNOSIS — R17 SERUM TOTAL BILIRUBIN ELEVATED: ICD-10-CM

## 2024-09-17 DIAGNOSIS — R10.32 LLQ PAIN: Primary | ICD-10-CM

## 2024-09-17 DIAGNOSIS — R10.819 SUPRAPUBIC TENDERNESS: ICD-10-CM

## 2024-09-17 DIAGNOSIS — R17 SERUM TOTAL BILIRUBIN ELEVATED: Primary | ICD-10-CM

## 2024-09-17 DIAGNOSIS — R09.81 NASAL CONGESTION: ICD-10-CM

## 2024-09-17 LAB
ALBUMIN SERPL BCG-MCNC: 4.2 G/DL (ref 3.5–5)
ALP SERPL-CCNC: 84 U/L (ref 34–104)
ALT SERPL W P-5'-P-CCNC: 13 U/L (ref 7–52)
ANION GAP SERPL CALCULATED.3IONS-SCNC: 7 MMOL/L (ref 4–13)
AST SERPL W P-5'-P-CCNC: 12 U/L (ref 13–39)
BASOPHILS # BLD AUTO: 0.03 THOUSANDS/ΜL (ref 0–0.1)
BASOPHILS NFR BLD AUTO: 0 % (ref 0–1)
BILIRUB DIRECT SERPL-MCNC: 0.25 MG/DL (ref 0–0.2)
BILIRUB SERPL-MCNC: 2.77 MG/DL (ref 0.2–1)
BUN SERPL-MCNC: 13 MG/DL (ref 5–25)
CALCIUM SERPL-MCNC: 9.2 MG/DL (ref 8.4–10.2)
CHLORIDE SERPL-SCNC: 104 MMOL/L (ref 96–108)
CO2 SERPL-SCNC: 29 MMOL/L (ref 21–32)
CREAT SERPL-MCNC: 0.88 MG/DL (ref 0.6–1.3)
CRP SERPL QL: 53.4 MG/L
EOSINOPHIL # BLD AUTO: 0.02 THOUSAND/ΜL (ref 0–0.61)
EOSINOPHIL NFR BLD AUTO: 0 % (ref 0–6)
ERYTHROCYTE [DISTWIDTH] IN BLOOD BY AUTOMATED COUNT: 12.8 % (ref 11.6–15.1)
GFR SERPL CREATININE-BSD FRML MDRD: 67 ML/MIN/1.73SQ M
GLUCOSE SERPL-MCNC: 99 MG/DL (ref 65–140)
HCT VFR BLD AUTO: 40.5 % (ref 34.8–46.1)
HGB BLD-MCNC: 13.7 G/DL (ref 11.5–15.4)
IMM GRANULOCYTES # BLD AUTO: 0.01 THOUSAND/UL (ref 0–0.2)
IMM GRANULOCYTES NFR BLD AUTO: 0 % (ref 0–2)
LYMPHOCYTES # BLD AUTO: 1.87 THOUSANDS/ΜL (ref 0.6–4.47)
LYMPHOCYTES NFR BLD AUTO: 23 % (ref 14–44)
MCH RBC QN AUTO: 30.2 PG (ref 26.8–34.3)
MCHC RBC AUTO-ENTMCNC: 33.8 G/DL (ref 31.4–37.4)
MCV RBC AUTO: 89 FL (ref 82–98)
MONOCYTES # BLD AUTO: 0.58 THOUSAND/ΜL (ref 0.17–1.22)
MONOCYTES NFR BLD AUTO: 7 % (ref 4–12)
NEUTROPHILS # BLD AUTO: 5.51 THOUSANDS/ΜL (ref 1.85–7.62)
NEUTS SEG NFR BLD AUTO: 70 % (ref 43–75)
NRBC BLD AUTO-RTO: 0 /100 WBCS
PLATELET # BLD AUTO: 242 THOUSANDS/UL (ref 149–390)
PMV BLD AUTO: 10.6 FL (ref 8.9–12.7)
POTASSIUM SERPL-SCNC: 3.5 MMOL/L (ref 3.5–5.3)
PROT SERPL-MCNC: 6.3 G/DL (ref 6.4–8.4)
RBC # BLD AUTO: 4.53 MILLION/UL (ref 3.81–5.12)
S PYO AG THROAT QL: NEGATIVE
SARS-COV-2 AG UPPER RESP QL IA: NEGATIVE
SL AMB POCT RAPID FLU A: NEGATIVE
SL AMB POCT RAPID FLU B: NEGATIVE
SODIUM SERPL-SCNC: 140 MMOL/L (ref 135–147)
VALID CONTROL: NORMAL
WBC # BLD AUTO: 8.02 THOUSAND/UL (ref 4.31–10.16)

## 2024-09-17 PROCEDURE — 82248 BILIRUBIN DIRECT: CPT

## 2024-09-17 PROCEDURE — 87804 INFLUENZA ASSAY W/OPTIC: CPT

## 2024-09-17 PROCEDURE — 36415 COLL VENOUS BLD VENIPUNCTURE: CPT

## 2024-09-17 PROCEDURE — 85025 COMPLETE CBC W/AUTO DIFF WBC: CPT

## 2024-09-17 PROCEDURE — 87086 URINE CULTURE/COLONY COUNT: CPT

## 2024-09-17 PROCEDURE — 86140 C-REACTIVE PROTEIN: CPT

## 2024-09-17 PROCEDURE — 80053 COMPREHEN METABOLIC PANEL: CPT

## 2024-09-17 PROCEDURE — 87880 STREP A ASSAY W/OPTIC: CPT

## 2024-09-17 PROCEDURE — 87811 SARS-COV-2 COVID19 W/OPTIC: CPT

## 2024-09-17 PROCEDURE — 99214 OFFICE O/P EST MOD 30 MIN: CPT

## 2024-09-17 RX ORDER — METRONIDAZOLE 500 MG/1
500 TABLET ORAL EVERY 8 HOURS SCHEDULED
Qty: 21 TABLET | Refills: 0 | Status: SHIPPED | OUTPATIENT
Start: 2024-09-17 | End: 2024-09-24

## 2024-09-17 RX ORDER — AMOXICILLIN 500 MG/1
CAPSULE ORAL
COMMUNITY
Start: 2024-06-24 | End: 2024-09-24

## 2024-09-17 RX ORDER — CIPROFLOXACIN 750 MG/1
750 TABLET, FILM COATED ORAL EVERY 12 HOURS SCHEDULED
Qty: 14 TABLET | Refills: 0 | Status: SHIPPED | OUTPATIENT
Start: 2024-09-17 | End: 2024-09-24

## 2024-09-17 NOTE — PROGRESS NOTES
Ambulatory Visit  Name: Lester Love      : 1955      MRN: [id number]  Encounter Provider: Diamante Marroquin MD  Encounter Date: 2024   Encounter department: [facility]    Assessment & Plan  LLQ pain    Pt w/ hx of chronic diverticulitis and prior abdominal infection complicated by pelvic abscess formation requiring exlap presents with acute abdominal pain, nausea, chills, and cold sweats. Physical exam demonstrates LLQ and suprapubic tenderness to palpation without rebound or guarding.     Ddx includes but is not limited to: diverticulitis, UTI, ureterolithiasis, and unspecified viral illness   Considering hx of complicated infection, will start empiric treatment for diverticulitis (below)   F/u urine studies for concern of infection, stone   F/u imaging and labwork for evidence of diverticular inflammation, abscess formation       Orders:    CBC and differential; Future    Comprehensive metabolic panel; Future    CT abdomen pelvis w contrast; Future    Urine culture    Diverticulitis    See above     Orders:    CBC and differential; Future    Comprehensive metabolic panel; Future    ciprofloxacin (CIPRO) 750 mg tablet; Take 1 tablet (750 mg total) by mouth every 12 (twelve) hours for 7 days    metroNIDAZOLE (FLAGYL) 500 mg tablet; Take 1 tablet (500 mg total) by mouth every 8 (eight) hours for 7 days    CT abdomen pelvis w contrast; Future    C-reactive protein; Future    Ureterolithiasis    Pt w/ hx of L ureteral stent placement in May 2 5 mm ureteral stone. Pt with suprapubic and LLQ tenderness to palpation, concerning for possible recurrence of stone.     F/u urine studies, imaging, labwork below   Pt started on empiric treatment for diverticulutis (see above), which would also cover UTI   F/u sensitivities/specificities, if cultures positive   F/u with urology if evidence of stone recurrence       Orders:    Urinalysis with microscopic    CBC and differential;  Future    Comprehensive metabolic panel; Future    CT abdomen pelvis w contrast; Future    Urine culture    Suprapubic tenderness    Pt w/ suprapubic tenderness on physical exam, concerning for possible UTI   F/u urine studies     Orders:    ciprofloxacin (CIPRO) 750 mg tablet; Take 1 tablet (750 mg total) by mouth every 12 (twelve) hours for 7 days    CT abdomen pelvis w contrast; Future    Urine culture    Nasal congestion  POCT COVID/Flu/Strep negative     Orders:    POCT Rapid Covid Ag    POCT rapid ANTIGEN strepA    POCT rapid flu A and B     Nutrition Assessment and Intervention:     Reviewed food recall journal      Physical Activity Assessment and Intervention:    Activity journal reviewed      History of Present Illness     HPI    Patient is a 70 yo female with a PMH of GERD, HLD, thyroid nodules, diverticulosis and chronic migraines presenting with concern of headache, stomach ache, and chills.     Patient reports:  - symptoms began yesterday, including raspy voice, chills, cold sweats, nausea with position changes and brushing teeth, non productive cough, headache, and abdominal pain   - headache is across her entire head, especially the top of her head, and does not feel like her typical migraines   - abdominal pain is mostly LLQ and radiates down into her groin. She's had 2 small but otherwise normal bowel movements with no blood or purulence   - no known sick contacts   - appetite slightly decreased. Drinking well   - no urinary urgency, frequency, dysuria, SOB, vomiting, rhinorrhea, sore throat    Chart review indicates patient has hx of L ureteral stent placement in May for L ureteral stone. Pt also has hx of chronic diverticulosis. In 2017 she had pelvic abscess requiring ex lap, drainage, and appendectomy.     Review of Systems   Constitutional:  Positive for appetite change, chills and fever.   HENT:  Positive for voice change. Negative for ear discharge, ear pain, postnasal drip, rhinorrhea,  sinus pressure and sore throat.    Eyes:  Negative for pain and visual disturbance.   Respiratory:  Negative for cough and shortness of breath.    Cardiovascular:  Negative for chest pain.   Gastrointestinal:  Positive for abdominal pain and nausea. Negative for blood in stool, diarrhea and vomiting.   Endocrine: Negative for polydipsia and polyuria.   Genitourinary:  Negative for difficulty urinating, dysuria and hematuria.   Musculoskeletal:  Negative for neck pain and neck stiffness.   Skin:  Negative for color change and rash.   Neurological:  Positive for headaches.   Hematological:  Negative for adenopathy.   All other systems reviewed and are negative.    Medical History Reviewed by provider this encounter:  Tobacco  Allergies  Meds  Problems  Med Hx  Surg Hx  Fam Hx       Current Outpatient Medications on File Prior to Visit   Medication Sig Dispense Refill    amoxicillin (AMOXIL) 500 mg capsule TAKE 4 CAPSULES BY MOUTH 1 (ONE) TIME FOR 1 DOSE TAKE 30 MINUTES BEFORE DENTAL PROCEDURE.      atorvastatin (LIPITOR) 20 mg tablet Take 1 tablet (20 mg total) by mouth daily at bedtime 90 tablet 1    Azelaic Acid 15 % cream       Multiple Vitamins-Calcium (One-A-Day Womens Formula) TABS Take 1 tablet by mouth daily 30 tablet 6    mupirocin (BACTROBAN) 2 % ointment Apply topically 2 (two) times a day 15 g 1    Omega-3 Fatty Acids (Fish Oil) 1200 MG CAPS Take by mouth      omeprazole (PriLOSEC) 40 MG capsule daily      rizatriptan (MAXALT) 5 mg tablet TAKE 1 TABLET (5 MG TOTAL) BY MOUTH ONCE AS NEEDED FOR MIGRAINE MAY REPEAT IN 2 HOURS IF NECESSARY 18 tablet 1     No current facility-administered medications on file prior to visit.      Social History     Tobacco Use    Smoking status: Former     Current packs/day: 0.00     Average packs/day: 1 pack/day for 15.0 years (15.0 ttl pk-yrs)     Types: Cigarettes     Start date: 1997     Quit date: 2012     Years since quittin.7    Smokeless tobacco:  Never   Vaping Use    Vaping status: Never Used   Substance and Sexual Activity    Alcohol use: Yes     Alcohol/week: 1.0 standard drink of alcohol     Types: 1 Glasses of wine per week     Comment:  socially    Drug use: Never    Sexual activity: Yes     Partners: Male     Birth control/protection: None     Comment: pt. declines std/hiv testing         Objective     /72   Pulse 75   Temp 97.8 °F (36.6 °C)   Wt 69.4 kg (153 lb)   SpO2 99%   BMI 27.54 kg/m²     Physical Exam  Vitals and nursing note reviewed.   Constitutional:       General: She is not in acute distress.     Appearance: Normal appearance. She is not toxic-appearing or diaphoretic.   HENT:      Head: Normocephalic and atraumatic.      Right Ear: External ear normal.      Left Ear: External ear normal.      Nose: Nose normal. No congestion or rhinorrhea.      Mouth/Throat:      Mouth: Mucous membranes are moist.      Pharynx: No oropharyngeal exudate or posterior oropharyngeal erythema.   Eyes:      General: No scleral icterus.        Right eye: No discharge.         Left eye: No discharge.      Conjunctiva/sclera: Conjunctivae normal.   Cardiovascular:      Rate and Rhythm: Normal rate and regular rhythm.      Heart sounds: Normal heart sounds. No murmur heard.     No friction rub. No gallop.   Pulmonary:      Effort: Pulmonary effort is normal. No respiratory distress.      Breath sounds: Normal breath sounds. No wheezing, rhonchi or rales.   Abdominal:      General: Bowel sounds are normal. There is no distension.      Palpations: Abdomen is soft.      Tenderness: There is abdominal tenderness in the suprapubic area and left lower quadrant. There is no right CVA tenderness, left CVA tenderness, guarding or rebound.   Musculoskeletal:         General: No swelling or deformity.      Cervical back: No tenderness.      Right lower leg: No edema.      Left lower leg: No edema.   Lymphadenopathy:      Cervical: No cervical adenopathy.   Skin:      General: Skin is warm and dry.      Capillary Refill: Capillary refill takes less than 2 seconds.      Coloration: Skin is not jaundiced.      Findings: No bruising or lesion.   Neurological:      Mental Status: She is alert.      Motor: No weakness.      Gait: Gait normal.   Psychiatric:         Mood and Affect: Mood normal.         Behavior: Behavior normal.         Thought Content: Thought content normal.         Judgment: Judgment normal.           Diamante Marroquin MD    PGY-3

## 2024-09-18 ENCOUNTER — HOSPITAL ENCOUNTER (OUTPATIENT)
Dept: CT IMAGING | Facility: HOSPITAL | Age: 69
Discharge: HOME | End: 2024-09-18
Payer: COMMERCIAL

## 2024-09-18 DIAGNOSIS — N20.1 URETEROLITHIASIS: ICD-10-CM

## 2024-09-18 DIAGNOSIS — K57.92 DIVERTICULITIS: ICD-10-CM

## 2024-09-18 DIAGNOSIS — R10.819 SUPRAPUBIC TENDERNESS: ICD-10-CM

## 2024-09-18 DIAGNOSIS — R10.32 LLQ PAIN: ICD-10-CM

## 2024-09-18 LAB — BACTERIA UR CULT: NORMAL

## 2024-09-18 PROCEDURE — 74177 CT ABD & PELVIS W/CONTRAST: CPT

## 2024-09-18 RX ADMIN — IOHEXOL 50 ML: 350 INJECTION, SOLUTION INTRAVENOUS at 07:30

## 2024-09-20 DIAGNOSIS — R17 SERUM TOTAL BILIRUBIN ELEVATED: Primary | ICD-10-CM

## 2024-09-24 ENCOUNTER — OFFICE VISIT (OUTPATIENT)
Age: 69
End: 2024-09-24
Payer: COMMERCIAL

## 2024-09-24 VITALS
BODY MASS INDEX: 26.44 KG/M2 | HEART RATE: 78 BPM | HEIGHT: 63 IN | WEIGHT: 149.2 LBS | RESPIRATION RATE: 18 BRPM | DIASTOLIC BLOOD PRESSURE: 80 MMHG | OXYGEN SATURATION: 98 % | TEMPERATURE: 98 F | SYSTOLIC BLOOD PRESSURE: 138 MMHG

## 2024-09-24 DIAGNOSIS — G43.E09 CHRONIC MIGRAINE WITH AURA WITHOUT STATUS MIGRAINOSUS, NOT INTRACTABLE: ICD-10-CM

## 2024-09-24 DIAGNOSIS — R17 SERUM TOTAL BILIRUBIN ELEVATED: ICD-10-CM

## 2024-09-24 DIAGNOSIS — H54.61 VISION LOSS OF RIGHT EYE: ICD-10-CM

## 2024-09-24 DIAGNOSIS — H53.9 VISION CHANGES: ICD-10-CM

## 2024-09-24 DIAGNOSIS — H53.149 PHOTOPHOBIA: ICD-10-CM

## 2024-09-24 DIAGNOSIS — K57.92 DIVERTICULITIS: Primary | ICD-10-CM

## 2024-09-24 DIAGNOSIS — R20.2 RIGHT HAND PARESTHESIA: ICD-10-CM

## 2024-09-24 PROCEDURE — 99213 OFFICE O/P EST LOW 20 MIN: CPT

## 2024-09-24 PROCEDURE — G2211 COMPLEX E/M VISIT ADD ON: HCPCS

## 2024-09-24 RX ORDER — TOPIRAMATE 25 MG/1
25 TABLET, FILM COATED ORAL
Qty: 30 TABLET | Refills: 1 | Status: SHIPPED | OUTPATIENT
Start: 2024-09-24

## 2024-09-24 RX ORDER — CLINDAMYCIN PHOSPHATE 10 UG/ML
LOTION TOPICAL
COMMUNITY
Start: 2024-09-17

## 2024-09-24 RX ORDER — TOPIRAMATE 25 MG/1
25 TABLET, FILM COATED ORAL 2 TIMES DAILY
Qty: 120 TABLET | Refills: 0 | Status: CANCELLED | OUTPATIENT
Start: 2024-09-24

## 2024-09-24 NOTE — PATIENT INSTRUCTIONS
Please check your blood pressure when you are having symptoms.   Please schedule migraine OMT appointment.   Please go for non-fasting labwork before your next appointment.   Please start taking topamax before bed.   Please start tracking your migraines with a headache-tracker, either on paper or via smart phone anil.

## 2024-09-24 NOTE — PROGRESS NOTES
Ambulatory Visit  Name: Lester Love      : 1955      MRN: [id number]  Encounter Provider: Diamante Marroquin MD  Encounter Date: 2024   Encounter department: [facility]    Assessment & Plan  Diverticulitis  Pt presents for f/u diverticulitis. Abdominal pain resolved s/p abd.   Pt having diarrhea, likely side effect of abx.          Chronic migraine with aura without status migrainosus, not intractable  Pt experiencing 12 migraine days per month with aura, photophobia, neck pain, and loss of vision in R eye.   Headaches are increasing in frequency and having more severe, new, acute symptoms, especially loss of vision and R hand tingling   Initiate migraine prophylaxis with topiramate 25 mg qhs   MRI brain and orbits ordered for worsening migraines with red flag symptoms (new neurologic symptoms, progressively worsening symptoms)   Pt to keep BP log and migraine journal when she experiences symptoms   Referral for migraine OMT     Orders:    topiramate (TOPAMAX) 25 mg tablet; Take 1 tablet (25 mg total) by mouth daily at bedtime    MRI brain and orbits wo and w contrast; Future    Vision changes  Pt experiencing 12 migraine days per month with aura, photophobia, neck pain, and loss of vision in R eye.   Headaches are increasing in frequency and having more severe, new, acute symptoms, especially loss of vision and R hand tingling   Initiate migraine prophylaxis with topiramate 25 mg qhs   MRI brain and orbits ordered for worsening migraines with red flag symptoms (new neurologic symptoms, progressively worsening symptoms)   Pt to keep BP log and migraine journal when she experiences symptoms   Referral for migraine OMT     Orders:    MRI brain and orbits wo and w contrast; Future    Vision loss of right eye  Pt experiencing 12 migraine days per month with aura, photophobia, neck pain, and loss of vision in R eye.   Headaches are increasing in frequency and having more  severe, new, acute symptoms, especially loss of vision and R hand tingling   Initiate migraine prophylaxis with topiramate 25 mg qhs   MRI brain and orbits ordered for worsening migraines with red flag symptoms (new neurologic symptoms, progressively worsening symptoms)   Pt to keep BP log and migraine journal when she experiences symptoms   Referral for migraine OMT     Orders:    MRI brain and orbits wo and w contrast; Future    Photophobia  Pt experiencing 12 migraine days per month with aura, photophobia, neck pain, and loss of vision in R eye.   Headaches are increasing in frequency and having more severe, new, acute symptoms, especially loss of vision and R hand tingling   Initiate migraine prophylaxis with topiramate 25 mg qhs   MRI brain and orbits ordered for worsening migraines with red flag symptoms (new neurologic symptoms, progressively worsening symptoms)   Pt to keep BP log and migraine journal when she experiences symptoms   Referral for migraine OMT     Orders:    MRI brain and orbits wo and w contrast; Future    Serum total bilirubin elevated  Pt w/ hx of chronically elevated bilirubin, recently exacerbated in setting of diverticulitis   Suspect Gilbert's syndrome   F/u repeat liver enzymes, bilirubin previously ordered   Referral for migraine OMT          Right hand paresthesia  Pt experiencing 12 migraine days per month with aura, photophobia, neck pain, and loss of vision in R eye.   Headaches are increasing in frequency and having more severe, new, acute symptoms, especially loss of vision and R hand tingling   Initiate migraine prophylaxis with topiramate 25 mg qhs   MRI brain and orbits ordered for worsening migraines with red flag symptoms (new neurologic symptoms, progressively worsening symptoms)   Pt to keep BP log and migraine journal when she experiences symptoms   Referral for migraine OMT     Orders:    MRI brain and orbits wo and w contrast; Future      Return in approximately 4  weeks for f/u headaches and labwork        Nutrition Assessment and Intervention:     Reviewed food recall journal      Physical Activity Assessment and Intervention:    Activity journal reviewed      Emotional and Mental Well-being, Sleep, Connectedness Assessment and Intervention:    Sleep/stress assessment performed      History of Present Illness     HPI    Patient presents today for f/u abdominal pain. She was seen 1 week ago and reported acute abdominal pain with associated nausea, chills, cold sweats, LLQ and suprapubic tenderness to palpation. CT abdomen demonstrated acute mid descending colon diverticulitis without abscess or fistula. She was started on ciprofloxacin and metronidazole 7 days ago.     Today, patient reports   - feeling better, discomfort has improved   - having some diarrhea (no blood)     Headaches:   - R sided   - unable to see out of her R eye when she has the headache (blurry)   - floaters, rainbows   - was picking weeds on Wednesday, bending down, started getting the symptoms of a migraine   - PRN abortive medication helps, takes it about about three times a week   - lots of tightness in the back of her neck   - occasional n/v   - feels they're getting worse/more frequent, but has been having them since the 80s   - stabbing pain, continuous, throbbing   - also gets tingling in her R hand occasionally     Review of Systems   Constitutional:  Negative for chills and fever.   HENT:  Negative for ear pain and sore throat.    Eyes:  Positive for photophobia and visual disturbance. Negative for pain.   Respiratory:  Negative for cough and shortness of breath.    Cardiovascular:  Negative for chest pain and palpitations.   Gastrointestinal:  Positive for diarrhea, nausea and vomiting. Negative for abdominal pain.   Genitourinary:  Negative for dysuria and hematuria.   Musculoskeletal:  Positive for neck pain. Negative for arthralgias and back pain.   Skin:  Negative for color change and rash.  "  Neurological:  Positive for headaches. Negative for seizures and syncope.   All other systems reviewed and are negative.    Medical History Reviewed by provider this encounter:  Tobacco  Allergies  Meds  Problems  Med Hx  Surg Hx  Fam Hx       Current Outpatient Medications on File Prior to Visit   Medication Sig Dispense Refill    atorvastatin (LIPITOR) 20 mg tablet Take 1 tablet (20 mg total) by mouth daily at bedtime 90 tablet 1    Azelaic Acid 15 % cream       clindamycin (CLEOCIN T) 1 % lotion APPLY TO ACNE DAILY OR TWICE A DAY      Multiple Vitamins-Calcium (One-A-Day Womens Formula) TABS Take 1 tablet by mouth daily 30 tablet 6    mupirocin (BACTROBAN) 2 % ointment Apply topically 2 (two) times a day 15 g 1    Omega-3 Fatty Acids (Fish Oil) 1200 MG CAPS Take by mouth      omeprazole (PriLOSEC) 40 MG capsule daily      rizatriptan (MAXALT) 5 mg tablet TAKE 1 TABLET (5 MG TOTAL) BY MOUTH ONCE AS NEEDED FOR MIGRAINE MAY REPEAT IN 2 HOURS IF NECESSARY 18 tablet 1     No current facility-administered medications on file prior to visit.      Social History     Tobacco Use    Smoking status: Former     Current packs/day: 0.00     Average packs/day: 1 pack/day for 15.0 years (15.0 ttl pk-yrs)     Types: Cigarettes     Start date: 1997     Quit date: 2012     Years since quittin.7    Smokeless tobacco: Never   Vaping Use    Vaping status: Never Used   Substance and Sexual Activity    Alcohol use: Yes     Alcohol/week: 1.0 standard drink of alcohol     Types: 1 Glasses of wine per week     Comment:  socially    Drug use: Never    Sexual activity: Yes     Partners: Male     Birth control/protection: None     Comment: pt. declines std/hiv testing         Objective     /80   Pulse 78   Temp 98 °F (36.7 °C)   Resp 18   Ht 5' 2.5\" (1.588 m)   Wt 67.7 kg (149 lb 3.2 oz)   SpO2 98%   BMI 26.85 kg/m²     Physical Exam  Vitals and nursing note reviewed.   Constitutional:       General: She is " not in acute distress.     Appearance: Normal appearance. She is not ill-appearing, toxic-appearing or diaphoretic.   HENT:      Head: Normocephalic and atraumatic.      Right Ear: External ear normal.      Left Ear: External ear normal.      Nose: Nose normal. No congestion or rhinorrhea.      Mouth/Throat:      Mouth: Mucous membranes are moist.   Eyes:      General: No scleral icterus.        Right eye: No discharge.         Left eye: No discharge.      Conjunctiva/sclera: Conjunctivae normal.   Cardiovascular:      Rate and Rhythm: Normal rate and regular rhythm.      Heart sounds: Normal heart sounds. No murmur heard.     No friction rub. No gallop.   Pulmonary:      Effort: Pulmonary effort is normal. No respiratory distress.      Breath sounds: Normal breath sounds. No wheezing, rhonchi or rales.   Abdominal:      General: Bowel sounds are normal. There is no distension.      Palpations: Abdomen is soft.      Tenderness: There is no abdominal tenderness. There is no guarding or rebound.   Musculoskeletal:         General: No swelling or deformity.      Cervical back: No tenderness.      Right lower leg: No edema.      Left lower leg: No edema.   Lymphadenopathy:      Cervical: No cervical adenopathy.   Skin:     General: Skin is warm and dry.      Capillary Refill: Capillary refill takes less than 2 seconds.      Coloration: Skin is not jaundiced.      Findings: No bruising or lesion.   Neurological:      Mental Status: She is alert.      Motor: No weakness.      Gait: Gait normal.   Psychiatric:         Mood and Affect: Mood normal.         Behavior: Behavior normal.         Thought Content: Thought content normal.         Judgment: Judgment normal.         Diamante Marroquin MD    PGY-3

## 2024-09-26 ENCOUNTER — RA CDI HCC (OUTPATIENT)
Dept: OTHER | Facility: HOSPITAL | Age: 69
End: 2024-09-26

## 2024-10-09 ENCOUNTER — APPOINTMENT (EMERGENCY)
Dept: RADIOLOGY | Facility: HOSPITAL | Age: 69
End: 2024-10-09
Payer: COMMERCIAL

## 2024-10-09 ENCOUNTER — HOSPITAL ENCOUNTER (EMERGENCY)
Facility: HOSPITAL | Age: 69
Discharge: HOME | End: 2024-10-09
Attending: STUDENT IN AN ORGANIZED HEALTH CARE EDUCATION/TRAINING PROGRAM
Payer: COMMERCIAL

## 2024-10-09 VITALS
RESPIRATION RATE: 18 BRPM | TEMPERATURE: 98.1 F | OXYGEN SATURATION: 97 % | HEART RATE: 73 BPM | DIASTOLIC BLOOD PRESSURE: 87 MMHG | SYSTOLIC BLOOD PRESSURE: 179 MMHG

## 2024-10-09 DIAGNOSIS — S70.00XA CONTUSION OF HIP: Primary | ICD-10-CM
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
## 2024-10-09 PROCEDURE — 99284 EMERGENCY DEPT VISIT MOD MDM: CPT

## 2024-10-09 PROCEDURE — 73502 X-RAY EXAM HIP UNI 2-3 VIEWS: CPT

## 2024-10-09 PROCEDURE — 72100 X-RAY EXAM L-S SPINE 2/3 VWS: CPT

## 2024-10-10 NOTE — ED PROVIDER NOTES
Time reflects when diagnosis was documented in both MDM as applicable and the Disposition within this note       Time User Action Codes Description Comment    10/9/2024  2:23 PM Ilsa Marie Add [S70.00XA] Contusion of hip     10/9/2024  2:24 PM Ilsa Marie Add [W19.XXXA] Fall           ED Disposition       ED Disposition   Discharge    Condition   Stable    Date/Time   Wed Oct 9, 2024  2:22 PM    Comment   Lester Love discharge to home/self care.                   Assessment & Plan       Medical Decision Making  Differential diagnosis includes but not limited to: Contusion, bursitis, lumbar sprain, compression fracture.  Hip fracture less likely.    Problems Addressed:  Contusion of hip: acute illness or injury  Fall: acute illness or injury    Amount and/or Complexity of Data Reviewed  Radiology: ordered and independent interpretation performed. Decision-making details documented in ED Course.             Medications - No data to display    ED Risk Strat Scores                                               History of Present Illness       Chief Complaint   Patient presents with    Fall     Pt presents after fall, c/o lower back pain/L buttocks pain. -HS, -LOC, -thinners.        Past Medical History:   Diagnosis Date    Allergic rhinitis     last assessed: 9/29/2016    Anxiety     last assessed: 8/22/2017    Disease of thyroid gland     Multiple Nodules    Diverticulosis     last assessed: 10/7/2013    GERD (gastroesophageal reflux disease)     Hydronephrosis, right     last assessed: 5/22/2013    Hyperlipidemia     last assessed 8/22/2017    Hyperthyroidism     Hypokalemia     last assessed: 3/24/2015    IBS (irritable bowel syndrome)     last assessed: 8/22/2017    Internal hemorrhoids     last assessed: 10/7/2013    Kidney stone     Migraines     last assessed: 8/22/2017    Nephrolithiasis     Osteoarthrosis of knee     last assessed: 2/28/2017    Osteopenia     last assessed: 8/22/2017    PONV  (postoperative nausea and vomiting)     Renal calculi     last assessed: 4/29/2016    Renal cyst     last assessed: 7/15/2015    Total bilirubin, elevated     last assessed: 8/22/2017    Uterine leiomyoma       Past Surgical History:   Procedure Laterality Date    APPENDECTOMY      BREAST BIOPSY Right 1998    benign    core bx    BREAST EXCISIONAL BIOPSY Left 1999    benign    COLONOSCOPY      FL RETROGRADE PYELOGRAM  5/22/2024    HAND SURGERY Left     Ganglion cyst    KNEE ARTHROSCOPY Left     X3    LAPAROTOMY N/A 10/30/2017    Procedure: LAPAROTOMY EXPLORATORY, DRAINAGE PELVIC ABSCESS; APPENDECTOMY;  Surgeon: Abdoul Ye DO;  Location: [facility];  Service: General    MYOMECTOMY      RI CYSTO/URETERO W/LITHOTRIPSY &INDWELL STENT INSRT Left 5/22/2024    Procedure: CYSTOSCOPY URETEROSCOPY WITH LITHOTRIPSY HOLMIUM LASER, RETROGRADE PYELOGRAM AND INSERTION STENT URETERAL;  Surgeon: Salinas Schreiber MD;  Location: [facility];  Service: Urology    RI REPAIR FIRST ABDOMINAL WALL HERNIA N/A 3/26/2020    Procedure: INCISIONAL HERNIA REPAIR WITH MESH;  Surgeon: Juvencio Rangel MD;  Location: [facility];  Service: General    SALIVARY GLAND SURGERY Right     Removal for Stones    THYROID SURGERY      biopsy thyroid using percutaneous core needle    TONSILLECTOMY      TUBAL LIGATION      UTERINE FIBROID SURGERY      embolization( Myomectomy)      Family History   Problem Relation Age of Onset    Cancer Mother         Gastric    Stomach cancer Mother     Deep vein thrombosis Mother     Other Father         CVA    Heart disease Father     Stroke Father     Diabetes Sister     Arthritis Sister     Kidney disease Sister     Osteoporosis Sister     Osteoporosis Sister     No Known Problems Sister     No Known Problems Maternal Grandmother     No Known Problems Paternal Grandmother     Hypertension Brother     No Known Problems Maternal Aunt     No Known Problems Maternal Aunt     No Known Problems Maternal Aunt     No Known  Problems Maternal Aunt     Polycystic kidney disease Maternal Aunt     Polycystic kidney disease Maternal Aunt     Heart disease Maternal Uncle 62    Kidney disease Paternal Aunt     Breast cancer Cousin 35    Esophageal cancer Cousin     Esophageal cancer Cousin     Breast cancer Cousin     Breast cancer additional onset Other 57      Social History     Tobacco Use    Smoking status: Former     Current packs/day: 0.00     Average packs/day: 1 pack/day for 15.0 years (15.0 ttl pk-yrs)     Types: Cigarettes     Start date: 1997     Quit date: 2012     Years since quittin.7    Smokeless tobacco: Never   Vaping Use    Vaping status: Never Used   Substance Use Topics    Alcohol use: Yes     Alcohol/week: 1.0 standard drink of alcohol     Types: 1 Glasses of wine per week     Comment:  socially    Drug use: Never      E-Cigarette/Vaping    E-Cigarette Use Never User       E-Cigarette/Vaping Substances      I have reviewed and agree with the history as documented.     69-year-old female presents the emergency department with complaints of back and hip pain after a fall.  States that she was trying to clean one of her windows in the low stepstool when the stepstool moved on the wooden floor and she fell.  States that she landed on her left side striking her buttock and hip on the ground.  Had some intense pain initially which seems to have improved.  Denies head injury.  States that she does take a low-dose aspirin daily but has not taken it in the past 3 to 4 days.  Denies headache presently.  Presently with complaints of pain in the lower back and hip which is worse with ambulation.      History provided by:  Patient   used: No    Fall  Mechanism of injury: fall    Incident location:  Home  Arrived directly from scene: yes    Fall:     Fall occurred: from a step stool.    Impact surface:  Hard floor    Point of impact:  Buttocks    Entrapped after fall: no    Suspicion of alcohol use: no     Suspicion of drug use: no    Associated symptoms: back pain    Associated symptoms: no abdominal pain, no chest pain and no neck pain        Review of Systems   Constitutional:  Negative for chills and fever.   HENT:  Negative for congestion, dental problem and sore throat.    Respiratory:  Negative for cough.    Cardiovascular:  Negative for chest pain.   Gastrointestinal:  Negative for abdominal pain.   Musculoskeletal:  Positive for arthralgias (hip pain) and back pain. Negative for neck pain.   Skin:  Negative for rash and wound.   All other systems reviewed and are negative.          Objective       ED Triage Vitals [10/09/24 1257]   Temperature Pulse Blood Pressure Respirations SpO2 Patient Position - Orthostatic VS   98.1 °F (36.7 °C) 73 (!) 179/87 18 97 % Sitting      Temp Source Heart Rate Source BP Location FiO2 (%) Pain Score    Oral Monitor Right arm -- 6      Vitals      Date and Time Temp Pulse SpO2 Resp BP Pain Score FACES Pain Rating User   10/09/24 1257 98.1 °F (36.7 °C) 73 97 % 18 179/87 6 -- AW            Physical Exam  Vitals and nursing note reviewed.   Constitutional:       General: She is not in acute distress.     Appearance: She is well-developed. She is not diaphoretic.   HENT:      Head: Normocephalic and atraumatic.      Right Ear: External ear normal.      Left Ear: External ear normal.      Mouth/Throat:      Mouth: Oropharynx is clear and moist. Mucous membranes are moist.      Pharynx: No oropharyngeal exudate.   Eyes:      Extraocular Movements: Extraocular movements intact.      Conjunctiva/sclera: Conjunctivae normal.      Pupils: Pupils are equal, round, and reactive to light.   Neck:      Vascular: No JVD.      Trachea: No tracheal deviation.   Cardiovascular:      Rate and Rhythm: Normal rate.   Pulmonary:      Effort: Pulmonary effort is normal. No respiratory distress.   Abdominal:      General: Bowel sounds are normal. There is no distension.      Palpations: Abdomen is  soft.      Tenderness: There is no abdominal tenderness. There is no guarding.   Musculoskeletal:         General: No deformity or edema. Normal range of motion.      Lumbar back: Tenderness present. No swelling, edema, deformity, signs of trauma, lacerations, spasms or bony tenderness. Normal range of motion. No scoliosis.        Back:       Left hip: Tenderness present. No deformity, lacerations, bony tenderness or crepitus. Normal range of motion.      Comments: No bruising over the lumbar area or left hip.   Lymphadenopathy:      Cervical: No cervical adenopathy.   Skin:     General: Skin is warm and dry.      Findings: No erythema or rash.   Neurological:      General: No focal deficit present.      Mental Status: She is alert and oriented to person, place, and time.   Psychiatric:         Mood and Affect: Mood and affect and mood normal.         Behavior: Behavior normal.         Results Reviewed       None            XR hip/pelv 2-3 vws left   ED Interpretation by Ilsa Marie PA-C (10/09 1409)   No fracture      Final Interpretation by Ellis Luna MD (10/09 1517)      No acute osseous abnormality.      Computerized Assisted Algorithm (CAA) may have been used to analyze all applicable images.            Workstation performed: [id number]         XR lumbar spine 2 or 3 views   ED Interpretation by Ilsa Marie PA-C (10/09 1409)   No fracture      Final Interpretation by Ellis Luna MD (10/09 1519)      No acute osseous abnormality.   Mild degenerative changes of the lumbar spine.            Workstation performed: [id number]             Procedures    ED Medication and Procedure Management   Prior to Admission Medications   Prescriptions Last Dose Informant Patient Reported? Taking?   Azelaic Acid 15 % cream  Self Yes No   Multiple Vitamins-Calcium (One-A-Day Womens Formula) TABS  Self No No   Sig: Take 1 tablet by mouth daily   Omega-3 Fatty Acids (Fish Oil) 1200 MG CAPS  Self Yes No   Sig: Take by mouth    atorvastatin (LIPITOR) 20 mg tablet   No No   Sig: Take 1 tablet (20 mg total) by mouth daily at bedtime   clindamycin (CLEOCIN T) 1 % lotion   Yes No   Sig: APPLY TO ACNE DAILY OR TWICE A DAY   mupirocin (BACTROBAN) 2 % ointment   No No   Sig: Apply topically 2 (two) times a day   omeprazole (PriLOSEC) 40 MG capsule  Self Yes No   Sig: daily   rizatriptan (MAXALT) 5 mg tablet   No No   Sig: TAKE 1 TABLET (5 MG TOTAL) BY MOUTH ONCE AS NEEDED FOR MIGRAINE MAY REPEAT IN 2 HOURS IF NECESSARY   topiramate (TOPAMAX) 25 mg tablet   No No   Sig: Take 1 tablet (25 mg total) by mouth daily at bedtime      Facility-Administered Medications: None     Discharge Medication List as of 10/9/2024  2:24 PM        CONTINUE these medications which have NOT CHANGED    Details   atorvastatin (LIPITOR) 20 mg tablet Take 1 tablet (20 mg total) by mouth daily at bedtime, Starting Thu 5/16/2024, Normal      Azelaic Acid 15 % cream Starting Sat 3/11/2023, Historical Med      clindamycin (CLEOCIN T) 1 % lotion APPLY TO ACNE DAILY OR TWICE A DAY, Historical Med      Multiple Vitamins-Calcium (One-A-Day Womens Formula) TABS Take 1 tablet by mouth daily, Starting Tue 9/26/2023, Normal      mupirocin (BACTROBAN) 2 % ointment Apply topically 2 (two) times a day, Starting Tue 9/17/2024, Normal      Omega-3 Fatty Acids (Fish Oil) 1200 MG CAPS Take by mouth, Historical Med      omeprazole (PriLOSEC) 40 MG capsule daily, Starting Sat 1/1/2022, Historical Med      rizatriptan (MAXALT) 5 mg tablet TAKE 1 TABLET (5 MG TOTAL) BY MOUTH ONCE AS NEEDED FOR MIGRAINE MAY REPEAT IN 2 HOURS IF NECESSARY, Starting Mon 7/15/2024, Normal      topiramate (TOPAMAX) 25 mg tablet Take 1 tablet (25 mg total) by mouth daily at bedtime, Starting Tue 9/24/2024, Normal           No discharge procedures on file.  ED SEPSIS DOCUMENTATION   Time reflects when diagnosis was documented in both MDM as applicable and the Disposition within this note       Time User Action Codes  Description Comment    10/9/2024  2:23 PM Ilsa Marie [S70.00XA] Contusion of hip     10/9/2024  2:24 PM Ilsa Marie Add [W19.XXXA] Fall                  Ilsa Marie, CORDELIA  10/10/24 1301

## 2024-10-12 NOTE — ED ATTENDING ATTESTATION
I was the supervising/collaborating physician in the Emergency Department.  I acknowledge the AP's documentation and services provided. I was available by phone and in person, if needed, for consultation.    Na Eisenberg MD

## 2024-10-17 ENCOUNTER — HOSPITAL ENCOUNTER (OUTPATIENT)
Dept: MRI IMAGING | Facility: HOSPITAL | Age: 69
Discharge: HOME | End: 2024-10-17
Payer: COMMERCIAL

## 2024-10-17 DIAGNOSIS — H53.9 VISION CHANGES: ICD-10-CM

## 2024-10-17 DIAGNOSIS — G43.E09 CHRONIC MIGRAINE WITH AURA WITHOUT STATUS MIGRAINOSUS, NOT INTRACTABLE: ICD-10-CM

## 2024-10-17 DIAGNOSIS — H54.61 VISION LOSS OF RIGHT EYE: ICD-10-CM

## 2024-10-17 DIAGNOSIS — H53.149 PHOTOPHOBIA: ICD-10-CM

## 2024-10-17 DIAGNOSIS — R20.2 RIGHT HAND PARESTHESIA: ICD-10-CM

## 2024-10-17 PROCEDURE — 70543 MRI ORBT/FAC/NCK W/O &W/DYE: CPT

## 2024-10-17 PROCEDURE — 70553 MRI BRAIN STEM W/O & W/DYE: CPT

## 2024-10-17 PROCEDURE — A9585 GADOBUTROL INJECTION: HCPCS

## 2024-10-17 RX ORDER — GADOBUTROL 604.72 MG/ML
7 INJECTION INTRAVENOUS
Status: COMPLETED | OUTPATIENT
Start: 2024-10-17 | End: 2024-10-17

## 2024-10-17 RX ADMIN — GADOBUTROL 7 ML: 604.72 INJECTION INTRAVENOUS at 22:00

## 2024-10-28 ENCOUNTER — HOSPITAL ENCOUNTER (OUTPATIENT)
Dept: RADIOLOGY | Facility: HOSPITAL | Age: 69
Discharge: HOME | End: 2024-10-28
Payer: COMMERCIAL

## 2024-10-28 DIAGNOSIS — Z87.442 PERSONAL HISTORY OF KIDNEY STONES: ICD-10-CM

## 2024-10-28 PROCEDURE — 74018 RADEX ABDOMEN 1 VIEW: CPT

## 2024-11-12 ENCOUNTER — OFFICE VISIT (OUTPATIENT)
Age: 69
End: 2024-11-12
Payer: COMMERCIAL

## 2024-11-12 VITALS
SYSTOLIC BLOOD PRESSURE: 118 MMHG | HEIGHT: 63 IN | DIASTOLIC BLOOD PRESSURE: 62 MMHG | BODY MASS INDEX: 27.32 KG/M2 | WEIGHT: 154.2 LBS | HEART RATE: 71 BPM | OXYGEN SATURATION: 97 %

## 2024-11-12 DIAGNOSIS — J02.9 SORE THROAT: ICD-10-CM

## 2024-11-12 DIAGNOSIS — J06.9 VIRAL URI WITH COUGH: Primary | ICD-10-CM

## 2024-11-12 LAB
S PYO AG THROAT QL: NEGATIVE
SARS-COV-2 AG UPPER RESP QL IA: NEGATIVE
SL AMB POCT RAPID FLU A: NEGATIVE
SL AMB POCT RAPID FLU B: NEGATIVE
VALID CONTROL: NORMAL

## 2024-11-12 PROCEDURE — 87804 INFLUENZA ASSAY W/OPTIC: CPT

## 2024-11-12 PROCEDURE — 87811 SARS-COV-2 COVID19 W/OPTIC: CPT

## 2024-11-12 PROCEDURE — 99213 OFFICE O/P EST LOW 20 MIN: CPT

## 2024-11-12 PROCEDURE — 87880 STREP A ASSAY W/OPTIC: CPT

## 2024-11-12 RX ORDER — FLUTICASONE PROPIONATE 50 MCG
2 SPRAY, SUSPENSION (ML) NASAL 2 TIMES DAILY
Qty: 15.8 ML | Refills: 1 | Status: SHIPPED | OUTPATIENT
Start: 2024-11-12 | End: 2024-11-22

## 2024-11-12 RX ORDER — BENZONATATE 100 MG/1
100 CAPSULE ORAL 3 TIMES DAILY PRN
Qty: 20 CAPSULE | Refills: 0 | Status: SHIPPED | OUTPATIENT
Start: 2024-11-12 | End: 2024-11-14 | Stop reason: SDUPTHER

## 2024-11-12 NOTE — PROGRESS NOTES
"Ambulatory Visit  Name: Lester Love      : 1955      MRN: [id number]  Encounter Provider: Diamante Marroquin MD  Encounter Date: 2024   Encounter department: [facility]    Assessment & Plan  Viral URI with cough  Patient presents for evaluation of a dry \"hacky\" cough, pressure in her ears, sore throat and runny nose that started 2 days ago. She has not taken any over the counter medications for her symptoms. Two days ago she had a temperature of 101 but has not had any fevers since then. No sick contacts, but she has not gotten her flu or COVID shot yet this year. COVID, Flu, rapid strep tests were all negative today.    Symptoms that started 2 days ago most consistent with Viral URI. Supportive care as ordered below.     Plan:  -Start Flonase and Tessalon perles    Orders:    fluticasone (FLONASE) 50 mcg/act nasal spray; 2 sprays into each nostril 2 (two) times a day for 10 days    benzonatate (TESSALON PERLES) 100 mg capsule; Take 1 capsule (100 mg total) by mouth 3 (three) times a day as needed for cough    Sore throat  Patient presents for evaluation of a dry \"hacky\" cough, pressure in her ears, sore throat and runny nose that started 2 days ago. She has not taken any over the counter medications for her symptoms. Two days ago she had a temperature of 101 but has not had any fevers since then. No sick contacts, but she has not gotten her flu or COVID shot yet this year. COVID, Flu, rapid strep tests were all negative today.      Symptoms that started 2 days ago most consistent with Viral URI. Supportive care as ordered below.    Plan:  -Start Flonase and Tessalon perles    Orders:    POCT Rapid Covid Ag    POCT rapid flu A and B    POCT rapid ANTIGEN strepA         Patient to call/return to clinic if symptoms do not begin to improve in 1 week.     History of Present Illness       HPI  69 year old female presents for dry \"hacky\" cough, pressure in her ears, and runny " nose that started 2 days ago. She has not taken any over the counter medications for her symptoms. On Sunday she had a temperature of 101 but has not had any fevers since then. No sick contacts, but she has not gotten her flu or COVID shot yet this year. She has a history of recurrent bronchitis, she has not smoked in 15 + years.     Review of Systems   Constitutional:  Positive for chills and fever. Negative for appetite change and fatigue.   HENT:  Positive for congestion, ear pain, sore throat and voice change.    Eyes:  Negative for pain.   Respiratory:  Positive for cough. Negative for shortness of breath.    Cardiovascular:  Negative for chest pain and palpitations.   Gastrointestinal:  Negative for constipation, diarrhea, nausea and vomiting.   Genitourinary:  Negative for dysuria and frequency.   Musculoskeletal:  Negative for arthralgias.   Skin:  Negative for rash.   Allergic/Immunologic: Negative for environmental allergies.   Neurological:  Negative for light-headedness and headaches.     Current Outpatient Medications on File Prior to Visit   Medication Sig Dispense Refill    atorvastatin (LIPITOR) 20 mg tablet Take 1 tablet (20 mg total) by mouth daily at bedtime 90 tablet 1    Azelaic Acid 15 % cream       clindamycin (CLEOCIN T) 1 % lotion APPLY TO ACNE DAILY OR TWICE A DAY      Multiple Vitamins-Calcium (One-A-Day Womens Formula) TABS Take 1 tablet by mouth daily 30 tablet 6    mupirocin (BACTROBAN) 2 % ointment Apply topically 2 (two) times a day 15 g 1    Omega-3 Fatty Acids (Fish Oil) 1200 MG CAPS Take by mouth      omeprazole (PriLOSEC) 40 MG capsule daily      rizatriptan (MAXALT) 5 mg tablet TAKE 1 TABLET (5 MG TOTAL) BY MOUTH ONCE AS NEEDED FOR MIGRAINE MAY REPEAT IN 2 HOURS IF NECESSARY 18 tablet 1    [DISCONTINUED] topiramate (TOPAMAX) 25 mg tablet Take 1 tablet (25 mg total) by mouth daily at bedtime 30 tablet 1     No current facility-administered medications on file prior to visit.     "    Objective     /62   Pulse 71   Ht 5' 2.5\" (1.588 m)   Wt 69.9 kg (154 lb 3.2 oz)   SpO2 97%   BMI 27.75 kg/m²     Physical Exam  Vitals and nursing note reviewed.   Constitutional:       General: She is not in acute distress.     Appearance: She is well-developed. She is not ill-appearing or toxic-appearing.   HENT:      Head: Normocephalic and atraumatic.      Right Ear: A middle ear effusion is present. There is no impacted cerumen. Tympanic membrane is not erythematous, retracted or bulging.      Left Ear: Tympanic membrane, ear canal and external ear normal. There is no impacted cerumen. Tympanic membrane is not erythematous, retracted or bulging.   Eyes:      Conjunctiva/sclera: Conjunctivae normal.   Cardiovascular:      Rate and Rhythm: Normal rate and regular rhythm.      Heart sounds: No murmur heard.  Pulmonary:      Effort: Pulmonary effort is normal. No respiratory distress.      Breath sounds: Normal breath sounds.   Abdominal:      Palpations: Abdomen is soft.      Tenderness: There is no abdominal tenderness.   Musculoskeletal:         General: No swelling.      Cervical back: Neck supple.   Skin:     General: Skin is warm and dry.      Capillary Refill: Capillary refill takes less than 2 seconds.   Neurological:      Mental Status: She is alert.   Psychiatric:         Mood and Affect: Mood normal.       Raffy Haley MS3    Diamante Marroquni MD    PGY-3  "

## 2024-11-14 ENCOUNTER — TELEPHONE (OUTPATIENT)
Age: 69
End: 2024-11-14

## 2024-11-14 DIAGNOSIS — J20.9 ACUTE BRONCHITIS, UNSPECIFIED ORGANISM: Primary | ICD-10-CM

## 2024-11-14 RX ORDER — AZITHROMYCIN 250 MG/1
TABLET, FILM COATED ORAL
Qty: 6 TABLET | Refills: 0 | Status: SHIPPED | OUTPATIENT
Start: 2024-11-14 | End: 2024-11-19

## 2024-11-14 RX ORDER — BENZONATATE 200 MG/1
200 CAPSULE ORAL 3 TIMES DAILY PRN
Qty: 30 CAPSULE | Refills: 0 | Status: SHIPPED | OUTPATIENT
Start: 2024-11-14

## 2024-11-14 RX ORDER — GUAIFENESIN 600 MG/1
1200 TABLET, EXTENDED RELEASE ORAL EVERY 12 HOURS SCHEDULED
Qty: 30 TABLET | Refills: 0 | Status: SHIPPED | OUTPATIENT
Start: 2024-11-14

## 2024-11-14 NOTE — TELEPHONE ENCOUNTER
Patient called she was seen 11/12, she said since appointment her cough is more in her chest and has cough is mucous sometimes, sinus congestion and ear pressure.  She is using Flonase and Tessalon perles, she asked if a cough syrup or another medication could be send to [facility] pharmacy in [place].    Thank you

## 2024-11-17 ENCOUNTER — OFFICE VISIT (OUTPATIENT)
Dept: URGENT CARE | Facility: CLINIC | Age: 69
End: 2024-11-17
Payer: COMMERCIAL

## 2024-11-17 VITALS
HEART RATE: 66 BPM | WEIGHT: 148 LBS | RESPIRATION RATE: 20 BRPM | DIASTOLIC BLOOD PRESSURE: 60 MMHG | TEMPERATURE: 98.1 F | OXYGEN SATURATION: 100 % | SYSTOLIC BLOOD PRESSURE: 112 MMHG | BODY MASS INDEX: 26.64 KG/M2

## 2024-11-17 DIAGNOSIS — J06.9 VIRAL UPPER RESPIRATORY TRACT INFECTION: Primary | ICD-10-CM

## 2024-11-17 PROCEDURE — 99213 OFFICE O/P EST LOW 20 MIN: CPT | Performed by: NURSE PRACTITIONER

## 2024-11-17 PROCEDURE — S9083 URGENT CARE CENTER GLOBAL: HCPCS | Performed by: NURSE PRACTITIONER

## 2024-11-17 NOTE — PROGRESS NOTES
[facility]        NAME: Lester Love is a 69 y.o. female  : 1955    MRN: [id number]  DATE: 2024  TIME: 11:49 AM    Assessment and Plan   Viral upper respiratory tract infection [J06.9]  1. Viral upper respiratory tract infection  guaifenesin-codeine (GUAIFENESIN AC) 100-10 MG/5ML liquid            Patient Instructions     Patient Instructions   --Rest, drink plenty of fluids  --Consider vitamin C, zinc, quercetin, and vitamin D to help strengthen your immune system  --For cough, you can take an OTC expectorant such as plain Robitussion or Mucinex (active ingredient guaifenesin).  A spoonful of honey at bedtime may also be helpful, as may a prescription cough medicine.  Also recommended is the use of a cool mist humidifier (with or without Vicks) in the bedroom at night.   --For sore throat, you can take OTC lozenges, use warm gargles (salt water or apple cider vinegar and honey), herbal teas, or an OTC throat spray (Chloraseptic).  --For nasal/sinus congestion, helpful measures include steam, warm compresses, an OTC saline nasal spray or Neti pot, or an OTC decongestant (such as Sudafed).  The decongestant should be avoided, however, if you are under 6 years of age, or have a history of high blood pressure or heart disease.  In addition, an OTC nasal steroid (Flonase, Nasocort) or nasal decongestant (Afrin, Romario-synephrine) may be taken.  The nasal steroid should be used at bedtime, after the saline nasal spray. The nasal decongestant should not be taken more than 3 days consecutively in order to prevent rebound congestion.   --For nasal drainage, postnasal drip, sneezing and itching, an OTC antihistamine (Allegra, Benadryl, etc) can be taken.   --You can take Tylenol or Motrin/Advil as needed for fever, headache, body aches. Motrin/Advil should be avoided, however, if you have a history of heart disease, bleeding ulcers, or if you take blood thinners.   --You should contact your  primary care provider and/or go to the ER if your symptoms are not improved or get worse over the next 7 days.  This includes new onset fever, localized ear pain, sinus pain, as well as worsening cough, chest pain, shortness of breath, or significant weakness/fatigue.          If tests have been performed at [facility], our office will contact you with results if changes need to be made to the care plan discussed with you at the visit.  You can review your full results on [facility].    Chief Complaint     Chief Complaint   Patient presents with    Cold Like Symptoms     Sx started last week, cough, laryngitis, sneezing. Saw PCP  5 days ago, and made a phone call after visit.  Given Zithromax, Flonase nasal spray Tessalon Pearls, Guaifenesin. No better. Still coughing.          History of Present Illness       Here with complaints of minimally productive cough, nasal congestion, rhinorrhea, sore throat x 1 week.   Initial fever but not since. Mild headaches, body aches.  Seen by PCP earlier this week.  Negative for strep, flu, COVID.  Rx's given.     Cough no better despite Tessalon, guaifenesin,  Flonase, Z-pack.    No dyspnea, wheezing.    No GI complaints.         Review of Systems   Review of Systems   Constitutional:  Negative for fever.   HENT:  Positive for rhinorrhea and sore throat.    Respiratory:  Positive for cough. Negative for shortness of breath and wheezing.    Cardiovascular:  Negative for chest pain.   Gastrointestinal:  Negative for abdominal pain, nausea and vomiting.         Current Medications       Current Outpatient Medications:     atorvastatin (LIPITOR) 20 mg tablet, Take 1 tablet (20 mg total) by mouth daily at bedtime, Disp: 90 tablet, Rfl: 1    Azelaic Acid 15 % cream, , Disp: , Rfl:     azithromycin (Zithromax) 250 mg tablet, Take 2 tablets (500 mg total) by mouth daily for 1 day, THEN 1 tablet (250 mg total) daily for 4 days., Disp: 6 tablet, Rfl: 0    benzonatate (TESSALON) 200  MG capsule, Take 1 capsule (200 mg total) by mouth 3 (three) times a day as needed for cough, Disp: 30 capsule, Rfl: 0    clindamycin (CLEOCIN T) 1 % lotion, APPLY TO ACNE DAILY OR TWICE A DAY, Disp: , Rfl:     fluticasone (FLONASE) 50 mcg/act nasal spray, 2 sprays into each nostril 2 (two) times a day for 10 days, Disp: 15.8 mL, Rfl: 1    guaiFENesin (MUCINEX) 600 mg 12 hr tablet, Take 2 tablets (1,200 mg total) by mouth every 12 (twelve) hours, Disp: 30 tablet, Rfl: 0    guaifenesin-codeine (GUAIFENESIN AC) 100-10 MG/5ML liquid, Take 10 mL by mouth 3 (three) times a day as needed for cough TAKE 1-2 TSP BY  MOUTH EVERY 4-6 HOURS AS NEEDED FOR COUGH, Disp: 180 mL, Rfl: 0    Multiple Vitamins-Calcium (One-A-Day Womens Formula) TABS, Take 1 tablet by mouth daily, Disp: 30 tablet, Rfl: 6    mupirocin (BACTROBAN) 2 % ointment, Apply topically 2 (two) times a day, Disp: 15 g, Rfl: 1    Omega-3 Fatty Acids (Fish Oil) 1200 MG CAPS, Take by mouth, Disp: , Rfl:     omeprazole (PriLOSEC) 40 MG capsule, daily, Disp: , Rfl:     rizatriptan (MAXALT) 5 mg tablet, TAKE 1 TABLET (5 MG TOTAL) BY MOUTH ONCE AS NEEDED FOR MIGRAINE MAY REPEAT IN 2 HOURS IF NECESSARY, Disp: 18 tablet, Rfl: 1    Current Allergies     Allergies as of 11/17/2024    (No Known Allergies)            The following portions of the patient's history were reviewed and updated as appropriate: allergies, current medications, past family history, past medical history, past social history, past surgical history and problem list.     Past Medical History:   Diagnosis Date    Allergic rhinitis     last assessed: 9/29/2016    Anxiety     last assessed: 8/22/2017    Disease of thyroid gland     Multiple Nodules    Diverticulosis     last assessed: 10/7/2013    GERD (gastroesophageal reflux disease)     Hydronephrosis, right     last assessed: 5/22/2013    Hyperlipidemia     last assessed 8/22/2017    Hyperthyroidism     Hypokalemia     last assessed: 3/24/2015    IBS  (irritable bowel syndrome)     last assessed: 8/22/2017    Internal hemorrhoids     last assessed: 10/7/2013    Kidney stone     Migraines     last assessed: 8/22/2017    Nephrolithiasis     Osteoarthrosis of knee     last assessed: 2/28/2017    Osteopenia     last assessed: 8/22/2017    PONV (postoperative nausea and vomiting)     Renal calculi     last assessed: 4/29/2016    Renal cyst     last assessed: 7/15/2015    Total bilirubin, elevated     last assessed: 8/22/2017    Uterine leiomyoma        Past Surgical History:   Procedure Laterality Date    APPENDECTOMY      BREAST BIOPSY Right 1998    benign    core bx    BREAST EXCISIONAL BIOPSY Left 1999    benign    COLONOSCOPY      FL RETROGRADE PYELOGRAM  5/22/2024    HAND SURGERY Left     Ganglion cyst    KNEE ARTHROSCOPY Left     X3    LAPAROTOMY N/A 10/30/2017    Procedure: LAPAROTOMY EXPLORATORY, DRAINAGE PELVIC ABSCESS; APPENDECTOMY;  Surgeon: Abdoul Ye DO;  Location: [facility];  Service: General    MYOMECTOMY      IL CYSTO/URETERO W/LITHOTRIPSY &INDWELL STENT INSRT Left 5/22/2024    Procedure: CYSTOSCOPY URETEROSCOPY WITH LITHOTRIPSY HOLMIUM LASER, RETROGRADE PYELOGRAM AND INSERTION STENT URETERAL;  Surgeon: Salinas Schreiber MD;  Location: [facility];  Service: Urology    IL REPAIR FIRST ABDOMINAL WALL HERNIA N/A 3/26/2020    Procedure: INCISIONAL HERNIA REPAIR WITH MESH;  Surgeon: Juvencio Rangel MD;  Location: [facility];  Service: General    SALIVARY GLAND SURGERY Right     Removal for Stones    THYROID SURGERY      biopsy thyroid using percutaneous core needle    TONSILLECTOMY      TUBAL LIGATION      UTERINE FIBROID SURGERY      embolization( Myomectomy)       Family History   Problem Relation Age of Onset    Cancer Mother         Gastric    Stomach cancer Mother     Deep vein thrombosis Mother     Other Father         CVA    Heart disease Father     Stroke Father     Diabetes Sister     Arthritis Sister     Kidney disease Sister     Osteoporosis  Sister     Osteoporosis Sister     No Known Problems Sister     No Known Problems Maternal Grandmother     No Known Problems Paternal Grandmother     Hypertension Brother     No Known Problems Maternal Aunt     No Known Problems Maternal Aunt     No Known Problems Maternal Aunt     No Known Problems Maternal Aunt     Polycystic kidney disease Maternal Aunt     Polycystic kidney disease Maternal Aunt     Heart disease Maternal Uncle 62    Kidney disease Paternal Aunt     Breast cancer Cousin 35    Esophageal cancer Cousin     Esophageal cancer Cousin     Breast cancer Cousin     Breast cancer additional onset Other 57         Medications have been verified.        Objective   /60   Pulse 66   Temp 98.1 °F (36.7 °C) (Temporal)   Resp 20   Wt 67.1 kg (148 lb)   SpO2 100%   BMI 26.64 kg/m²   No LMP recorded. Patient is postmenopausal.       Physical Exam     Physical Exam  Constitutional:       General: She is not in acute distress.     Appearance: Normal appearance. She is well-developed. She is not ill-appearing, toxic-appearing or diaphoretic.   HENT:      Head: Normocephalic.      Right Ear: Tympanic membrane, ear canal and external ear normal.      Left Ear: Tympanic membrane, ear canal and external ear normal.      Nose: Congestion and rhinorrhea present.      Comments: No sinus tenderness.      Mouth/Throat:      Pharynx: No oropharyngeal exudate or posterior oropharyngeal erythema.   Eyes:      General:         Right eye: No discharge.         Left eye: No discharge.   Cardiovascular:      Rate and Rhythm: Normal rate and regular rhythm.      Heart sounds: Normal heart sounds. No murmur heard.  Pulmonary:      Effort: Pulmonary effort is normal. No respiratory distress.      Breath sounds: Normal breath sounds. No stridor. No wheezing, rhonchi or rales.      Comments: Breathing easy.  Occasional cough noted only.    Chest:      Chest wall: No tenderness.   Abdominal:      Tenderness: There is no  abdominal tenderness.   Musculoskeletal:      Cervical back: Neck supple.   Lymphadenopathy:      Cervical: No cervical adenopathy.   Skin:     General: Skin is warm and dry.   Neurological:      Mental Status: She is alert and oriented to person, place, and time.      Deep Tendon Reflexes: Reflexes are normal and symmetric.   Psychiatric:         Mood and Affect: Mood normal.

## 2024-11-17 NOTE — PATIENT INSTRUCTIONS
--Rest, drink plenty of fluids  --Consider vitamin C, zinc, quercetin, and vitamin D to help strengthen your immune system  --For cough, you can take an OTC expectorant such as plain Robitussion or Mucinex (active ingredient guaifenesin).  A spoonful of honey at bedtime may also be helpful, as may a prescription cough medicine.  Also recommended is the use of a cool mist humidifier (with or without Vicks) in the bedroom at night.   --For sore throat, you can take OTC lozenges, use warm gargles (salt water or apple cider vinegar and honey), herbal teas, or an OTC throat spray (Chloraseptic).  --For nasal/sinus congestion, helpful measures include steam, warm compresses, an OTC saline nasal spray or Neti pot, or an OTC decongestant (such as Sudafed).  The decongestant should be avoided, however, if you are under 6 years of age, or have a history of high blood pressure or heart disease.  In addition, an OTC nasal steroid (Flonase, Nasocort) or nasal decongestant (Afrin, Romario-synephrine) may be taken.  The nasal steroid should be used at bedtime, after the saline nasal spray. The nasal decongestant should not be taken more than 3 days consecutively in order to prevent rebound congestion.   --For nasal drainage, postnasal drip, sneezing and itching, an OTC antihistamine (Allegra, Benadryl, etc) can be taken.   --You can take Tylenol or Motrin/Advil as needed for fever, headache, body aches. Motrin/Advil should be avoided, however, if you have a history of heart disease, bleeding ulcers, or if you take blood thinners.   --You should contact your primary care provider and/or go to the ER if your symptoms are not improved or get worse over the next 7 days.  This includes new onset fever, localized ear pain, sinus pain, as well as worsening cough, chest pain, shortness of breath, or significant weakness/fatigue.

## 2024-11-18 ENCOUNTER — RESULTS FOLLOW-UP (OUTPATIENT)
Dept: UROLOGY | Facility: CLINIC | Age: 69
End: 2024-11-18

## 2024-11-19 ENCOUNTER — OFFICE VISIT (OUTPATIENT)
Dept: UROLOGY | Facility: CLINIC | Age: 69
End: 2024-11-19
Payer: COMMERCIAL

## 2024-11-19 VITALS
OXYGEN SATURATION: 97 % | BODY MASS INDEX: 26.93 KG/M2 | HEIGHT: 63 IN | WEIGHT: 152 LBS | DIASTOLIC BLOOD PRESSURE: 80 MMHG | HEART RATE: 64 BPM | SYSTOLIC BLOOD PRESSURE: 110 MMHG

## 2024-11-19 DIAGNOSIS — Z87.442 PERSONAL HISTORY OF KIDNEY STONES: ICD-10-CM

## 2024-11-19 DIAGNOSIS — Z87.442 HISTORY OF KIDNEY STONES: Primary | ICD-10-CM

## 2024-11-19 DIAGNOSIS — E78.2 MIXED HYPERLIPIDEMIA: ICD-10-CM

## 2024-11-19 PROCEDURE — 99214 OFFICE O/P EST MOD 30 MIN: CPT | Performed by: UROLOGY

## 2024-11-19 RX ORDER — ATORVASTATIN CALCIUM 20 MG/1
20 TABLET, FILM COATED ORAL
Qty: 90 TABLET | Refills: 1 | Status: CANCELLED | OUTPATIENT
Start: 2024-11-19

## 2024-11-19 NOTE — PROGRESS NOTES
Lester Love is a(n) 69 y.o. female. , :  1955    Subjective     Assessment:  The encounter diagnosis was History of kidney stones.  69-year-old woman.  Had ureteroscopy for left proximal ureteral stone in May.  Took her own stent out.  Doing well.  No flank pain or blood in the urine.  Follow-up imaging today shows that there may be a stone in the upper and mid pole of the left kidney each 3 mm.  Interestingly that KUB was in late October but in mid September she fell off a radiator and had a CT scan done which they did compare when they evaluated the KUB and there was definitely no stone.  I even demonstrated this to the patient today on imaging.  She is also convinced that there was not a stone back in September.  She is okay to continue to watch this.    Plan: She will call if she develops any flank pain but otherwise we will see her back with a KUB in 1 year.  She will contin     Radiology  10/28/24 I personally reviewed these images. Official reading brought over and any variance by me noted after. Date of personal review:24     History  Left proximal; ureteral stone     Past  surgical history   2024 5 mm left proximal ureteral stone.  Successful crush.    Prior Visits  OR    2024 OV Salinas Schreiber  69-year-old woman.  Had ureteroscopy for left proximal ureteral stone in May.  Took her own stent out.  Doing well.  No flank pain or blood in the urine.  Follow-up imaging today shows that there may be a stone in the upper and mid pole of the left kidney each 3 mm.  Interestingly that KUB was in late October but in mid September she fell off a radiator and had a CT scan done which they did compare when they evaluated the KUB and there was definitely no stone.  I even demonstrated this to the patient today on imaging.  She is also convinced that there was not a stone back in September.  She is okay to continue to watch this.    Plan: She will call if she develops any  "flank pain but otherwise we will see her back with a KUB in 1 year.  She will continue to drink plenty of fluids including lemon water to try to prevent a new stone.    Review of Systems    No results found for: \"PSA\"  No results found for: \"TESTOSTERONE\"  No components found for: \"CR\"  No results found for: \"HBA1C\"    Objective     /80 (BP Location: Left arm, Patient Position: Sitting, Cuff Size: Standard)   Pulse 64   Ht 5' 2.5\" (1.588 m)   Wt 68.9 kg (152 lb)   SpO2 97%   BMI 27.36 kg/m²     Physical Exam      Salinas Abdoul, [facility]  "

## 2024-11-19 NOTE — PATIENT INSTRUCTIONS
Continue to drink plenty of fluids to avoid new stones.  Call if any flank pain or blood in the urine.

## 2024-11-19 NOTE — TELEPHONE ENCOUNTER
Medication: atorvastatin (LIPITOR) 20 mg tablet     Dose/Frequency: bakari 1 tablet (20 mg total) by mouth daily at bedtime     Quantity: 90    Pharmacy: [id number] - [place]     Office:   [x] PCP/Provider -   [] Speciality/Provider -     Does the patient have enough for 3 days?   [x] Yes   [] No - Send as HP to POD

## 2024-11-25 RX ORDER — ATORVASTATIN CALCIUM 20 MG/1
20 TABLET, FILM COATED ORAL
Qty: 100 TABLET | Refills: 1 | Status: SHIPPED | OUTPATIENT
Start: 2024-11-25

## 2024-11-25 NOTE — ADDENDUM NOTE
Addended by: CHELSEY KONG on: 11/25/2024 03:03 PM     Modules accepted: Orders     complains of pain/discomfort

## 2024-11-29 ENCOUNTER — OFFICE VISIT (OUTPATIENT)
Age: 69
End: 2024-11-29
Payer: COMMERCIAL

## 2024-11-29 VITALS
SYSTOLIC BLOOD PRESSURE: 112 MMHG | HEIGHT: 63 IN | OXYGEN SATURATION: 94 % | WEIGHT: 151.6 LBS | BODY MASS INDEX: 26.86 KG/M2 | DIASTOLIC BLOOD PRESSURE: 64 MMHG | TEMPERATURE: 98.7 F | HEART RATE: 56 BPM

## 2024-11-29 DIAGNOSIS — R13.10 DYSPHAGIA, UNSPECIFIED TYPE: Primary | ICD-10-CM

## 2024-11-29 DIAGNOSIS — R05.1 ACUTE COUGH: ICD-10-CM

## 2024-11-29 PROCEDURE — 99213 OFFICE O/P EST LOW 20 MIN: CPT

## 2024-11-29 PROCEDURE — G2211 COMPLEX E/M VISIT ADD ON: HCPCS

## 2024-11-29 RX ORDER — BENZONATATE 100 MG/1
100 CAPSULE ORAL 3 TIMES DAILY PRN
Qty: 20 CAPSULE | Refills: 0 | Status: SHIPPED | OUTPATIENT
Start: 2024-11-29

## 2024-11-29 RX ORDER — CHOLECALCIFEROL (VITAMIN D3) 50 MCG
1 TABLET ORAL DAILY
COMMUNITY

## 2024-11-29 NOTE — PROGRESS NOTES
Name: Lester Love      : 1955      MRN: [id number]  Encounter Provider: Janell Sykes DO  Encounter Date: 2024   Encounter department: [facility]  :  Assessment & Plan  Dysphagia, unspecified type  Presents with cough that started on , has been recently having difficulty swallowing both solids and liquids as well as her sputum  -Has tried multiple cough remedies without much relief  -Cough may be related to difficulty swallowing has this could be triggering her cough reflex  -Ordered barium swallow to further evaluate  -Advised patient to make appointment with her GI doctor, may need EGD  -Provided strict return/ED precautions  Orders:    FL barium swallow ROUTINE esophagus; Future    Acute cough  Ordered Tessalon Perles for symptomatic relief of cough, see dysphagia  Orders:    benzonatate (TESSALON PERLES) 100 mg capsule; Take 1 capsule (100 mg total) by mouth 3 (three) times a day as needed for cough    Follow-up in 2 weeks to assess above, or sooner as needed.       History of Present Illness     HPI  Patient is a 69-year-old female with past medical history of GERD, multiple thyroid nodules, and osteopenia that presents to the clinic today for continued cough.  Patient states that her cough started on .  Initially did not have mucus production but then started to have mucus production, unsure about the color is.  States that her cough and hacking cough was relentless at first, has improved somewhat but still has not gone away.  She has tried multiple medications, such as Tessalon Perles, Robitussin, Vicks spray, Flonase, Tylenol flu/cough medicine, all of which have not helped fully.  Patient also describes a burning sensation in her epigastric/midline chest/.  States she has been having difficulty with swallowing both solids and liquids, as well as her spit.  She has never had anything like this before.  She states she had an EGD done prior but it  was many years ago and states there were no abnormalities from the EGD.  Recently saw her GI doctor and they decided to keep her omeprazole as a daily medication for now versus taking every other day.  Patient also notes that she has nodules on her thyroid for several years, has been monitoring this with ultrasounds.  Notes to have pressure in both of your ears, denies decreased hearing or drainage from ears.  Does have some runny nose and postnasal drip as well as watery diarrhea for the past 5 to 6 days typically once per day but intermittent with normal BMs as well.  Patient thinks this may be due to the other medications that she has been taking recently. She states her mother passed away from gastric cancer.Patient denies recent unintentional weight loss/weight gain.       Review of Systems   Constitutional:  Negative for chills, fever and unexpected weight change.   HENT:  Positive for postnasal drip, rhinorrhea and trouble swallowing. Negative for ear pain, hearing loss and sore throat.    Eyes:  Negative for pain and visual disturbance.   Respiratory:  Positive for cough. Negative for shortness of breath.    Cardiovascular:  Negative for chest pain and palpitations.   Gastrointestinal:  Positive for diarrhea. Negative for abdominal pain, nausea and vomiting.   Genitourinary:  Negative for dysuria and hematuria.   Musculoskeletal:  Negative for arthralgias and back pain.   Skin:  Negative for color change and rash.   Neurological:  Negative for seizures and syncope.   Psychiatric/Behavioral:  Negative for agitation and behavioral problems.    All other systems reviewed and are negative.    Current Outpatient Medications on File Prior to Visit   Medication Sig Dispense Refill    atorvastatin (LIPITOR) 20 mg tablet Take 1 tablet (20 mg total) by mouth daily at bedtime 100 tablet 1    Azelaic Acid 15 % cream       Cholecalciferol (Vitamin D3) 50 MCG (2000 UT) TABS Take 1 capsule by mouth daily      clindamycin  "(CLEOCIN T) 1 % lotion APPLY TO ACNE DAILY OR TWICE A DAY      fluticasone (FLONASE) 50 mcg/act nasal spray 2 sprays into each nostril 2 (two) times a day for 10 days 15.8 mL 1    MULTIPLE VITAMIN PO Take by mouth daily      Multiple Vitamins-Calcium (One-A-Day Womens Formula) TABS Take 1 tablet by mouth daily 30 tablet 6    mupirocin (BACTROBAN) 2 % ointment Apply topically 2 (two) times a day 15 g 1    Omega-3 Fatty Acids (Fish Oil) 1200 MG CAPS Take by mouth      omeprazole (PriLOSEC) 40 MG capsule daily      rizatriptan (MAXALT) 5 mg tablet TAKE 1 TABLET (5 MG TOTAL) BY MOUTH ONCE AS NEEDED FOR MIGRAINE MAY REPEAT IN 2 HOURS IF NECESSARY 18 tablet 1    [DISCONTINUED] benzonatate (TESSALON) 200 MG capsule Take 1 capsule (200 mg total) by mouth 3 (three) times a day as needed for cough 30 capsule 0    [DISCONTINUED] guaiFENesin (MUCINEX) 600 mg 12 hr tablet Take 2 tablets (1,200 mg total) by mouth every 12 (twelve) hours 30 tablet 0    [DISCONTINUED] guaifenesin-codeine (GUAIFENESIN AC) 100-10 MG/5ML liquid Take 10 mL by mouth 3 (three) times a day as needed for cough TAKE 1-2 TSP BY  MOUTH EVERY 4-6 HOURS AS NEEDED FOR COUGH 180 mL 0     No current facility-administered medications on file prior to visit.      Social History     Tobacco Use    Smoking status: Former     Current packs/day: 0.00     Average packs/day: 1 pack/day for 15.0 years (15.0 ttl pk-yrs)     Types: Cigarettes     Start date: 1997     Quit date: 2012     Years since quittin.9    Smokeless tobacco: Never   Vaping Use    Vaping status: Never Used   Substance and Sexual Activity    Alcohol use: Yes     Alcohol/week: 1.0 standard drink of alcohol     Types: 1 Glasses of wine per week     Comment:  socially    Drug use: Never    Sexual activity: Yes     Partners: Male     Birth control/protection: None     Comment: pt. declines std/hiv testing        Objective   /64   Pulse 56   Temp 98.7 °F (37.1 °C)   Ht 5' 2.5\" (1.588 m)  "  Wt 68.8 kg (151 lb 9.6 oz)   SpO2 94%   BMI 27.29 kg/m²      Physical Exam  Vitals reviewed.   Constitutional:       Appearance: Normal appearance. She is not ill-appearing, toxic-appearing or diaphoretic.   HENT:      Head: Normocephalic and atraumatic.      Right Ear: Tympanic membrane, ear canal and external ear normal.      Left Ear: Tympanic membrane, ear canal and external ear normal.      Nose: Congestion present.      Mouth/Throat:      Mouth: Mucous membranes are moist.      Pharynx: Oropharynx is clear. No oropharyngeal exudate or posterior oropharyngeal erythema.      Comments: During thyroid exam, patient had difficulty swallowing spit and had to induce swallowing phase twice  Eyes:      Extraocular Movements: Extraocular movements intact.      Conjunctiva/sclera: Conjunctivae normal.      Pupils: Pupils are equal, round, and reactive to light.   Neck:      Comments: Mildly enlarged lymph node on left compared to right with some tenderness to palpation, though denies any pain without palpation; no overlying erythema  Cardiovascular:      Rate and Rhythm: Normal rate and regular rhythm.      Pulses: Normal pulses.      Heart sounds: Normal heart sounds.   Pulmonary:      Effort: Pulmonary effort is normal.      Breath sounds: Normal breath sounds. No wheezing, rhonchi or rales.   Abdominal:      General: Abdomen is flat. There is no distension.      Palpations: Abdomen is soft.      Tenderness: There is no abdominal tenderness. There is no guarding or rebound.      Comments: No tenderness to epigastric region or along the midline anterior chest wall   Musculoskeletal:         General: Normal range of motion.      Cervical back: Normal range of motion. Tenderness present.      Right lower leg: No edema.      Left lower leg: No edema.   Lymphadenopathy:      Cervical: Cervical adenopathy present.   Skin:     General: Skin is warm.      Capillary Refill: Capillary refill takes less than 2 seconds.       Coloration: Skin is not jaundiced.      Findings: No erythema.   Neurological:      General: No focal deficit present.      Mental Status: She is alert and oriented to person, place, and time. Mental status is at baseline.   Psychiatric:         Mood and Affect: Mood normal.         Behavior: Behavior normal.

## 2024-12-04 ENCOUNTER — HOSPITAL ENCOUNTER (OUTPATIENT)
Dept: RADIOLOGY | Facility: HOSPITAL | Age: 69
Discharge: HOME | End: 2024-12-04
Payer: COMMERCIAL

## 2024-12-04 DIAGNOSIS — R13.10 DYSPHAGIA, UNSPECIFIED TYPE: ICD-10-CM

## 2024-12-04 PROCEDURE — 74220 X-RAY XM ESOPHAGUS 1CNTRST: CPT

## 2024-12-05 ENCOUNTER — NURSE TRIAGE (OUTPATIENT)
Age: 69
End: 2024-12-05

## 2024-12-05 ENCOUNTER — RESULTS FOLLOW-UP (OUTPATIENT)
Dept: PEDIATRICS CLINIC | Facility: CLINIC | Age: 69
End: 2024-12-05

## 2024-12-05 NOTE — TELEPHONE ENCOUNTER
"Patient called in regarding ongoing sore throat issue and dry cough. She said she is not coughing up green mucous. She feels as if there is something swollen in the back of her throat. She completed the barium swallow study. She is asking if she should go to an allergist. OV tomorrow 0340. Patient wanted to keep appointment for 12/16/24 in case it is needed.    Reason for Disposition   Requesting regular office appointment and adult stable (no new symptoms, not getting worse)    Answer Assessment - Initial Assessment Questions  1. REASON FOR CALL: \"What is the main reason for your call?\" or \"How can I best help you?\"      On going throat soreness   2. SYMPTOMS : \"Do you have any symptoms?\"       Strange feeling in back of throat, dry cough, green mucous  3. OTHER QUESTIONS: \"Do you have any other questions?\"      Results of barium swallow and allergy consult    Protocols used: Information Only Call - No Triage-Adult-[place]    "

## 2024-12-06 ENCOUNTER — OFFICE VISIT (OUTPATIENT)
Age: 69
End: 2024-12-06
Payer: COMMERCIAL

## 2024-12-06 VITALS
SYSTOLIC BLOOD PRESSURE: 122 MMHG | HEART RATE: 72 BPM | BODY MASS INDEX: 26.82 KG/M2 | DIASTOLIC BLOOD PRESSURE: 62 MMHG | OXYGEN SATURATION: 98 % | TEMPERATURE: 97.5 F | WEIGHT: 149 LBS

## 2024-12-06 DIAGNOSIS — R13.10 DYSPHAGIA, UNSPECIFIED TYPE: ICD-10-CM

## 2024-12-06 DIAGNOSIS — R05.1 ACUTE COUGH: Primary | ICD-10-CM

## 2024-12-06 PROCEDURE — 99213 OFFICE O/P EST LOW 20 MIN: CPT

## 2024-12-06 PROCEDURE — G2211 COMPLEX E/M VISIT ADD ON: HCPCS

## 2024-12-06 RX ORDER — AZITHROMYCIN 250 MG/1
TABLET, FILM COATED ORAL
Qty: 6 TABLET | Refills: 0 | Status: SHIPPED | OUTPATIENT
Start: 2024-12-06 | End: 2024-12-11

## 2024-12-06 NOTE — PROGRESS NOTES
Name: Lester Love      : 1955      MRN: [id number]  Encounter Provider: Janell Sykes DO  Encounter Date: 2024   Encounter department: [facility]  :  Assessment & Plan  Acute cough  Patient is experiencing dry nonproductive cough worse at night since  with dysphagia to both solids and liquids. She also coughed up green and yellow sputum this past week. Cough drops, syrups, and supportive care provide minimal and momentary relief. Barium swallow showed no esophageal reflux, and PPI treatment has provided no relief. Given length of cough and green/yellow sputum, this could be due to walking pneumonia   - Start Azithromycin 250mg BID for 1 day, then qd for 4 days  - Continue supportive measures to provide cough relief  - Schedule follow up with GI to further evaluate dysphagia symptoms  - F/U  to reevaluate cough  Orders:    azithromycin (Zithromax) 250 mg tablet; Take 2 tablets (500 mg total) by mouth daily for 1 day, THEN 1 tablet (250 mg total) daily for 4 days.    Dysphagia, unspecified type  See acute cough              History of Present Illness     Lester is a 69 year old female with a past medical history of GERD, cough, and dysphagia presenting for follow up on chronic cough and dysphagia s/p barium esophogram. She has been experiencing a dry, nonproductive cough since  which is worst at night. She receives brief relief with tessalon perles, cough drops and cough syrup. She states that she has only coughed up sputum one time since onset of cough, this past week and the sputum was yellow/green in color. She is not experiencing any fever, shortness of breath, weight loss, night sweats, or chills. She is also having associated dysphagia with both solids and liquids. She takes a PPI daily, eats a noninflammatory diet, and gets regular exercise. The esophogram showed tertiary contractions and a sliding hiatal hernia. It also showed no gastroesophageal  reflux. She has noted no improvement or worsening of any of these symptoms since onset.      Review of Systems   Constitutional: Negative.    HENT:  Positive for voice change.         Hoarseness associated with coughing   Eyes: Negative.    Respiratory:  Positive for cough. Negative for shortness of breath.    Cardiovascular: Negative.    Gastrointestinal: Negative.    Endocrine: Negative.    Genitourinary: Negative.    Musculoskeletal: Negative.    Skin: Negative.    Allergic/Immunologic: Negative.    Neurological: Negative.    Hematological: Negative.    Psychiatric/Behavioral: Negative.       Current Outpatient Medications on File Prior to Visit   Medication Sig Dispense Refill    atorvastatin (LIPITOR) 20 mg tablet Take 1 tablet (20 mg total) by mouth daily at bedtime 100 tablet 1    Azelaic Acid 15 % cream       benzonatate (TESSALON PERLES) 100 mg capsule Take 1 capsule (100 mg total) by mouth 3 (three) times a day as needed for cough 20 capsule 0    Cholecalciferol (Vitamin D3) 50 MCG (2000 UT) TABS Take 1 capsule by mouth daily      clindamycin (CLEOCIN T) 1 % lotion APPLY TO ACNE DAILY OR TWICE A DAY      MULTIPLE VITAMIN PO Take by mouth daily      Multiple Vitamins-Calcium (One-A-Day Womens Formula) TABS Take 1 tablet by mouth daily 30 tablet 6    mupirocin (BACTROBAN) 2 % ointment Apply topically 2 (two) times a day 15 g 1    Omega-3 Fatty Acids (Fish Oil) 1200 MG CAPS Take by mouth      omeprazole (PriLOSEC) 40 MG capsule daily      rizatriptan (MAXALT) 5 mg tablet TAKE 1 TABLET (5 MG TOTAL) BY MOUTH ONCE AS NEEDED FOR MIGRAINE MAY REPEAT IN 2 HOURS IF NECESSARY 18 tablet 1    fluticasone (FLONASE) 50 mcg/act nasal spray 2 sprays into each nostril 2 (two) times a day for 10 days 15.8 mL 1     No current facility-administered medications on file prior to visit.      Social History     Tobacco Use    Smoking status: Former     Current packs/day: 0.00     Average packs/day: 1 pack/day for 15.0 years (15.0  ttl pk-yrs)     Types: Cigarettes     Start date: 1997     Quit date: 2012     Years since quittin.9    Smokeless tobacco: Never   Vaping Use    Vaping status: Never Used   Substance and Sexual Activity    Alcohol use: Yes     Alcohol/week: 1.0 standard drink of alcohol     Types: 1 Glasses of wine per week     Comment:  socially    Drug use: Never    Sexual activity: Yes     Partners: Male     Birth control/protection: None     Comment: pt. declines std/hiv testing        Objective   /62   Pulse 72   Temp 97.5 °F (36.4 °C)   Wt 67.6 kg (149 lb)   SpO2 98%   BMI 26.82 kg/m²      Physical Exam  Constitutional:       Appearance: Normal appearance.   HENT:      Head: Normocephalic.      Nose: Nose normal.      Mouth/Throat:      Mouth: Mucous membranes are dry.      Pharynx: Posterior oropharyngeal erythema present.   Eyes:      Extraocular Movements: Extraocular movements intact.      Conjunctiva/sclera: Conjunctivae normal.   Cardiovascular:      Rate and Rhythm: Normal rate and regular rhythm.      Pulses: Normal pulses.      Heart sounds: Normal heart sounds. No murmur heard.     No friction rub. No gallop.   Pulmonary:      Effort: Pulmonary effort is normal.      Breath sounds: Normal breath sounds. No wheezing, rhonchi or rales.      Comments: Dry nonproductive cough  Abdominal:      General: Abdomen is flat. Bowel sounds are normal.      Palpations: Abdomen is soft.      Tenderness: There is no abdominal tenderness. There is no guarding.   Musculoskeletal:         General: Normal range of motion.      Cervical back: Normal range of motion.   Skin:     General: Skin is warm.      Capillary Refill: Capillary refill takes less than 2 seconds.   Neurological:      General: No focal deficit present.      Mental Status: She is alert and oriented to person, place, and time.   Psychiatric:         Mood and Affect: Mood normal.         Behavior: Behavior normal.         Thought Content: Thought  content normal.

## 2024-12-09 ENCOUNTER — RA CDI HCC (OUTPATIENT)
Dept: OTHER | Facility: HOSPITAL | Age: 69
End: 2024-12-09

## 2024-12-16 ENCOUNTER — OFFICE VISIT (OUTPATIENT)
Age: 69
End: 2024-12-16
Payer: COMMERCIAL

## 2024-12-16 VITALS
HEART RATE: 99 BPM | BODY MASS INDEX: 26.93 KG/M2 | HEIGHT: 63 IN | TEMPERATURE: 98.7 F | SYSTOLIC BLOOD PRESSURE: 128 MMHG | OXYGEN SATURATION: 99 % | DIASTOLIC BLOOD PRESSURE: 60 MMHG | WEIGHT: 152 LBS

## 2024-12-16 DIAGNOSIS — R05.1 ACUTE COUGH: ICD-10-CM

## 2024-12-16 DIAGNOSIS — R13.10 DYSPHAGIA, UNSPECIFIED TYPE: Primary | ICD-10-CM

## 2024-12-16 PROCEDURE — 99213 OFFICE O/P EST LOW 20 MIN: CPT

## 2024-12-16 PROCEDURE — G2211 COMPLEX E/M VISIT ADD ON: HCPCS

## 2024-12-16 RX ORDER — FLUTICASONE PROPIONATE 50 MCG
2 SPRAY, SUSPENSION (ML) NASAL 2 TIMES DAILY
Qty: 15.8 ML | Refills: 1 | Status: SHIPPED | OUTPATIENT
Start: 2024-12-16 | End: 2024-12-26

## 2024-12-16 RX ORDER — BENZONATATE 100 MG/1
100 CAPSULE ORAL 3 TIMES DAILY PRN
Qty: 20 CAPSULE | Refills: 0 | Status: SHIPPED | OUTPATIENT
Start: 2024-12-16

## 2024-12-16 NOTE — PROGRESS NOTES
Name: Lester Love      : 1955      MRN: [id number]  Encounter Provider: Janell Sykes DO  Encounter Date: 2024   [facility]  :  Assessment & Plan  Acute cough    Orders:  •  benzonatate (TESSALON PERLES) 100 mg capsule; Take 1 capsule (100 mg total) by mouth 3 (three) times a day as needed for cough  •  XR chest pa and lateral; Future  •  fluticasone (FLONASE) 50 mcg/act nasal spray; 2 sprays into each nostril 2 (two) times a day for 10 days  •  Bordetella pertussis / parapertussis PCR; Future    Dysphagia, unspecified type           Presents for follow-up of 1 month history cough and dysphagia.  Prescribed azithromycin at last office visit-patient notes no improvement.  Is currently following with GI for dysphagia, has EGD planned for beginning of January with a follow-up appointment after that.  Cough may be related to postviral syndrome versus pertussis versus pneumonia versus GERD versus LERD.  Ordered chest x-ray, will call patient with results.  Ordered whooping cough PCR.  Refilled Flonase and Tessalon Perles.  Patient requested refill of cough syrup with codeine that she was prescribed for 6 days at an urgent care center about a month ago, however, she notes that this did not help with her cough.  Reviewed PDMP.  Advised patient it would not be advisable to prescribe this at this time as it did not help improve cough.  Educated patient to continue other supportive measures such as gargling with salt water.  Will follow-up with patient about 3 weeks or so, preferably after her EGD and follow-up appointment with GI.  Strict ED/return to office precautions given.     History of Present Illness {?Quick Links Encounters * My Last Note * Last Note in Specialty * Snapshot * Since Last Visit * History :11536}    HPI  Patient is a 68 yo F with PMHx of GERD, allergic rhinitis, and HLD that presents for f/u of cough and dysphagia.  Patient was  "last seen in the office for these issues.  Has been having a cough without mucus production for the past month.  Does not have relief with over-the-counter cough medications, PPI, antibiotic.  Reports that she coughs all throughout the day and describes it as a hacking cough.  States she ran out of Flonase and Tessalon Perles which helped somewhat.  Has chills today and subjective fevers but no documented fevers.  Has seen GI recently for dysphagia, has EGD planned for beginning of January.  Reports she had 2 weeks recently where she stopped taking PPI but had cough prior to this.  Was on PPI for 15 years and has never had a cough like this before.  Nose some swelling in her throat from coughing, felt like she gagged this morning from brushing her teeth.  Reports she was a smoker, stopped 22 years ago, and had about a pack every month for 10 years on and off.  Reports cough has not improved since last office visit, antibiotics did not have any improvement for her.      Review of Systems   Constitutional:  Positive for chills and fever. Negative for unexpected weight change.   HENT:  Positive for rhinorrhea and trouble swallowing. Negative for ear pain and sore throat.    Eyes:  Negative for pain and visual disturbance.   Respiratory:  Positive for cough. Negative for shortness of breath.    Cardiovascular:  Negative for chest pain and palpitations.   Gastrointestinal:  Negative for abdominal pain and vomiting.   Genitourinary:  Negative for dysuria and hematuria.   Musculoskeletal:  Negative for arthralgias and back pain.   Skin:  Negative for color change and rash.   Neurological:  Negative for seizures and syncope.   All other systems reviewed and are negative.      Objective {?Quick Links Trend Vitals * Enter New Vitals * Results Review * Timeline (Adult) * Labs * Imaging * Cardiology * Procedures * Lung Cancer Screening * Surgical eConsent :76195}  /60   Pulse 99   Temp 98.7 °F (37.1 °C)   Ht 5' 2.5\" " (1.588 m)   Wt 68.9 kg (152 lb)   SpO2 99%   BMI 27.36 kg/m²      Physical Exam  Vitals reviewed.   Constitutional:       Appearance: Normal appearance. She is not ill-appearing, toxic-appearing or diaphoretic.   HENT:      Head: Normocephalic and atraumatic.      Right Ear: External ear normal.      Left Ear: External ear normal.      Nose: Rhinorrhea present.      Mouth/Throat:      Mouth: Mucous membranes are moist.      Pharynx: Posterior oropharyngeal erythema present.      Comments: Mild erythema in posterior pharynx  Eyes:      Extraocular Movements: Extraocular movements intact.      Conjunctiva/sclera: Conjunctivae normal.      Pupils: Pupils are equal, round, and reactive to light.   Neck:      Comments: To enlarged lymph nodes on left side of neck, has not decreased since last office visit  Cardiovascular:      Rate and Rhythm: Normal rate and regular rhythm.      Pulses: Normal pulses.      Heart sounds: Normal heart sounds.   Pulmonary:      Effort: Pulmonary effort is normal.      Breath sounds: Normal breath sounds.   Abdominal:      General: Abdomen is flat. There is no distension.      Palpations: Abdomen is soft.      Tenderness: There is no abdominal tenderness. There is no guarding or rebound.   Musculoskeletal:         General: Normal range of motion.      Cervical back: Normal range of motion.      Right lower leg: No edema.      Left lower leg: No edema.   Lymphadenopathy:      Cervical: Cervical adenopathy present.   Skin:     General: Skin is warm.      Capillary Refill: Capillary refill takes less than 2 seconds.      Coloration: Skin is not jaundiced.      Findings: No erythema.   Neurological:      General: No focal deficit present.      Mental Status: She is alert and oriented to person, place, and time. Mental status is at baseline.   Psychiatric:         Mood and Affect: Mood normal.         Behavior: Behavior normal.

## 2024-12-17 ENCOUNTER — HOSPITAL ENCOUNTER (OUTPATIENT)
Dept: RADIOLOGY | Facility: HOSPITAL | Age: 69
Discharge: HOME | End: 2024-12-17
Payer: COMMERCIAL

## 2024-12-17 ENCOUNTER — RESULTS FOLLOW-UP (OUTPATIENT)
Age: 69
End: 2024-12-17

## 2024-12-17 DIAGNOSIS — R05.1 ACUTE COUGH: ICD-10-CM

## 2024-12-17 PROCEDURE — 71046 X-RAY EXAM CHEST 2 VIEWS: CPT

## 2024-12-20 ENCOUNTER — TELEPHONE (OUTPATIENT)
Age: 69
End: 2024-12-20

## 2024-12-20 ENCOUNTER — CLINICAL SUPPORT (OUTPATIENT)
Age: 69
End: 2024-12-20
Payer: COMMERCIAL

## 2024-12-20 DIAGNOSIS — R05.9 COUGH, UNSPECIFIED TYPE: Primary | ICD-10-CM

## 2024-12-20 DIAGNOSIS — R05.1 ACUTE COUGH: Primary | ICD-10-CM

## 2024-12-20 LAB
B PARAPERT DNA UPPER RESP QL NAA+PROBE: NOT DETECTED
B PERT DNA UPPER RESP QL NAA+PROBE: NOT DETECTED
S PYO AG THROAT QL: NEGATIVE
SARS-COV-2 AG UPPER RESP QL IA: NEGATIVE
SL AMB POCT RAPID FLU A: NORMAL
VALID CONTROL: NORMAL

## 2024-12-20 PROCEDURE — 87804 INFLUENZA ASSAY W/OPTIC: CPT

## 2024-12-20 PROCEDURE — 87880 STREP A ASSAY W/OPTIC: CPT

## 2024-12-20 PROCEDURE — 87811 SARS-COV-2 COVID19 W/OPTIC: CPT

## 2024-12-20 PROCEDURE — 87798 DETECT AGENT NOS DNA AMP: CPT | Performed by: FAMILY MEDICINE

## 2024-12-20 PROCEDURE — 87070 CULTURE OTHR SPECIMN AEROBIC: CPT | Performed by: FAMILY MEDICINE

## 2024-12-20 NOTE — TELEPHONE ENCOUNTER
Patient stated she was told by Dr Sykes that she would receive a phone call to schedule follow up covid/flu/strep swabs again. She said she went to the lab for the whooping cough test but they said that has to be done in the office as well.    Patient would like to come in today to be swabbed for all the sicknesses. She said she's been feeling like this since Nov 13 and is hoping to feel better before Christmas.    Please review and call patient.

## 2024-12-22 LAB — BACTERIA THROAT CULT: NORMAL

## 2024-12-23 ENCOUNTER — RESULTS FOLLOW-UP (OUTPATIENT)
Age: 69
End: 2024-12-23

## 2024-12-23 NOTE — TELEPHONE ENCOUNTER
Patient returned call. I relayed the message from Dr. Cui regarding strep results and offered to schedule an appointment. Patient is still experiencing symptoms. She has been on 2 zpaks, cough syrup and tessalon pearls. She is not sure what else would be able to be done in the office. Please advise on further recommendation

## 2024-12-24 DIAGNOSIS — J18.9 WALKING PNEUMONIA: ICD-10-CM

## 2024-12-24 DIAGNOSIS — R05.2 SUBACUTE COUGH: ICD-10-CM

## 2024-12-24 DIAGNOSIS — R50.9 FEVER, UNSPECIFIED FEVER CAUSE: Primary | ICD-10-CM

## 2024-12-24 RX ORDER — MOXIFLOXACIN HYDROCHLORIDE 400 MG/1
400 TABLET ORAL DAILY
Qty: 7 TABLET | Refills: 0 | Status: SHIPPED | OUTPATIENT
Start: 2024-12-24 | End: 2024-12-31

## 2024-12-24 NOTE — PROGRESS NOTES
Considering patient's 5-6 weeks of intermittent fevers and persistent cough, will treat with moxifloxacin for walking pneumonia and obtain CT chest.     Patient left voicemail, sent [employer] message, and asked clinical staff to contact patient, as well.

## 2024-12-30 ENCOUNTER — HOSPITAL ENCOUNTER (OUTPATIENT)
Dept: CT IMAGING | Facility: HOSPITAL | Age: 69
Discharge: HOME | End: 2024-12-30
Payer: COMMERCIAL

## 2024-12-30 DIAGNOSIS — R05.2 SUBACUTE COUGH: ICD-10-CM

## 2024-12-30 DIAGNOSIS — R50.9 FEVER, UNSPECIFIED FEVER CAUSE: ICD-10-CM

## 2024-12-30 PROCEDURE — 71250 CT THORAX DX C-: CPT

## 2025-02-01 ENCOUNTER — APPOINTMENT (OUTPATIENT)
Dept: LAB | Facility: CLINIC | Age: 70
End: 2025-02-01
Payer: COMMERCIAL

## 2025-02-01 DIAGNOSIS — M85.89 OSTEOPENIA OF MULTIPLE SITES: ICD-10-CM

## 2025-02-01 DIAGNOSIS — M89.9 DISORDER OF BONE, UNSPECIFIED: ICD-10-CM

## 2025-02-01 DIAGNOSIS — E04.2 MULTIPLE THYROID NODULES: ICD-10-CM

## 2025-02-01 DIAGNOSIS — R79.89 LOW TSH LEVEL: ICD-10-CM

## 2025-02-01 LAB
25(OH)D3 SERPL-MCNC: 37.3 NG/ML (ref 30–100)
T4 FREE SERPL-MCNC: 0.84 NG/DL (ref 0.61–1.12)
TSH SERPL DL<=0.05 MIU/L-ACNC: 0.24 UIU/ML (ref 0.45–4.5)

## 2025-02-01 PROCEDURE — 84439 ASSAY OF FREE THYROXINE: CPT

## 2025-02-01 PROCEDURE — 84443 ASSAY THYROID STIM HORMONE: CPT

## 2025-02-01 PROCEDURE — 36415 COLL VENOUS BLD VENIPUNCTURE: CPT

## 2025-02-01 PROCEDURE — 82306 VITAMIN D 25 HYDROXY: CPT

## 2025-02-03 ENCOUNTER — RESULTS FOLLOW-UP (OUTPATIENT)
Dept: ENDOCRINOLOGY | Facility: CLINIC | Age: 70
End: 2025-02-03

## 2025-02-06 ENCOUNTER — TELEPHONE (OUTPATIENT)
Age: 70
End: 2025-02-06

## 2025-02-06 DIAGNOSIS — E04.2 MULTIPLE THYROID NODULES: ICD-10-CM

## 2025-02-06 DIAGNOSIS — M85.89 OSTEOPENIA OF MULTIPLE SITES: Primary | ICD-10-CM

## 2025-02-06 DIAGNOSIS — R79.89 LOW TSH LEVEL: Primary | ICD-10-CM

## 2025-02-06 NOTE — TELEPHONE ENCOUNTER
Covering for Dr. Bridges. Labs show TSH is low at 0.245, relatively stable from last lab in January 2024. A mildly low TSH in this setting can be suggestive of mild overactivity of the thyroid gland. I can order a thyroid uptake scan for her (a functional thyroid study) and this would tell us more definitively. Otherwise, she may plan to discuss with Dr. Bridges upon her return. Follow up thyroid labs are already ordered for next follow up and a DXA order is available in her chart from Dr. Bridges which appears to have an expected date of May 2025, which is 2-years from last dxa

## 2025-02-06 NOTE — TELEPHONE ENCOUNTER
Pt needs a new rx for her bone density spine and hip. Pt tried to schedule it but it needs to be updated. Can only get every 2 years please let the pt know when its done   respiratory distress

## 2025-02-06 NOTE — TELEPHONE ENCOUNTER
Pt cancelled 2/12/25 appt. She is now scheduled for June 2025.    Pt would like the results  of most recent labs.     Pt also asking for new lab orders for next visit.

## 2025-02-11 DIAGNOSIS — M85.89 OSTEOPENIA OF MULTIPLE SITES: Primary | ICD-10-CM

## 2025-02-11 DIAGNOSIS — R79.89 LOW TSH LEVEL: Primary | ICD-10-CM

## 2025-02-11 DIAGNOSIS — M85.88 OTHER SPECIFIED DISORDERS OF BONE DENSITY AND STRUCTURE, OTHER SITE: ICD-10-CM

## 2025-04-17 ENCOUNTER — TELEPHONE (OUTPATIENT)
Dept: DERMATOLOGY | Facility: CLINIC | Age: 70
End: 2025-04-17

## 2025-04-17 NOTE — TELEPHONE ENCOUNTER
Called pt regarding their appt with Dr. Suggs on 7/16 in [facility] office. As of 7/1, provider will no longer be seeing pt's in [place]. Spoke with pt and appt has been r/s for 7/17 @ 10:20 with Dr. Suggs at [place].

## 2025-04-28 ENCOUNTER — HOSPITAL ENCOUNTER (OUTPATIENT)
Dept: RADIOLOGY | Age: 70
Discharge: HOME | End: 2025-04-28
Payer: COMMERCIAL

## 2025-04-28 VITALS — WEIGHT: 150 LBS | HEIGHT: 63 IN | BODY MASS INDEX: 26.58 KG/M2

## 2025-04-28 DIAGNOSIS — Z12.31 ENCOUNTER FOR SCREENING MAMMOGRAM FOR MALIGNANT NEOPLASM OF BREAST: ICD-10-CM

## 2025-04-28 PROCEDURE — 77063 BREAST TOMOSYNTHESIS BI: CPT

## 2025-04-28 PROCEDURE — 77067 SCR MAMMO BI INCL CAD: CPT

## 2025-05-01 ENCOUNTER — RESULTS FOLLOW-UP (OUTPATIENT)
Dept: OBGYN CLINIC | Facility: CLINIC | Age: 70
End: 2025-05-01

## 2025-05-05 ENCOUNTER — HOSPITAL ENCOUNTER (OUTPATIENT)
Dept: RADIOLOGY | Age: 70
Discharge: HOME | End: 2025-05-05
Payer: COMMERCIAL

## 2025-05-05 VITALS — HEIGHT: 62 IN | BODY MASS INDEX: 28.34 KG/M2 | WEIGHT: 154 LBS

## 2025-05-05 DIAGNOSIS — M85.88 OTHER SPECIFIED DISORDERS OF BONE DENSITY AND STRUCTURE, OTHER SITE: ICD-10-CM

## 2025-05-05 DIAGNOSIS — M85.89 OSTEOPENIA OF MULTIPLE SITES: ICD-10-CM

## 2025-05-05 PROCEDURE — 77080 DXA BONE DENSITY AXIAL: CPT

## 2025-05-12 ENCOUNTER — HOSPITAL ENCOUNTER (OUTPATIENT)
Dept: NUCLEAR MEDICINE | Facility: HOSPITAL | Age: 70
Discharge: HOME | End: 2025-05-12
Attending: INTERNAL MEDICINE
Payer: COMMERCIAL

## 2025-05-12 ENCOUNTER — RESULTS FOLLOW-UP (OUTPATIENT)
Dept: ENDOCRINOLOGY | Facility: CLINIC | Age: 70
End: 2025-05-12

## 2025-05-12 DIAGNOSIS — R79.89 LOW TSH LEVEL: ICD-10-CM

## 2025-05-12 PROCEDURE — 78014 THYROID IMAGING W/BLOOD FLOW: CPT

## 2025-05-12 PROCEDURE — A9516 IODINE I-123 SOD IODIDE MIC: HCPCS

## 2025-05-13 ENCOUNTER — HOSPITAL ENCOUNTER (OUTPATIENT)
Dept: NUCLEAR MEDICINE | Facility: HOSPITAL | Age: 70
Discharge: HOME | End: 2025-05-13
Attending: INTERNAL MEDICINE
Payer: COMMERCIAL

## 2025-05-15 ENCOUNTER — RESULTS FOLLOW-UP (OUTPATIENT)
Dept: ENDOCRINOLOGY | Facility: CLINIC | Age: 70
End: 2025-05-15

## 2025-05-16 NOTE — RESULT ENCOUNTER NOTE
Please call the patient regarding labs - thyroid uptake and scan shows an overactive thyroid nodule , will discuss management on upcoming visit

## 2025-05-18 DIAGNOSIS — E78.2 MIXED HYPERLIPIDEMIA: ICD-10-CM

## 2025-05-19 DIAGNOSIS — R17 SERUM TOTAL BILIRUBIN ELEVATED: ICD-10-CM

## 2025-05-19 DIAGNOSIS — E78.2 MIXED HYPERLIPIDEMIA: Primary | ICD-10-CM

## 2025-05-19 DIAGNOSIS — Z13.1 SCREENING FOR DIABETES MELLITUS: ICD-10-CM

## 2025-05-19 RX ORDER — ATORVASTATIN CALCIUM 20 MG/1
20 TABLET, FILM COATED ORAL
Qty: 30 TABLET | Refills: 1 | Status: SHIPPED | OUTPATIENT
Start: 2025-05-19

## 2025-05-20 ENCOUNTER — APPOINTMENT (OUTPATIENT)
Dept: LAB | Facility: CLINIC | Age: 70
End: 2025-05-20
Attending: INTERNAL MEDICINE
Payer: COMMERCIAL

## 2025-05-20 ENCOUNTER — RESULTS FOLLOW-UP (OUTPATIENT)
Dept: ENDOCRINOLOGY | Facility: CLINIC | Age: 70
End: 2025-05-20

## 2025-05-20 DIAGNOSIS — R79.89 LOW TSH LEVEL: ICD-10-CM

## 2025-05-20 DIAGNOSIS — E78.2 MIXED HYPERLIPIDEMIA: ICD-10-CM

## 2025-05-20 DIAGNOSIS — R17 SERUM TOTAL BILIRUBIN ELEVATED: ICD-10-CM

## 2025-05-20 DIAGNOSIS — Z13.1 SCREENING FOR DIABETES MELLITUS: ICD-10-CM

## 2025-05-20 DIAGNOSIS — E04.2 MULTIPLE THYROID NODULES: ICD-10-CM

## 2025-05-20 LAB
ALBUMIN SERPL BCG-MCNC: 4.1 G/DL (ref 3.5–5)
ALP SERPL-CCNC: 84 U/L (ref 34–104)
ALT SERPL W P-5'-P-CCNC: 16 U/L (ref 7–52)
ANION GAP SERPL CALCULATED.3IONS-SCNC: 7 MMOL/L (ref 4–13)
AST SERPL W P-5'-P-CCNC: 16 U/L (ref 13–39)
BASOPHILS # BLD AUTO: 0.03 THOUSANDS/ÂΜL (ref 0–0.1)
BASOPHILS NFR BLD AUTO: 1 % (ref 0–1)
BILIRUB SERPL-MCNC: 2.09 MG/DL (ref 0.2–1)
BUN SERPL-MCNC: 11 MG/DL (ref 5–25)
CALCIUM SERPL-MCNC: 9 MG/DL (ref 8.4–10.2)
CHLORIDE SERPL-SCNC: 106 MMOL/L (ref 96–108)
CHOLEST SERPL-MCNC: 195 MG/DL (ref ?–200)
CO2 SERPL-SCNC: 29 MMOL/L (ref 21–32)
CREAT SERPL-MCNC: 0.71 MG/DL (ref 0.6–1.3)
EOSINOPHIL # BLD AUTO: 0.05 THOUSAND/ÂΜL (ref 0–0.61)
EOSINOPHIL NFR BLD AUTO: 1 % (ref 0–6)
ERYTHROCYTE [DISTWIDTH] IN BLOOD BY AUTOMATED COUNT: 13.2 % (ref 11.6–15.1)
EST. AVERAGE GLUCOSE BLD GHB EST-MCNC: 117 MG/DL
GFR SERPL CREATININE-BSD FRML MDRD: 87 ML/MIN/1.73SQ M
GLUCOSE P FAST SERPL-MCNC: 93 MG/DL (ref 65–99)
HBA1C MFR BLD: 5.7 %
HCT VFR BLD AUTO: 40.6 % (ref 34.8–46.1)
HDLC SERPL-MCNC: 73 MG/DL
HGB BLD-MCNC: 13.7 G/DL (ref 11.5–15.4)
IMM GRANULOCYTES # BLD AUTO: 0.01 THOUSAND/UL (ref 0–0.2)
IMM GRANULOCYTES NFR BLD AUTO: 0 % (ref 0–2)
LDLC SERPL CALC-MCNC: 97 MG/DL (ref 0–100)
LDLC SERPL DIRECT ASSAY-MCNC: 109 MG/DL (ref 0–100)
LYMPHOCYTES # BLD AUTO: 1.64 THOUSANDS/ÂΜL (ref 0.6–4.47)
LYMPHOCYTES NFR BLD AUTO: 39 % (ref 14–44)
MCH RBC QN AUTO: 30.2 PG (ref 26.8–34.3)
MCHC RBC AUTO-ENTMCNC: 33.7 G/DL (ref 31.4–37.4)
MCV RBC AUTO: 89 FL (ref 82–98)
MONOCYTES # BLD AUTO: 0.35 THOUSAND/ÂΜL (ref 0.17–1.22)
MONOCYTES NFR BLD AUTO: 8 % (ref 4–12)
NEUTROPHILS # BLD AUTO: 2.16 THOUSANDS/ÂΜL (ref 1.85–7.62)
NEUTS SEG NFR BLD AUTO: 51 % (ref 43–75)
NONHDLC SERPL-MCNC: 122 MG/DL
NRBC BLD AUTO-RTO: 0 /100 WBCS
PLATELET # BLD AUTO: 237 THOUSANDS/UL (ref 149–390)
PMV BLD AUTO: 10.9 FL (ref 8.9–12.7)
POTASSIUM SERPL-SCNC: 3.9 MMOL/L (ref 3.5–5.3)
PROT SERPL-MCNC: 6.2 G/DL (ref 6.4–8.4)
RBC # BLD AUTO: 4.54 MILLION/UL (ref 3.81–5.12)
SODIUM SERPL-SCNC: 142 MMOL/L (ref 135–147)
T4 FREE SERPL-MCNC: 0.83 NG/DL (ref 0.61–1.12)
TRIGL SERPL-MCNC: 123 MG/DL (ref ?–150)
TSH SERPL DL<=0.05 MIU/L-ACNC: 0.25 UIU/ML (ref 0.45–4.5)
WBC # BLD AUTO: 4.24 THOUSAND/UL (ref 4.31–10.16)

## 2025-05-20 PROCEDURE — 83036 HEMOGLOBIN GLYCOSYLATED A1C: CPT

## 2025-05-20 PROCEDURE — 84443 ASSAY THYROID STIM HORMONE: CPT

## 2025-05-20 PROCEDURE — 36415 COLL VENOUS BLD VENIPUNCTURE: CPT

## 2025-05-20 PROCEDURE — 84439 ASSAY OF FREE THYROXINE: CPT

## 2025-05-20 PROCEDURE — 85025 COMPLETE CBC W/AUTO DIFF WBC: CPT

## 2025-05-20 PROCEDURE — 80053 COMPREHEN METABOLIC PANEL: CPT

## 2025-05-20 PROCEDURE — 83721 ASSAY OF BLOOD LIPOPROTEIN: CPT

## 2025-05-20 PROCEDURE — 80061 LIPID PANEL: CPT

## 2025-06-05 ENCOUNTER — OFFICE VISIT (OUTPATIENT)
Dept: ENDOCRINOLOGY | Facility: CLINIC | Age: 70
End: 2025-06-05
Payer: COMMERCIAL

## 2025-06-05 VITALS
HEIGHT: 62 IN | DIASTOLIC BLOOD PRESSURE: 68 MMHG | WEIGHT: 150 LBS | HEART RATE: 63 BPM | OXYGEN SATURATION: 96 % | SYSTOLIC BLOOD PRESSURE: 122 MMHG | BODY MASS INDEX: 27.6 KG/M2

## 2025-06-05 DIAGNOSIS — E04.2 MULTIPLE THYROID NODULES: Primary | ICD-10-CM

## 2025-06-05 PROCEDURE — 99214 OFFICE O/P EST MOD 30 MIN: CPT

## 2025-06-05 RX ORDER — DOXYCYCLINE 40 MG/1
CAPSULE ORAL
COMMUNITY

## 2025-06-05 RX ORDER — OMEPRAZOLE 20 MG/1
1 CAPSULE, DELAYED RELEASE ORAL DAILY
COMMUNITY
Start: 2025-04-04

## 2025-06-05 NOTE — PROGRESS NOTES
"Name: Lester Love      : 1955      MRN: [id number]  Encounter Provider: AKASH Keenan  Encounter Date: 2025   Encounter department: [facility] [place]    Chief Complaint   Patient presents with    Hyperthyroidism     History of Present Illness   History of Present Illness  This is a 70 year old female with a history of low TSH, osteopenia and vitamin D deficiency.      Reports extreme weight loss in the . Reports weight loss has been off and on over the years.   Palpitations- \"sometimes\"   Reports \"always being a bad sleeper\"   Hair loss, fatigue- denies    Reports difficulty swallowing and raspy voice after URI in 2024.   Had right salivary gland removed due to stones in     Uptake scan complete with showed increased uptake at the right lower pole.   DEXA completed 2025- shows osteopenia      Assessment & Plan  Subclinical hyperthyroidism-we discussed options today including right lobectomy or medication.  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  She would like some time to think about her options and discuss with her family members regarding which route to proceed with.  Risks for each option were discussed with patient and sequelae of untreated low TSH also discussed with patient.  She should seek treatment due to being over 65, history of osteopenia and risk for heart conditions associated with low TSH.   Difficulty swallowing/raspy voice- we will update a thyroid US to ensure no changes to nodules.        Review of Systems   Constitutional:  Negative for chills and fever.   HENT:  Positive for trouble swallowing and voice change. Negative for ear pain and sore throat.    Eyes:  Negative for pain and visual disturbance.   Respiratory:  Negative for cough and shortness of breath.    Cardiovascular:  Negative for chest pain and palpitations. " "  Gastrointestinal:  Negative for abdominal pain and vomiting.   Genitourinary:  Negative for dysuria and hematuria.   Musculoskeletal:  Negative for arthralgias and back pain.   Skin:  Negative for color change and rash.   Neurological:  Negative for seizures and syncope.   All other systems reviewed and are negative.   as per HPI       Medical History Reviewed by provider this encounter:     .    Objective   /68   Pulse 63   Ht 5' 2.25\" (1.581 m)   Wt 68 kg (150 lb)   SpO2 96%   BMI 27.22 kg/m²      Body mass index is 27.22 kg/m².  Wt Readings from Last 3 Encounters:   06/05/25 68 kg (150 lb)   05/05/25 69.9 kg (154 lb)   04/28/25 68 kg (150 lb)     Physical Exam  Vitals reviewed.   HENT:      Head: Normocephalic and atraumatic.     Cardiovascular:      Rate and Rhythm: Normal rate.   Pulmonary:      Effort: Pulmonary effort is normal.     Neurological:      Mental Status: She is alert.       Physical Exam          Results    Labs:   Lab Results   Component Value Date    HGBA1C 5.7 (H) 05/20/2025    HGBA1C 5.5 04/11/2022    HGBA1C 5.4 06/11/2020     Lab Results   Component Value Date    CREATININE 0.71 05/20/2025    CREATININE 0.88 09/17/2024    CREATININE 0.80 05/13/2024    BUN 11 05/20/2025     09/20/2017    K 3.9 05/20/2025     05/20/2025    CO2 29 05/20/2025     eGFRcr   Date Value Ref Range Status   09/28/2023 67 > OR = 60 mL/min/1.73m2 Final     eGFR   Date Value Ref Range Status   05/20/2025 87 ml/min/1.73sq m Final     Lab Results   Component Value Date    CHOL 161 08/10/2017    HDL 73 05/20/2025    TRIG 123 05/20/2025     Lab Results   Component Value Date    ALT 16 05/20/2025    AST 16 05/20/2025    ALKPHOS 84 05/20/2025    BILITOT 1.3 (H) 09/20/2017     Lab Results   Component Value Date    EHM2OKQUDPXO 0.252 (L) 05/20/2025    UXA1TCDQFOSY 0.245 (L) 02/01/2025    SSO7YGTGPVKV 0.36 (L) 09/20/2017     Lab Results   Component Value Date    FREET4 0.83 05/20/2025       Patient " Instructions   Alternative to methimazole would be PTU  Second option is surgery. Our preferred surgeon is Dr. Quezada     Patient Education     Methimazole (meth IM a zole)   Brand Names: US Tapazole [DSC]   Brand Names: Zaire JAMP-Methimazole; Mar-Methimazole; Tapazole   What is this drug used for?   It is used to treat an overactive thyroid gland.  What do I need to tell my doctor BEFORE I take this drug?   If you are allergic to this drug; any part of this drug; or any other drugs, foods, or substances. Tell your doctor about the allergy and what signs you had.  This drug may interact with other drugs or health problems.  Tell your doctor and pharmacist about all of your drugs (prescription or OTC, natural products, vitamins) and health problems. You must check to make sure that it is safe for you to take this drug with all of your drugs and health problems. Do not start, stop, or change the dose of any drug without checking with your doctor.  What are some things I need to know or do while I take this drug?   Tell all of your health care providers that you take this drug. This includes your doctors, nurses, pharmacists, and dentists.  Have blood work checked as you have been told by the doctor. Talk with the doctor.  Some blood vessel problems have happened with this drug. Sometimes, this has led to severe health problems like kidney problems, nerve problems, and bleeding in the lungs. If you have questions, talk with the doctor.  This drug may cause harm to the unborn baby if you take it while you are pregnant, especially in the first trimester.  If you are pregnant or you get pregnant while taking this drug, call your doctor right away.  Tell your doctor if you are breast-feeding. You will need to talk about any risks to your baby.  Have your baby's thyroid checked if you are using this drug and breast-feeding.  What are some side effects that I need to call my doctor about right away?   WARNING/CAUTION: Even  though it may be rare, some people may have very bad and sometimes deadly side effects when taking a drug. Tell your doctor or get medical help right away if you have any of the following signs or symptoms that may be related to a very bad side effect:  Signs of an allergic reaction, like rash; hives; itching; red, swollen, blistered, or peeling skin with or without fever; wheezing; tightness in the chest or throat; trouble breathing, swallowing, or talking; unusual hoarseness; or swelling of the mouth, face, lips, tongue, or throat.  Signs of liver problems like dark urine, tiredness, decreased appetite, upset stomach or stomach pain, light-colored stools, throwing up, or yellow skin or eyes.  Signs of kidney problems like unable to pass urine, change in how much urine is passed, blood in the urine, or a big weight gain.  Signs of lupus like a rash on the cheeks or other body parts, sunburn easy, muscle or joint pain, chest pain or shortness of breath, or swelling in the arms or legs.  Signs of low blood sugar like dizziness, headache, feeling sleepy, feeling weak, shaking, a fast heartbeat, confusion, hunger, or sweating.  Shortness of breath.  Coughing up blood.  Swelling.  A burning, numbness, or tingling feeling that is not normal.  Swollen gland.  Low blood cell counts have happened with this drug. If blood cell counts get very low, this can lead to bleeding problems, infections, or anemia. Call your doctor right away if you have signs of infection like fever, chills, or sore throat; any unexplained bruising or bleeding; or if you feel very tired or weak.  What are some other side effects of this drug?   All drugs may cause side effects. However, many people have no side effects or only have minor side effects. Call your doctor or get medical help if any of these side effects or any other side effects bother you or do not go away:  Itching.  Change in taste.  Feeling sleepy.  Headache.  Muscle or joint  pain.  Hair loss.  Upset stomach or throwing up.  Stomach pain.  Change in color of skin.  These are not all of the side effects that may occur. If you have questions about side effects, call your doctor. Call your doctor for medical advice about side effects.  You may report side effects to your national health agency.  You may report side effects to the FDA at 1-556.958.1843. You may also report side effects at https://www.fda.gov/medwatch.  How is this drug best taken?   Use this drug as ordered by your doctor. Read all information given to you. Follow all instructions closely.  Keep taking this drug as you have been told by your doctor or other health care provider, even if you feel well.  What do I do if I miss a dose?   Take a missed dose as soon as you think about it.  If it is close to the time for your next dose, skip the missed dose and go back to your normal time.  Do not take 2 doses at the same time or extra doses.  How do I store and/or throw out this drug?   Store at room temperature in a dry place. Do not store in a bathroom.  Keep all drugs in a safe place. Keep all drugs out of the reach of children and pets.  Throw away unused or  drugs. Do not flush down a toilet or pour down a drain unless you are told to do so. Check with your pharmacist if you have questions about the best way to throw out drugs. There may be drug take-back programs in your area.  General drug facts   If your symptoms or health problems do not get better or if they become worse, call your doctor.  Do not share your drugs with others and do not take anyone else's drugs.  Some drugs may have another patient information leaflet. If you have any questions about this drug, please talk with your doctor, nurse, pharmacist, or other health care provider.  Some drugs may have another patient information leaflet. Check with your pharmacist. If you have any questions about this drug, please talk with your doctor, nurse,  pharmacist, or other health care provider.  If you think there has been an overdose, call your poison control center or get medical care right away. Be ready to tell or show what was taken, how much, and when it happened.  Consumer Information Use and Disclaimer   This generalized information is a limited summary of diagnosis, treatment, and/or medication information. It is not meant to be comprehensive and should be used as a tool to help the user understand and/or assess potential diagnostic and treatment options. It does NOT include all information about conditions, treatments, medications, side effects, or risks that may apply to a specific patient. It is not intended to be medical advice or a substitute for the medical advice, diagnosis, or treatment of a health care provider based on the health care provider's examination and assessment of a patient's specific and unique circumstances. Patients must speak with a health care provider for complete information about their health, medical questions, and treatment options, including any risks or benefits regarding use of medications. This information does not endorse any treatments or medications as safe, effective, or approved for treating a specific patient. UpToDate, Inc. and its affiliates disclaim any warranty or liability relating to this information or the use thereof. The use of this information is governed by the Terms of Use, available at https://www.wolterskluwer.com/en/know/clinical-effectiveness-terms.  Last Reviewed Date   2022-02-02  Copyright   © 2024 UpToDate, Inc. and its affiliates and/or licensors. All rights reserved.            Discussed with the patient and all questioned fully answered. She will call me if any problems arise.

## 2025-06-05 NOTE — PATIENT INSTRUCTIONS
Alternative to methimazole would be PTU  Second option is surgery. Our preferred surgeon is Dr. Quezada     Patient Education     Methimazole (meth IM a zole)   Brand Names: US Tapazole [DSC]   Brand Names: Zaire JAMP-Methimazole; Mar-Methimazole; Tapazole   What is this drug used for?   It is used to treat an overactive thyroid gland.  What do I need to tell my doctor BEFORE I take this drug?   If you are allergic to this drug; any part of this drug; or any other drugs, foods, or substances. Tell your doctor about the allergy and what signs you had.  This drug may interact with other drugs or health problems.  Tell your doctor and pharmacist about all of your drugs (prescription or OTC, natural products, vitamins) and health problems. You must check to make sure that it is safe for you to take this drug with all of your drugs and health problems. Do not start, stop, or change the dose of any drug without checking with your doctor.  What are some things I need to know or do while I take this drug?   Tell all of your health care providers that you take this drug. This includes your doctors, nurses, pharmacists, and dentists.  Have blood work checked as you have been told by the doctor. Talk with the doctor.  Some blood vessel problems have happened with this drug. Sometimes, this has led to severe health problems like kidney problems, nerve problems, and bleeding in the lungs. If you have questions, talk with the doctor.  This drug may cause harm to the unborn baby if you take it while you are pregnant, especially in the first trimester.  If you are pregnant or you get pregnant while taking this drug, call your doctor right away.  Tell your doctor if you are breast-feeding. You will need to talk about any risks to your baby.  Have your baby's thyroid checked if you are using this drug and breast-feeding.  What are some side effects that I need to call my doctor about right away?   WARNING/CAUTION: Even though it may  be rare, some people may have very bad and sometimes deadly side effects when taking a drug. Tell your doctor or get medical help right away if you have any of the following signs or symptoms that may be related to a very bad side effect:  Signs of an allergic reaction, like rash; hives; itching; red, swollen, blistered, or peeling skin with or without fever; wheezing; tightness in the chest or throat; trouble breathing, swallowing, or talking; unusual hoarseness; or swelling of the mouth, face, lips, tongue, or throat.  Signs of liver problems like dark urine, tiredness, decreased appetite, upset stomach or stomach pain, light-colored stools, throwing up, or yellow skin or eyes.  Signs of kidney problems like unable to pass urine, change in how much urine is passed, blood in the urine, or a big weight gain.  Signs of lupus like a rash on the cheeks or other body parts, sunburn easy, muscle or joint pain, chest pain or shortness of breath, or swelling in the arms or legs.  Signs of low blood sugar like dizziness, headache, feeling sleepy, feeling weak, shaking, a fast heartbeat, confusion, hunger, or sweating.  Shortness of breath.  Coughing up blood.  Swelling.  A burning, numbness, or tingling feeling that is not normal.  Swollen gland.  Low blood cell counts have happened with this drug. If blood cell counts get very low, this can lead to bleeding problems, infections, or anemia. Call your doctor right away if you have signs of infection like fever, chills, or sore throat; any unexplained bruising or bleeding; or if you feel very tired or weak.  What are some other side effects of this drug?   All drugs may cause side effects. However, many people have no side effects or only have minor side effects. Call your doctor or get medical help if any of these side effects or any other side effects bother you or do not go away:  Itching.  Change in taste.  Feeling sleepy.  Headache.  Muscle or joint pain.  Hair  loss.  Upset stomach or throwing up.  Stomach pain.  Change in color of skin.  These are not all of the side effects that may occur. If you have questions about side effects, call your doctor. Call your doctor for medical advice about side effects.  You may report side effects to your national health agency.  You may report side effects to the FDA at 1-930.453.9150. You may also report side effects at https://www.fda.gov/medwatch.  How is this drug best taken?   Use this drug as ordered by your doctor. Read all information given to you. Follow all instructions closely.  Keep taking this drug as you have been told by your doctor or other health care provider, even if you feel well.  What do I do if I miss a dose?   Take a missed dose as soon as you think about it.  If it is close to the time for your next dose, skip the missed dose and go back to your normal time.  Do not take 2 doses at the same time or extra doses.  How do I store and/or throw out this drug?   Store at room temperature in a dry place. Do not store in a bathroom.  Keep all drugs in a safe place. Keep all drugs out of the reach of children and pets.  Throw away unused or  drugs. Do not flush down a toilet or pour down a drain unless you are told to do so. Check with your pharmacist if you have questions about the best way to throw out drugs. There may be drug take-back programs in your area.  General drug facts   If your symptoms or health problems do not get better or if they become worse, call your doctor.  Do not share your drugs with others and do not take anyone else's drugs.  Some drugs may have another patient information leaflet. If you have any questions about this drug, please talk with your doctor, nurse, pharmacist, or other health care provider.  Some drugs may have another patient information leaflet. Check with your pharmacist. If you have any questions about this drug, please talk with your doctor, nurse, pharmacist, or other  health care provider.  If you think there has been an overdose, call your poison control center or get medical care right away. Be ready to tell or show what was taken, how much, and when it happened.  Consumer Information Use and Disclaimer   This generalized information is a limited summary of diagnosis, treatment, and/or medication information. It is not meant to be comprehensive and should be used as a tool to help the user understand and/or assess potential diagnostic and treatment options. It does NOT include all information about conditions, treatments, medications, side effects, or risks that may apply to a specific patient. It is not intended to be medical advice or a substitute for the medical advice, diagnosis, or treatment of a health care provider based on the health care provider's examination and assessment of a patient's specific and unique circumstances. Patients must speak with a health care provider for complete information about their health, medical questions, and treatment options, including any risks or benefits regarding use of medications. This information does not endorse any treatments or medications as safe, effective, or approved for treating a specific patient. UpToDate, Inc. and its affiliates disclaim any warranty or liability relating to this information or the use thereof. The use of this information is governed by the Terms of Use, available at https://www.wolterskluwer.com/en/know/clinical-effectiveness-terms.  Last Reviewed Date   2022-02-02  Copyright   © 2024 UpToDate, Inc. and its affiliates and/or licensors. All rights reserved.

## 2025-06-06 ENCOUNTER — TELEPHONE (OUTPATIENT)
Age: 70
End: 2025-06-06

## 2025-06-06 NOTE — TELEPHONE ENCOUNTER
Patient called asking to have her recent labwork, dexa and also the thyroid imaging faxed to Dr Ken  fax#[phone]. This dr is not in our system and she wants him to have the results for her appointment.

## 2025-06-10 NOTE — TELEPHONE ENCOUNTER
Emailed patient release of information and patient will fill out and email it back us. Told patient as soon as we have it back we will fax over records

## 2025-06-11 DIAGNOSIS — E78.2 MIXED HYPERLIPIDEMIA: ICD-10-CM

## 2025-06-12 ENCOUNTER — TELEPHONE (OUTPATIENT)
Dept: ENDOCRINOLOGY | Facility: CLINIC | Age: 70
End: 2025-06-12

## 2025-06-12 RX ORDER — ATORVASTATIN CALCIUM 20 MG/1
20 TABLET, FILM COATED ORAL
Qty: 90 TABLET | Refills: 1 | Status: SHIPPED | OUTPATIENT
Start: 2025-06-12

## 2025-06-13 ENCOUNTER — HOSPITAL ENCOUNTER (OUTPATIENT)
Dept: ULTRASOUND IMAGING | Facility: HOSPITAL | Age: 70
Discharge: HOME | End: 2025-06-13
Payer: COMMERCIAL

## 2025-06-13 DIAGNOSIS — E04.2 MULTIPLE THYROID NODULES: ICD-10-CM

## 2025-06-13 PROCEDURE — 76536 US EXAM OF HEAD AND NECK: CPT

## 2025-07-17 ENCOUNTER — OFFICE VISIT (OUTPATIENT)
Dept: DERMATOLOGY | Facility: CLINIC | Age: 70
End: 2025-07-17
Payer: COMMERCIAL

## 2025-07-17 VITALS — HEIGHT: 62 IN | BODY MASS INDEX: 28.29 KG/M2 | WEIGHT: 153.7 LBS | TEMPERATURE: 97.4 F

## 2025-07-17 DIAGNOSIS — L70.0 ACNE VULGARIS: Primary | ICD-10-CM

## 2025-07-17 PROCEDURE — 99204 OFFICE O/P NEW MOD 45 MIN: CPT | Performed by: DERMATOLOGY

## 2025-07-17 RX ORDER — AZELAIC ACID 0.15 G/G
GEL TOPICAL
Qty: 50 G | Refills: 3 | Status: SHIPPED | OUTPATIENT
Start: 2025-07-17

## 2025-07-17 NOTE — PROGRESS NOTES
"[facility] Note     Patient Name: Lester Love  Encounter Date: 7/17/25       Have you been cared for by a [facility] Dermatologist in the last 3 years and, if so, which description applies to you? NO. I am considered a \"new\" patient and must complete all patient intake questions. I am not of child-bearing potential.     REVIEW OF SYSTEMS:  Have you recently had or currently have any of the following? Recent fever or chills? No  Any non-healing wound? No   PAST MEDICAL HISTORY:  Have you personally ever had or currently have any of the following?  If \"YES,\" then please provide more detail. Skin cancer (such as Melanoma, Basal Cell Carcinoma, Squamous Cell Carcinoma?  No  Tuberculosis, HIV/AIDS, Hepatitis B or C: No  Radiation Treatment No   HISTORY OF IMMUNOSUPPRESSION:   Do you have a history of any of the following:  Systemic Immunosuppression such as Diabetes, Biologic or Immunotherapy, Chemotherapy, Organ Transplantation, Bone Marrow Transplantation or Prednisone?  No     Answering \"YES\" requires the addition of the dotphrase \"IMMUNOSUPPRESSED\" as the first diagnosis of the patient's visit.   FAMILY HISTORY:  Any \"first degree relatives\" (parent, brother, sister, or child) with the following?    Skin Cancer, Pancreatic or Other Cancer? YES, mother- gastric cancer   PATIENT EXPERIENCE:    Do you want the Dermatologist to perform a COMPLETE skin exam today including a clinical examination under the \"bra and underwear\" areas?  NO  If necessary, do we have your permission to call and leave a detailed message on your Preferred Phone number that includes your specific medical information?  Yes      Allergies[1] Current Medications[2]              Whom besides the patient is providing clinical information about today's encounter?   NO ADDITIONAL HISTORIAN (patient alone provided history)    Physical Exam and Assessment/Plan by Diagnosis:      ACNE VULGARIS    Physical Exam:  Anatomic Locations " "Involved: Face  Global Assessment: ALMOST CLEAR: A few scattered comedones and a few small inflammatory papules.   Scarring Present? NONE      Additional History of Present Condition:  patient is here regarding ongoing pustules on her face, she states the pustules \"come and go\" and a few spots of concern. Going on for a few years; was on azelaic acid a few years ago     - start azelaic acid 15% cream 1-2 times a day as needed for flares              Scribe Attestation      I,:  Melva Diggs MA am acting as a scribe while in the presence of the attending physician.:       I,:  Margarita Suggs MD personally performed the services described in this documentation    as scribed in my presence.:                    [1] No Known Allergies  [2]   Current Outpatient Medications:     atorvastatin (LIPITOR) 20 mg tablet, TAKE 1 TABLET BY MOUTH EVERYDAY AT BEDTIME, Disp: 90 tablet, Rfl: 1    Cholecalciferol (Vitamin D3) 50 MCG (2000 UT) TABS, Take 1 capsule by mouth in the morning., Disp: , Rfl:     Multiple Vitamins-Calcium (One-A-Day Womens Formula) TABS, Take 1 tablet by mouth daily, Disp: 30 tablet, Rfl: 6    Omega-3 Fatty Acids (Fish Oil) 1200 MG CAPS, Take by mouth, Disp: , Rfl:     omeprazole (PriLOSEC) 40 MG capsule, in the morning., Disp: , Rfl:     rizatriptan (MAXALT) 5 mg tablet, TAKE 1 TABLET (5 MG TOTAL) BY MOUTH ONCE AS NEEDED FOR MIGRAINE MAY REPEAT IN 2 HOURS IF NECESSARY, Disp: 18 tablet, Rfl: 1    clindamycin (CLEOCIN T) 1 % lotion, , Disp: , Rfl:     doxycycline (ORACEA) 40 MG capsule, Take by mouth, Disp: , Rfl:     mupirocin (BACTROBAN) 2 % ointment, Apply topically 2 (two) times a day, Disp: 15 g, Rfl: 1    omeprazole (PriLOSEC) 20 mg delayed release capsule, Take 1 capsule by mouth in the morning, Disp: , Rfl:     "

## (undated) DEVICE — GLOVE SRG BIOGEL 7.5

## (undated) DEVICE — SUT MONOCRYL 4-0 PS-2 18 IN Y496G

## (undated) DEVICE — SUT VICRYL 2-0 REEL 54 IN J286G

## (undated) DEVICE — LUBRICANT JELLY SURGILUBE TUBE 4OZ FLIP TOP

## (undated) DEVICE — CHLORAPREP HI-LITE 26ML ORANGE

## (undated) DEVICE — TOWEL SET X-RAY

## (undated) DEVICE — 3M™ STERI-STRIP™ REINFORCED ADHESIVE SKIN CLOSURES, R1547, 1/2 IN X 4 IN (12 MM X 100 MM), 6 STRIPS/ENVELOPE: Brand: 3M™ STERI-STRIP™

## (undated) DEVICE — SYRINGE 10ML LL CONTROL TOP

## (undated) DEVICE — INTENDED FOR TISSUE SEPARATION, AND OTHER PROCEDURES THAT REQUIRE A SHARP SURGICAL BLADE TO PUNCTURE OR CUT.: Brand: BARD-PARKER SAFETY BLADES SIZE 15, STERILE

## (undated) DEVICE — POUCH URO CATCHER II STERILE

## (undated) DEVICE — SCD SEQUENTIAL COMPRESSION COMFORT SLEEVE MEDIUM KNEE LENGTH: Brand: KENDALL SCD

## (undated) DEVICE — PROXIMATE SKIN STAPLERS (35 WIDE) CONTAINS 35 STAINLESS STEEL STAPLES (FIXED HEAD): Brand: PROXIMATE

## (undated) DEVICE — FIBER STD QUANTA 272 MICRON

## (undated) DEVICE — TRAY FOLEY 16FR URIMETER SURESTEP

## (undated) DEVICE — SPECIMEN CONTAINER: Brand: CARDINAL HEALTH

## (undated) DEVICE — STR GUIDEWIRE BOWL WITH LID PK: Brand: CARDINAL HEALTH

## (undated) DEVICE — FABRIC REINFORCED, SURGICAL GOWN, XL: Brand: CONVERTORS

## (undated) DEVICE — BETHLEHEM UNIVERSAL MINOR GEN: Brand: CARDINAL HEALTH

## (undated) DEVICE — SUT VICRYL 0 CT-1 27 IN J260H

## (undated) DEVICE — CYSTO TUBING TUR Y IRRIGATION

## (undated) DEVICE — STERILE MAJOR GENERAL PACK: Brand: CARDINAL HEALTH

## (undated) DEVICE — SEAL ADJUST BIOPSY PORT Y ADAPTOR

## (undated) DEVICE — NEEDLE HYPO 22G X 1-1/2 IN

## (undated) DEVICE — TUBING SUCTION 5MM X 12 FT

## (undated) DEVICE — CYSTOSCOPY PACK: Brand: CONVERTORS

## (undated) DEVICE — ADHESIVE SKIN HIGH VISCOSITY EXOFIN 1ML

## (undated) DEVICE — SUT SILK 2-0 SH 30 IN K833H

## (undated) DEVICE — ABDOMINAL PAD: Brand: DERMACEA

## (undated) DEVICE — PENCIL ELECTROSURG E-Z CLEAN -0035H

## (undated) DEVICE — VIOLET BRAIDED (POLYGLACTIN 910), SYNTHETIC ABSORBABLE SUTURE: Brand: COATED VICRYL

## (undated) DEVICE — JACKSON-PRATT 100CC BULB RESERVOIR: Brand: CARDINAL HEALTH

## (undated) DEVICE — SPONGE LAP 18 X 18 IN

## (undated) DEVICE — SUT VICRYL 0 REEL 54 IN J287G

## (undated) DEVICE — GLOVE SRG BIOGEL ECLIPSE 7

## (undated) DEVICE — INTENDED FOR TISSUE SEPARATION, AND OTHER PROCEDURES THAT REQUIRE A SHARP SURGICAL BLADE TO PUNCTURE OR CUT.: Brand: BARD-PARKER SAFETY BLADES SIZE 10, STERILE

## (undated) DEVICE — SUT SILK 0 SH 30 IN K834H

## (undated) DEVICE — GUIDEWIRE STRGHT TIP 0.038 IN SOLO PLUS

## (undated) DEVICE — LIGHT HANDLE COVER SLEEVE DISP BLUE STELLAR

## (undated) DEVICE — GAUZE SPONGES,16 PLY: Brand: CURITY

## (undated) DEVICE — MAYO STAND COVER: Brand: CONVERTORS

## (undated) DEVICE — POOLE SUCTION HANDLE: Brand: CARDINAL HEALTH

## (undated) DEVICE — TELFA NON-ADHERENT ABSORBENT DRESSING: Brand: TELFA

## (undated) DEVICE — BULB SYRINGE,IRRIGATION WITH PROTECTIVE CAP: Brand: DOVER

## (undated) DEVICE — SHEATH URETERAL ACCESS 10/12FR 25CM PROXIS

## (undated) DEVICE — PAD GROUNDING ADULT

## (undated) DEVICE — JP CHANNEL DRAIN 19FR, FULL FLUTES: Brand: JACKSON-PRATT

## (undated) DEVICE — SUT PDS II 3-0 SH 27 IN Z316H

## (undated) DEVICE — RADIOLOGY STERILE LABELS: Brand: CENTURION

## (undated) DEVICE — 3000CC GUARDIAN II: Brand: GUARDIAN

## (undated) DEVICE — SUT VICRYL 1 CT-1 27 IN J261H

## (undated) DEVICE — PLUMEPEN PRO 10FT

## (undated) DEVICE — SUT PDS II 1 CTX 36 IN Z371T

## (undated) DEVICE — BOWL: 16OZ PEELPOUCH 75/CS 16/PLT: Brand: MEDEGEN MEDICAL PRODUCTS, LLC

## (undated) DEVICE — GLOVE SRG BIOGEL ORTHOPEDIC 8

## (undated) DEVICE — PROXIMATE LINEAR STAPLER RELOADS, 60MM: Brand: PROXIMATE

## (undated) DEVICE — DRAPE EQUIPMENT RF WAND

## (undated) DEVICE — VIAL DECANTER

## (undated) DEVICE — PROXIMATE RELOADABLE LINEAR STAPLER, 60MM: Brand: PROXIMATE